# Patient Record
Sex: FEMALE | Race: WHITE | NOT HISPANIC OR LATINO | Employment: OTHER | URBAN - NONMETROPOLITAN AREA
[De-identification: names, ages, dates, MRNs, and addresses within clinical notes are randomized per-mention and may not be internally consistent; named-entity substitution may affect disease eponyms.]

---

## 2017-01-03 ENCOUNTER — TRANSCRIBE ORDERS (OUTPATIENT)
Dept: ADMINISTRATIVE | Facility: HOSPITAL | Age: 82
End: 2017-01-03

## 2017-01-03 ENCOUNTER — HOSPITAL ENCOUNTER (OUTPATIENT)
Dept: ULTRASOUND IMAGING | Facility: HOSPITAL | Age: 82
Discharge: HOME | End: 2017-01-03
Attending: OPHTHALMOLOGY | Admitting: OPHTHALMOLOGY

## 2017-01-03 DIAGNOSIS — I65.23 CAROTID ARTERY OBSTRUCTION, BILATERAL: ICD-10-CM

## 2017-01-03 DIAGNOSIS — I95.9 HYPOTENSION, UNSPECIFIED HYPOTENSION TYPE: Primary | ICD-10-CM

## 2017-01-03 DIAGNOSIS — I65.23 CAROTID ARTERY OBSTRUCTION, BILATERAL: Primary | ICD-10-CM

## 2017-01-03 PROCEDURE — 93880 EXTRACRANIAL BILAT STUDY: CPT

## 2017-01-05 ENCOUNTER — OFFICE VISIT (OUTPATIENT)
Dept: VASCULAR SURGERY | Facility: CLINIC | Age: 82
End: 2017-01-05

## 2017-01-05 ENCOUNTER — HOSPITAL ENCOUNTER (OUTPATIENT)
Dept: CARDIOLOGY | Facility: HOSPITAL | Age: 82
Discharge: HOME | End: 2017-01-05
Attending: OPHTHALMOLOGY

## 2017-01-05 ENCOUNTER — HOSPITAL ENCOUNTER (OUTPATIENT)
Dept: CARDIOLOGY | Facility: HOSPITAL | Age: 82
Discharge: HOME | End: 2017-01-05
Attending: OPHTHALMOLOGY | Admitting: OPHTHALMOLOGY

## 2017-01-05 VITALS
DIASTOLIC BLOOD PRESSURE: 68 MMHG | WEIGHT: 152 LBS | BODY MASS INDEX: 26.93 KG/M2 | HEIGHT: 63 IN | SYSTOLIC BLOOD PRESSURE: 140 MMHG | HEART RATE: 80 BPM

## 2017-01-05 VITALS
DIASTOLIC BLOOD PRESSURE: 75 MMHG | SYSTOLIC BLOOD PRESSURE: 140 MMHG | WEIGHT: 152 LBS | BODY MASS INDEX: 26.93 KG/M2 | HEIGHT: 63 IN

## 2017-01-05 DIAGNOSIS — E78.5 HYPERLIPIDEMIA, UNSPECIFIED HYPERLIPIDEMIA TYPE: ICD-10-CM

## 2017-01-05 DIAGNOSIS — I95.9 HYPOTENSION, UNSPECIFIED HYPOTENSION TYPE: ICD-10-CM

## 2017-01-05 DIAGNOSIS — I10 ESSENTIAL HYPERTENSION: ICD-10-CM

## 2017-01-05 DIAGNOSIS — I65.23 BILATERAL CAROTID ARTERY STENOSIS: Primary | ICD-10-CM

## 2017-01-05 DIAGNOSIS — G45.3 AMAUROSIS FUGAX OF RIGHT EYE: ICD-10-CM

## 2017-01-05 DIAGNOSIS — I48.91 ATRIAL FIBRILLATION, UNSPECIFIED TYPE (HCC): ICD-10-CM

## 2017-01-05 LAB
BH CV ECHO MEAS - AO MAX PG (FULL): 4.5 MMHG
BH CV ECHO MEAS - AO MAX PG: 7.8 MMHG
BH CV ECHO MEAS - AO MEAN PG (FULL): 3 MMHG
BH CV ECHO MEAS - AO MEAN PG: 5 MMHG
BH CV ECHO MEAS - AO ROOT AREA (BSA CORRECTED): 1.4
BH CV ECHO MEAS - AO ROOT AREA: 4.5 CM^2
BH CV ECHO MEAS - AO ROOT DIAM: 2.4 CM
BH CV ECHO MEAS - AO V2 MAX: 140 CM/SEC
BH CV ECHO MEAS - AO V2 MEAN: 101 CM/SEC
BH CV ECHO MEAS - AO V2 VTI: 36.4 CM
BH CV ECHO MEAS - AVA(I,A): 1.2 CM^2
BH CV ECHO MEAS - AVA(I,D): 1.2 CM^2
BH CV ECHO MEAS - AVA(V,A): 1.3 CM^2
BH CV ECHO MEAS - AVA(V,D): 1.3 CM^2
BH CV ECHO MEAS - BSA(HAYCOCK): 1.8 M^2
BH CV ECHO MEAS - BSA: 1.7 M^2
BH CV ECHO MEAS - BZI_BMI: 26.9 KILOGRAMS/M^2
BH CV ECHO MEAS - BZI_METRIC_HEIGHT: 160 CM
BH CV ECHO MEAS - BZI_METRIC_WEIGHT: 68.9 KG
BH CV ECHO MEAS - EDV(CUBED): 46.7 ML
BH CV ECHO MEAS - EDV(TEICH): 54.4 ML
BH CV ECHO MEAS - EF(CUBED): 92.8 %
BH CV ECHO MEAS - EF(TEICH): 88.9 %
BH CV ECHO MEAS - ESV(CUBED): 3.4 ML
BH CV ECHO MEAS - ESV(TEICH): 6.1 ML
BH CV ECHO MEAS - FS: 58.3 %
BH CV ECHO MEAS - IVS/LVPW: 1.3
BH CV ECHO MEAS - IVSD: 1.2 CM
BH CV ECHO MEAS - LA DIMENSION: 3.7 CM
BH CV ECHO MEAS - LA/AO: 1.5
BH CV ECHO MEAS - LAT PEAK E' VEL: 8.1 CM/SEC
BH CV ECHO MEAS - LV MASS(C)D: 115.9 GRAMS
BH CV ECHO MEAS - LV MASS(C)DI: 67.3 GRAMS/M^2
BH CV ECHO MEAS - LV MAX PG: 3.3 MMHG
BH CV ECHO MEAS - LV MEAN PG: 2 MMHG
BH CV ECHO MEAS - LV V1 MAX: 90.7 CM/SEC
BH CV ECHO MEAS - LV V1 MEAN: 59.4 CM/SEC
BH CV ECHO MEAS - LV V1 VTI: 22.2 CM
BH CV ECHO MEAS - LVIDD: 3.6 CM
BH CV ECHO MEAS - LVIDS: 1.5 CM
BH CV ECHO MEAS - LVOT AREA (M): 2 CM^2
BH CV ECHO MEAS - LVOT AREA: 2 CM^2
BH CV ECHO MEAS - LVOT DIAM: 1.6 CM
BH CV ECHO MEAS - LVPWD: 0.9 CM
BH CV ECHO MEAS - MED PEAK E' VEL: 6.42 CM/SEC
BH CV ECHO MEAS - MV DEC TIME: 0.17 SEC
BH CV ECHO MEAS - MV E MAX VEL: 132 CM/SEC
BH CV ECHO MEAS - RAP SYSTOLE: 10 MMHG
BH CV ECHO MEAS - RVSP: 74.6 MMHG
BH CV ECHO MEAS - SI(AO): 95.7 ML/M^2
BH CV ECHO MEAS - SI(CUBED): 25.1 ML/M^2
BH CV ECHO MEAS - SI(LVOT): 25.9 ML/M^2
BH CV ECHO MEAS - SI(TEICH): 28.1 ML/M^2
BH CV ECHO MEAS - SV(AO): 164.7 ML
BH CV ECHO MEAS - SV(CUBED): 43.3 ML
BH CV ECHO MEAS - SV(LVOT): 44.6 ML
BH CV ECHO MEAS - SV(TEICH): 48.4 ML
BH CV ECHO MEAS - TR MAX VEL: 402 CM/SEC
E/E' RATIO: 20.6
LEFT ATRIUM VOLUME INDEX: 37.2 ML/M2
LEFT ATRIUM VOLUME: 63.9 CM3
LV EF 2D ECHO EST: 65 %

## 2017-01-05 PROCEDURE — 93226 XTRNL ECG REC<48 HR SCAN A/R: CPT

## 2017-01-05 PROCEDURE — 99204 OFFICE O/P NEW MOD 45 MIN: CPT | Performed by: SURGERY

## 2017-01-05 PROCEDURE — 93306 TTE W/DOPPLER COMPLETE: CPT

## 2017-01-05 PROCEDURE — 93306 TTE W/DOPPLER COMPLETE: CPT | Performed by: INTERNAL MEDICINE

## 2017-01-05 PROCEDURE — 93227 XTRNL ECG REC<48 HR R&I: CPT | Performed by: INTERNAL MEDICINE

## 2017-01-05 PROCEDURE — 93225 XTRNL ECG REC<48 HRS REC: CPT

## 2017-01-05 RX ORDER — ALENDRONATE SODIUM 70 MG/1
70 TABLET ORAL
COMMUNITY
End: 2022-08-08

## 2017-01-05 RX ORDER — HYDROCHLOROTHIAZIDE 25 MG/1
25 TABLET ORAL DAILY
COMMUNITY

## 2017-01-05 RX ORDER — LEVOTHYROXINE SODIUM 137 UG/1
137 CAPSULE ORAL DAILY
COMMUNITY
End: 2021-08-10

## 2017-01-05 RX ORDER — LISINOPRIL 30 MG/1
10 TABLET ORAL 2 TIMES DAILY
COMMUNITY
End: 2018-01-19 | Stop reason: DRUGHIGH

## 2017-01-05 RX ORDER — WARFARIN SODIUM 5 MG/1
5 TABLET ORAL
COMMUNITY
End: 2017-01-16 | Stop reason: SDUPTHER

## 2017-01-05 RX ORDER — IBUPROFEN 200 MG
200 TABLET ORAL DAILY PRN
COMMUNITY
End: 2022-08-08

## 2017-01-05 RX ORDER — ALPRAZOLAM 0.25 MG/1
0.25 TABLET ORAL 2 TIMES DAILY PRN
COMMUNITY

## 2017-01-05 RX ORDER — TRAMADOL HYDROCHLORIDE 50 MG/1
50 TABLET ORAL EVERY 6 HOURS PRN
COMMUNITY

## 2017-01-05 RX ORDER — ACETAMINOPHEN 325 MG/1
650 TABLET ORAL DAILY PRN
COMMUNITY

## 2017-01-05 RX ORDER — CHOLECALCIFEROL (VITAMIN D3) 125 MCG
TABLET ORAL DAILY
COMMUNITY
End: 2019-01-25

## 2017-01-05 RX ORDER — METOPROLOL SUCCINATE 100 MG/1
100 TABLET, EXTENDED RELEASE ORAL NIGHTLY
COMMUNITY
End: 2022-08-08 | Stop reason: SDUPTHER

## 2017-01-05 NOTE — Clinical Note
January 5, 2017     Shahla Beltran DO  [address]  [address]  [place] [address]    Patient: Damaris Nash   YOB: 1935   Date of Visit: 1/5/2017       Dear Dr. Beltran DO:    Thank you for referring Damaris Nash to me for evaluation. Below are the relevant portions of my assessment and plan of care.                   If you have questions, please do not hesitate to call me. I look forward to following Damaris along with you.         Sincerely,        Natan Rogel DO        CC: No Recipients

## 2017-01-05 NOTE — LETTER
January 5, 2017     Shahla Beltran DO  [address]  [address]  [place] [address]    Patient: Damaris Nash   YOB: 1935   Date of Visit: 1/5/2017       Dear Dr. Beltran, DO:    Thank you for referring Damaris Nash to me for evaluation. Below are the relevant portions of my assessment and plan of care.    If you have questions, please do not hesitate to call me. I look forward to following Damaris along with you.         Sincerely,        Natan Rogel DO        CC: MD Natan Oseguera DO  1/5/2017  4:50 PM  Signed  01/05/2017      Shahla Beltran DO  [address]  [address]  [place] [address]    Damaris Nash  1935    Chief Complaint   Patient presents with   • Carotid Artery Disease     US daniele carotid completed yesterday. Can not see out off right eye since Monday night       Dear Shahla Beltran, *:      HPI  I had the pleasure of seeing your patient Damaris Nash in the office today.  Thank you kindly for this consultation.  As you recall, Damaris Nash is a 81 y.o. left-handed female who you are currently following for routine health maintenance.  She states she has loss of vision to her right eye three days ago.  She was sent to Dr. Rajput at that time.  She does have a history of stroke with symptoms of slurred speech and right-sided weakness.  Her most recent episodes only involved right eye vision loss.  She had a carotid duplex which was personally reviewed by me.    Past Medical History   Diagnosis Date   • A-fib    • Breast CA    • Carotid artery disease    • Hyperlipidemia    • Hypertension    • Hypothyroid    • Osteopenia    • Stroke    • Vitamin D deficiency        Past Surgical History   Procedure Laterality Date   • Breast lumpectomy     • Partial hip arthroplasty Right    • Cataract extraction Right        History reviewed. No pertinent family history.    Social History     Social History   • Marital status:      Spouse name:  N/A   • Number of children: N/A   • Years of education: N/A     Occupational History   • Not on file.     Social History Main Topics   • Smoking status: Former Smoker     Quit date: 1/5/1994   • Smokeless tobacco: Never Used   • Alcohol use No   • Drug use: No   • Sexual activity: Defer     Other Topics Concern   • Not on file     Social History Narrative       Allergies   Allergen Reactions   • Celebrex [Celecoxib]    • Epinephrine    • Ferrous Sulfate    • Other      Omnicef, some Thad, possible Rocephin allergy   • Simvastatin    • Sulfa Antibiotics        Prior to Admission medications    Medication Sig Start Date End Date Taking? Authorizing Provider   acetaminophen (TYLENOL) 325 MG tablet Take 650 mg by mouth Daily As Needed for mild pain (1-3).   Yes Historical Provider, MD   alendronate (FOSAMAX) 70 MG tablet Take 70 mg by mouth Every 7 (Seven) Days.   Yes Historical Provider, MD   ALPRAZolam (XANAX) 0.25 MG tablet Take 0.25 mg by mouth 2 (Two) Times a Day As Needed for anxiety.   Yes Historical Provider, MD   Ergocalciferol (VITAMIN D2) 2000 UNITS tablet Take  by mouth Daily.   Yes Historical Provider, MD   Glucosamine-Chondroitin (MOVE FREE PO) Take  by mouth Daily.   Yes Historical Provider, MD   hydrochlorothiazide (HYDRODIURIL) 25 MG tablet Take 25 mg by mouth Daily.   Yes Historical Provider, MD   ibuprofen (ADVIL,MOTRIN) 200 MG tablet Take 200 mg by mouth Daily As Needed for mild pain (1-3).   Yes Historical Provider, MD   levothyroxine (SYNTHROID, LEVOTHROID) 75 MCG tablet Take 75 mcg by mouth Daily.   Yes Historical Provider, MD   lisinopril (PRINIVIL,ZESTRIL) 30 MG tablet Take 30 mg by mouth Daily.   Yes Historical Provider, MD   metoprolol succinate XL (TOPROL-XL) 100 MG 24 hr tablet Take 100 mg by mouth Every Night.   Yes Historical Provider, MD   Multiple Vitamins-Minerals (PRESERVISION AREDS 2 PO) Take 2 capsules by mouth Daily.   Yes Historical Provider, MD   traMADol (ULTRAM) 50 MG tablet  "Take 50 mg by mouth Every 6 (Six) Hours As Needed for moderate pain (4-6).   Yes Historical Provider, MD   warfarin (COUMADIN) 5 MG tablet Take 5 mg by mouth Daily.   Yes Historical Provider, MD       Review of Systems   Constitutional: Negative.    HENT: Negative.    Eyes: Positive for visual disturbance.   Respiratory: Negative.    Cardiovascular: Negative.    Gastrointestinal: Negative.    Endocrine: Negative.    Genitourinary: Negative.    Musculoskeletal: Negative.    Skin: Negative.    Allergic/Immunologic: Negative.    Neurological: Negative.    Hematological: Negative.    Psychiatric/Behavioral: Negative.        Visit Vitals   • /68   • Pulse 80   • Ht 63\" (160 cm)   • Wt 152 lb (68.9 kg)   • BMI 26.93 kg/m2     Physical Exam   Constitutional: She is oriented to person, place, and time. She appears well-developed and well-nourished. No distress.   HENT:   Head: Normocephalic and atraumatic.   Mouth/Throat: Oropharynx is clear and moist.   Eyes: Pupils are equal, round, and reactive to light. No scleral icterus.   Neck: Normal range of motion. Neck supple. No JVD present. Carotid bruit is not present. No thyromegaly present.   Cardiovascular: Normal rate, regular rhythm, S2 normal, normal heart sounds, intact distal pulses and normal pulses.  Exam reveals no gallop and no friction rub.    No murmur heard.  Abdominal: Soft. Normal aorta and bowel sounds are normal. There is no hepatosplenomegaly.   Musculoskeletal: Normal range of motion.   Neurological: She is alert and oriented to person, place, and time. No cranial nerve deficit.   Skin: Skin is warm and dry. She is not diaphoretic.   Psychiatric: She has a normal mood and affect. Her behavior is normal. Judgment and thought content normal.   Nursing note and vitals reviewed.      Diagnostic Data:  EXAMINATION: US CAROTID BILATERAL- 1/3/2017 1:54 PM CST      HISTORY: Hypertension, right eye visual disturbance.      Color Doppler and duplex imaging of " the carotid arteries is obtained.  The right common carotid artery is visualized, color flow is present.  Peak systolic velocity right common carotid artery is 52 cm/s. The  proximal right internal carotid artery peak systolic velocities 109  cm/s. This may be associated with 50-69% stenosis.      Antegrade flow is demonstrated in the right vertebral.      Left common carotid artery is visualized. Atherosclerotic plaque is  present. Color flow is demonstrated in the left common carotid artery.  Peak systolic velocity is 67 cm/s. Atherosclerotic plaque is present at  its bifurcation. Peak systolic velocity left internal carotid artery 149  cm/s. This is associated with 50-69% stenosis.      Antegrade flow is demonstrated in the left vertebral artery.      IMPRESSION:  1. Turbulence is noted in the proximal right internal carotid artery  50-69% associated stenosis.  2. Turbulence is present in the proximal left internal carotid artery  50-69% stenosis.  3. Correlation with CT angiography may be suggested.  This report was finalized on 01/03/2017 15:45 by Dr. Nate Mendes MD.    Patient Active Problem List   Diagnosis   • Atrial fibrillation (aka ATRIAL FIBRILLATION)   • CVA (cerebral infarction)   • A-fib   • Carotid artery disease   • Hyperlipidemia   • Hypertension   • Amaurosis fugax of right eye   • Bilateral carotid artery stenosis         ICD-10-CM ICD-9-CM   1. Bilateral carotid artery stenosis I65.23 433.10     433.30   2. Atrial fibrillation, unspecified type I48.91 427.31   3. Essential hypertension I10 401.9   4. Hyperlipidemia, unspecified hyperlipidemia type E78.5 272.4   5. Amaurosis fugax of right eye G45.3 362.34       Lab Frequency Next Occurrence   Protime-INR     POC Protime / INR         Plan: After thoroughly evaluating Damaris Nash, I believe the best course of action is to proceed with a CTA of neck to further evaluate her carotid disease.  She does have loss of vision to right eye, without  return.  She should continue taking her current medication regimen as previously planned.  We will see her back on Tuesday with a CTA of the neck.  If she does have significant carotid occlusive disease she will need to have a carotid endarterectomy for stroke risk reduction.  This was all discussed in full with complete understanding.  Thank you for allowing me to participate in the care of your patient.  Please do not hesitate with any questions or concerns.  I will keep you aware of any further encounters with Damaris Nash.        Sincerely yours,         Natan Rogel, DO Shahla Beltran, DO

## 2017-01-05 NOTE — MR AVS SNAPSHOT
Damaris Nash   1/5/2017 10:45 AM   Office Visit    Dept Phone:  [phone]   Encounter #:  [id number]    Provider:  Natan Rogel DO   Department:  [facility] VASCULAR SERVICES                Your Full Care Plan              Your Updated Medication List          This list is accurate as of: 1/5/17 11:30 AM.  Always use your most recent med list.                acetaminophen 325 MG tablet   Commonly known as:  TYLENOL       alendronate 70 MG tablet   Commonly known as:  FOSAMAX       ALPRAZolam 0.25 MG tablet   Commonly known as:  XANAX       hydrochlorothiazide 25 MG tablet   Commonly known as:  HYDRODIURIL       ibuprofen 200 MG tablet   Commonly known as:  ADVIL,MOTRIN       levothyroxine 75 MCG tablet   Commonly known as:  SYNTHROID, LEVOTHROID       lisinopril 30 MG tablet   Commonly known as:  PRINIVIL,ZESTRIL       metoprolol succinate  MG 24 hr tablet   Commonly known as:  TOPROL-XL       MOVE FREE PO       PRESERVISION AREDS 2 PO       traMADol 50 MG tablet   Commonly known as:  ULTRAM       Vitamin D2 2000 UNITS tablet       warfarin 5 MG tablet   Commonly known as:  COUMADIN               You Were Diagnosed With        Codes Comments    Bilateral carotid artery stenosis    -  Primary ICD-10-CM: I65.23  ICD-9-CM: 433.10, 433.30       Instructions     None    Patient Instructions History      Upcoming Appointments     Visit Type Date Time Department     [facility] ECHO 2D COMPLETE VT 1/5/2017  8:00 AM  [facility]     PAD HOLTER MONITOR 24 HOUR VT 1/5/2017  9:15 AM  [facility]    NEW PATIENT 1/5/2017 10:45 AM [facility]    FOLLOW UP 1/16/2017 12:30 PM [facility]      MyChart Signup     Our records indicate that you have declined [facility] signup. If you would like to sign up for [facility], please email [email or web address] or call [phone] to obtain an activation code.             Other Info from Your  "Visit           Your Appointments     Jan 16, 2017 12:30 PM CST   Follow Up with [facility] NP   [facility] (--)    [address]  [facility] [place] [address] [phone]           Arrive 15 minutes prior to appointment.              Allergies     Celebrex [Celecoxib]      Epinephrine      Ferrous Sulfate      Other      Omnicef, some Thad, possible Rocephin allergy    Simvastatin      Sulfa Antibiotics        Reason for Visit     Carotid Artery Disease US daniele carotid completed yesterday. Can not see out off right eye since Monday night      Vital Signs     Blood Pressure Pulse Height Weight Body Mass Index Smoking Status    140/68 80 63\" (160 cm) 152 lb (68.9 kg) 26.93 kg/m2 Former Smoker      Problems and Diagnoses Noted     Atrial fibrillation (irregular heartbeat)    Carotid artery disease    High cholesterol or triglycerides    High blood pressure    Narrowing of artery in neck    -  Primary        "

## 2017-01-09 ENCOUNTER — TELEPHONE (OUTPATIENT)
Dept: CARDIOLOGY | Facility: CLINIC | Age: 82
End: 2017-01-09

## 2017-01-09 DIAGNOSIS — R93.89 ABNORMAL ULTRASOUND OF CAROTID ARTERY: Primary | ICD-10-CM

## 2017-01-09 NOTE — TELEPHONE ENCOUNTER
----- Message from Riley Mota MD sent at 1/4/2017  9:03 PM CST -----  Moderate bilat disease. Consult lizz   i called pt to go over the results of the carotid u/s and that Dr. Mota wanted to refer to Dr. Rogel.  I had to leave a  msg for pt to call me back.  Tod Nixon, CMA

## 2017-01-09 NOTE — TELEPHONE ENCOUNTER
Pt has already been referred to Dr. Rogel for the carotid.  So disregard the order I put in for the referral.

## 2017-01-10 ENCOUNTER — HOSPITAL ENCOUNTER (OUTPATIENT)
Dept: CT IMAGING | Facility: HOSPITAL | Age: 82
Discharge: HOME | End: 2017-01-10
Admitting: NURSE PRACTITIONER

## 2017-01-10 ENCOUNTER — OFFICE VISIT (OUTPATIENT)
Dept: VASCULAR SURGERY | Facility: CLINIC | Age: 82
End: 2017-01-10

## 2017-01-10 VITALS
WEIGHT: 150 LBS | SYSTOLIC BLOOD PRESSURE: 158 MMHG | DIASTOLIC BLOOD PRESSURE: 70 MMHG | BODY MASS INDEX: 26.58 KG/M2 | HEART RATE: 84 BPM | HEIGHT: 63 IN

## 2017-01-10 DIAGNOSIS — I10 ESSENTIAL HYPERTENSION: ICD-10-CM

## 2017-01-10 DIAGNOSIS — I65.23 BILATERAL CAROTID ARTERY STENOSIS: ICD-10-CM

## 2017-01-10 DIAGNOSIS — G45.3 AMAUROSIS FUGAX OF RIGHT EYE: ICD-10-CM

## 2017-01-10 DIAGNOSIS — I65.23 BILATERAL CAROTID ARTERY STENOSIS: Primary | ICD-10-CM

## 2017-01-10 LAB — CREAT BLDA-MCNC: 1 MG/DL (ref 0.6–1.3)

## 2017-01-10 PROCEDURE — 70498 CT ANGIOGRAPHY NECK: CPT

## 2017-01-10 PROCEDURE — 99214 OFFICE O/P EST MOD 30 MIN: CPT | Performed by: SURGERY

## 2017-01-10 PROCEDURE — 0 IOPAMIDOL PER 1 ML: Performed by: NURSE PRACTITIONER

## 2017-01-10 PROCEDURE — 82565 ASSAY OF CREATININE: CPT

## 2017-01-10 RX ORDER — BENZONATATE 100 MG/1
CAPSULE ORAL
COMMUNITY
Start: 2017-01-04 | End: 2017-09-01

## 2017-01-10 RX ORDER — WARFARIN SODIUM 4 MG/1
TABLET ORAL
Refills: 5 | COMMUNITY
Start: 2016-12-16 | End: 2017-01-16 | Stop reason: SDUPTHER

## 2017-01-10 RX ADMIN — IOPAMIDOL 150 ML: 755 INJECTION, SOLUTION INTRAVENOUS at 08:15

## 2017-01-10 NOTE — Clinical Note
January 10, 2017     Shahla Beltran DO  [address]  [address]  [place] [address]    Patient: Damaris Nash   YOB: 1935   Date of Visit: 1/10/2017       Dear Dr. Devin DO:    Damaris Nash was in my office today. Below are the relevant portions of my assessment and plan of care.           If you have questions, please do not hesitate to call me. I look forward to following Damaris along with you.         Sincerely,        Natan Rogel,         CC: Isidoro Rajput MD

## 2017-01-10 NOTE — LETTER
"January 10, 2017     Shahla Beltran DO  [address]  [address]  [place] [address]    Patient: Damaris Nash   YOB: 1935   Date of Visit: 1/10/2017       Dear Dr. Beltran, :    Thank you for referring Damaris Nash to me for evaluation. Below are the relevant portions of my assessment and plan of care.    If you have questions, please do not hesitate to call me. I look forward to following Damaris along with you.         Sincerely,        Natan Rogel DO        CC: MD Natan Oseguera DO  1/10/2017  4:06 PM  Signed  01/05/2017      No referring provider defined for this encounter.    Damaris Nash  1935    Chief Complaint   Patient presents with   • Follow-up     CTA results.Visual impairment right eye.       Dear  Shahla Beltran DO      HPI  I had the pleasure of seeing your patient Damaris Nash in the office today.    As you recall, Damaris Nash is a 81 y.o. left-handed female who you are currently following for routine health maintenance.  She states she has loss of vision to her right eye three days ago.  She was sent to Dr. Rajput at that time, and after a discussion with him, he states he thought the problem was related to hypoperfusion.  She does have a history of stroke with symptoms of slurred speech and right-sided weakness.  Her most recent episodes only involved right eye vision loss.  She did have a CTA of the neck, which I did review in office.    Review of Systems   Constitutional: Negative.    HENT: Negative.    Eyes: Positive for visual disturbance.   Respiratory: Negative.    Cardiovascular: Negative.    Gastrointestinal: Negative.    Endocrine: Negative.    Genitourinary: Negative.    Musculoskeletal: Negative.    Skin: Negative.    Allergic/Immunologic: Negative.    Neurological: Negative.    Hematological: Negative.    Psychiatric/Behavioral: Negative.        Visit Vitals   • /70   • Pulse 84   • Ht 63\" (160 cm)   • Wt 150 lb " (68 kg)   • BMI 26.57 kg/m2     Physical Exam   Constitutional: She is oriented to person, place, and time. She appears well-developed and well-nourished. No distress.   HENT:   Head: Normocephalic and atraumatic.   Mouth/Throat: Oropharynx is clear and moist.   Eyes: Pupils are equal, round, and reactive to light. No scleral icterus.   Neck: Normal range of motion. Neck supple. No JVD present. Carotid bruit is not present. No thyromegaly present.   Cardiovascular: Normal rate, regular rhythm, S2 normal, normal heart sounds, intact distal pulses and normal pulses.  Exam reveals no gallop and no friction rub.    No murmur heard.  Abdominal: Soft. Normal aorta and bowel sounds are normal. There is no hepatosplenomegaly.   Musculoskeletal: Normal range of motion.   Neurological: She is alert and oriented to person, place, and time. No cranial nerve deficit.   Skin: Skin is warm and dry. She is not diaphoretic.   Psychiatric: She has a normal mood and affect. Her behavior is normal. Judgment and thought content normal.   Nursing note and vitals reviewed.      Diagnostic Data:  CT ANGIOGRAM OF THE NECK WITHOUT AND WITH CONTRAST WITH REFORMATIONS AND  3-D IMAGING:  Underlying COPD changes are present within the partially visualized lung  apices. There is some apical granulomatous scarring mainly on the right.  There is a questionable 5 mm nodule within the posterior right upper  lobe with follow-up of this finding recommended to document stability.      Some streak artifact from dense contrast within the brachiocephalic vein  and upper SVC. The right common carotid artery demonstrates only minimal  atherosclerotic calcification at its origin and throughout its course.  In the region of the bifurcation there are moderate calcified plaques  present felt to be approximately 50% stenotic. The internal carotid  artery appears to be widely patent throughout its course in the neck  into the skull base.      The left common  carotid artery origin shows no obvious significant  stenosis with streaking artifact present. There is scattered mild  atherosclerotic calcification in the distal left common carotid artery  felt to be less than 30% stenotic. There is some proximal left internal  carotid artery calcified plaque, irregular felt to be in the range of  80-90% stenotic. This involves a short segment of the proximal left  internal carotid artery which returns to normal caliber above this  stenosis into the skull base.      The right subclavian artery demonstrates a normal origin of the right  vertebral artery with a normal cervical course of this vessel throughout  the neck into the skull base and posterior fossa.      Some mild atherosclerotic calcifications within the left subclavian  artery does not produce significant vertebral stenosis on the left. The  left vertebral origin appears widely patent. The cervical course shows  no obvious component of significant stenosis. There is minimal  calcification involving the distal left vertebral artery just lateral to  the left lateral mass of C1. This is felt to be less than 50% stenotic.      IMPRESSIONS:  1. Multiple calcified plaques within the proximal left internal carotid  artery distribution just over a short segment felt to be in the range of  80-90% stenotic.  2. Lesser calcified plaque in the right carotid bifurcation felt to be  in the range of 50% stenotic.  3. Some minimal calcification of the distal left vertebral artery  lateral to the C1 arch level, felt to be less than 50% stenotic.  This report was finalized on by Dr. Mikey Rendon MD.    Patient Active Problem List   Diagnosis   • Atrial fibrillation (aka ATRIAL FIBRILLATION)   • CVA (cerebral infarction)   • A-fib   • Carotid artery disease   • Hyperlipidemia   • Hypertension   • Amaurosis fugax of right eye   • Bilateral carotid artery stenosis         ICD-10-CM ICD-9-CM   1. Bilateral carotid artery stenosis I65.23  433.10     433.30   2. Essential hypertension I10 401.9   3. Amaurosis fugax of right eye G45.3 362.34         Plan: After thoroughly evaluating Damaris Nash, I believe the best course of action is to remain conservative from a vascular standpoint.  The CTA of her neck did not show significant narrowing.  There is about 50% stenosis on both sides.  The reading from the radiologist is not accurate.  She can continue on her current medication regimen as previously planned.  I will see her back in 1 year's time with a repeat carotid duplex for continued surveillance.  This was all discussed in full with complete understanding.  Thank you for allowing me to participate in the care of your patient.  Please do not hesitate with any questions or concerns.  I will keep you aware of any further encounters with Damaris Nash.        Sincerely yours,         Natan Rogel, DO    Shahla Beltran, DO

## 2017-01-10 NOTE — PROGRESS NOTES
"01/05/2017      No referring provider defined for this encounter.    Damaris Nash  1935    Chief Complaint   Patient presents with   • Follow-up     CTA results.Visual impairment right eye.       Dear  Shahla Beltran, DO      HPI  I had the pleasure of seeing your patient Damaris Nash in the office today.    As you recall, Damaris Nash is a 81 y.o. left-handed female who you are currently following for routine health maintenance.  She states she has loss of vision to her right eye three days ago.  She was sent to Dr. Rajput at that time, and after a discussion with him, he states he thought the problem was related to hypoperfusion.  She does have a history of stroke with symptoms of slurred speech and right-sided weakness.  Her most recent episodes only involved right eye vision loss.  She did have a CTA of the neck, which I did review in office.    Review of Systems   Constitutional: Negative.    HENT: Negative.    Eyes: Positive for visual disturbance.   Respiratory: Negative.    Cardiovascular: Negative.    Gastrointestinal: Negative.    Endocrine: Negative.    Genitourinary: Negative.    Musculoskeletal: Negative.    Skin: Negative.    Allergic/Immunologic: Negative.    Neurological: Negative.    Hematological: Negative.    Psychiatric/Behavioral: Negative.        Visit Vitals   • /70   • Pulse 84   • Ht 63\" (160 cm)   • Wt 150 lb (68 kg)   • BMI 26.57 kg/m2     Physical Exam   Constitutional: She is oriented to person, place, and time. She appears well-developed and well-nourished. No distress.   HENT:   Head: Normocephalic and atraumatic.   Mouth/Throat: Oropharynx is clear and moist.   Eyes: Pupils are equal, round, and reactive to light. No scleral icterus.   Neck: Normal range of motion. Neck supple. No JVD present. Carotid bruit is not present. No thyromegaly present.   Cardiovascular: Normal rate, regular rhythm, S2 normal, normal heart sounds, intact distal pulses and normal " pulses.  Exam reveals no gallop and no friction rub.    No murmur heard.  Abdominal: Soft. Normal aorta and bowel sounds are normal. There is no hepatosplenomegaly.   Musculoskeletal: Normal range of motion.   Neurological: She is alert and oriented to person, place, and time. No cranial nerve deficit.   Skin: Skin is warm and dry. She is not diaphoretic.   Psychiatric: She has a normal mood and affect. Her behavior is normal. Judgment and thought content normal.   Nursing note and vitals reviewed.      Diagnostic Data:  CT ANGIOGRAM OF THE NECK WITHOUT AND WITH CONTRAST WITH REFORMATIONS AND  3-D IMAGING:  Underlying COPD changes are present within the partially visualized lung  apices. There is some apical granulomatous scarring mainly on the right.  There is a questionable 5 mm nodule within the posterior right upper  lobe with follow-up of this finding recommended to document stability.      Some streak artifact from dense contrast within the brachiocephalic vein  and upper SVC. The right common carotid artery demonstrates only minimal  atherosclerotic calcification at its origin and throughout its course.  In the region of the bifurcation there are moderate calcified plaques  present felt to be approximately 50% stenotic. The internal carotid  artery appears to be widely patent throughout its course in the neck  into the skull base.      The left common carotid artery origin shows no obvious significant  stenosis with streaking artifact present. There is scattered mild  atherosclerotic calcification in the distal left common carotid artery  felt to be less than 30% stenotic. There is some proximal left internal  carotid artery calcified plaque, irregular felt to be in the range of  80-90% stenotic. This involves a short segment of the proximal left  internal carotid artery which returns to normal caliber above this  stenosis into the skull base.      The right subclavian artery demonstrates a normal origin of  the right  vertebral artery with a normal cervical course of this vessel throughout  the neck into the skull base and posterior fossa.      Some mild atherosclerotic calcifications within the left subclavian  artery does not produce significant vertebral stenosis on the left. The  left vertebral origin appears widely patent. The cervical course shows  no obvious component of significant stenosis. There is minimal  calcification involving the distal left vertebral artery just lateral to  the left lateral mass of C1. This is felt to be less than 50% stenotic.      IMPRESSIONS:  1. Multiple calcified plaques within the proximal left internal carotid  artery distribution just over a short segment felt to be in the range of  80-90% stenotic.  2. Lesser calcified plaque in the right carotid bifurcation felt to be  in the range of 50% stenotic.  3. Some minimal calcification of the distal left vertebral artery  lateral to the C1 arch level, felt to be less than 50% stenotic.  This report was finalized on by Dr. Mikey Rendon MD.    Patient Active Problem List   Diagnosis   • Atrial fibrillation (aka ATRIAL FIBRILLATION)   • CVA (cerebral infarction)   • A-fib   • Carotid artery disease   • Hyperlipidemia   • Hypertension   • Amaurosis fugax of right eye   • Bilateral carotid artery stenosis         ICD-10-CM ICD-9-CM   1. Bilateral carotid artery stenosis I65.23 433.10     433.30   2. Essential hypertension I10 401.9   3. Amaurosis fugax of right eye G45.3 362.34         Plan: After thoroughly evaluating Damaris Nash, I believe the best course of action is to remain conservative from a vascular standpoint.  The CTA of her neck did not show significant narrowing.  There is about 50% stenosis on both sides.  The reading from the radiologist is not accurate.  She can continue on her current medication regimen as previously planned.  I will see her back in 1 year's time with a repeat carotid duplex for continued  surveillance.  This was all discussed in full with complete understanding.  Thank you for allowing me to participate in the care of your patient.  Please do not hesitate with any questions or concerns.  I will keep you aware of any further encounters with Damaris Nash.        Sincerely yours,         Natan Rogel, DO    Shahla Beltran, DO

## 2017-01-10 NOTE — MR AVS SNAPSHOT
Damaris Nash   1/10/2017 9:30 AM   Office Visit    Dept Phone:  [phone]   Encounter #:  [id number]    Provider:  Natan Rogel DO   Department:  [facility] VASCULAR SERVICES                Your Full Care Plan              Your Updated Medication List          This list is accurate as of: 1/10/17 10:20 AM.  Always use your most recent med list.                acetaminophen 325 MG tablet   Commonly known as:  TYLENOL       alendronate 70 MG tablet   Commonly known as:  FOSAMAX       ALPRAZolam 0.25 MG tablet   Commonly known as:  XANAX       benzonatate 100 MG capsule   Commonly known as:  TESSALON       hydrochlorothiazide 25 MG tablet   Commonly known as:  HYDRODIURIL       ibuprofen 200 MG tablet   Commonly known as:  ADVIL,MOTRIN       levothyroxine 75 MCG tablet   Commonly known as:  SYNTHROID, LEVOTHROID       lisinopril 30 MG tablet   Commonly known as:  PRINIVIL,ZESTRIL       metoprolol succinate  MG 24 hr tablet   Commonly known as:  TOPROL-XL       MOVE FREE PO       PRESERVISION AREDS 2 PO       traMADol 50 MG tablet   Commonly known as:  ULTRAM       Vitamin D2 2000 UNITS tablet       warfarin 5 MG tablet   Commonly known as:  COUMADIN               You Were Diagnosed With        Codes Comments    Bilateral carotid artery stenosis    -  Primary ICD-10-CM: I65.23  ICD-9-CM: 433.10, 433.30     Essential hypertension     ICD-10-CM: I10  ICD-9-CM: 401.9       Instructions     None    Patient Instructions History      Upcoming Appointments     Visit Type Date Time Department    FOLLOW UP 1/10/2017  9:30 AM [facility]ULAR SURG [facility]    CT PAD ANGIOGRAM NECK W WO 1/10/2017  8:30 AM  PAD CT    FOLLOW UP 1/16/2017 12:30 PM [facility] HEART GROUP PAD      MyChart Signup     Our records indicate that you have declined [facility] MyChart signup. If you would like to sign up for MyChart, please email [email or web address] or call [phone] to obtain an  "activation code.             Other Info from Your Visit           Your Appointments     Jan 16, 2017 12:30 PM CST   Follow Up with [facility] NP   [facility] GROUP (--)    [address] [place] [address] [address]  [facility] [address] [phone]           Arrive 15 minutes prior to appointment.              Allergies     Celebrex [Celecoxib]      Epinephrine      Ferrous Sulfate      Other      Omnicef, some Thad, possible Rocephin allergy    Simvastatin      Sulfa Antibiotics        Reason for Visit     Follow-up CTA results.Visual impairment right eye.      Vital Signs     Blood Pressure Pulse Height Weight Body Mass Index Smoking Status    158/70 84 63\" (160 cm) 150 lb (68 kg) 26.57 kg/m2 Former Smoker      Problems and Diagnoses Noted     Narrowing of artery in neck    High blood pressure        "

## 2017-01-16 ENCOUNTER — OFFICE VISIT (OUTPATIENT)
Dept: CARDIOLOGY | Facility: CLINIC | Age: 82
End: 2017-01-16

## 2017-01-16 VITALS
DIASTOLIC BLOOD PRESSURE: 70 MMHG | WEIGHT: 149 LBS | BODY MASS INDEX: 26.4 KG/M2 | SYSTOLIC BLOOD PRESSURE: 130 MMHG | RESPIRATION RATE: 18 BRPM | HEART RATE: 78 BPM | HEIGHT: 63 IN

## 2017-01-16 DIAGNOSIS — I77.9 BILATERAL CAROTID ARTERY DISEASE (HCC): ICD-10-CM

## 2017-01-16 DIAGNOSIS — I48.91 ATRIAL FIBRILLATION, UNSPECIFIED TYPE (HCC): ICD-10-CM

## 2017-01-16 DIAGNOSIS — Z86.73 HX OF TRANSIENT ISCHEMIC ATTACK (TIA): ICD-10-CM

## 2017-01-16 DIAGNOSIS — E78.2 MIXED HYPERLIPIDEMIA: Primary | ICD-10-CM

## 2017-01-16 DIAGNOSIS — I10 ESSENTIAL HYPERTENSION: ICD-10-CM

## 2017-01-16 PROCEDURE — 93000 ELECTROCARDIOGRAM COMPLETE: CPT | Performed by: PHYSICIAN ASSISTANT

## 2017-01-16 PROCEDURE — 99213 OFFICE O/P EST LOW 20 MIN: CPT | Performed by: PHYSICIAN ASSISTANT

## 2017-01-16 RX ORDER — WARFARIN SODIUM 4 MG/1
TABLET ORAL
Qty: 30 TABLET | Refills: 11 | Status: SHIPPED | OUTPATIENT
Start: 2017-01-16 | End: 2018-02-04 | Stop reason: SDUPTHER

## 2017-01-16 NOTE — MR AVS SNAPSHOT
Damaris Nash   1/16/2017 12:30 PM   Office Visit    Dept Phone:  [phone]   Encounter #:  [id number]    Provider:  [facility] NP   Department:  [facility] HEART GROUP                Your Full Care Plan              Today's Medication Changes          These changes are accurate as of: 1/16/17  1:36 PM.  If you have any questions, ask your nurse or doctor.               Medication(s)that have changed:     warfarin 4 MG tablet   Commonly known as:  COUMADIN   Take 1/2 pill daily on MWF. Take 1 daily on Tues,TH,Sat,Sun   What changed:  See the new instructions.            Where to Get Your Medications      These medications were sent to [facility] [address] [facility], [place] - [address] AT [facility] [address]  - [phone] [facility] [phone] FX  [address], [place] [address]    Hours:  24-hours Phone:  [phone]     warfarin 4 MG tablet                  Your Updated Medication List          This list is accurate as of: 1/16/17  1:36 PM.  Always use your most recent med list.                acetaminophen 325 MG tablet   Commonly known as:  TYLENOL       alendronate 70 MG tablet   Commonly known as:  FOSAMAX       ALPRAZolam 0.25 MG tablet   Commonly known as:  XANAX       benzonatate 100 MG capsule   Commonly known as:  TESSALON       hydrochlorothiazide 25 MG tablet   Commonly known as:  HYDRODIURIL       ibuprofen 200 MG tablet   Commonly known as:  ADVIL,MOTRIN       levothyroxine 75 MCG tablet   Commonly known as:  SYNTHROID, LEVOTHROID       lisinopril 30 MG tablet   Commonly known as:  PRINIVIL,ZESTRIL       metoprolol succinate  MG 24 hr tablet   Commonly known as:  TOPROL-XL       MOVE FREE PO       PRESERVISION AREDS 2 PO       traMADol 50 MG tablet   Commonly known as:  ULTRAM       Vitamin D2 2000 UNITS tablet       warfarin 4 MG tablet   Commonly known as:  COUMADIN   Take 1/2 pill daily on MWF. Take 1 daily on  "Tues,TH,Sat,Sun               You Were Diagnosed With        Codes Comments    Mixed hyperlipidemia    -  Primary ICD-10-CM: E78.2  ICD-9-CM: 272.2     Bilateral carotid artery disease     ICD-10-CM: I77.9  ICD-9-CM: 447.9     Atrial fibrillation, unspecified type     ICD-10-CM: I48.91  ICD-9-CM: 427.31 Ablation 2012    Essential hypertension     ICD-10-CM: I10  ICD-9-CM: 401.9     Hx of transient ischemic attack (TIA)     ICD-10-CM: Z86.73  ICD-9-CM: V12.54       Instructions     None    Patient Instructions History      Upcoming Appointments     Visit Type Date Time Department    FOLLOW UP 1/16/2017 12:30 PM [facility]    FOLLOW UP 1/12/2018 11:30 AM [facility]ASCULAR SURG [facility]      MyChart Signup     Our records indicate that you have declined [facility] signup. If you would like to sign up for [employer], please email [facility] [email or web address] or call [phone] to obtain an activation code.             Other Info from Your Visit           Your Appointments     Jan 12, 2018 11:30 AM CST   Follow Up with POLY Rosen   [facility] VASCULAR SERVICES (--)    [address] [place] [address] [address] [place] [address]   [phone]           Arrive 15 minutes prior to appointment.              Allergies     Celebrex [Celecoxib]      Epinephrine      Ferrous Sulfate      Other      Omnicef, some Thad, possible Rocephin allergy    Simvastatin      Sulfa Antibiotics        Reason for Visit     Hypotension had carotid ultrasound, holter moniter, 2D echo was done      Vital Signs     Blood Pressure Pulse Respirations Height Weight Breastfeeding?    130/70 (BP Location: Left arm, Patient Position: Sitting, Cuff Size: Adult) 78 18 63\" (160 cm) 149 lb (67.6 kg) No    Body Mass Index Smoking Status                26.39 kg/m2 Former Smoker          Problems and Diagnoses Noted     Atrial fibrillation (irregular heartbeat)    Carotid artery disease    Hx of transient ischemic attack (TIA)    High " cholesterol or triglycerides    High blood pressure

## 2017-01-16 NOTE — PROGRESS NOTES
Subjective:     Encounter Date:01/16/2017      Patient ID: Damaris Nash is a 81 y.o. female.    Chief Complaint:  Atrial Fibrillation   Presents for follow-up visit. Symptoms include hypertension. Symptoms are negative for chest pain, dizziness, palpitations, shortness of breath, syncope, tachycardia and weakness. The symptoms have been improving. Past medical history includes atrial fibrillation.   Hypertension   This is a chronic problem. The current episode started more than 1 year ago. The problem is unchanged. Associated symptoms include blurred vision. Pertinent negatives include no chest pain, headaches, malaise/fatigue, orthopnea, palpitations, PND or shortness of breath. Risk factors for coronary artery disease include dyslipidemia.     The pt states she has had trouble with her vision. She woke up a few weeks ago with transient vision loss in her R eye. Since this time she continues with blurred/double vision intermittently. She says she's seen Dr. Isidoro Rajput about this issue and he recommended a carotid study. It showed 50-69% stenosis on both sides. The CTA neck showed similar results, per Dr. Rogel. He recommends a conservative treatment approach. The pt also had an echo and Holter monitor performed. The echo showed LVEF 65%, grade I diastolic dysfn and mild mitral regurg. The holter was unremarkable. The pt states that Dr. Rajput suggested the vision loss may have been caused from nighttime hypotension and recommended that she take her Toprol earlier in the evening. The pt states her BP has been running 120s-130s and occasionally 150s systolic. There are no documented episodes of hypotension. Other than the transient vision loss, the pt has no new complaints. She denies any CP/pressure, dyspnea, syncope, dizziness, palpitations, edema, or increased fatigue.     The following portions of the patient's history were reviewed and updated as appropriate: allergies, current medications, past family  history, past medical history, past social history, past surgical history and problem list.    Review of Systems   Constitution: Negative for weakness, malaise/fatigue and weight gain.   HENT: Negative for headaches.    Eyes: Positive for blurred vision, double vision and vision loss in right eye (resolved now).   Cardiovascular: Negative for chest pain, claudication, dyspnea on exertion, irregular heartbeat, leg swelling, near-syncope, orthopnea, palpitations, paroxysmal nocturnal dyspnea and syncope.   Respiratory: Negative for hemoptysis and shortness of breath.    Hematologic/Lymphatic: Negative for bleeding problem. Does not bruise/bleed easily.   Skin: Negative for rash.   Musculoskeletal: Negative for myalgias.   Gastrointestinal: Negative for abdominal pain, melena, nausea and vomiting.   Genitourinary: Negative for hematuria.   Neurological: Negative for dizziness, focal weakness and light-headedness.   Psychiatric/Behavioral: Negative for depression and memory loss. The patient is not nervous/anxious.    All other systems reviewed and are negative.      ECG 12 Lead  Date/Time: 1/16/2017 1:56 PM  Performed by: CELESTINE BAINS  Authorized by: CELESTINE BAINS   Comparison: compared with previous ECG from 11/24/2015  Comparison to previous ECG: No longer sinus tach  Rhythm: sinus rhythm  Rate: normal  Conduction: non-specific intraventricular conduction delay  QRS axis: normal                 Objective:     Physical Exam   Constitutional: She is oriented to person, place, and time. She appears well-developed and well-nourished.   HENT:   Head: Normocephalic and atraumatic.   Eyes: Conjunctivae and EOM are normal. Pupils are equal, round, and reactive to light.   Neck: Normal range of motion. Neck supple. No JVD present.   Cardiovascular: Normal rate, regular rhythm, S1 normal, S2 normal, normal heart sounds, intact distal pulses and normal pulses.    No murmur heard.  Pulmonary/Chest: Effort normal and breath  "sounds normal. No respiratory distress.   Abdominal: Soft. Bowel sounds are normal. She exhibits no distension.   Musculoskeletal: She exhibits no edema.   Neurological: She is alert and oriented to person, place, and time.   Skin: Skin is warm and dry.   Psychiatric: She has a normal mood and affect. Judgment normal.   Vitals reviewed.    Visit Vitals   • /70 (BP Location: Left arm, Patient Position: Sitting, Cuff Size: Adult)   • Pulse 78   • Resp 18   • Ht 63\" (160 cm)   • Wt 149 lb (67.6 kg)   • Breastfeeding No   • BMI 26.39 kg/m2         Lab Review: INR 12/27/16: 3.1      Assessment:          Diagnosis Plan   1. Mixed hyperlipidemia      Followed by Dr. Beltran, pt states she will be placed back on statin    2. Bilateral carotid artery disease      Followed by Dr. Rogel   3. Atrial fibrillation, unspecified type      Ablation 2012. in NSR today. Chronically anticoagulated on warfarin   4. Essential hypertension      Well controlled today. Pt reports 130s-150s. Follow-up with PCP.    5. Hx of transient ischemic attack (TIA)            Plan:       1. Will not treat for hypotension at this time, as no documentation   2. Continue Coumadin 4 mg TTHSS and 2 mg MWF.  3. Recheck INR as scheduled.  4. Continue all other medication as prescribed  5. Follow-up with PCP in 2 weeks, as scheduled, regarding HTN and HLD  6. Pt not currently on statin drug, but states her PCP has said she would be started on one soon  7. Follow-up in 1 year, unless needed sooner  8. Verbalized understanding of instructions.        "

## 2017-01-27 ENCOUNTER — LAB (OUTPATIENT)
Dept: LAB | Facility: HOSPITAL | Age: 82
End: 2017-01-27
Attending: INTERNAL MEDICINE

## 2017-01-27 ENCOUNTER — ANTICOAGULATION VISIT (OUTPATIENT)
Dept: CARDIOLOGY | Facility: CLINIC | Age: 82
End: 2017-01-27

## 2017-01-27 DIAGNOSIS — I48.91 ATRIAL FIBRILLATION, UNSPECIFIED TYPE (HCC): ICD-10-CM

## 2017-01-27 LAB
INR PPP: 2.7
INR PPP: 2.7 (ref 0.91–1.09)
PROTHROMBIN TIME: 32 SECONDS (ref 10–13.8)

## 2017-01-27 PROCEDURE — 85610 PROTHROMBIN TIME: CPT | Performed by: FAMILY MEDICINE

## 2017-02-27 ENCOUNTER — LAB (OUTPATIENT)
Dept: LAB | Facility: HOSPITAL | Age: 82
End: 2017-02-27
Attending: INTERNAL MEDICINE

## 2017-02-27 ENCOUNTER — ANTICOAGULATION VISIT (OUTPATIENT)
Dept: CARDIOLOGY | Facility: CLINIC | Age: 82
End: 2017-02-27

## 2017-02-27 DIAGNOSIS — I48.91 ATRIAL FIBRILLATION, UNSPECIFIED TYPE (HCC): ICD-10-CM

## 2017-02-27 LAB
INR PPP: 2.4
INR PPP: 2.4 (ref 0.91–1.09)
PROTHROMBIN TIME: 28.5 SECONDS (ref 10–13.8)

## 2017-02-27 PROCEDURE — 85610 PROTHROMBIN TIME: CPT | Performed by: FAMILY MEDICINE

## 2017-03-28 ENCOUNTER — LAB (OUTPATIENT)
Dept: LAB | Facility: HOSPITAL | Age: 82
End: 2017-03-28
Attending: INTERNAL MEDICINE

## 2017-03-28 ENCOUNTER — ANTICOAGULATION VISIT (OUTPATIENT)
Dept: CARDIOLOGY | Facility: CLINIC | Age: 82
End: 2017-03-28

## 2017-03-28 DIAGNOSIS — I48.91 ATRIAL FIBRILLATION, UNSPECIFIED TYPE (HCC): ICD-10-CM

## 2017-03-28 LAB
INR PPP: 2.4 (ref 0.91–1.09)
PROTHROMBIN TIME: 29.2 SECONDS (ref 10–13.8)

## 2017-03-28 PROCEDURE — 85610 PROTHROMBIN TIME: CPT | Performed by: FAMILY MEDICINE

## 2017-04-26 ENCOUNTER — LAB (OUTPATIENT)
Dept: LAB | Facility: HOSPITAL | Age: 82
End: 2017-04-26
Attending: INTERNAL MEDICINE

## 2017-04-26 ENCOUNTER — ANTICOAGULATION VISIT (OUTPATIENT)
Dept: CARDIOLOGY | Facility: CLINIC | Age: 82
End: 2017-04-26

## 2017-04-26 DIAGNOSIS — I48.91 ATRIAL FIBRILLATION, UNSPECIFIED TYPE (HCC): ICD-10-CM

## 2017-04-26 LAB
INR PPP: 2.1 (ref 0.91–1.09)
PROTHROMBIN TIME: 24.6 SECONDS (ref 10–13.8)

## 2017-04-26 PROCEDURE — 85610 PROTHROMBIN TIME: CPT | Performed by: FAMILY MEDICINE

## 2017-05-30 ENCOUNTER — LAB (OUTPATIENT)
Dept: LAB | Facility: HOSPITAL | Age: 82
End: 2017-05-30
Attending: INTERNAL MEDICINE

## 2017-05-30 ENCOUNTER — ANTICOAGULATION VISIT (OUTPATIENT)
Dept: CARDIOLOGY | Facility: CLINIC | Age: 82
End: 2017-05-30

## 2017-05-30 DIAGNOSIS — I48.91 ATRIAL FIBRILLATION, UNSPECIFIED TYPE (HCC): ICD-10-CM

## 2017-05-30 LAB
INR PPP: 1.7 (ref 0.91–1.09)
PROTHROMBIN TIME: 20.8 SECONDS (ref 10–13.8)

## 2017-05-30 PROCEDURE — 85610 PROTHROMBIN TIME: CPT | Performed by: FAMILY MEDICINE

## 2017-06-07 ENCOUNTER — ANTICOAGULATION VISIT (OUTPATIENT)
Dept: CARDIOLOGY | Facility: CLINIC | Age: 82
End: 2017-06-07

## 2017-06-07 ENCOUNTER — LAB (OUTPATIENT)
Dept: LAB | Facility: HOSPITAL | Age: 82
End: 2017-06-07
Attending: INTERNAL MEDICINE

## 2017-06-07 DIAGNOSIS — I48.91 ATRIAL FIBRILLATION, UNSPECIFIED TYPE (HCC): ICD-10-CM

## 2017-06-07 LAB
INR PPP: 2.6 (ref 0.91–1.09)
PROTHROMBIN TIME: 30.8 SECONDS (ref 10–13.8)

## 2017-06-07 PROCEDURE — 85610 PROTHROMBIN TIME: CPT | Performed by: FAMILY MEDICINE

## 2017-06-22 ENCOUNTER — ANTICOAGULATION VISIT (OUTPATIENT)
Dept: CARDIOLOGY | Facility: CLINIC | Age: 82
End: 2017-06-22

## 2017-06-22 ENCOUNTER — LAB (OUTPATIENT)
Dept: LAB | Facility: HOSPITAL | Age: 82
End: 2017-06-22
Attending: INTERNAL MEDICINE

## 2017-06-22 DIAGNOSIS — I48.91 ATRIAL FIBRILLATION, UNSPECIFIED TYPE (HCC): ICD-10-CM

## 2017-06-22 LAB
INR PPP: 2.4 (ref 0.91–1.09)
PROTHROMBIN TIME: 29.3 SECONDS (ref 10–13.8)

## 2017-06-22 PROCEDURE — 85610 PROTHROMBIN TIME: CPT | Performed by: INTERNAL MEDICINE

## 2017-07-21 ENCOUNTER — LAB (OUTPATIENT)
Dept: LAB | Facility: HOSPITAL | Age: 82
End: 2017-07-21
Attending: INTERNAL MEDICINE

## 2017-07-21 ENCOUNTER — ANTICOAGULATION VISIT (OUTPATIENT)
Dept: CARDIOLOGY | Facility: CLINIC | Age: 82
End: 2017-07-21

## 2017-07-21 DIAGNOSIS — I48.91 ATRIAL FIBRILLATION, UNSPECIFIED TYPE (HCC): ICD-10-CM

## 2017-07-21 LAB
INR PPP: 2.1 (ref 0.91–1.09)
PROTHROMBIN TIME: 25.3 SECONDS (ref 10–13.8)

## 2017-07-21 PROCEDURE — 85610 PROTHROMBIN TIME: CPT | Performed by: INTERNAL MEDICINE

## 2017-08-17 ENCOUNTER — TRANSCRIBE ORDERS (OUTPATIENT)
Dept: ADMINISTRATIVE | Facility: HOSPITAL | Age: 82
End: 2017-08-17

## 2017-08-17 DIAGNOSIS — M85.9 DISORDER OF BONE DENSITY AND STRUCTURE, UNSPECIFIED: Primary | ICD-10-CM

## 2017-08-18 ENCOUNTER — LAB (OUTPATIENT)
Dept: LAB | Facility: HOSPITAL | Age: 82
End: 2017-08-18
Attending: INTERNAL MEDICINE

## 2017-08-18 ENCOUNTER — ANTICOAGULATION VISIT (OUTPATIENT)
Dept: CARDIOLOGY | Facility: CLINIC | Age: 82
End: 2017-08-18

## 2017-08-18 ENCOUNTER — HOSPITAL ENCOUNTER (OUTPATIENT)
Dept: BONE DENSITY | Facility: HOSPITAL | Age: 82
Discharge: HOME | End: 2017-08-18
Attending: FAMILY MEDICINE | Admitting: FAMILY MEDICINE

## 2017-08-18 DIAGNOSIS — M85.9 DISORDER OF BONE DENSITY AND STRUCTURE, UNSPECIFIED: ICD-10-CM

## 2017-08-18 DIAGNOSIS — I48.91 ATRIAL FIBRILLATION, UNSPECIFIED TYPE (HCC): ICD-10-CM

## 2017-08-18 LAB
INR PPP: 2.4
INR PPP: 2.4 (ref 0.91–1.09)
PROTHROMBIN TIME: 28.7 SECONDS (ref 10–13.8)

## 2017-08-18 PROCEDURE — 77080 DXA BONE DENSITY AXIAL: CPT

## 2017-08-18 PROCEDURE — 85610 PROTHROMBIN TIME: CPT | Performed by: INTERNAL MEDICINE

## 2017-09-01 PROCEDURE — 80048 BASIC METABOLIC PNL TOTAL CA: CPT | Performed by: FAMILY MEDICINE

## 2017-09-01 PROCEDURE — 87086 URINE CULTURE/COLONY COUNT: CPT | Performed by: FAMILY MEDICINE

## 2017-09-01 PROCEDURE — 84443 ASSAY THYROID STIM HORMONE: CPT | Performed by: FAMILY MEDICINE

## 2017-09-01 PROCEDURE — 84439 ASSAY OF FREE THYROXINE: CPT | Performed by: FAMILY MEDICINE

## 2017-09-06 ENCOUNTER — OFFICE VISIT (OUTPATIENT)
Dept: CARDIAC REHAB | Facility: HOSPITAL | Age: 82
End: 2017-09-06

## 2017-09-06 DIAGNOSIS — I48.91 ATRIAL FIBRILLATION, UNSPECIFIED TYPE (HCC): Primary | ICD-10-CM

## 2017-09-08 ENCOUNTER — TRANSCRIBE ORDERS (OUTPATIENT)
Dept: LAB | Facility: HOSPITAL | Age: 82
End: 2017-09-08

## 2017-09-08 DIAGNOSIS — I10 ESSENTIAL (PRIMARY) HYPERTENSION: Primary | ICD-10-CM

## 2017-09-09 ENCOUNTER — HOSPITAL ENCOUNTER (OUTPATIENT)
Dept: ULTRASOUND IMAGING | Facility: HOSPITAL | Age: 82
Discharge: HOME | End: 2017-09-09
Admitting: NURSE PRACTITIONER

## 2017-09-09 DIAGNOSIS — I10 ESSENTIAL (PRIMARY) HYPERTENSION: ICD-10-CM

## 2017-09-09 PROCEDURE — 76775 US EXAM ABDO BACK WALL LIM: CPT

## 2017-09-15 ENCOUNTER — ANTICOAGULATION VISIT (OUTPATIENT)
Dept: CARDIOLOGY | Facility: CLINIC | Age: 82
End: 2017-09-15

## 2017-09-15 ENCOUNTER — LAB (OUTPATIENT)
Dept: LAB | Facility: HOSPITAL | Age: 82
End: 2017-09-15
Attending: INTERNAL MEDICINE

## 2017-09-15 DIAGNOSIS — I48.91 ATRIAL FIBRILLATION, UNSPECIFIED TYPE (HCC): ICD-10-CM

## 2017-09-15 LAB
INR PPP: 1.7
INR PPP: 1.7 (ref 0.91–1.09)
PROTHROMBIN TIME: 20 SECONDS (ref 10–13.8)

## 2017-09-15 PROCEDURE — 85610 PROTHROMBIN TIME: CPT | Performed by: INTERNAL MEDICINE

## 2017-09-25 ENCOUNTER — ANTICOAGULATION VISIT (OUTPATIENT)
Dept: CARDIOLOGY | Facility: CLINIC | Age: 82
End: 2017-09-25

## 2017-09-25 ENCOUNTER — LAB (OUTPATIENT)
Dept: LAB | Facility: HOSPITAL | Age: 82
End: 2017-09-25
Attending: INTERNAL MEDICINE

## 2017-09-25 ENCOUNTER — OFFICE VISIT (OUTPATIENT)
Dept: UROLOGY | Facility: CLINIC | Age: 82
End: 2017-09-25

## 2017-09-25 VITALS
SYSTOLIC BLOOD PRESSURE: 130 MMHG | DIASTOLIC BLOOD PRESSURE: 82 MMHG | WEIGHT: 147.2 LBS | BODY MASS INDEX: 26.08 KG/M2 | TEMPERATURE: 98.6 F | HEIGHT: 63 IN

## 2017-09-25 DIAGNOSIS — R31.29 MICROSCOPIC HEMATURIA: Primary | ICD-10-CM

## 2017-09-25 DIAGNOSIS — I48.91 ATRIAL FIBRILLATION, UNSPECIFIED TYPE (HCC): ICD-10-CM

## 2017-09-25 DIAGNOSIS — R35.0 FREQUENCY OF MICTURITION: ICD-10-CM

## 2017-09-25 LAB
BILIRUB BLD-MCNC: NEGATIVE MG/DL
CLARITY, POC: CLEAR
COLOR UR: YELLOW
GLUCOSE UR STRIP-MCNC: NEGATIVE MG/DL
INR PPP: 2.4 (ref 0.91–1.09)
KETONES UR QL: NEGATIVE
LEUKOCYTE EST, POC: NEGATIVE
NITRITE UR-MCNC: NEGATIVE MG/ML
PH UR: 6 [PH] (ref 5–8)
PROT UR STRIP-MCNC: NEGATIVE MG/DL
PROTHROMBIN TIME: 28.6 SECONDS (ref 10–13.8)
RBC # UR STRIP: ABNORMAL /UL
SP GR UR: 1.01 (ref 1–1.03)
UROBILINOGEN UR QL: NORMAL

## 2017-09-25 PROCEDURE — 99204 OFFICE O/P NEW MOD 45 MIN: CPT | Performed by: UROLOGY

## 2017-09-25 PROCEDURE — 85610 PROTHROMBIN TIME: CPT | Performed by: INTERNAL MEDICINE

## 2017-09-25 PROCEDURE — 88112 CYTOPATH CELL ENHANCE TECH: CPT | Performed by: UROLOGY

## 2017-09-25 PROCEDURE — 81003 URINALYSIS AUTO W/O SCOPE: CPT | Performed by: UROLOGY

## 2017-09-25 NOTE — PROGRESS NOTES
Subjective    Ms. Nash is 81 y.o. female    Chief Complaint: Microscopic Hematuria    History of Present Illness     Hematuria  Patient complains of microscopic hematuria. Onset of hematuria was several weeks ago and was gradual in onset. There is not a history of nephrolithiasis. There is not a history of urologic trauma. Other urologic symptoms include nocturia, urgency. Patient admits to history of smoking. Patient denies history of previous infection. Prior workup has been UA. Prior workup has revealed no etiology.      The following portions of the patient's history were reviewed and updated as appropriate: allergies, current medications, past family history, past medical history, past social history, past surgical history and problem list.    Review of Systems   Constitutional: Negative for appetite change, chills, fatigue, fever and unexpected weight change.   HENT: Negative for congestion, dental problem, ear pain, hearing loss, nosebleeds, sinus pressure and trouble swallowing.    Eyes: Negative for pain, discharge, redness and itching.   Respiratory: Negative for apnea, cough, choking and shortness of breath.    Cardiovascular: Negative for chest pain and palpitations.   Gastrointestinal: Negative for abdominal distention, abdominal pain, blood in stool, constipation, diarrhea, nausea and vomiting.   Endocrine: Negative for cold intolerance and heat intolerance.   Genitourinary: Negative for difficulty urinating, dysuria, flank pain, frequency, hematuria and urgency.   Musculoskeletal: Negative for arthralgias, back pain and gait problem.   Skin: Negative for pallor, rash and wound.   Allergic/Immunologic: Negative for immunocompromised state.   Neurological: Negative for dizziness, tremors, seizures, weakness, numbness and headaches.   Hematological: Negative for adenopathy. Does not bruise/bleed easily.   Psychiatric/Behavioral: Negative for agitation, behavioral problems, hallucinations, self-injury  and suicidal ideas.         Current Outpatient Prescriptions:   •  acetaminophen (TYLENOL) 325 MG tablet, Take 650 mg by mouth Daily As Needed for mild pain (1-3)., Disp: , Rfl:   •  alendronate (FOSAMAX) 70 MG tablet, Take 70 mg by mouth Every 7 (Seven) Days., Disp: , Rfl:   •  ALPRAZolam (XANAX) 0.25 MG tablet, Take 0.25 mg by mouth 2 (Two) Times a Day As Needed for anxiety., Disp: , Rfl:   •  Ergocalciferol (VITAMIN D2) 2000 UNITS tablet, Take  by mouth Daily., Disp: , Rfl:   •  Glucosamine-Chondroitin (MOVE FREE PO), Take  by mouth Daily., Disp: , Rfl:   •  hydrochlorothiazide (HYDRODIURIL) 25 MG tablet, Take 25 mg by mouth Daily., Disp: , Rfl:   •  ibuprofen (ADVIL,MOTRIN) 200 MG tablet, Take 200 mg by mouth Daily As Needed for mild pain (1-3)., Disp: , Rfl:   •  levothyroxine (SYNTHROID, LEVOTHROID) 75 MCG tablet, Take 75 mcg by mouth Daily., Disp: , Rfl:   •  lisinopril (PRINIVIL,ZESTRIL) 30 MG tablet, Take 10 mg by mouth 2 (Two) Times a Day., Disp: , Rfl:   •  metoprolol succinate XL (TOPROL-XL) 100 MG 24 hr tablet, Take 100 mg by mouth Every Night., Disp: , Rfl:   •  Multiple Vitamins-Minerals (PRESERVISION AREDS 2 PO), Take 2 capsules by mouth Daily., Disp: , Rfl:   •  traMADol (ULTRAM) 50 MG tablet, Take 50 mg by mouth Every 6 (Six) Hours As Needed for moderate pain (4-6)., Disp: , Rfl:   •  warfarin (COUMADIN) 4 MG tablet, Take 1/2 pill daily on MWF. Take 1 daily on Tues,TH,Sat,Sun, Disp: 30 tablet, Rfl: 11    Past Medical History:   Diagnosis Date   • A-fib    • Benign essential HTN    • Breast CA    • Carotid artery disease    • CVA (cerebral vascular accident)    • GERD (gastroesophageal reflux disease)    • Hyperlipidemia    • Hypertension    • Hypothyroid    • Hypothyroidism    • Osteopenia    • Primary pulmonary HTN    • Shortness of breath    • Stroke    • Vitamin D deficiency        Past Surgical History:   Procedure Laterality Date   • BREAST LUMPECTOMY     • CARDIAC ABLATION      cardiac  "ablation procedure for af   • CATARACT EXTRACTION Right    • PARTIAL HIP ARTHROPLASTY Right        Social History     Social History   • Marital status:      Spouse name: N/A   • Number of children: N/A   • Years of education: N/A     Social History Main Topics   • Smoking status: Former Smoker     Years: 30.00     Types: Cigarettes     Quit date: 1/5/1992   • Smokeless tobacco: Never Used   • Alcohol use No   • Drug use: No   • Sexual activity: Defer     Other Topics Concern   • None     Social History Narrative       Family History   Problem Relation Age of Onset   • Stroke Mother    • Cancer Father    • COPD Sister        Objective    /82  Temp 98.6 °F (37 °C)  Ht 63\" (160 cm)  Wt 147 lb 3.2 oz (66.8 kg)  BMI 26.08 kg/m2    Physical Exam   Constitutional: She is oriented to person, place, and time. She appears well-developed and well-nourished. No distress.   HENT:   Head: Normocephalic and atraumatic.   Right Ear: External ear and ear canal normal.   Left Ear: External ear and ear canal normal.   Nose: No nasal deformity. No epistaxis.   Mouth/Throat: Oropharynx is clear and moist. Mucous membranes are not pale, not dry and not cyanotic. Normal dentition. No oropharyngeal exudate.   Neck: Trachea normal. No tracheal tenderness present. No tracheal deviation present. No thyroid mass and no thyromegaly present.   Pulmonary/Chest: Effort normal. No accessory muscle usage. No respiratory distress. Chest wall is not dull to percussion (No flatness or hyperresonance). She exhibits no mass and no tenderness.   On palpation, no tactile fremitus. All movements are symmetric. No intercostal retraction noted.    Abdominal: Soft. Normal appearance. She exhibits no distension and no mass. There is no hepatosplenomegaly. There is no tenderness. No hernia.   Rectal examination or stool specimen is not indicated.    Musculoskeletal:   Normal gait and station. The spine, ribs, and pelvis are examined. No " obvious misalignment or asymmetry. ROM is reasonable for age. No instability. No obvious atrophy, flaccidity or spasticity.    Lymphadenopathy:     She has no cervical adenopathy.        Right: No inguinal adenopathy present.        Left: No inguinal adenopathy present.   Neurological: She is alert and oriented to person, place, and time.   Skin: Skin is warm, dry and intact. No lesion and no rash noted. She is not diaphoretic. No cyanosis. No pallor. Nails show no clubbing.   On palpation, there were no induration, subcutaneous nodules, or tightening   Psychiatric: Her speech is normal and behavior is normal. Judgment and thought content normal. Her mood appears not anxious. Her affect is not labile. She does not exhibit a depressed mood.   Vitals reviewed.    Renal ultrasound independent review    The renal ultrasound is available for me to review.  Treatment recommendations require an independent review.  This film has been reviewed by the radiologist to determine any non urologic abnormalities that are presents.  However, I very closely inspected the kidneys for size, symmetry, contour, parenchymal thickness, perinephric reaction, presence of calcifications, and intrarenal dilation of the collecting system.       The right kidney appears normal on this ultrasound.  The renal parenchymal is norml in thickness.  There are no solid masses or cysts.  There is no hydronephrosis.  There are no stones.      The left kidney appears normal on this ultrasound.  The renal parenchymal is norml in thickness.  There are no solid masses or cysts.  There is no hydronephrosis.  There are no stones.      The bladder appears normal on thisultrsaound.  The bladder appears normal in thickness.  There no masses or stones seen on this exam.             Results for orders placed or performed in visit on 09/25/17   POC Urinalysis Dipstick, Automated   Result Value Ref Range    Color Yellow Yellow, Straw, Dark Yellow, Miryam    Clarity,  UA Clear Clear    Glucose, UA Negative Negative, 1000 mg/dL (3+) mg/dL    Bilirubin Negative Negative    Ketones, UA Negative Negative    Specific Gravity  1.010 1.005 - 1.030    Blood, UA Small (A) Negative    pH, Urine 6.0 5.0 - 8.0    Protein, POC Negative Negative mg/dL    Urobilinogen, UA Normal Normal    Leukocytes Negative Negative    Nitrite, UA Negative Negative     Assessment and Plan    Damaris was seen today for blood in urine.    Diagnoses and all orders for this visit:    Microscopic hematuria  -     POC Urinalysis Dipstick, Automated  -     CT Abdomen Pelvis With & Without Contrast; Future  -     Cystoscopy; Future  -     Non-gynecologic Cytology    Frequency of micturition              Full hematuria workup including cytology, CT urogram and cystoscopy.  I revealed 51 pages of medical records summarizes follows she had a urine culture drawn which showed gram-positive Argelia a repeat urinalysis still showed continued blood in urine and she was sent over for evaluation.

## 2017-09-27 LAB
CYTO UR: NORMAL
LAB AP CASE REPORT: NORMAL
Lab: NORMAL
PATH REPORT.FINAL DX SPEC: NORMAL
PATH REPORT.GROSS SPEC: NORMAL

## 2017-10-05 ENCOUNTER — HOSPITAL ENCOUNTER (OUTPATIENT)
Dept: CT IMAGING | Facility: HOSPITAL | Age: 82
Discharge: HOME | End: 2017-10-05
Attending: UROLOGY | Admitting: UROLOGY

## 2017-10-05 ENCOUNTER — PROCEDURE VISIT (OUTPATIENT)
Dept: UROLOGY | Facility: CLINIC | Age: 82
End: 2017-10-05

## 2017-10-05 ENCOUNTER — APPOINTMENT (OUTPATIENT)
Dept: CARDIAC REHAB | Facility: HOSPITAL | Age: 82
End: 2017-10-05

## 2017-10-05 DIAGNOSIS — R31.29 MICROSCOPIC HEMATURIA: Primary | ICD-10-CM

## 2017-10-05 DIAGNOSIS — R31.29 MICROSCOPIC HEMATURIA: ICD-10-CM

## 2017-10-05 PROCEDURE — 0 IOPAMIDOL PER 1 ML: Performed by: UROLOGY

## 2017-10-05 PROCEDURE — 82565 ASSAY OF CREATININE: CPT

## 2017-10-05 PROCEDURE — 52000 CYSTOURETHROSCOPY: CPT | Performed by: UROLOGY

## 2017-10-05 PROCEDURE — 74178 CT ABD&PLV WO CNTR FLWD CNTR: CPT

## 2017-10-05 RX ADMIN — IOPAMIDOL 150 ML: 755 INJECTION, SOLUTION INTRAVENOUS at 09:45

## 2017-10-05 NOTE — PROGRESS NOTES
Pre- operative diagnosis:  Hematuria    Post operative diagnosis:  Same    Procedure:  The patient was prepped and draped in a normal sterile fashion.  The urethra was anesthetized with 2% lidocaine jelly.  A rigid cystoscope was introduced per urethra.      Urethra:  Normal    Bladder:  There is no evidence of a stone, foreign body or mass within the bladder.  The bladder is minimally trabeculated.  The bladder neck is without contracture.    Ureteral orifices:  Normal position bilaterally and Clear efflux bilaterally    Patient tolerated the procedure well    Complications: none    Blood loss: minimal    Follow up:    No additional follow up needed    Cytology negative.  Cystoscopy negative.  CT reviewed I see no evidence of solid renal masses or hydronephrosis.  There is no obstructing or nonobstructing stones.  Plan to see on a when necessary basis.

## 2017-10-06 LAB — CREAT BLDA-MCNC: 1 MG/DL (ref 0.6–1.3)

## 2017-10-10 ENCOUNTER — APPOINTMENT (OUTPATIENT)
Dept: CARDIAC REHAB | Facility: HOSPITAL | Age: 82
End: 2017-10-10

## 2017-10-12 ENCOUNTER — APPOINTMENT (OUTPATIENT)
Dept: CARDIAC REHAB | Facility: HOSPITAL | Age: 82
End: 2017-10-12

## 2017-10-17 ENCOUNTER — APPOINTMENT (OUTPATIENT)
Dept: CARDIAC REHAB | Facility: HOSPITAL | Age: 82
End: 2017-10-17

## 2017-10-19 ENCOUNTER — APPOINTMENT (OUTPATIENT)
Dept: CARDIAC REHAB | Facility: HOSPITAL | Age: 82
End: 2017-10-19

## 2017-10-23 ENCOUNTER — ANTICOAGULATION VISIT (OUTPATIENT)
Dept: CARDIOLOGY | Facility: CLINIC | Age: 82
End: 2017-10-23

## 2017-10-23 ENCOUNTER — LAB (OUTPATIENT)
Dept: LAB | Facility: HOSPITAL | Age: 82
End: 2017-10-23
Attending: INTERNAL MEDICINE

## 2017-10-23 DIAGNOSIS — I48.91 ATRIAL FIBRILLATION, UNSPECIFIED TYPE (HCC): Primary | ICD-10-CM

## 2017-10-23 DIAGNOSIS — I48.91 ATRIAL FIBRILLATION, UNSPECIFIED TYPE (HCC): ICD-10-CM

## 2017-10-23 LAB
INR PPP: 2.6 (ref 0.91–1.09)
PROTHROMBIN TIME: 31.7 SECONDS (ref 10–13.8)

## 2017-10-23 PROCEDURE — 85610 PROTHROMBIN TIME: CPT | Performed by: INTERNAL MEDICINE

## 2017-10-24 ENCOUNTER — APPOINTMENT (OUTPATIENT)
Dept: CARDIAC REHAB | Facility: HOSPITAL | Age: 82
End: 2017-10-24

## 2017-10-26 ENCOUNTER — APPOINTMENT (OUTPATIENT)
Dept: CARDIAC REHAB | Facility: HOSPITAL | Age: 82
End: 2017-10-26

## 2017-10-31 ENCOUNTER — APPOINTMENT (OUTPATIENT)
Dept: CARDIAC REHAB | Facility: HOSPITAL | Age: 82
End: 2017-10-31

## 2017-11-02 ENCOUNTER — APPOINTMENT (OUTPATIENT)
Dept: CARDIAC REHAB | Facility: HOSPITAL | Age: 82
End: 2017-11-02

## 2017-11-07 ENCOUNTER — APPOINTMENT (OUTPATIENT)
Dept: CARDIAC REHAB | Facility: HOSPITAL | Age: 82
End: 2017-11-07

## 2017-11-08 ENCOUNTER — APPOINTMENT (OUTPATIENT)
Dept: CARDIAC REHAB | Facility: HOSPITAL | Age: 82
End: 2017-11-08

## 2017-11-09 ENCOUNTER — APPOINTMENT (OUTPATIENT)
Dept: CARDIAC REHAB | Facility: HOSPITAL | Age: 82
End: 2017-11-09

## 2017-11-14 ENCOUNTER — APPOINTMENT (OUTPATIENT)
Dept: CARDIAC REHAB | Facility: HOSPITAL | Age: 82
End: 2017-11-14

## 2017-11-15 ENCOUNTER — APPOINTMENT (OUTPATIENT)
Dept: CARDIAC REHAB | Facility: HOSPITAL | Age: 82
End: 2017-11-15

## 2017-11-16 ENCOUNTER — APPOINTMENT (OUTPATIENT)
Dept: CARDIAC REHAB | Facility: HOSPITAL | Age: 82
End: 2017-11-16

## 2017-11-21 ENCOUNTER — ANTICOAGULATION VISIT (OUTPATIENT)
Dept: CARDIOLOGY | Facility: CLINIC | Age: 82
End: 2017-11-21

## 2017-11-21 ENCOUNTER — LAB (OUTPATIENT)
Dept: LAB | Facility: HOSPITAL | Age: 82
End: 2017-11-21
Attending: INTERNAL MEDICINE

## 2017-11-21 ENCOUNTER — APPOINTMENT (OUTPATIENT)
Dept: CARDIAC REHAB | Facility: HOSPITAL | Age: 82
End: 2017-11-21

## 2017-11-21 DIAGNOSIS — I48.91 ATRIAL FIBRILLATION, UNSPECIFIED TYPE (HCC): ICD-10-CM

## 2017-11-21 LAB
INR PPP: 2.4 (ref 0.91–1.09)
PROTHROMBIN TIME: 29.1 SECONDS (ref 10–13.8)

## 2017-11-21 PROCEDURE — 85610 PROTHROMBIN TIME: CPT | Performed by: INTERNAL MEDICINE

## 2017-11-22 ENCOUNTER — APPOINTMENT (OUTPATIENT)
Dept: CARDIAC REHAB | Facility: HOSPITAL | Age: 82
End: 2017-11-22

## 2017-11-28 ENCOUNTER — APPOINTMENT (OUTPATIENT)
Dept: CARDIAC REHAB | Facility: HOSPITAL | Age: 82
End: 2017-11-28

## 2017-11-29 ENCOUNTER — APPOINTMENT (OUTPATIENT)
Dept: CARDIAC REHAB | Facility: HOSPITAL | Age: 82
End: 2017-11-29

## 2017-11-30 ENCOUNTER — APPOINTMENT (OUTPATIENT)
Dept: CARDIAC REHAB | Facility: HOSPITAL | Age: 82
End: 2017-11-30

## 2017-12-05 ENCOUNTER — APPOINTMENT (OUTPATIENT)
Dept: CARDIAC REHAB | Facility: HOSPITAL | Age: 82
End: 2017-12-05

## 2017-12-06 ENCOUNTER — APPOINTMENT (OUTPATIENT)
Dept: CARDIAC REHAB | Facility: HOSPITAL | Age: 82
End: 2017-12-06

## 2017-12-07 ENCOUNTER — APPOINTMENT (OUTPATIENT)
Dept: CARDIAC REHAB | Facility: HOSPITAL | Age: 82
End: 2017-12-07

## 2017-12-12 ENCOUNTER — APPOINTMENT (OUTPATIENT)
Dept: CARDIAC REHAB | Facility: HOSPITAL | Age: 82
End: 2017-12-12

## 2017-12-13 ENCOUNTER — APPOINTMENT (OUTPATIENT)
Dept: CARDIAC REHAB | Facility: HOSPITAL | Age: 82
End: 2017-12-13

## 2017-12-14 ENCOUNTER — APPOINTMENT (OUTPATIENT)
Dept: CARDIAC REHAB | Facility: HOSPITAL | Age: 82
End: 2017-12-14

## 2017-12-19 ENCOUNTER — APPOINTMENT (OUTPATIENT)
Dept: CARDIAC REHAB | Facility: HOSPITAL | Age: 82
End: 2017-12-19

## 2017-12-20 ENCOUNTER — APPOINTMENT (OUTPATIENT)
Dept: CARDIAC REHAB | Facility: HOSPITAL | Age: 82
End: 2017-12-20

## 2017-12-20 ENCOUNTER — ANTICOAGULATION VISIT (OUTPATIENT)
Dept: CARDIOLOGY | Facility: CLINIC | Age: 82
End: 2017-12-20

## 2017-12-20 ENCOUNTER — LAB (OUTPATIENT)
Dept: LAB | Facility: HOSPITAL | Age: 82
End: 2017-12-20
Attending: INTERNAL MEDICINE

## 2017-12-20 DIAGNOSIS — I48.91 ATRIAL FIBRILLATION, UNSPECIFIED TYPE (HCC): ICD-10-CM

## 2017-12-20 LAB
INR PPP: 2.4 (ref 0.91–1.09)
PROTHROMBIN TIME: 28.8 SECONDS (ref 10–13.8)

## 2017-12-20 PROCEDURE — 85610 PROTHROMBIN TIME: CPT | Performed by: INTERNAL MEDICINE

## 2017-12-21 ENCOUNTER — APPOINTMENT (OUTPATIENT)
Dept: CARDIAC REHAB | Facility: HOSPITAL | Age: 82
End: 2017-12-21

## 2017-12-26 ENCOUNTER — APPOINTMENT (OUTPATIENT)
Dept: CARDIAC REHAB | Facility: HOSPITAL | Age: 82
End: 2017-12-26

## 2017-12-27 ENCOUNTER — APPOINTMENT (OUTPATIENT)
Dept: CARDIAC REHAB | Facility: HOSPITAL | Age: 82
End: 2017-12-27

## 2017-12-28 ENCOUNTER — APPOINTMENT (OUTPATIENT)
Dept: CARDIAC REHAB | Facility: HOSPITAL | Age: 82
End: 2017-12-28

## 2018-01-10 ENCOUNTER — HOSPITAL ENCOUNTER (OUTPATIENT)
Dept: ULTRASOUND IMAGING | Facility: HOSPITAL | Age: 83
Discharge: HOME | End: 2018-01-10
Admitting: NURSE PRACTITIONER

## 2018-01-10 DIAGNOSIS — I65.23 BILATERAL CAROTID ARTERY STENOSIS: ICD-10-CM

## 2018-01-10 PROCEDURE — 93880 EXTRACRANIAL BILAT STUDY: CPT | Performed by: SURGERY

## 2018-01-10 PROCEDURE — 93880 EXTRACRANIAL BILAT STUDY: CPT

## 2018-01-18 ENCOUNTER — TELEPHONE (OUTPATIENT)
Dept: VASCULAR SURGERY | Facility: CLINIC | Age: 83
End: 2018-01-18

## 2018-01-18 NOTE — TELEPHONE ENCOUNTER
Spoke with Ms Nash and reminded her of her appointment tomorrow at 930 am with Josefina and she stated she would be here.     Test completed [id number]

## 2018-01-19 ENCOUNTER — LAB (OUTPATIENT)
Dept: LAB | Facility: HOSPITAL | Age: 83
End: 2018-01-19
Attending: INTERNAL MEDICINE

## 2018-01-19 ENCOUNTER — ANTICOAGULATION VISIT (OUTPATIENT)
Dept: CARDIOLOGY | Facility: CLINIC | Age: 83
End: 2018-01-19

## 2018-01-19 ENCOUNTER — OFFICE VISIT (OUTPATIENT)
Dept: CARDIOLOGY | Facility: CLINIC | Age: 83
End: 2018-01-19

## 2018-01-19 ENCOUNTER — OFFICE VISIT (OUTPATIENT)
Dept: VASCULAR SURGERY | Facility: CLINIC | Age: 83
End: 2018-01-19

## 2018-01-19 VITALS
DIASTOLIC BLOOD PRESSURE: 64 MMHG | SYSTOLIC BLOOD PRESSURE: 134 MMHG | WEIGHT: 144 LBS | HEIGHT: 63 IN | BODY MASS INDEX: 25.52 KG/M2 | HEART RATE: 64 BPM | OXYGEN SATURATION: 98 %

## 2018-01-19 VITALS
DIASTOLIC BLOOD PRESSURE: 62 MMHG | WEIGHT: 145 LBS | OXYGEN SATURATION: 95 % | HEART RATE: 68 BPM | BODY MASS INDEX: 25.69 KG/M2 | HEIGHT: 63 IN | SYSTOLIC BLOOD PRESSURE: 110 MMHG

## 2018-01-19 DIAGNOSIS — I48.91 ATRIAL FIBRILLATION, UNSPECIFIED TYPE (HCC): ICD-10-CM

## 2018-01-19 DIAGNOSIS — I65.23 BILATERAL CAROTID ARTERY STENOSIS: Primary | ICD-10-CM

## 2018-01-19 DIAGNOSIS — E78.2 MIXED HYPERLIPIDEMIA: ICD-10-CM

## 2018-01-19 DIAGNOSIS — I10 ESSENTIAL HYPERTENSION: ICD-10-CM

## 2018-01-19 DIAGNOSIS — I65.23 BILATERAL CAROTID ARTERY STENOSIS: ICD-10-CM

## 2018-01-19 DIAGNOSIS — I48.0 PAROXYSMAL ATRIAL FIBRILLATION (HCC): Primary | ICD-10-CM

## 2018-01-19 LAB
INR PPP: 2.5 (ref 0.91–1.09)
PROTHROMBIN TIME: 30.2 SECONDS (ref 10–13.8)

## 2018-01-19 PROCEDURE — 93000 ELECTROCARDIOGRAM COMPLETE: CPT | Performed by: INTERNAL MEDICINE

## 2018-01-19 PROCEDURE — 99214 OFFICE O/P EST MOD 30 MIN: CPT | Performed by: INTERNAL MEDICINE

## 2018-01-19 PROCEDURE — 85610 PROTHROMBIN TIME: CPT | Performed by: INTERNAL MEDICINE

## 2018-01-19 PROCEDURE — 99213 OFFICE O/P EST LOW 20 MIN: CPT | Performed by: NURSE PRACTITIONER

## 2018-01-19 RX ORDER — LISINOPRIL 20 MG/1
TABLET ORAL
Refills: 5 | COMMUNITY
Start: 2017-12-24 | End: 2019-01-04

## 2018-01-19 RX ORDER — ATORVASTATIN CALCIUM 20 MG/1
20 TABLET, FILM COATED ORAL DAILY
COMMUNITY
End: 2022-08-08 | Stop reason: SINTOL

## 2018-01-19 NOTE — PROGRESS NOTES
"01/19/2018       Shahla Beltran DO  [address]  [facility] [address]    Damaris Nash  1935    Chief Complaint   Patient presents with   • Follow-up     I year Follow up on Carotid artery stenosis.  Had U/S on 01/10/2017.  She denies any stroke like symptoms.  Her vision in her right eye has slightly improved from stroke since last year.       Dear  Shahla Beltran DO      HPI  I had the pleasure of seeing your patient Damaris Nash in the office today.    As you recall, Damaris Nash is a 82 y.o. left-handed female who you are currently following for routine health maintenance.  She states she has loss of vision to her right eye three days ago.  She was sent to Dr. Rajput at that time, and after a discussion with him, he states he thought the problem was related to hypoperfusion.  She does have a history of stroke with symptoms of slurred speech and right-sided weakness.  Her most recent episodes only involved right eye vision loss.  She did have noninvasive testing performed today, which I did review in office.    Review of Systems   Constitutional: Negative.    HENT: Negative.    Eyes: Positive for visual disturbance.   Respiratory: Negative.    Cardiovascular: Negative.    Gastrointestinal: Negative.    Endocrine: Negative.    Genitourinary: Negative.    Musculoskeletal: Negative.    Skin: Negative.    Allergic/Immunologic: Negative.    Neurological: Negative.    Hematological: Negative.    Psychiatric/Behavioral: Negative.        /64  Pulse 64  Ht 160 cm (63\")  Wt 65.3 kg (144 lb)  SpO2 98%  BMI 25.51 kg/m2  Physical Exam   Constitutional: She is oriented to person, place, and time. She appears well-developed and well-nourished. No distress.   HENT:   Head: Normocephalic and atraumatic.   Mouth/Throat: Oropharynx is clear and moist.   Eyes: Pupils are equal, round, and reactive to light. No scleral icterus.   Neck: Normal range of motion. Neck supple. No JVD present. " Carotid bruit is not present. No thyromegaly present.   Cardiovascular: Normal rate, regular rhythm, S2 normal, normal heart sounds, intact distal pulses and normal pulses.  Exam reveals no gallop and no friction rub.    No murmur heard.  Abdominal: Soft. Normal aorta and bowel sounds are normal. There is no hepatosplenomegaly.   Musculoskeletal: Normal range of motion.   Neurological: She is alert and oriented to person, place, and time. No cranial nerve deficit.   Skin: Skin is warm and dry. She is not diaphoretic.   Psychiatric: She has a normal mood and affect. Her behavior is normal. Judgment and thought content normal.   Nursing note and vitals reviewed.      Diagnostic Data:  History: Carotid occlusive disease      IMPRESSION:  Impression:  1. There is 50-69% stenosis of the right internal carotid artery.  2. There is less than 50% stenosis of the left internal carotid artery.  3. Antegrade flow is demonstrated in bilateral vertebral arteries.      Comments: Bilateral carotid vertebral arterial duplex scan was  performed.      Grayscale imaging shows intimal thickening and calcified elements at the  carotid bifurcation. The right internal carotid artery peak systolic  velocity is 445 cm/sec. The end-diastolic velocity is 94.3 cm/sec. The  right ICA/CCA ratio is approximately 13.6 . These findings correlate  with 50-69% stenosis of the right internal carotid artery.      Grayscale imaging shows intimal thickening and calcified elements at the  carotid bifurcation. The left internal carotid artery peak systolic  velocity is 102 cm/sec. The end-diastolic velocity is 27 cm/sec. The  left ICA/CCA ratio is approximately 1.5 . These findings correlate with  less than 50% stenosis of the left internal carotid artery.      Antegrade flow is demonstrated in bilateral vertebral arteries.  There is greater than 50% stenosis in bilateral external carotid  arteries.  This report was finalized on 01/10/2018 12:28 by   Natan Rogel MD.      CT ANGIOGRAM OF THE NECK WITHOUT AND WITH CONTRAST WITH REFORMATIONS AND  3-D IMAGING:  Underlying COPD changes are present within the partially visualized lung  apices. There is some apical granulomatous scarring mainly on the right.  There is a questionable 5 mm nodule within the posterior right upper  lobe with follow-up of this finding recommended to document stability.      Some streak artifact from dense contrast within the brachiocephalic vein  and upper SVC. The right common carotid artery demonstrates only minimal  atherosclerotic calcification at its origin and throughout its course.  In the region of the bifurcation there are moderate calcified plaques  present felt to be approximately 50% stenotic. The internal carotid  artery appears to be widely patent throughout its course in the neck  into the skull base.      The left common carotid artery origin shows no obvious significant  stenosis with streaking artifact present. There is scattered mild  atherosclerotic calcification in the distal left common carotid artery  felt to be less than 30% stenotic. There is some proximal left internal  carotid artery calcified plaque, irregular felt to be in the range of  80-90% stenotic. This involves a short segment of the proximal left  internal carotid artery which returns to normal caliber above this  stenosis into the skull base.      The right subclavian artery demonstrates a normal origin of the right  vertebral artery with a normal cervical course of this vessel throughout  the neck into the skull base and posterior fossa.      Some mild atherosclerotic calcifications within the left subclavian  artery does not produce significant vertebral stenosis on the left. The  left vertebral origin appears widely patent. The cervical course shows  no obvious component of significant stenosis. There is minimal  calcification involving the distal left vertebral artery just lateral to  the left  lateral mass of C1. This is felt to be less than 50% stenotic.      IMPRESSIONS:  1. Multiple calcified plaques within the proximal left internal carotid  artery distribution just over a short segment felt to be in the range of  80-90% stenotic.  2. Lesser calcified plaque in the right carotid bifurcation felt to be  in the range of 50% stenotic.  3. Some minimal calcification of the distal left vertebral artery  lateral to the C1 arch level, felt to be less than 50% stenotic.  This report was finalized on by Dr. Mikey Rendon MD.    Patient Active Problem List   Diagnosis   • A-fib   • Carotid artery disease   • Hyperlipidemia   • Hypertension   • Amaurosis fugax of right eye   • Bilateral carotid artery stenosis   • Hx of transient ischemic attack (TIA)         ICD-10-CM ICD-9-CM   1. Bilateral carotid artery stenosis I65.23 433.10     433.30   2. Essential hypertension I10 401.9   3. Mixed hyperlipidemia E78.2 272.2         Plan: After thoroughly evaluating Damaris Nash, I believe the best course of action is to remain conservative from a vascular standpoint.  The CTA of her neck did not show significant narrowing.  There is about 50% stenosis on both sides.  The reading from the radiologist is not accurate.  She can continue on her current medication regimen as previously planned.  I did have Dr. Rogel review her testing and reports unchanged.  I will see her back in 1 year's time with a repeat carotid duplex for continued surveillance.  This was all discussed in full with complete understanding.  Thank you for allowing me to participate in the care of your patient.  Please do not hesitate with any questions or concerns.  I will keep you aware of any further encounters with Damaris Nash.        Sincerely yours,         POLY Rosen, DO

## 2018-01-19 NOTE — PROGRESS NOTES
Referring Provider: Shahla Beltran DO    Reason for Follow-up Visit: afib    Subjective .   Chief Complaint:   Chief Complaint   Patient presents with   • Follow-up     yearly for afib, CAD, HTN and HDL   • Coronary Artery Disease     doing well with no compliants.  has had a good year.   • Atrial Fibrillation     no complaints of palpitations   • Hyperlipidemia     followed by pcp. pt had a carotid study last week and was referred to Dr. Rogel.  she will see him today.   • Hypertension     had a spell with BP a few months ago where it went up.  pt went to pcp and found she had a bladder infections.       History of present illness:  Damaris Nash is a 82 y.o. yo female with history of PAF, maintaining SR on coumadin. Denies CP or SOB.        History  Past Medical History:   Diagnosis Date   • A-fib    • Benign essential HTN    • Breast CA    • Carotid artery disease    • CVA (cerebral vascular accident)    • GERD (gastroesophageal reflux disease)    • Hyperlipidemia    • Hypertension    • Hypothyroid    • Hypothyroidism    • Osteopenia    • Primary pulmonary HTN    • Shortness of breath    • Stroke    • Vitamin D deficiency    ,   Past Surgical History:   Procedure Laterality Date   • BREAST LUMPECTOMY     • CARDIAC ABLATION      cardiac ablation procedure for af   • CATARACT EXTRACTION Right    • PARTIAL HIP ARTHROPLASTY Right    ,   Family History   Problem Relation Age of Onset   • Stroke Mother    • Cancer Father    • COPD Sister    ,   Social History   Substance Use Topics   • Smoking status: Former Smoker     Years: 30.00     Types: Cigarettes     Start date: 1955     Quit date: 1/5/1992   • Smokeless tobacco: Never Used   • Alcohol use No   ,     Medications  Current Outpatient Prescriptions   Medication Sig Dispense Refill   • acetaminophen (TYLENOL) 325 MG tablet Take 650 mg by mouth Daily As Needed for mild pain (1-3).     • alendronate (FOSAMAX) 70 MG tablet Take 70 mg by mouth Every 7  (Seven) Days.     • ALPRAZolam (XANAX) 0.25 MG tablet Take 0.25 mg by mouth 2 (Two) Times a Day As Needed for anxiety.     • atorvastatin (LIPITOR) 10 MG tablet Take 10 mg by mouth Daily.     • Ergocalciferol (VITAMIN D2) 2000 UNITS tablet Take  by mouth Daily.     • hydrochlorothiazide (HYDRODIURIL) 25 MG tablet Take 25 mg by mouth Daily.     • ibuprofen (ADVIL,MOTRIN) 200 MG tablet Take 200 mg by mouth Daily As Needed for mild pain (1-3).     • levothyroxine (SYNTHROID, LEVOTHROID) 75 MCG tablet Take 75 mcg by mouth Daily.     • lisinopril (PRINIVIL,ZESTRIL) 30 MG tablet Take 10 mg by mouth 2 (Two) Times a Day.     • metoprolol succinate XL (TOPROL-XL) 100 MG 24 hr tablet Take 100 mg by mouth Every Night.     • Multiple Vitamins-Minerals (PRESERVISION AREDS 2 PO) Take 2 capsules by mouth Daily.     • traMADol (ULTRAM) 50 MG tablet Take 50 mg by mouth Every 6 (Six) Hours As Needed for moderate pain (4-6).     • warfarin (COUMADIN) 4 MG tablet Take 1/2 pill daily on MWF. Take 1 daily on Tues,TH,Sat,Sun (Patient taking differently: Take 4 mg by mouth. Take 1/2 pill daily on MWF. Take 1 daily on Tues,TH,Sat,Sun) 30 tablet 11   • Glucosamine-Chondroitin (MOVE FREE PO) Take  by mouth Daily.       No current facility-administered medications for this visit.        Allergies:  Celebrex [celecoxib]; Epinephrine; Ferrous sulfate; Other; Simvastatin; and Sulfa antibiotics    Review of Systems  Review of Systems   HENT: Negative for nosebleeds.    Cardiovascular: Negative for chest pain, claudication, dyspnea on exertion, irregular heartbeat, leg swelling, near-syncope, orthopnea, palpitations, paroxysmal nocturnal dyspnea and syncope.   Respiratory: Negative for cough, hemoptysis and shortness of breath.    Gastrointestinal: Negative for dysphagia, hematemesis and melena.   Genitourinary: Negative for hematuria.   All other systems reviewed and are negative.      Objective     Physical Exam:  /62 (BP Location: Left  "arm, Patient Position: Sitting, Cuff Size: Adult)  Pulse 68  Ht 160 cm (63\")  Wt 65.8 kg (145 lb)  SpO2 95%  BMI 25.69 kg/m2  Physical Exam   Constitutional: She is oriented to person, place, and time. She appears well-developed and well-nourished. No distress.   HENT:   Head: Normocephalic and atraumatic.   Eyes: No scleral icterus.   Neck: Normal range of motion.   Cardiovascular: Normal rate, regular rhythm and normal heart sounds.  Exam reveals no gallop and no friction rub.    No murmur heard.  Pulmonary/Chest: Effort normal and breath sounds normal. No respiratory distress. She has no wheezes. She has no rales.   Abdominal: Soft. Bowel sounds are normal. She exhibits no distension. There is no tenderness.   Musculoskeletal: She exhibits no edema.   Neurological: She is alert and oriented to person, place, and time. No cranial nerve deficit.   Skin: Skin is warm and dry. She is not diaphoretic. No erythema.   Psychiatric: She has a normal mood and affect. Her behavior is normal.       Results Review:    ECG 12 Lead  Date/Time: 1/19/2018 9:24 AM  Performed by: SHARMAINE RAE  Authorized by: SHARMAINE RAE   Comparison: compared with previous ECG   Similar to previous ECG  Rhythm: sinus rhythm  Rate: normal  Conduction: conduction normal  ST Segments: ST segments normal  T Waves: T waves normal  QRS axis: normal  Clinical impression: normal ECG            Lab on 12/20/2017   Component Date Value Ref Range Status   • Protime 12/20/2017 28.8* 10.0 - 13.8 seconds Final    : [id number] ID: [id number]   • INR 12/20/2017 2.4* 0.91 - 1.09 Final       Assessment/Plan   Damaris was seen today for follow-up, coronary artery disease, atrial fibrillation, hyperlipidemia and hypertension.    Diagnoses and all orders for this visit:    Paroxysmal atrial fibrillation, maintaining SR    Mixed hyperlipidemia, Patient's statin therapy is followed by PCP.    Essential hypertension, good control    Bilateral " carotid artery stenosis, moderate, followed by Dr. Rogel

## 2018-02-05 RX ORDER — WARFARIN SODIUM 4 MG/1
TABLET ORAL
Qty: 30 TABLET | Refills: 11 | Status: SHIPPED | OUTPATIENT
Start: 2018-02-05 | End: 2019-01-25 | Stop reason: SDUPTHER

## 2018-02-14 ENCOUNTER — LAB (OUTPATIENT)
Dept: LAB | Facility: HOSPITAL | Age: 83
End: 2018-02-14
Attending: INTERNAL MEDICINE

## 2018-02-14 DIAGNOSIS — I48.91 ATRIAL FIBRILLATION, UNSPECIFIED TYPE (HCC): ICD-10-CM

## 2018-02-14 LAB
INR PPP: 1.9 (ref 0.91–1.09)
PROTHROMBIN TIME: 22.3 SECONDS (ref 10–13.8)

## 2018-02-14 PROCEDURE — 85610 PROTHROMBIN TIME: CPT | Performed by: INTERNAL MEDICINE

## 2018-02-15 ENCOUNTER — ANTICOAGULATION VISIT (OUTPATIENT)
Dept: CARDIOLOGY | Facility: CLINIC | Age: 83
End: 2018-02-15

## 2018-02-28 ENCOUNTER — ANTICOAGULATION VISIT (OUTPATIENT)
Dept: CARDIOLOGY | Facility: CLINIC | Age: 83
End: 2018-02-28

## 2018-02-28 ENCOUNTER — LAB (OUTPATIENT)
Dept: LAB | Facility: HOSPITAL | Age: 83
End: 2018-02-28
Attending: INTERNAL MEDICINE

## 2018-02-28 DIAGNOSIS — I48.91 ATRIAL FIBRILLATION, UNSPECIFIED TYPE (HCC): ICD-10-CM

## 2018-02-28 LAB
INR PPP: 2.2 (ref 0.91–1.09)
PROTHROMBIN TIME: 26.6 SECONDS (ref 10–13.8)

## 2018-02-28 PROCEDURE — 85610 PROTHROMBIN TIME: CPT | Performed by: INTERNAL MEDICINE

## 2018-03-14 ENCOUNTER — LAB (OUTPATIENT)
Dept: LAB | Facility: HOSPITAL | Age: 83
End: 2018-03-14
Attending: INTERNAL MEDICINE

## 2018-03-14 ENCOUNTER — ANTICOAGULATION VISIT (OUTPATIENT)
Dept: CARDIOLOGY | Facility: CLINIC | Age: 83
End: 2018-03-14

## 2018-03-14 DIAGNOSIS — I48.91 ATRIAL FIBRILLATION, UNSPECIFIED TYPE (HCC): ICD-10-CM

## 2018-03-14 LAB
INR PPP: 2.4 (ref 0.91–1.09)
PROTHROMBIN TIME: 28.3 SECONDS (ref 10–13.8)

## 2018-03-14 PROCEDURE — 85610 PROTHROMBIN TIME: CPT | Performed by: INTERNAL MEDICINE

## 2018-04-12 ENCOUNTER — LAB (OUTPATIENT)
Dept: LAB | Facility: HOSPITAL | Age: 83
End: 2018-04-12
Attending: INTERNAL MEDICINE

## 2018-04-12 ENCOUNTER — ANTICOAGULATION VISIT (OUTPATIENT)
Dept: CARDIOLOGY | Facility: CLINIC | Age: 83
End: 2018-04-12

## 2018-04-12 DIAGNOSIS — I48.91 ATRIAL FIBRILLATION, UNSPECIFIED TYPE (HCC): ICD-10-CM

## 2018-04-12 LAB
INR PPP: 2.7 (ref 0.91–1.09)
PROTHROMBIN TIME: 32.1 SECONDS (ref 10–13.8)

## 2018-04-12 PROCEDURE — 85610 PROTHROMBIN TIME: CPT | Performed by: INTERNAL MEDICINE

## 2018-05-11 ENCOUNTER — ANTICOAGULATION VISIT (OUTPATIENT)
Dept: CARDIOLOGY | Facility: CLINIC | Age: 83
End: 2018-05-11

## 2018-05-11 ENCOUNTER — LAB (OUTPATIENT)
Dept: LAB | Facility: HOSPITAL | Age: 83
End: 2018-05-11
Attending: INTERNAL MEDICINE

## 2018-05-11 DIAGNOSIS — I48.91 ATRIAL FIBRILLATION, UNSPECIFIED TYPE (HCC): ICD-10-CM

## 2018-05-11 LAB
INR PPP: 2.4 (ref 0.91–1.09)
PROTHROMBIN TIME: 28.9 SECONDS (ref 10–13.8)

## 2018-05-11 PROCEDURE — 85610 PROTHROMBIN TIME: CPT | Performed by: INTERNAL MEDICINE

## 2018-06-11 ENCOUNTER — LAB (OUTPATIENT)
Dept: LAB | Facility: HOSPITAL | Age: 83
End: 2018-06-11
Attending: INTERNAL MEDICINE

## 2018-06-11 DIAGNOSIS — I48.91 ATRIAL FIBRILLATION, UNSPECIFIED TYPE (HCC): ICD-10-CM

## 2018-06-11 LAB
INR PPP: 2.5 (ref 0.91–1.09)
PROTHROMBIN TIME: 30.4 SECONDS (ref 10–13.8)

## 2018-06-11 PROCEDURE — 85610 PROTHROMBIN TIME: CPT | Performed by: INTERNAL MEDICINE

## 2018-06-13 ENCOUNTER — ANTICOAGULATION VISIT (OUTPATIENT)
Dept: CARDIOLOGY | Facility: CLINIC | Age: 83
End: 2018-06-13

## 2018-07-09 ENCOUNTER — LAB (OUTPATIENT)
Dept: LAB | Facility: HOSPITAL | Age: 83
End: 2018-07-09
Attending: INTERNAL MEDICINE

## 2018-07-09 ENCOUNTER — ANTICOAGULATION VISIT (OUTPATIENT)
Dept: CARDIOLOGY | Facility: CLINIC | Age: 83
End: 2018-07-09

## 2018-07-09 DIAGNOSIS — I48.91 ATRIAL FIBRILLATION, UNSPECIFIED TYPE (HCC): ICD-10-CM

## 2018-07-09 LAB
INR PPP: 2.7 (ref 0.91–1.09)
PROTHROMBIN TIME: 32.9 SECONDS (ref 10–13.8)

## 2018-07-09 PROCEDURE — 85610 PROTHROMBIN TIME: CPT | Performed by: INTERNAL MEDICINE

## 2018-07-10 RX ORDER — FLUTICASONE PROPIONATE 50 MCG
2 SPRAY, SUSPENSION (ML) NASAL DAILY
COMMUNITY
End: 2019-01-04

## 2018-07-10 RX ORDER — METOPROLOL TARTRATE 100 MG/1
100 TABLET ORAL 2 TIMES DAILY
COMMUNITY
End: 2019-01-25 | Stop reason: ALTCHOICE

## 2018-07-10 RX ORDER — CLONIDINE HYDROCHLORIDE 0.1 MG/1
0.1 TABLET ORAL AS NEEDED
COMMUNITY
End: 2022-08-08

## 2018-07-10 RX ORDER — ACETAMINOPHEN 160 MG
2000 TABLET,DISINTEGRATING ORAL DAILY
COMMUNITY

## 2018-08-06 ENCOUNTER — LAB (OUTPATIENT)
Dept: LAB | Facility: HOSPITAL | Age: 83
End: 2018-08-06
Attending: INTERNAL MEDICINE

## 2018-08-06 ENCOUNTER — OFFICE VISIT (OUTPATIENT)
Dept: PODIATRY | Facility: CLINIC | Age: 83
End: 2018-08-06

## 2018-08-06 ENCOUNTER — ANTICOAGULATION VISIT (OUTPATIENT)
Dept: CARDIOLOGY | Facility: CLINIC | Age: 83
End: 2018-08-06

## 2018-08-06 VITALS
DIASTOLIC BLOOD PRESSURE: 74 MMHG | WEIGHT: 144 LBS | HEIGHT: 63 IN | OXYGEN SATURATION: 95 % | HEART RATE: 72 BPM | BODY MASS INDEX: 25.52 KG/M2 | SYSTOLIC BLOOD PRESSURE: 140 MMHG

## 2018-08-06 DIAGNOSIS — I48.91 ATRIAL FIBRILLATION, UNSPECIFIED TYPE (HCC): ICD-10-CM

## 2018-08-06 DIAGNOSIS — M79.672 LEFT FOOT PAIN: Primary | ICD-10-CM

## 2018-08-06 DIAGNOSIS — I87.2 VENOUS INSUFFICIENCY OF BOTH LOWER EXTREMITIES: ICD-10-CM

## 2018-08-06 LAB
INR PPP: 2.5 (ref 0.91–1.09)
PROTHROMBIN TIME: 29.5 SECONDS (ref 10–13.8)

## 2018-08-06 PROCEDURE — 99203 OFFICE O/P NEW LOW 30 MIN: CPT | Performed by: PODIATRIST

## 2018-08-06 PROCEDURE — 85610 PROTHROMBIN TIME: CPT | Performed by: INTERNAL MEDICINE

## 2018-08-06 RX ORDER — NITROFURANTOIN MACROCRYSTALS 100 MG/1
100 CAPSULE ORAL 4 TIMES DAILY
COMMUNITY
End: 2019-01-25

## 2018-08-06 NOTE — PATIENT INSTRUCTIONS
Chronic Venous Insufficiency  Chronic venous insufficiency, also called venous stasis, is a condition that prevents blood from being pumped effectively through the veins in your legs. Blood may no longer be pumped effectively from the legs back to the heart. This condition can range from mild to severe. With proper treatment, you should be able to continue with an active life.  What are the causes?  Chronic venous insufficiency occurs when the vein walls become stretched, weakened, or damaged, or when valves within the vein are damaged. Some common causes of this include:  · High blood pressure inside the veins (venous hypertension).  · Increased blood pressure in the leg veins from long periods of sitting or standing.  · A blood clot that blocks blood flow in a vein (deep vein thrombosis, DVT).  · Inflammation of a vein (phlebitis) that causes a blood clot to form.  · Tumors in the pelvis that cause blood to back up.    What increases the risk?  The following factors may make you more likely to develop this condition:  · Having a family history of this condition.  · Obesity.  · Pregnancy.  · Living without enough physical activity or exercise (sedentary lifestyle).  · Smoking.  · Having a job that requires long periods of standing or sitting in one place.  · Being a certain age. Women in their 40s and 50s and men in their 70s are more likely to develop this condition.    What are the signs or symptoms?  Symptoms of this condition include:  · Veins that are enlarged, bulging, or twisted (varicose veins).  · Skin breakdown or ulcers.  · Reddened or discolored skin on the front of the leg.  · Brown, smooth, tight, and painful skin just above the ankle, usually on the inside of the leg (lipodermatosclerosis).  · Swelling.    How is this diagnosed?  This condition may be diagnosed based on:  · Your medical history.  · A physical exam.  · Tests, such as:  ? A procedure that creates an image of a blood vessel and nearby  organs and provides information about blood flow through the blood vessel (duplex ultrasound).  ? A procedure that tests blood flow (plethysmography).  ? A procedure to look at the veins using X-ray and dye (venogram).    How is this treated?  The goals of treatment are to help you return to an active life and to minimize pain or disability. Treatment depends on the severity of your condition, and it may include:  · Wearing compression stockings. These can help relieve symptoms and help prevent your condition from getting worse. However, they do not cure the condition.  · Sclerotherapy. This is a procedure involving an injection of a material that “dissolves” damaged veins.  · Surgery. This may involve:  ? Removing a diseased vein (vein stripping).  ? Cutting off blood flow through the vein (laser ablation surgery).  ? Repairing a valve.    Follow these instructions at home:  · Wear compression stockings as told by your health care provider. These stockings help to prevent blood clots and reduce swelling in your legs.  · Take over-the-counter and prescription medicines only as told by your health care provider.  · Stay active by exercising, walking, or doing different activities. Ask your health care provider what activities are safe for you and how much exercise you need.  · Drink enough fluid to keep your urine clear or pale yellow.  · Do not use any products that contain nicotine or tobacco, such as cigarettes and e-cigarettes. If you need help quitting, ask your health care provider.  · Keep all follow-up visits as told by your health care provider. This is important.  Contact a health care provider if:  · You have redness, swelling, or more pain in the affected area.  · You see a red streak or line that extends up or down from the affected area.  · You have skin breakdown or a loss of skin in the affected area, even if the breakdown is small.  · You get an injury in the affected area.  Get help right away  if:  · You get an injury and an open wound in the affected area.  · You have severe pain that does not get better with medicine.  · You have sudden numbness or weakness in the foot or ankle below the affected area, or you have trouble moving your foot or ankle.  · You have a fever and you have worse or persistent symptoms.  · You have chest pain.  · You have shortness of breath.  Summary  · Chronic venous insufficiency, also called venous stasis, is a condition that prevents blood from being pumped effectively through the veins in your legs.  · Chronic venous insufficiency occurs when the vein walls become stretched, weakened, or damaged, or when valves within the vein are damaged.  · Treatment for this condition depends on how severe your condition is, and it may involve wearing compression stockings or having a procedure.  · Make sure you stay active by exercising, walking, or doing different activities. Ask your health care provider what activities are safe for you and how much exercise you need.  This information is not intended to replace advice given to you by your health care provider. Make sure you discuss any questions you have with your health care provider.  Document Released: 04/22/2008 Document Revised: 11/06/2017 Document Reviewed: 11/06/2017  [employer] Patient Education © 2017 [employer]

## 2018-08-06 NOTE — PROGRESS NOTES
[facility] - PODIATRY    Today's Date: 08/06/18    Patient Name: Damaris Nash  MRN: [id number]  CSN: [id number]  PCP: Shahla Beltran DO  Referring Provider: No ref. provider found    SUBJECTIVE     Chief Complaint   Patient presents with   • Left Foot - Establish Care     PT is here to establish care. PT c/o cramping in lower left leg and left foot. Pain scale: 5/10. PCP: Dr. Shahla Beltran last visit 05/24/2018     HPI: Damaris Nash, a 82 y.o.female, comes to clinic as a(n) new patient complaining of foot pain. Patient has h/o AFib on Warfarin, Stroke, HTN, Breast Cancer, CAD, GERD, HLD, HTN, Hypothyroid, Osteopenia, Vit D Def. Patient is non-diabetic. States that her left foot becomes painful when she is on her feet for long periods. Denies injury or trauma. Denies open wounds or sores. Denies pain to today but relates it is mostly in her arch. Also notes redness at the ends of her legs and occasional swelling. Admits pain at 5/10 level and described as aching and cramps. Denies previous treatment. Denies any constitutional symptoms. No other pedal complaints at this time.    Past Medical History:   Diagnosis Date   • A-fib (CMS/HCC)    • Acute ischemic right ICA stroke (CMS/HCC)    • Atrial fibrillation (CMS/HCC)    • Benign essential HTN    • Breast CA (CMS/HCC)    • Breast cancer (CMS/HCC)    • Carotid artery disease (CMS/HCC)    • CVA (cerebral vascular accident) (CMS/HCC)    • CVA (cerebral vascular accident) (CMS/HCC)    • Gallstones    • GERD (gastroesophageal reflux disease)    • Hyperlipidemia    • Hypertension    • Hypothyroid    • Hypothyroidism    • Osteopenia    • Osteopenia    • Primary pulmonary HTN (CMS/HCC)    • Shortness of breath    • Stroke (CMS/HCC)    • Vitamin D deficiency      Past Surgical History:   Procedure Laterality Date   • BREAST LUMPECTOMY     • CARDIAC ABLATION      cardiac ablation procedure for af   • CATARACT EXTRACTION Right    • PARTIAL HIP  ARTHROPLASTY Right      Family History   Problem Relation Age of Onset   • Stroke Mother    • Cancer Father    • COPD Sister      Social History     Social History   • Marital status:      Spouse name: N/A   • Number of children: N/A   • Years of education: N/A     Occupational History   • Not on file.     Social History Main Topics   • Smoking status: Former Smoker     Years: 30.00     Types: Cigarettes     Start date: 1955     Quit date: 1/5/1992   • Smokeless tobacco: Never Used   • Alcohol use No   • Drug use: No   • Sexual activity: Defer     Other Topics Concern   • Not on file     Social History Narrative   • No narrative on file     Allergies   Allergen Reactions   • Epinephrine Shortness Of Breath   • Celebrex [Celecoxib]    • Ferrous Sulfate    • Other      Omnicef, some Thad, possible Rocephin allergy   • Simvastatin    • Sulfa Antibiotics      Current Outpatient Prescriptions   Medication Sig Dispense Refill   • acetaminophen (TYLENOL) 325 MG tablet Take 650 mg by mouth Daily As Needed for mild pain (1-3).     • alendronate (FOSAMAX) 70 MG tablet Take 70 mg by mouth Every 7 (Seven) Days.     • ALPRAZolam (XANAX) 0.25 MG tablet Take 0.25 mg by mouth 2 (Two) Times a Day As Needed for anxiety.     • atorvastatin (LIPITOR) 10 MG tablet Take 10 mg by mouth Daily.     • Cholecalciferol (VITAMIN D3) 2000 units capsule Take 2,000 Units by mouth Daily.     • Ergocalciferol (VITAMIN D2) 2000 UNITS tablet Take  by mouth Daily.     • hydrochlorothiazide (HYDRODIURIL) 25 MG tablet Take 25 mg by mouth Daily.     • ibuprofen (ADVIL,MOTRIN) 200 MG tablet Take 200 mg by mouth Daily As Needed for mild pain (1-3).     • levothyroxine (SYNTHROID, LEVOTHROID) 75 MCG tablet Take 75 mcg by mouth Daily.     • lisinopril (PRINIVIL,ZESTRIL) 20 MG tablet TK 1 T PO BID  5   • metoprolol tartrate (LOPRESSOR) 100 MG tablet Take 100 mg by mouth 2 (Two) Times a Day.     • Multiple Vitamins-Minerals (PRESERVISION AREDS 2 PO)  Take 2 capsules by mouth Daily.     • nitrofurantoin (MACRODANTIN) 100 MG capsule Take 100 mg by mouth 4 (Four) Times a Day.     • traMADol (ULTRAM) 50 MG tablet Take 50 mg by mouth Every 6 (Six) Hours As Needed for moderate pain (4-6).     • warfarin (COUMADIN) 4 MG tablet TAKE 0.5 TABLET BY MOUTH ON MONDAY, WEDNESDAY, AND FRIDAY. TAKE 1 TABLET BY MOUTH ON TUESDAY, THURSDAY, SATURDAY, AND SUNDAY 30 tablet 11   • CloNIDine (CATAPRES) 0.1 MG tablet Take 0.1 mg by mouth 2 (Two) Times a Day.     • DOXYCYCLINE MONOHYDRATE PO Take 100 mg by mouth.     • fluticasone (FLONASE) 50 MCG/ACT nasal spray 2 sprays into each nostril Daily.     • metoprolol succinate XL (TOPROL-XL) 100 MG 24 hr tablet Take 100 mg by mouth Every Night.       No current facility-administered medications for this visit.      Review of Systems   Constitutional: Negative for chills and fever.   HENT: Negative for congestion.    Respiratory: Negative for shortness of breath.    Cardiovascular: Positive for leg swelling. Negative for chest pain.   Gastrointestinal: Negative for constipation, diarrhea, nausea and vomiting.   Musculoskeletal:        Foot pain   Skin: Negative for wound.   Neurological: Negative for numbness.       OBJECTIVE     Vitals:    08/06/18 1328   BP: 140/74   Pulse: 72   SpO2: 95%       PHYSICAL EXAM  GEN:   Accompanied by granddaughter.     General Exam  Appearance: appears stated age and healthy   Orientation: alert and oriented to person, place, and time   Affect: appropriate   Assistance: cane use   Shoe Gear: casual shoes    Foot/Ankle Exam  Inspection and Palpation  Ecchymosis - Right: none Left: none  Tenderness - Right: none   Left: none   Arch - Right: normal Left: normal (Mild pronation on weight bearing)  Hallux Valgus - Right: no Left: no  Hallux Limitus - Right: no Left: no  Edema - Right: +1 Left: +1  Skin - Right: skin intact  Left: skin intact   Nails -  Right: normal Left: normal     Vascular  Dorsalis Pedis -  Right: 2+ Left: 2+  Posterior Tibial - Right: 2+ Left: 2+  Skin Temperature -  Right: warm  Left: warm    CFT - Right: 3 Left: 3  Pedal Hair Growth - Right: absent Left: absent  Varicosities -  Right: moderate varicosities  Left: moderate varicosities      Neurologic  Protective Sensation using [place] Monofilament - Right: 10 Left: 10    Muscle Strength  Dorsiflexion - Right: 5 Left: 5  Plantar Flexion - Right: 5 Left: 5  Eversion - Right: 5 Left: 5  Inversion - Right: 5 Left: 5  Great Toe Extension - Right: 5 Left: 5  Great Toe Flexion - Right: 5 Left: 5    Range of Motion  Normal right ankle ROM  Normal left ankle ROM            RADIOLOGY/NUCLEAR:  No results found.    LABORATORY/CULTURE RESULTS:      PATHOLOGY RESULTS:       ASSESSMENT/PLAN     Damaris was seen today for establish care.    Diagnoses and all orders for this visit:    Left foot pain    Venous insufficiency of both lower extremities      Comprehensive lower extremity examination and evaluation was performed.  Discussed findings and treatment plan including risks, benefits, and treatment options with patient in detail. Patient agreed with treatment plan.  Advised to wear supportive shoes with added cushion inserts when ambulating for longer periods of time.    Aching and cramping in legs consistent with venous insufficiency.  Rx: Compression Stockings 20-30mmHg  An After Visit Summary was printed and given to the patient at discharge, including (if requested) any available informative/educational handouts regarding diagnosis, treatment, or medications. All questions were answered to patient/family satisfaction. Should symptoms fail to improve or worsen they agree to call or return to clinic or to go to the Emergency Department. Discussed the importance of following up with any needed screening tests/labs/specialist appointments and any requested follow-up recommended by me today. Importance of maintaining follow-up discussed and patient  accepts that missed appointments can delay diagnosis and potentially lead to worsening of conditions.  Return if symptoms worsen or fail to improve., or sooner if acute issues arise.    Lab Frequency Next Occurrence   Cystoscopy Once 10/09/2017   US Carotid Bilateral Once 01/19/2019   POC Protime / INR     Protime-INR         This document has been electronically signed by Laurent Gonzalez DPM on August 6, 2018 2:07 PM

## 2018-09-04 ENCOUNTER — TRANSCRIBE ORDERS (OUTPATIENT)
Dept: PHYSICAL THERAPY | Facility: HOSPITAL | Age: 83
End: 2018-09-04

## 2018-09-04 ENCOUNTER — ANTICOAGULATION VISIT (OUTPATIENT)
Dept: CARDIOLOGY | Facility: CLINIC | Age: 83
End: 2018-09-04

## 2018-09-04 ENCOUNTER — LAB (OUTPATIENT)
Dept: LAB | Facility: HOSPITAL | Age: 83
End: 2018-09-04
Attending: INTERNAL MEDICINE

## 2018-09-04 DIAGNOSIS — I48.91 ATRIAL FIBRILLATION, UNSPECIFIED TYPE (HCC): ICD-10-CM

## 2018-09-04 DIAGNOSIS — R27.0 ATAXIA: Primary | ICD-10-CM

## 2018-09-04 DIAGNOSIS — R26.9 GAIT ABNORMALITY: ICD-10-CM

## 2018-09-04 LAB
INR PPP: 2.1 (ref 0.91–1.09)
PROTHROMBIN TIME: 24.6 SECONDS (ref 10–13.8)

## 2018-09-04 PROCEDURE — 85610 PROTHROMBIN TIME: CPT | Performed by: INTERNAL MEDICINE

## 2018-09-11 ENCOUNTER — HOSPITAL ENCOUNTER (OUTPATIENT)
Dept: PHYSICAL THERAPY | Facility: HOSPITAL | Age: 83
Setting detail: THERAPIES SERIES
Discharge: HOME | End: 2018-09-11
Attending: FAMILY MEDICINE

## 2018-09-11 DIAGNOSIS — R26.9 ABNORMALITY OF GAIT AND MOBILITY: Primary | ICD-10-CM

## 2018-09-11 DIAGNOSIS — R53.1 GENERAL WEAKNESS: ICD-10-CM

## 2018-09-11 PROCEDURE — G8978 MOBILITY CURRENT STATUS: HCPCS | Performed by: PHYSICAL THERAPIST

## 2018-09-11 PROCEDURE — 97161 PT EVAL LOW COMPLEX 20 MIN: CPT | Performed by: PHYSICAL THERAPIST

## 2018-09-11 PROCEDURE — G8979 MOBILITY GOAL STATUS: HCPCS | Performed by: PHYSICAL THERAPIST

## 2018-09-11 NOTE — THERAPY EVALUATION
Outpatient Physical Therapy Ortho Initial Evaluation  [facility]     Patient Name: Damaris Nash  : 1935  MRN: [id number]  Today's Date: 2018      Visit Date: 2018    Patient Active Problem List   Diagnosis   • A-fib (CMS/HCC)   • Carotid artery disease (CMS/HCC)   • Hyperlipidemia   • Hypertension   • Amaurosis fugax of right eye   • Bilateral carotid artery stenosis   • Hx of transient ischemic attack (TIA)        Past Medical History:   Diagnosis Date   • A-fib (CMS/HCC)    • Acute ischemic right ICA stroke (CMS/HCC)    • Atrial fibrillation (CMS/HCC)    • Benign essential HTN    • Breast CA (CMS/HCC)    • Breast cancer (CMS/HCC)    • Carotid artery disease (CMS/HCC)    • CVA (cerebral vascular accident) (CMS/HCC)    • CVA (cerebral vascular accident) (CMS/HCC)    • Gallstones    • GERD (gastroesophageal reflux disease)    • Hyperlipidemia    • Hypertension    • Hypothyroid    • Hypothyroidism    • Osteopenia    • Osteopenia    • Primary pulmonary HTN (CMS/HCC)    • S/P right hip fracture 2012   • Shortness of breath    • Stroke (CMS/HCC)    • Vitamin D deficiency         Past Surgical History:   Procedure Laterality Date   • BREAST LUMPECTOMY     • CARDIAC ABLATION      cardiac ablation procedure for af   • CATARACT EXTRACTION Right    • PARTIAL HIP ARTHROPLASTY Right        Visit Dx:     ICD-10-CM ICD-9-CM   1. Abnormality of gait and mobility R26.9 781.2   2. General weakness R53.1 780.79             Patient History     Row Name 18 1445             History    Chief Complaint Difficulty Walking;Difficulty with daily activities;Falls/history of falls;Balance Problems  -TB      Date Current Problem(s) Began 16  -TB      Brief Description of Current Complaint She has a remote h/o a mini-stroke (20 years ago). In , she fell and broke her right hip and eventually had a hemiarthroplasty. Over the last couple of years, she says her walking has become more difficult. She  doesn't want to regress to the point of needing a walker or w/c.   -TB      Patient/Caregiver Goals Relieve pain;Improve strength;Know what to do to help the symptoms  -TB      Patient's Rating of General Health Fair  -TB      Hand Dominance left-handed  -TB      Occupation/sports/leisure activities retired  -TB      How has patient tried to help current problem? cardiac rehab  -TB         Pain     Pain Location Hip;Knee  -TB      Pain at Present 6  -TB      Pain Frequency Intermittent  -TB      What Performance Factors Make the Current Problem(s) WORSE? standing after sitting awhile  -TB      What Performance Factors Make the Current Problem(s) BETTER? not walking  -TB      Pain Comments pain in right hip and daniele knees  -TB         Fall Risk Assessment    Any falls in the past year: No  -TB         Services    Prior Rehab/Home Health Experiences Yes  -TB      When was the prior experience with Rehab/Home Health 2012  -TB      Are you currently receiving Home Health services No  -TB         Daily Activities    Primary Language English  -TB      Are you able to read Yes  -TB      Are you able to write Yes  -TB      How does patient learn best? Listening;Demonstration  -TB      Teaching needs identified Home Exercise Program;Management of Condition;Falls Prevention  -TB      Patient is concerned about/has problems with Performing home management (household chores, shopping, care of dependents);Walking;Difficulty with self care (i.e. bathing, dressing, toileting:  -TB      Does patient have problems with the following? Anxiety  -TB      Barriers to learning Visual   blind right eye  -TB      Pt Participated in POC and Goals Yes  -TB         Safety    Are you being hurt, hit, or frightened by anyone at home or in your life? No  -TB      Are you being neglected by a caregiver No  -TB        User Key  (r) = Recorded By, (t) = Taken By, (c) = Cosigned By    Initials Name Provider Type    TB Corbin Gates, PT Physical  Therapist                PT Ortho     Row Name 09/11/18 1600       Posture/Observations    Posture/Observations Comments hyperkyphotic thoracic spine  -TB       Special Tests/Palpation    Special Tests/Palpation Hip  -TB       Hip Special Tests    Trendelenberg sign (gluteus medius weakness) Positive;Bilateral:  -TB       General ROM    GENERAL ROM COMMENTS daniele hip PROM WFL  -TB       MMT (Manual Muscle Testing)    Rt Lower Ext Rt Hip ABduction;Rt Knee Extension;Rt Knee Flexion;Rt Ankle Plantarflexion;Rt Ankle Dorsiflexion  -TB    Lt Lower Ext Lt Hip ABduction;Lt Knee WNL;Lt Ankle WNL  -TB       MMT Right Lower Ext    Rt Hip ABduction MMT, Gross Movement (3-/5) fair minus  -TB    Rt Knee Extension MMT, Gross Movement (4/5) good  -TB    Rt Knee Flexion MMT, Gross Movement (4/5) good  -TB    Rt Ankle Plantarflexion MMT, Gross Movement (4/5) good  -TB    Rt Ankle Dorsiflexion MMT, Gross Movement (4/5) good  -TB       MMT Left Lower Ext    Lt Hip ABduction MMT, Gross Movement (3-/5) fair minus  -TB       Sensation    Additional Comments diminished sensation 50% daniele feet  -TB       Pathomechanics    Lower Extremity Pathomechanics Trendelenburg with midstance  -TB       Gait/Stairs Assessment/Training    Comment (Gait/Stairs) walks with forward stooped posture with SC looking at floor with wide ABENA and daniele trendelenberg  -TB      User Key  (r) = Recorded By, (t) = Taken By, (c) = Cosigned By    Initials Name Provider Type    TB Corbin Gates, PT Physical Therapist                      Therapy Education  Education Details: seated W's and hip abd against yellow tband; posture with walking  Given: HEP, Mobility training, Fall prevention and home safety  Program: New  How Provided: Verbal, Demonstration, Written  Provided to: Patient, Caregiver  Level of Understanding: Teach back education performed, Verbalized, Demonstrated           PT OP Goals     Row Name 09/11/18 1600          Long Term Goals    LTG Date to Achieve  10/23/18  -TB     LTG 1 daniele hip abd 4/5  -TB     LTG 1 Progress New  -TB     LTG 2 Walk with more erect posture without looking at the floor most of the time  -TB     LTG 2 Progress New  -TB     LTG 3 Walk with decreased trendelenberg daniele with SC  -TB     LTG 3 Progress New  -TB     LTG 4 SLS x 5 seconds daniele  -TB     LTG 4 Progress New  -TB     LTG 5 Ind with HEP for hip stability and posture  -TB     LTG 5 Progress New  -TB        Time Calculation    PT Goal Re-Cert Due Date 10/11/18  -TB       User Key  (r) = Recorded By, (t) = Taken By, (c) = Cosigned By    Initials Name Provider Type    TB Corbin Gates, PT Physical Therapist                PT Assessment/Plan     Row Name 09/11/18 1600          PT Assessment    Functional Limitations Impaired gait;Impaired locomotion;Limitation in home management;Limitations in functional capacity and performance;Performance in leisure activities  -TB     Impairments Gait;Impaired muscle endurance;Balance;Posture;Muscle strength  -TB     Assessment Comments Her primary problem with walking appears to stem from the severe weakness in her daniele hips and her dependency on her vision for balance causing her to stoop forward and look down with walking. Her right leg grossly is weaker than her left but it's her hips that limit her the most. I believe she can progress well with her confidence and gait if she is consistent with her HEP.  -TB     Please refer to paper survey for additional self-reported information Yes  -TB     Rehab Potential Good  -TB     Patient/caregiver participated in establishment of treatment plan and goals Yes  -TB     Patient would benefit from skilled therapy intervention Yes  -TB        PT Plan    PT Frequency 1x/week;2x/week  -TB     Predicted Duration of Therapy Intervention (Therapy Eval) 6 weeks  -TB     Planned CPT's? PT EVAL LOW COMPLEXITY: 70350;PT THER PROC EA 15 MIN: 22122;PT THER ACT EA 15 MIN: 35616;PT MANUAL THERAPY EA 15 MIN: 91905;PT GAIT  TRAINING EA 15 MIN: 20225  -TB     PT Plan Comments We will focus early on her posture, loosening her thoracic kyphosis and also focus on hip stability. We will incorporate this into more balance and gait training.   -TB       User Key  (r) = Recorded By, (t) = Taken By, (c) = Cosigned By    Initials Name Provider Type    TB Corbin Gates, PT Physical Therapist                                        Time Calculation:     Therapy Suggested Charges     Code   Minutes Charges    None             Start Time: 1445  Stop Time: 1545  Time Calculation (min): 60 min     Therapy Charges for Today     Code Description Service Date Service Provider Modifiers Qty    85003681180 HC PT MOBILITY CURRENT 9/11/2018 Corbin Gates, PT GP, CK 1    37583702434 HC PT MOBILITY PROJECTED 9/11/2018 Corbin Gates, PT GP, CJ 1    31693837241 HC PT EVAL LOW COMPLEXITY 4 9/11/2018 Corbin Gates, PT GP 1          PT G-Codes  PT Professional Judgement Used?: Yes  Functional Limitation: Mobility: Walking and moving around  Mobility: Walking and Moving Around Current Status (): At least 40 percent but less than 60 percent impaired, limited or restricted  Mobility: Walking and Moving Around Goal Status (): At least 20 percent but less than 40 percent impaired, limited or restricted         Corbin Gates, PT  9/11/2018

## 2018-09-13 ENCOUNTER — HOSPITAL ENCOUNTER (OUTPATIENT)
Dept: PHYSICAL THERAPY | Facility: HOSPITAL | Age: 83
Setting detail: THERAPIES SERIES
Discharge: HOME | End: 2018-09-13

## 2018-09-13 DIAGNOSIS — R53.1 GENERAL WEAKNESS: ICD-10-CM

## 2018-09-13 DIAGNOSIS — R26.9 ABNORMALITY OF GAIT AND MOBILITY: Primary | ICD-10-CM

## 2018-09-13 PROCEDURE — 97110 THERAPEUTIC EXERCISES: CPT

## 2018-09-13 NOTE — THERAPY TREATMENT NOTE
Outpatient Physical Therapy Ortho Treatment Note  [facility]     Patient Name: Damaris Nash  : 1935  MRN: [id number]  Today's Date: 2018      Visit Date: 2018    Visit Dx:    ICD-10-CM ICD-9-CM   1. Abnormality of gait and mobility R26.9 781.2   2. General weakness R53.1 780.79       Patient Active Problem List   Diagnosis   • A-fib (CMS/HCC)   • Carotid artery disease (CMS/HCC)   • Hyperlipidemia   • Hypertension   • Amaurosis fugax of right eye   • Bilateral carotid artery stenosis   • Hx of transient ischemic attack (TIA)        Past Medical History:   Diagnosis Date   • A-fib (CMS/HCC)    • Acute ischemic right ICA stroke (CMS/HCC)    • Atrial fibrillation (CMS/HCC)    • Benign essential HTN    • Breast CA (CMS/HCC)    • Breast cancer (CMS/HCC)    • Carotid artery disease (CMS/HCC)    • CVA (cerebral vascular accident) (CMS/HCC)    • CVA (cerebral vascular accident) (CMS/HCC)    • Gallstones    • GERD (gastroesophageal reflux disease)    • Hyperlipidemia    • Hypertension    • Hypothyroid    • Hypothyroidism    • Osteopenia    • Osteopenia    • Primary pulmonary HTN (CMS/HCC)    • S/P right hip fracture 2012   • Shortness of breath    • Stroke (CMS/HCC)    • Vitamin D deficiency         Past Surgical History:   Procedure Laterality Date   • BREAST LUMPECTOMY     • CARDIAC ABLATION      cardiac ablation procedure for af   • CATARACT EXTRACTION Right    • PARTIAL HIP ARTHROPLASTY Right              PT Ortho     Row Name 18 1600       Posture/Observations    Posture/Observations Comments hyperkyphotic thoracic spine  -TB       Special Tests/Palpation    Special Tests/Palpation Hip  -TB       Hip Special Tests    Trendelenberg sign (gluteus medius weakness) Positive;Bilateral:  -TB       General ROM    GENERAL ROM COMMENTS daniele hip PROM WFL  -TB       MMT (Manual Muscle Testing)    Rt Lower Ext Rt Hip ABduction;Rt Knee Extension;Rt Knee Flexion;Rt Ankle Plantarflexion;Rt Ankle  Dorsiflexion  -TB    Lt Lower Ext Lt Hip ABduction;Lt Knee WNL;Lt Ankle WNL  -TB       MMT Right Lower Ext    Rt Hip ABduction MMT, Gross Movement (3-/5) fair minus  -TB    Rt Knee Extension MMT, Gross Movement (4/5) good  -TB    Rt Knee Flexion MMT, Gross Movement (4/5) good  -TB    Rt Ankle Plantarflexion MMT, Gross Movement (4/5) good  -TB    Rt Ankle Dorsiflexion MMT, Gross Movement (4/5) good  -TB       MMT Left Lower Ext    Lt Hip ABduction MMT, Gross Movement (3-/5) fair minus  -TB       Sensation    Additional Comments diminished sensation 50% daniele feet  -TB       Pathomechanics    Lower Extremity Pathomechanics Trendelenburg with midstance  -TB       Gait/Stairs Assessment/Training    Comment (Gait/Stairs) walks with forward stooped posture with SC looking at floor with wide ABENA and daniele trendelenberg  -TB      User Key  (r) = Recorded By, (t) = Taken By, (c) = Cosigned By    Initials Name Provider Type    TB Corbin Gates, PT Physical Therapist                            PT Assessment/Plan     Row Name 09/13/18 1520          PT Assessment    Assessment Comments Patient continues to have increased thoracic kyphosis, which causes her to look down at the ground when she is walking.  She also continues to have decreased endurance and needed multiple rest breaks.   Due to decreased right heel strike and decreased proprioception and forward flexed posture, she is at a higher risk of falls.  -GILBERTO        PT Plan    PT Plan Comments We will continue to work to improve posture, proprioception, hip strength, and progress with balance.  -GILBERTO       User Key  (r) = Recorded By, (t) = Taken By, (c) = Cosigned By    Initials Name Provider Type    Nikita Bautista, PTA Physical Therapy Assistant                    Exercises     Row Name 09/13/18 1520             Subjective Comments    Subjective Comments Patient reports she has trouble walking.  She has to have help climbing up/down stairs with rail or cane. She reports  "she has steps at home,  but she has a rail.  She denies any recent falls.  -GILBERTO         Subjective Pain    Able to rate subjective pain? yes  -GILBERTO      Pre-Treatment Pain Level 0  -GILBERTO      Post-Treatment Pain Level 0  -GILBERTO         Total Minutes    59231 - PT Therapeutic Exercise Minutes 58  -GILBERTO         Exercise 1    Exercise Name 1 reclined: thoracic towel roll stretch  -GILBERTO      Cueing 1 Verbal;Tactile  -GILBERTO      Time 1 5 minutes  -GILBERTO         Exercise 2    Exercise Name 2 reclined: hooklying clams  -GILBERTO      Cueing 2 Verbal;Tactile  -GILBERTO      Sets 2 3  -GILBERTO      Reps 2 10  -GILBERTO      Additional Comments yellow band  -GILBERTO         Exercise 3    Exercise Name 3 seated marching with yellow band around knees  -GILBERTO      Cueing 3 Verbal;Tactile;Demo  -GILBERTO      Sets 3 3  -GILBERTO      Reps 3 10  -GILBERTO         Exercise 4    Exercise Name 4 sit to stands  -GILBERTO      Cueing 4 Tactile;Verbal  -GILBERTO      Sets 4 1  -GILBERTO      Reps 4 5  -GILBERTO         Exercise 5    Exercise Name 5 Ambulated with cane 40'x4 working to increase heel strike and look up  -GILBERTO      Cueing 5 Verbal;Tactile;Demo  -GILBERTO      Sets 5 2  -GILBERTO      Time 5 40'  -GILBERTO         Exercise 6    Exercise Name 6 seated alternating foot taps on 2\" step  -GILBERTO      Cueing 6 Verbal;Tactile  -GILBERTO      Sets 6 3  -GILBERTO      Time 6 30 seconds each  -GILBERTO      Additional Comments cues to look up and not at feet  -GILBERTO         Exercise 7    Exercise Name 7 reviewed new HEP components  -GILBERTO        User Key  (r) = Recorded By, (t) = Taken By, (c) = Cosigned By    Initials Name Provider Type    GILBERTO Nikita Verma, PTA Physical Therapy Assistant                               PT OP Goals     Row Name 09/13/18 1520          Long Term Goals    LTG Date to Achieve 10/23/18  -GILBERTO     LTG 1 daniele hip abd 4/5  -GILBERTO     LTG 1 Progress Ongoing  -GILBERTO     LTG 2 Walk with more erect posture without looking at the floor most of the time  -GILBERTO     LTG 2 Progress Ongoing  -GILBERTO     LTG 2 Progress Comments she continues to look down at the " ground  -GILBERTO     LTG 3 Walk with decreased trendelenberg daniele with SC  -GILBERTO     LTG 3 Progress Ongoing  -GILBERTO     LTG 4 SLS x 5 seconds daniele  -GILBERTO     LTG 4 Progress Ongoing  -GILBERTO     LTG 5 Ind with HEP for hip stability and posture  -GILBERTO     LTG 5 Progress Ongoing  -GILBERTO        Time Calculation    PT Goal Re-Cert Due Date 10/11/18  -GILBERTO       User Key  (r) = Recorded By, (t) = Taken By, (c) = Cosigned By    Initials Name Provider Type    Nikita Bautista PTA Physical Therapy Assistant          Therapy Education  Education Details: seated hip abduction with yellow band, seated thoracic towel roll stretch  Given: HEP  Program: New  How Provided: Verbal, Demonstration, Written  Provided to: Patient  Level of Understanding: Verbalized              Time Calculation:   Start Time: 1520  Stop Time: 1618  Time Calculation (min): 58 min  Total Timed Code Minutes- PT: 58 minute(s)  Therapy Suggested Charges     Code   Minutes Charges    66169 (CPT®) Hc Pt Neuromusc Re Education Ea 15 Min      51559 (CPT®) Hc Pt Ther Proc Ea 15 Min 58 4    89954 (CPT®) Hc Gait Training Ea 15 Min      07852 (CPT®) Hc Pt Therapeutic Act Ea 15 Min      98814 (CPT®) Hc Pt Manual Therapy Ea 15 Min      39238 (CPT®) Hc Pt Ther Massage- Per 15 Min      36489 (CPT®) Hc Pt Iontophoresis Ea 15 Min      99365 (CPT®) Hc Pt Elec Stim Ea-Per 15 Min      61907 (CPT®) Hc Pt Ultrasound Ea 15 Min      02408 (CPT®) Hc Pt Self Care/Mgmt/Train Ea 15 Min      43900 (CPT®) Hc Pt Prosthetic (S) Train Initial Encounter, Each 15 Min      11862 (CPT®) Hc Orthotic(S) Mgmt/Train Initial Encounter, Each 15min      82275 (CPT®) Hc Pt Aquatic Therapy Ea 15 Min      30226 (CPT®) Hc Pt Orthotic(S)/Prosthetic(S) Encounter, Each 15 Min      Total  58 4        Therapy Charges for Today     Code Description Service Date Service Provider Modifiers Qty    81468784402 HC PT THER PROC EA 15 MIN 9/13/2018 Nikita Verma PTA GP 4                    Nikita Verma PTA  9/13/2018

## 2018-09-20 ENCOUNTER — HOSPITAL ENCOUNTER (OUTPATIENT)
Dept: PHYSICAL THERAPY | Facility: HOSPITAL | Age: 83
Setting detail: THERAPIES SERIES
Discharge: HOME | End: 2018-09-20

## 2018-09-20 ENCOUNTER — APPOINTMENT (OUTPATIENT)
Dept: PHYSICAL THERAPY | Facility: HOSPITAL | Age: 83
End: 2018-09-20

## 2018-09-20 DIAGNOSIS — R53.1 GENERAL WEAKNESS: ICD-10-CM

## 2018-09-20 DIAGNOSIS — R26.9 ABNORMALITY OF GAIT AND MOBILITY: Primary | ICD-10-CM

## 2018-09-20 PROCEDURE — 97110 THERAPEUTIC EXERCISES: CPT | Performed by: PHYSICAL THERAPIST

## 2018-09-20 PROCEDURE — 97116 GAIT TRAINING THERAPY: CPT | Performed by: PHYSICAL THERAPIST

## 2018-09-20 NOTE — THERAPY TREATMENT NOTE
Outpatient Physical Therapy Ortho Treatment Note  [facility]     Patient Name: Damaris Nash  : 1935  MRN: [id number]  Today's Date: 2018      Visit Date: 2018    Visit Dx:    ICD-10-CM ICD-9-CM   1. Abnormality of gait and mobility R26.9 781.2   2. General weakness R53.1 780.79       Patient Active Problem List   Diagnosis   • A-fib (CMS/HCC)   • Carotid artery disease (CMS/HCC)   • Hyperlipidemia   • Hypertension   • Amaurosis fugax of right eye   • Bilateral carotid artery stenosis   • Hx of transient ischemic attack (TIA)        Past Medical History:   Diagnosis Date   • A-fib (CMS/HCC)    • Acute ischemic right ICA stroke (CMS/HCC)    • Atrial fibrillation (CMS/HCC)    • Benign essential HTN    • Breast CA (CMS/HCC)    • Breast cancer (CMS/HCC)    • Carotid artery disease (CMS/HCC)    • CVA (cerebral vascular accident) (CMS/HCC)    • CVA (cerebral vascular accident) (CMS/HCC)    • Gallstones    • GERD (gastroesophageal reflux disease)    • Hyperlipidemia    • Hypertension    • Hypothyroid    • Hypothyroidism    • Osteopenia    • Osteopenia    • Primary pulmonary HTN (CMS/HCC)    • S/P right hip fracture 2012   • Shortness of breath    • Stroke (CMS/HCC)    • Vitamin D deficiency         Past Surgical History:   Procedure Laterality Date   • BREAST LUMPECTOMY     • CARDIAC ABLATION      cardiac ablation procedure for af   • CATARACT EXTRACTION Right    • PARTIAL HIP ARTHROPLASTY Right                              PT Assessment/Plan     Row Name 18 1000          PT Assessment    Assessment Comments Her hips are certainly weak but it's her lack of anterior weight shifting that is affecting her gait more than anything. She struggles to understand the concept of the anterior weight shifting and will tend to get distracted with other cues like heel strike and forget about posture and weight shifting. She showed that with ant weight shifting, she could walk actually pretty good.  She needs more confidence and practice in this area.   -TB        PT Plan    PT Plan Comments Cont to work on anterior weight shifting and incorporate into her gait  -TB       User Key  (r) = Recorded By, (t) = Taken By, (c) = Cosigned By    Initials Name Provider Type    Corbin Muhammad, PT Physical Therapist                    Exercises     Row Name 09/20/18 1005 09/20/18 1000          Subjective Comments    Subjective Comments She is trying to not look at her toes as much. She denies any falls.   -TB  --        Subjective Pain    Pre-Treatment Pain Level 0  -TB  --        Total Minutes    96937 - Gait Training Minutes   -- 30  -TB     21010 - PT Therapeutic Exercise Minutes  -- 10  -TB        Exercise 1    Exercise Name 1 gait training with cues to stand more erect and shift weight more anterior.  -TB  --     Time 1 30  -TB  --     Additional Comments she tends to push her weight posterior; I nudged her in the back to push her more anterior and she did much better with a faster stride and straight walk; it took many cues over and over again for her to begin to understand  -TB  --        Exercise 2    Exercise Name 2 standing SLS with hip abd in // bars  -TB  --     Sets 2 2  -TB  --     Reps 2 10 daniele  -TB  --     Time 2 sustained 5 sec each  -TB  --        Exercise 3    Exercise Name 3 standing heel raises in // bars  -TB  --     Sets 3 1  -TB  --     Reps 3 25  -TB  --     Additional Comments cued to not pull with arms to balance but try to not hold onto // bars  -TB  --       User Key  (r) = Recorded By, (t) = Taken By, (c) = Cosigned By    Initials Name Provider Type    Corbin Muhammad, PT Physical Therapist                               PT OP Goals     Row Name 09/20/18 1000          Long Term Goals    LTG Date to Achieve 10/23/18  -TB     LTG 1 daniele hip abd 4/5  -TB     LTG 1 Progress Ongoing  -TB     LTG 2 Walk with more erect posture without looking at the floor most of the time  -TB     LTG 2  Progress Ongoing  -TB     LTG 2 Progress Comments tends to stoop  -TB     LTG 3 Walk with decreased trendelenberg daniele with SC  -TB     LTG 3 Progress Ongoing  -TB     LTG 4 SLS x 5 seconds daniele  -TB     LTG 4 Progress Ongoing  -TB     LTG 5 Ind with HEP for hip stability and posture  -TB     LTG 5 Progress Ongoing  -TB        Time Calculation    PT Goal Re-Cert Due Date 10/11/18  -TB       User Key  (r) = Recorded By, (t) = Taken By, (c) = Cosigned By    Initials Name Provider Type    TB Corbin Gates, PT Physical Therapist          Therapy Education  Education Details: focus on anterior weight shift wtih gait  Given: HEP  Program: New, Progressed  How Provided: Verbal, Demonstration, Written  Provided to: Patient  Level of Understanding: Verbalized              Time Calculation:   Start Time: 1005  Stop Time: 1045  Time Calculation (min): 40 min  Total Timed Code Minutes- PT: 40 minute(s)  Therapy Suggested Charges     Code   Minutes Charges    94958 (CPT®) Hc Pt Neuromusc Re Education Ea 15 Min      14775 (CPT®) Hc Pt Ther Proc Ea 15 Min 10 1    69659 (CPT®) Hc Gait Training Ea 15 Min 30 2    19838 (CPT®) Hc Pt Therapeutic Act Ea 15 Min      25255 (CPT®) Hc Pt Manual Therapy Ea 15 Min      22228 (CPT®) Hc Pt Ther Massage- Per 15 Min      14649 (CPT®) Hc Pt Iontophoresis Ea 15 Min      47411 (CPT®) Hc Pt Elec Stim Ea-Per 15 Min      54795 (CPT®) Hc Pt Ultrasound Ea 15 Min      97611 (CPT®) Hc Pt Self Care/Mgmt/Train Ea 15 Min      42590 (CPT®) Hc Pt Prosthetic (S) Train Initial Encounter, Each 15 Min      68277 (CPT®) Hc Orthotic(S) Mgmt/Train Initial Encounter, Each 15min      33060 (CPT®) Hc Pt Aquatic Therapy Ea 15 Min      90402 (CPT®) Hc Pt Orthotic(S)/Prosthetic(S) Encounter, Each 15 Min      Total  40 3        Therapy Charges for Today     Code Description Service Date Service Provider Modifiers Qty    22856788924 HC PT THER PROC EA 15 MIN 9/20/2018 Corbin Gates, PT GP 1    97870112501 HC GAIT TRAINING  EA 15 MIN 9/20/2018 Corbin Gates, PT GP 2                    Corbin Gates, PT  9/20/2018

## 2018-09-25 ENCOUNTER — HOSPITAL ENCOUNTER (OUTPATIENT)
Dept: PHYSICAL THERAPY | Facility: HOSPITAL | Age: 83
Setting detail: THERAPIES SERIES
Discharge: HOME | End: 2018-09-25

## 2018-09-25 DIAGNOSIS — R26.9 ABNORMALITY OF GAIT AND MOBILITY: Primary | ICD-10-CM

## 2018-09-25 DIAGNOSIS — R53.1 GENERAL WEAKNESS: ICD-10-CM

## 2018-09-25 PROCEDURE — 97116 GAIT TRAINING THERAPY: CPT

## 2018-09-25 PROCEDURE — 97110 THERAPEUTIC EXERCISES: CPT

## 2018-09-25 NOTE — THERAPY TREATMENT NOTE
Outpatient Physical Therapy Ortho Treatment Note  [facility]     Patient Name: Damaris Nash  : 1935  MRN: [id number]  Today's Date: 2018      Visit Date: 2018    Visit Dx:    ICD-10-CM ICD-9-CM   1. Abnormality of gait and mobility R26.9 781.2   2. General weakness R53.1 780.79       Patient Active Problem List   Diagnosis   • A-fib (CMS/HCC)   • Carotid artery disease (CMS/HCC)   • Hyperlipidemia   • Hypertension   • Amaurosis fugax of right eye   • Bilateral carotid artery stenosis   • Hx of transient ischemic attack (TIA)        Past Medical History:   Diagnosis Date   • A-fib (CMS/HCC)    • Acute ischemic right ICA stroke (CMS/HCC)    • Atrial fibrillation (CMS/HCC)    • Benign essential HTN    • Breast CA (CMS/HCC)    • Breast cancer (CMS/HCC)    • Carotid artery disease (CMS/HCC)    • CVA (cerebral vascular accident) (CMS/HCC)    • CVA (cerebral vascular accident) (CMS/HCC)    • Gallstones    • GERD (gastroesophageal reflux disease)    • Hyperlipidemia    • Hypertension    • Hypothyroid    • Hypothyroidism    • Osteopenia    • Osteopenia    • Primary pulmonary HTN (CMS/HCC)    • S/P right hip fracture 2012   • Shortness of breath    • Stroke (CMS/HCC)    • Vitamin D deficiency         Past Surgical History:   Procedure Laterality Date   • BREAST LUMPECTOMY     • CARDIAC ABLATION      cardiac ablation procedure for af   • CATARACT EXTRACTION Right    • PARTIAL HIP ARTHROPLASTY Right              PT Ortho     Row Name 18 0940       Gait/Stairs Assessment/Training    59816 - Gait Training Minutes  10  -GILBERTO    [place] Level (Gait) contact guard  -GILBERTO    Assistive Device (Gait) cane, straight  -GILBERTO    Distance in Feet (Gait) 330  -GILBERTO    Bilateral Gait Deviations heel strike decreased;weight shift ability decreased  -GILBERTO    Comment (Gait/Stairs) Worked on improving anterior weightshifting with gait She did have decreased heel strike L>R.  PTA provided gentle nudge on her back  in order to have tactile cueing for anterior weightshifting.  This did improve with practice, but she continues to  a sway back posture.  -GILBERTO      User Key  (r) = Recorded By, (t) = Taken By, (c) = Cosigned By    Initials Name Provider Type    Nikita Bautista PTA Physical Therapy Assistant                            PT Assessment/Plan     Row Name 09/25/18 0940          PT Assessment    Assessment Comments Today's focus was on improving anterior weightshifting on the Neurocom balance master.  She performed well on the balance master with visual cueing of her weightshifting.  However, with gait she continues to have increased posteriorly displaced COM.  However, with cueing this did improve.    -GILBERTO        PT Plan    PT Plan Comments We will continue to challenge her anterior weightshifting and carry this over into gait.  -GILBERTO       User Key  (r) = Recorded By, (t) = Taken By, (c) = Cosigned By    Initials Name Provider Type    Nikita Bautista PTA Physical Therapy Assistant                    Exercises     Row Name 09/25/18 0909             Subjective Comments    Subjective Comments Patient denies any falls.  She reports she hasn't been doing real good due to the weather.  -GILBERTO         Subjective Pain    Able to rate subjective pain? yes  -GILBERTO      Pre-Treatment Pain Level 0  -GILBERTO      Post-Treatment Pain Level 0  -GILBERTO         Total Minutes    28452 - Gait Training Minutes  10  -GILBERTO      82697 - PT Therapeutic Exercise Minutes 44  -GILBERTO         Exercise 1    Exercise Name 1 Neurocom: limits of stability  -GILBERTO      Time 1 --  -GILBERTO         Exercise 2    Exercise Name 2 Neurocom: weightshifting training: midline orientation, forward/backward, foward L/R, forward L/R/straight  -GILBERTO      Time 2 --  -GILBERTO      Additional Comments 44 minutes total spent on Neurocom with seated rest breaks  -GILBERTO        User Key  (r) = Recorded By, (t) = Taken By, (c) = Cosigned By    Initials Name Provider Type    Nikita Bautista,  PTA Physical Therapy Assistant                               PT OP Goals     Row Name 09/25/18 0940          Long Term Goals    LTG Date to Achieve 10/23/18  -GILBERTO     LTG 1 daniele hip abd 4/5  -GILBERTO     LTG 1 Progress Ongoing  -GILBERTO     LTG 2 Walk with more erect posture without looking at the floor most of the time  -GILBERTO     LTG 2 Progress Ongoing  -GILBERTO     LTG 2 Progress Comments She tends to look down at the ground with sway back posture.  -GILBERTO     LTG 3 Walk with decreased trendelenberg daniele with SC  -GILBERTO     LTG 3 Progress Ongoing  -GILBERTO     LTG 4 SLS x 5 seconds daniele  -GILBERTO     LTG 4 Progress Ongoing  -GILBERTO     LTG 5 Ind with HEP for hip stability and posture  -GILBERTO     LTG 5 Progress Ongoing  -GILBERTO        Time Calculation    PT Goal Re-Cert Due Date 10/11/18  -GILBERTO       User Key  (r) = Recorded By, (t) = Taken By, (c) = Cosigned By    Initials Name Provider Type    Nikita Bautista, PTA Physical Therapy Assistant          Therapy Education  Education Details: Gave her printed walking tip sheet  Given: HEP  Program: New  How Provided: Verbal, Written  Provided to: Patient  Level of Understanding: Verbalized              Time Calculation:   Start Time: 0940  Stop Time: 1037  Time Calculation (min): 57 min  PT Non-Billable Time (min): 3 min  Total Timed Code Minutes- PT: 54 minute(s)  Therapy Suggested Charges     Code   Minutes Charges    22759 (CPT®) Hc Pt Neuromusc Re Education Ea 15 Min      27075 (CPT®) Hc Pt Ther Proc Ea 15 Min 44 3    70875 (CPT®) Hc Gait Training Ea 15 Min 10 1    28447 (CPT®) Hc Pt Therapeutic Act Ea 15 Min      39337 (CPT®) Hc Pt Manual Therapy Ea 15 Min      09292 (CPT®) Hc Pt Ther Massage- Per 15 Min      02043 (CPT®) Hc Pt Iontophoresis Ea 15 Min      17866 (CPT®) Hc Pt Elec Stim Ea-Per 15 Min      06688 (CPT®) Hc Pt Ultrasound Ea 15 Min      12941 (CPT®) Hc Pt Self Care/Mgmt/Train Ea 15 Min      51869 (CPT®) Hc Pt Prosthetic (S) Train Initial Encounter, Each 15 Min      04276 (CPT®) Hc Orthotic(S)  Mgmt/Train Initial Encounter, Each 15min      56956 (CPT®) Hc Pt Aquatic Therapy Ea 15 Min      87086 (CPT®) Hc Pt Orthotic(S)/Prosthetic(S) Encounter, Each 15 Min      Total  54 4        Therapy Charges for Today     Code Description Service Date Service Provider Modifiers Qty    57749131821 HC PT THER PROC EA 15 MIN 9/25/2018 Nikita Verma, PTA GP 3    78984099879 HC GAIT TRAINING EA 15 MIN 9/25/2018 Nikita Verma, PTA GP 1                    Nikita Verma, PTA  9/25/2018

## 2018-09-27 ENCOUNTER — HOSPITAL ENCOUNTER (OUTPATIENT)
Dept: PHYSICAL THERAPY | Facility: HOSPITAL | Age: 83
Setting detail: THERAPIES SERIES
Discharge: HOME | End: 2018-09-27

## 2018-09-27 DIAGNOSIS — R26.9 ABNORMALITY OF GAIT AND MOBILITY: Primary | ICD-10-CM

## 2018-09-27 DIAGNOSIS — R53.1 GENERAL WEAKNESS: ICD-10-CM

## 2018-09-27 PROCEDURE — 97110 THERAPEUTIC EXERCISES: CPT

## 2018-09-27 NOTE — THERAPY TREATMENT NOTE
Outpatient Physical Therapy Ortho Treatment Note  [facility]     Patient Name: Damaris Nash  : 1935  MRN: [id number]  Today's Date: 2018      Visit Date: 2018    Visit Dx:    ICD-10-CM ICD-9-CM   1. Abnormality of gait and mobility R26.9 781.2   2. General weakness R53.1 780.79       Patient Active Problem List   Diagnosis   • A-fib (CMS/HCC)   • Carotid artery disease (CMS/HCC)   • Hyperlipidemia   • Hypertension   • Amaurosis fugax of right eye   • Bilateral carotid artery stenosis   • Hx of transient ischemic attack (TIA)        Past Medical History:   Diagnosis Date   • A-fib (CMS/HCC)    • Acute ischemic right ICA stroke (CMS/HCC)    • Atrial fibrillation (CMS/HCC)    • Benign essential HTN    • Breast CA (CMS/HCC)    • Breast cancer (CMS/HCC)    • Carotid artery disease (CMS/HCC)    • CVA (cerebral vascular accident) (CMS/HCC)    • CVA (cerebral vascular accident) (CMS/HCC)    • Gallstones    • GERD (gastroesophageal reflux disease)    • Hyperlipidemia    • Hypertension    • Hypothyroid    • Hypothyroidism    • Osteopenia    • Osteopenia    • Primary pulmonary HTN (CMS/HCC)    • S/P right hip fracture 2012   • Shortness of breath    • Stroke (CMS/HCC)    • Vitamin D deficiency         Past Surgical History:   Procedure Laterality Date   • BREAST LUMPECTOMY     • CARDIAC ABLATION      cardiac ablation procedure for af   • CATARACT EXTRACTION Right    • PARTIAL HIP ARTHROPLASTY Right              PT Ortho     Row Name 18 0940       Gait/Stairs Assessment/Training    17201 - Gait Training Minutes  10  -GILBERTO    [place]augh Level (Gait) contact guard  -GILBERTO    Assistive Device (Gait) cane, straight  -GILBERTO    Distance in Feet (Gait) 330  -GILBERTO    Bilateral Gait Deviations heel strike decreased;weight shift ability decreased  -GILBERTO    Comment (Gait/Stairs) Worked on improving anterior weightshifting with gait She did have decreased heel strike L>R.  PTA provided gentle nudge on her back  in order to have tactile cueing for anterior weightshifting.  This did improve with practice, but she continues to  a sway back posture.  -GILBERTO      User Key  (r) = Recorded By, (t) = Taken By, (c) = Cosigned By    Initials Name Provider Type    Nikita Bautista PTA Physical Therapy Assistant                            PT Assessment/Plan     Row Name 09/27/18 1121          PT Assessment    Assessment Comments Patient continues to have increased weight placed more posteriorly, but she was able to correct this today about 50% of the time on her own.  Her static balance is actually good for her age.  She does demonstrate decreased step length and heel strike during gait, which places her at an increased risk of falls.  -GILBERTO        PT Plan    PT Plan Comments We will continue to work on anterior weightshifting, improving gait mechanics, and challenging her balance.  -GILBERTO       User Key  (r) = Recorded By, (t) = Taken By, (c) = Cosigned By    Initials Name Provider Type    Nikita Bautista PTA Physical Therapy Assistant                    Exercises     Row Name 09/27/18 1121             Subjective Comments    Subjective Comments Patient denies falls or near falls  -GILBERTO         Subjective Pain    Able to rate subjective pain? yes  -GILBERTO      Pre-Treatment Pain Level 0  -GILBERTO      Post-Treatment Pain Level 0  -GILBERTO         Total Minutes    56789 - PT Therapeutic Exercise Minutes 38  -GILBERTO         Exercise 1    Exercise Name 1 standing heel raises at cybex  -GILBERTO      Cueing 1 Verbal;Tactile  -GILBERTO      Sets 1 2  -GILBERTO      Reps 1 10  -GILBERTO      Additional Comments UE support  -GILBERTO         Exercise 2    Exercise Name 2 NBOS and normal base of support on bynum foam   -GILBERTO      Cueing 2 Tactile;Verbal  -GILBERTO      Sets 2 3 each  -GILBERTO      Time 2 30 seconds each  -GILBERTO      Additional Comments no UE support  -GILBERTO         Exercise 3    Exercise Name 3 SLS with step up/over foam // bars  -GILBERTO      Cueing 3 Verbal;Tactile;Demo  -GILBERTO      Sets 3  "1  -GILBERTO      Reps 3 10  -GILBERTO      Additional Comments each leg, blue therafoam, UE support at bars  -GILBERTO         Exercise 4    Exercise Name 4 alternating toe taps on 2\" step  -GILBERTO      Cueing 4 Tactile;Verbal;Demo  -GILBERTO      Sets 4 2  -GILBERTO      Time 4 1 minute each  -GILBERTO      Additional Comments she progressed to only one UE support  -GILBERTO         Exercise 5    Exercise Name 5 standing SLS with hip abd in // bars  -GILBERTO      Cueing 5 Verbal;Tactile  -GILBERTO      Sets 5 2  -GILBERTO      Reps 5 10  -GILBERTO         Exercise 6    Exercise Name 6 standing marching at // bars  -GILBERTO      Cueing 6 Verbal;Tactile  -GILBERTO      Sets 6 1  -GILBERTO      Reps 6 20  -GILBERTO      Additional Comments added to HEP  -GILBERTO        User Key  (r) = Recorded By, (t) = Taken By, (c) = Cosigned By    Initials Name Provider Type    Nikita Bautista, PTA Physical Therapy Assistant                               PT OP Goals     Row Name 09/27/18 1121          Long Term Goals    LTG Date to Achieve 10/23/18  -GILBERTO     LTG 1 daniele hip abd 4/5  -GILBERTO     LTG 1 Progress Ongoing  -GILBERTO     LTG 2 Walk with more erect posture without looking at the floor most of the time  -GILBERTO     LTG 2 Progress Ongoing  -GILBERTO     LTG 2 Progress Comments she is improving with being able to correct herself from looking at the ground  -GILBERTO     LTG 3 Walk with decreased trendelenberg daniele with SC  -GILBERTO     LTG 3 Progress Ongoing  -GILBERTO     LTG 4 SLS x 5 seconds daniele  -GILBERTO     LTG 4 Progress Ongoing  -GILBERTO     LTG 5 Ind with HEP for hip stability and posture  -GILBERTO     LTG 5 Progress Ongoing  -GILBERTO        Time Calculation    PT Goal Re-Cert Due Date 10/11/18  -GILBERTO       User Key  (r) = Recorded By, (t) = Taken By, (c) = Cosigned By    Initials Name Provider Type    Nikita Bautista, PTA Physical Therapy Assistant          Therapy Education  Education Details: standing marching with UE support  Given: HEP  Program: New  How Provided: Verbal, Demonstration, Written  Provided to: Patient  Level of Understanding: Verbalized, " Demonstrated              Time Calculation:   Start Time: 1121  Stop Time: 1208  Time Calculation (min): 47 min  PT Non-Billable Time (min): 9 min  Total Timed Code Minutes- PT: 38 minute(s)  Therapy Suggested Charges     Code   Minutes Charges    82904 (CPT®) Hc Pt Neuromusc Re Education Ea 15 Min      03982 (CPT®) Hc Pt Ther Proc Ea 15 Min 38 3    05402 (CPT®) Hc Gait Training Ea 15 Min      95373 (CPT®) Hc Pt Therapeutic Act Ea 15 Min      56349 (CPT®) Hc Pt Manual Therapy Ea 15 Min      59335 (CPT®) Hc Pt Ther Massage- Per 15 Min      06860 (CPT®) Hc Pt Iontophoresis Ea 15 Min      23727 (CPT®) Hc Pt Elec Stim Ea-Per 15 Min      15929 (CPT®) Hc Pt Ultrasound Ea 15 Min      96431 (CPT®) Hc Pt Self Care/Mgmt/Train Ea 15 Min      21036 (CPT®) Hc Pt Prosthetic (S) Train Initial Encounter, Each 15 Min      73334 (CPT®) Hc Orthotic(S) Mgmt/Train Initial Encounter, Each 15min      51859 (CPT®) Hc Pt Aquatic Therapy Ea 15 Min      18633 (CPT®) Hc Pt Orthotic(S)/Prosthetic(S) Encounter, Each 15 Min      Total  38 3        Therapy Charges for Today     Code Description Service Date Service Provider Modifiers Qty    08978656790 HC PT THER PROC EA 15 MIN 9/27/2018 Nikita Verma, PTA GP 3                    Nikita Verma, PTA  9/27/2018

## 2018-10-02 ENCOUNTER — HOSPITAL ENCOUNTER (OUTPATIENT)
Dept: PHYSICAL THERAPY | Facility: HOSPITAL | Age: 83
Setting detail: THERAPIES SERIES
Discharge: HOME | End: 2018-10-02

## 2018-10-02 DIAGNOSIS — R53.1 GENERAL WEAKNESS: ICD-10-CM

## 2018-10-02 DIAGNOSIS — R26.9 ABNORMALITY OF GAIT AND MOBILITY: Primary | ICD-10-CM

## 2018-10-02 PROCEDURE — 97110 THERAPEUTIC EXERCISES: CPT | Performed by: PHYSICAL THERAPIST

## 2018-10-02 PROCEDURE — G8979 MOBILITY GOAL STATUS: HCPCS | Performed by: PHYSICAL THERAPIST

## 2018-10-02 PROCEDURE — G8978 MOBILITY CURRENT STATUS: HCPCS | Performed by: PHYSICAL THERAPIST

## 2018-10-02 PROCEDURE — 97116 GAIT TRAINING THERAPY: CPT | Performed by: PHYSICAL THERAPIST

## 2018-10-02 NOTE — THERAPY TREATMENT NOTE
Outpatient Physical Therapy Ortho Treatment Note  [facility]     Patient Name: Damaris Nash  : 1935  MRN: [id number]  Today's Date: 10/2/2018      Visit Date: 10/02/2018    Visit Dx:    ICD-10-CM ICD-9-CM   1. Abnormality of gait and mobility R26.9 781.2   2. General weakness R53.1 780.79       Patient Active Problem List   Diagnosis   • A-fib (CMS/HCC)   • Carotid artery disease (CMS/HCC)   • Hyperlipidemia   • Hypertension   • Amaurosis fugax of right eye   • Bilateral carotid artery stenosis   • Hx of transient ischemic attack (TIA)        Past Medical History:   Diagnosis Date   • A-fib (CMS/HCC)    • Acute ischemic right ICA stroke (CMS/HCC)    • Atrial fibrillation (CMS/HCC)    • Benign essential HTN    • Breast CA (CMS/HCC)    • Breast cancer (CMS/HCC)    • Carotid artery disease (CMS/HCC)    • CVA (cerebral vascular accident) (CMS/HCC)    • CVA (cerebral vascular accident) (CMS/HCC)    • Gallstones    • GERD (gastroesophageal reflux disease)    • Hyperlipidemia    • Hypertension    • Hypothyroid    • Hypothyroidism    • Osteopenia    • Osteopenia    • Primary pulmonary HTN (CMS/HCC)    • S/P right hip fracture 2012   • Shortness of breath    • Stroke (CMS/HCC)    • Vitamin D deficiency         Past Surgical History:   Procedure Laterality Date   • BREAST LUMPECTOMY     • CARDIAC ABLATION      cardiac ablation procedure for af   • CATARACT EXTRACTION Right    • PARTIAL HIP ARTHROPLASTY Right                              PT Assessment/Plan     Row Name 10/02/18 1200          PT Assessment    Assessment Comments She is physically capable of walk faster and with better control but her habit of leaning back. This improves her gait and control. I emphasized how her kyphosis and gait could worsen over time if she doesn't change her habits now. I also showed her Xrays of compression fractures of a thoracic kyphosis.   -TB        PT Plan    PT Plan Comments Cont to emphasize hip stability and  posture and anterior weight shifting with gait.  -TB       User Key  (r) = Recorded By, (t) = Taken By, (c) = Cosigned By    Initials Name Provider Type    Corbin Muhammad, PT Physical Therapist                    Exercises     Row Name 10/02/18 1200 10/02/18 1120          Subjective Comments    Subjective Comments  -- She feels like she has good days and bad days. She reports no falling. She feels like she is 10% better and would like to walk better.  -TB        Subjective Pain    Pre-Treatment Pain Level  -- 0  -TB        Total Minutes    76576 - Gait Training Minutes  25  -TB  --     42076 - PT Therapeutic Exercise Minutes 15  -TB  --        Exercise 1    Exercise Name 1  -- SLS with UE asst in // bars with hip abd/airplane  -TB     Sets 1  -- 3  -TB     Time 1  -- 1-2 min each leg each direction  -TB        Exercise 2    Exercise Name 2  -- Gait training with emphasis on posture being more erect and more of an anterior weight shift. I nudged her in the bad to get her to shift her weight more anterior. When I did this, she took longer steps and walked faster. She would tend to resist and push  back and when I would let go, she would slow down.   -TB     Time 2  -- 25  -TB       User Key  (r) = Recorded By, (t) = Taken By, (c) = Cosigned By    Initials Name Provider Type    Corbin Muhammad, PT Physical Therapist                               PT OP Goals     Row Name 10/02/18 1200          Long Term Goals    LTG Date to Achieve 10/23/18  -TB     LTG 1 daniele hip abd 4/5  -TB     LTG 1 Progress Ongoing  -TB     LTG 1 Progress Comments 4-/5  -TB     LTG 2 Walk with more erect posture without looking at the floor most of the time  -TB     LTG 2 Progress Ongoing  -TB     LTG 2 Progress Comments capable but needs cuing  -TB     LTG 3 Walk with decreased trendelenberg daniele with SC  -TB     LTG 3 Progress Ongoing  -TB     LTG 4 SLS x 5 seconds daniele  -TB     LTG 4 Progress Ongoing  -TB     LTG 4 Progress Comments not  without UE asst  -TB     LTG 5 Ind with HEP for hip stability and posture  -TB     LTG 5 Progress Ongoing  -TB        Time Calculation    PT Goal Re-Cert Due Date 10/11/18  -TB       User Key  (r) = Recorded By, (t) = Taken By, (c) = Cosigned By    Initials Name Provider Type    TB Corbin Gates, PT Physical Therapist          Therapy Education  Education Details: SLS with hip abd/airplane; ant weight shift with gait  Given: HEP  Program: Reinforced  How Provided: Verbal, Demonstration, Written  Provided to: Patient  Level of Understanding: Verbalized, Demonstrated              Time Calculation:   Start Time: 1120  Stop Time: 1200  Time Calculation (min): 40 min  Total Timed Code Minutes- PT: 40 minute(s)  Therapy Suggested Charges     Code   Minutes Charges    90293 (CPT®) Hc Pt Neuromusc Re Education Ea 15 Min      69696 (CPT®) Hc Pt Ther Proc Ea 15 Min 15 1    81388 (CPT®) Hc Gait Training Ea 15 Min 25 2    51536 (CPT®) Hc Pt Therapeutic Act Ea 15 Min      41765 (CPT®) Hc Pt Manual Therapy Ea 15 Min      45530 (CPT®) Hc Pt Ther Massage- Per 15 Min      68616 (CPT®) Hc Pt Iontophoresis Ea 15 Min      53081 (CPT®) Hc Pt Elec Stim Ea-Per 15 Min      39674 (CPT®) Hc Pt Ultrasound Ea 15 Min      33787 (CPT®) Hc Pt Self Care/Mgmt/Train Ea 15 Min      15313 (CPT®) Hc Pt Prosthetic (S) Train Initial Encounter, Each 15 Min      18879 (CPT®) Hc Orthotic(S) Mgmt/Train Initial Encounter, Each 15min      24540 (CPT®) Hc Pt Aquatic Therapy Ea 15 Min      67711 (CPT®) Hc Pt Orthotic(S)/Prosthetic(S) Encounter, Each 15 Min       (CPT®) Hc Pt Electrical Stim Unattended      Total  40 3        Therapy Charges for Today     Code Description Service Date Service Provider Modifiers Qty    55879013833 HC PT MOBILITY CURRENT 10/2/2018 Corbin Gates, PT GP, CK 1    72176077168 HC PT MOBILITY PROJECTED 10/2/2018 Corbin Gates, PT GP, CJ 1    29819667981 HC PT THER PROC EA 15 MIN 10/2/2018 Corbin Gates, PT GP 1     [id number]  GAIT TRAINING EA 15 MIN 10/2/2018 Corbin Gates, PT GP 2          PT G-Codes  PT Professional Judgement Used?: Yes  Functional Limitation: Mobility: Walking and moving around  Mobility: Walking and Moving Around Current Status (): At least 40 percent but less than 60 percent impaired, limited or restricted  Mobility: Walking and Moving Around Goal Status (): At least 20 percent but less than 40 percent impaired, limited or restricted         Corbin Gates, PT  10/2/2018

## 2018-10-04 ENCOUNTER — ANTICOAGULATION VISIT (OUTPATIENT)
Dept: CARDIOLOGY | Facility: CLINIC | Age: 83
End: 2018-10-04

## 2018-10-04 ENCOUNTER — HOSPITAL ENCOUNTER (OUTPATIENT)
Dept: PHYSICAL THERAPY | Facility: HOSPITAL | Age: 83
Setting detail: THERAPIES SERIES
Discharge: HOME | End: 2018-10-04

## 2018-10-04 ENCOUNTER — LAB (OUTPATIENT)
Dept: LAB | Facility: HOSPITAL | Age: 83
End: 2018-10-04
Attending: INTERNAL MEDICINE

## 2018-10-04 DIAGNOSIS — R53.1 GENERAL WEAKNESS: ICD-10-CM

## 2018-10-04 DIAGNOSIS — R26.9 ABNORMALITY OF GAIT AND MOBILITY: Primary | ICD-10-CM

## 2018-10-04 DIAGNOSIS — I48.91 ATRIAL FIBRILLATION, UNSPECIFIED TYPE (HCC): ICD-10-CM

## 2018-10-04 LAB
INR PPP: 2.6 (ref 0.91–1.09)
PROTHROMBIN TIME: 31.4 SECONDS (ref 10–13.8)

## 2018-10-04 PROCEDURE — 97110 THERAPEUTIC EXERCISES: CPT

## 2018-10-04 PROCEDURE — 85610 PROTHROMBIN TIME: CPT | Performed by: INTERNAL MEDICINE

## 2018-10-04 NOTE — THERAPY TREATMENT NOTE
Outpatient Physical Therapy Ortho Treatment Note  [facility]     Patient Name: Damaris Nash  : 1935  MRN: [id number]  Today's Date: 10/4/2018      Visit Date: 10/04/2018    Visit Dx:    ICD-10-CM ICD-9-CM   1. Abnormality of gait and mobility R26.9 781.2   2. General weakness R53.1 780.79       Patient Active Problem List   Diagnosis   • A-fib (CMS/HCC)   • Carotid artery disease (CMS/HCC)   • Hyperlipidemia   • Hypertension   • Amaurosis fugax of right eye   • Bilateral carotid artery stenosis   • Hx of transient ischemic attack (TIA)        Past Medical History:   Diagnosis Date   • A-fib (CMS/HCC)    • Acute ischemic right ICA stroke (CMS/HCC)    • Atrial fibrillation (CMS/HCC)    • Benign essential HTN    • Breast CA (CMS/HCC)    • Breast cancer (CMS/HCC)    • Carotid artery disease (CMS/HCC)    • CVA (cerebral vascular accident) (CMS/HCC)    • CVA (cerebral vascular accident) (CMS/HCC)    • Gallstones    • GERD (gastroesophageal reflux disease)    • Hyperlipidemia    • Hypertension    • Hypothyroid    • Hypothyroidism    • Osteopenia    • Osteopenia    • Primary pulmonary HTN (CMS/HCC)    • S/P right hip fracture 2012   • Shortness of breath    • Stroke (CMS/HCC)    • Vitamin D deficiency         Past Surgical History:   Procedure Laterality Date   • BREAST LUMPECTOMY     • CARDIAC ABLATION      cardiac ablation procedure for af   • CATARACT EXTRACTION Right    • PARTIAL HIP ARTHROPLASTY Right                              PT Assessment/Plan     Row Name 10/04/18 1122          PT Assessment    Assessment Comments Patient continues to rest in a sway back posture, which puts more weight posteriorly.  Today, she was progressed with hip and lower extremity strengthening in order to improve standing stabilization.  She does have stairs at home, and today she required moderate assistance with arms to complete safe step up.  -GILBERTO        PT Plan    PT Plan Comments We will continue to progress LE  "strengthening and stair practice.  -GILBERTO       User Key  (r) = Recorded By, (t) = Taken By, (c) = Cosigned By    Initials Name Provider Type    Nikita Bautista, PTA Physical Therapy Assistant                    Exercises     Row Name 10/04/18 1122             Subjective Comments    Subjective Comments Patient reports she is tired today. She denies falls since the last visit.    -GILBERTO         Subjective Pain    Able to rate subjective pain? yes  -GILBERTO      Pre-Treatment Pain Level 0  -GILBERTO      Post-Treatment Pain Level 0  -GILBERTO         Total Minutes    47167 - PT Therapeutic Exercise Minutes 43  -GILBERTO         Exercise 1    Exercise Name 1 reclined sidelying clams  -GILBERTO      Cueing 1 Verbal;Tactile  -GILBERTO      Sets 1 3  -GILBERTO      Reps 1 10  -GILBERTO      Additional Comments first set without resistance; new orange band for resistance   -GILBERTO         Exercise 2    Exercise Name 2 seated LAQ  -GILBERTO      Cueing 2 Verbal;Tactile  -GILBERTO      Sets 2 1  -GILBERTO      Reps 2 10  -GILBERTO      Additional Comments red theraband  -GILBERTO         Exercise 3    Exercise Name 3 seated hamstring curl  -GILBERTO      Cueing 3 Verbal;Tactile  -GILBERTO      Sets 3 1  -GILBERTO      Reps 3 10  -GILBERTO      Additional Comments red theraband  -GILBERTO         Exercise 4    Exercise Name 4 alternating toe taps on 4\" steps  -GILBERTO      Cueing 4 Verbal;Tactile;Demo  -GILBERTO      Time 4 3 minutes   -GILBERTO      Additional Comments LUE support at cybex  -GILBERTO         Exercise 5    Exercise Name 5 Gait training with emphasis on posture being more erect and more of an anterior weight shift. I provided a gentle nudge on her back to get her to shift her weight more anterior. When I did this, she took longer steps and walked faster. She would tend to resist and push  back and when I would let go, she would slow down.   -GILBERTO      Cueing 5 Tactile;Verbal  -GILBERTO      Time 5 230' with SC  -GILBERTO         Exercise 6    Exercise Name 6 step up/down on 4\" step  -GILBERTO      Cueing 6 Verbal;Tactile  -GILBERTO      Sets 6 1  -GILBERTO      Reps 6 10 " each leg  -GILBERTO      Additional Comments daniele UE support at cybex; she used daniele UE to pull herself up  -GILBERTO        User Key  (r) = Recorded By, (t) = Taken By, (c) = Cosigned By    Initials Name Provider Type    Nikita Bautista PTA Physical Therapy Assistant                               PT OP Goals     Row Name 10/04/18 1122          Long Term Goals    LTG Date to Achieve 10/23/18  -GILBERTO     LTG 1 daniele hip abd 4/5  -GILBERTO     LTG 1 Progress Ongoing  -GILBERTO     LTG 2 Walk with more erect posture without looking at the floor most of the time  -GILBERTO     LTG 2 Progress Ongoing  -GILBERTO     LTG 3 Walk with decreased trendelenberg daniele with SC  -GILBERTO     LTG 3 Progress Ongoing  -GILBERTO     LTG 4 SLS x 5 seconds daniele  -GILBERTO     LTG 4 Progress Ongoing  -GILBERTO     LTG 5 Ind with HEP for hip stability and posture  -GILBERTO     LTG 5 Progress Ongoing  -GILBERTO     LTG 5 Progress Comments added clams with yellow band  -GILBERTO        Time Calculation    PT Goal Re-Cert Due Date 10/11/18  -GILBERTO       User Key  (r) = Recorded By, (t) = Taken By, (c) = Cosigned By    Initials Name Provider Type    Nikita Bautista PTA Physical Therapy Assistant          Therapy Education  Education Details: sidelying clams with yellow band  Given: HEP  Program: New  How Provided: Verbal, Demonstration, Written  Provided to: Patient  Level of Understanding: Verbalized, Demonstrated              Time Calculation:   Start Time: 1122  Stop Time: 1205  Time Calculation (min): 43 min  Total Timed Code Minutes- PT: 43 minute(s)  Therapy Suggested Charges     Code   Minutes Charges    20362 (CPT®) Hc Pt Neuromusc Re Education Ea 15 Min      85613 (CPT®) Hc Pt Ther Proc Ea 15 Min 43 3    73718 (CPT®) Hc Gait Training Ea 15 Min      62105 (CPT®) Hc Pt Therapeutic Act Ea 15 Min      13923 (CPT®) Hc Pt Manual Therapy Ea 15 Min      79909 (CPT®) Hc Pt Ther Massage- Per 15 Min      60819 (CPT®) Hc Pt Iontophoresis Ea 15 Min      50691 (CPT®) Hc Pt Elec Stim Ea-Per 15 Min      63870 (CPT®) Hc Pt  Ultrasound Ea 15 Min      57345 (CPT®) Hc Pt Self Care/Mgmt/Train Ea 15 Min      66118 (CPT®) Hc Pt Prosthetic (S) Train Initial Encounter, Each 15 Min      72669 (CPT®) Hc Orthotic(S) Mgmt/Train Initial Encounter, Each 15min      27342 (CPT®) Hc Pt Aquatic Therapy Ea 15 Min      13984 (CPT®) Hc Pt Orthotic(S)/Prosthetic(S) Encounter, Each 15 Min       (CPT®) Hc Pt Electrical Stim Unattended      Total  43 3        Therapy Charges for Today     Code Description Service Date Service Provider Modifiers Qty    94531976129 HC PT THER PROC EA 15 MIN 10/4/2018 Nikita Verma, PTA GP 3                    Nikita Verma, LEXIE  10/4/2018

## 2018-10-09 ENCOUNTER — HOSPITAL ENCOUNTER (OUTPATIENT)
Dept: PHYSICAL THERAPY | Facility: HOSPITAL | Age: 83
Setting detail: THERAPIES SERIES
Discharge: HOME | End: 2018-10-09

## 2018-10-09 DIAGNOSIS — R53.1 GENERAL WEAKNESS: ICD-10-CM

## 2018-10-09 DIAGNOSIS — R26.9 ABNORMALITY OF GAIT AND MOBILITY: Primary | ICD-10-CM

## 2018-10-09 PROCEDURE — 97110 THERAPEUTIC EXERCISES: CPT

## 2018-10-09 NOTE — THERAPY PROGRESS REPORT/RE-CERT
Outpatient Physical Therapy Ortho Progress Note  [facility]     Patient Name: Damaris Nash  : 1935  MRN: [id number]  Today's Date: 10/9/2018      Visit Date: 10/09/2018    Visit Dx:    ICD-10-CM ICD-9-CM   1. Abnormality of gait and mobility R26.9 781.2   2. General weakness R53.1 780.79       Patient Active Problem List   Diagnosis   • A-fib (CMS/HCC)   • Carotid artery disease (CMS/HCC)   • Hyperlipidemia   • Hypertension   • Amaurosis fugax of right eye   • Bilateral carotid artery stenosis   • Hx of transient ischemic attack (TIA)        Past Medical History:   Diagnosis Date   • A-fib (CMS/HCC)    • Acute ischemic right ICA stroke (CMS/HCC)    • Atrial fibrillation (CMS/HCC)    • Benign essential HTN    • Breast CA (CMS/HCC)    • Breast cancer (CMS/HCC)    • Carotid artery disease (CMS/HCC)    • CVA (cerebral vascular accident) (CMS/HCC)    • CVA (cerebral vascular accident) (CMS/HCC)    • Gallstones    • GERD (gastroesophageal reflux disease)    • Hyperlipidemia    • Hypertension    • Hypothyroid    • Hypothyroidism    • Osteopenia    • Osteopenia    • Primary pulmonary HTN (CMS/HCC)    • S/P right hip fracture 2012   • Shortness of breath    • Stroke (CMS/HCC)    • Vitamin D deficiency         Past Surgical History:   Procedure Laterality Date   • BREAST LUMPECTOMY     • CARDIAC ABLATION      cardiac ablation procedure for af   • CATARACT EXTRACTION Right    • PARTIAL HIP ARTHROPLASTY Right                              PT Assessment/Plan     Row Name 10/09/18 1115          PT Assessment    Functional Limitations Impaired gait;Impaired locomotion;Limitation in home management;Limitations in functional capacity and performance;Performance in leisure activities  -TB     Impairments Gait;Impaired muscle endurance;Balance;Posture;Muscle strength  -TB     Assessment Comments Patient is slowly making improvements towards her goals.  She is showing improvements with her posture, but she does  continue to require cueing in order to not look at the ground while she is walking.  Her hip strength is one of the biggest limitations for her.  She has decreased trust of right leg, even though with MMT both hips are symmetrical in strength.  She would continue to benefit from skilled therapy in order to improve functional strength and carry this over into gait, balance, and functional  training.  -GILBERTO     Rehab Potential Good  -TB     Patient/caregiver participated in establishment of treatment plan and goals Yes  -TB     Patient would benefit from skilled therapy intervention Yes  -TB        PT Plan    PT Frequency 1x/week;2x/week  -TB     Predicted Duration of Therapy Intervention (Therapy Eval) 2-4 weeks  -TB     Planned CPT's? PT THER PROC EA 15 MIN: 31010;PT THER ACT EA 15 MIN: 46590;PT MANUAL THERAPY EA 15 MIN: 40142;PT GAIT TRAINING EA 15 MIN: 15163  -TB     PT Plan Comments We will continue to work to improve hip strength and focus on stepping up activities for stair safety.  -GILBERTO       User Key  (r) = Recorded By, (t) = Taken By, (c) = Cosigned By    Initials Name Provider Type    TB Corbin Gates, PT Physical Therapist    Nikita Bautista, PTA Physical Therapy Assistant                    Exercises     Row Name 10/09/18 1110             Subjective Comments    Subjective Comments Patient reports she feels tired today.  She feels she can see improvements with being able to be more aware of her posture.  However, she still has difficulty with walking especially at night.    -GILBERTO         Subjective Pain    Able to rate subjective pain? yes  -GILBERTO      Pre-Treatment Pain Level 0  -GILBERTO      Post-Treatment Pain Level 0  -GILBERTO         Total Minutes    74594 - PT Therapeutic Exercise Minutes 49  -GILBERTO         Exercise 1    Exercise Name 1 MMT of bilateral hips   -GILBERTO      Additional Comments see goals section  -GILBERTO         Exercise 2    Exercise Name 2 sidelying clams  -GILBERTO      Cueing 2 Verbal;Tactile  -GILBERTO      Sets  "2 2  -GILBERTO      Reps 2 10  -GILBERTO      Additional Comments red band, reviewed for HEP  -GILBERTO         Exercise 3    Exercise Name 3 rolling on mat table SBA  -GILBERTO      Additional Comments she needs increased time for bed mobility  -GILBERTO         Exercise 4    Exercise Name 4 SLS at cybex  -GILBERTO      Cueing 4 Verbal;Tactile;Demo  -GILBERTO      Sets 4 3 each leg  -GILBERTO      Time 4 Best (L) 13 seconds (R) 6 seconds   -GILBERTO      Additional Comments she progressed to a two finger hold for each leg, unable without UE support at all  -IGLBERTO         Exercise 5    Exercise Name 5 step ups onto 4\" step  -GILBERTO      Cueing 5 Verbal;Tactile  -GILBERTO      Sets 5 1  -GILBERTO      Reps 5 10 each  -GILBERTO      Additional Comments she needed one UE support  -GILBERTO         Exercise 6    Exercise Name 6 stairs training  -GILBERTO      Cueing 6 Verbal;Tactile  -GILBERTO      Sets 6 2  -GILBERTO      Reps 6 4 steps  -GILBERTO      Additional Comments She required one HR on right and cane in left during ascending.  She was educated to go up with the left foot and down with the right for safety.  She is unable to go step-over-step at this time.  -GILBERTO         Exercise 7    Exercise Name 7 Marching in place  -GILBERTO      Cueing 7 Verbal;Tactile  -GILBERTO      Sets 7 1  -GILBERTO      Reps 7 20  -GILBERTO      Additional Comments reviewed for HEP  -GILBERTO        User Key  (r) = Recorded By, (t) = Taken By, (c) = Cosigned By    Initials Name Provider Type    Nikita Bautista, PTA Physical Therapy Assistant                               PT OP Goals     Row Name 10/09/18 1115          Long Term Goals    LTG Date to Achieve 11/06/18  -GILBERTO     LTG 1 daniele hip abd 4/5  -GILBERTO     LTG 1 Progress Ongoing  -GILBERTO     LTG 1 Progress Comments (R) 3+/5 (L) 3+/5  -GILBERTO     LTG 2 Walk with more erect posture without looking at the floor most of the time  -GILBERTO     LTG 2 Progress Ongoing  -GILBERTO     LTG 2 Progress Comments She does continue to have forward shoulder posture, but she is becoming more aware to self correct  -GILBERTO     LTG 3 Walk with decreased " trendelenberg daniele with SC  -GILBERTO     LTG 3 Progress Ongoing  -GILBERTO     LTG 3 Progress Comments During gait she only had minor trendelenberg during right midstance.  However, during SLS she had positive trendelenburg bilaterally  -GILBERTO     LTG 4 SLS x 5 seconds daniele  -GILBERTO     LTG 4 Progress Ongoing  -GILBERTO     LTG 4 Progress Comments She required a two finger hand hold.  (L) 13 seconds and (R) 6 second best  -GILBERTO     LTG 5 Ind with HEP for hip stability and posture  -GILBERTO     LTG 5 Progress Ongoing  -GILBERTO     LTG 5 Progress Comments reviewed today  -GILBERTO        Time Calculation    PT Goal Re-Cert Due Date 11/08/18  -GILBERTO       User Key  (r) = Recorded By, (t) = Taken By, (c) = Cosigned By    Initials Name Provider Type    Nikita Bautista, PTA Physical Therapy Assistant          Therapy Education  Education Details: reviewed HEP today  Given: HEP  Program: Reinforced  How Provided: Verbal, Demonstration  Provided to: Patient  Level of Understanding: Verbalized, Demonstrated              Time Calculation:   Start Time: 1115  Stop Time: 1209  Time Calculation (min): 54 min  PT Non-Billable Time (min): 5 min  Total Timed Code Minutes- PT: 49 minute(s)  Therapy Suggested Charges     Code   Minutes Charges    85811 (CPT®) Hc Pt Neuromusc Re Education Ea 15 Min      24825 (CPT®) Hc Pt Ther Proc Ea 15 Min 49 3    84579 (CPT®) Hc Gait Training Ea 15 Min      79133 (CPT®) Hc Pt Therapeutic Act Ea 15 Min      64708 (CPT®) Hc Pt Manual Therapy Ea 15 Min      35654 (CPT®) Hc Pt Ther Massage- Per 15 Min      35577 (CPT®) Hc Pt Iontophoresis Ea 15 Min      33432 (CPT®) Hc Pt Elec Stim Ea-Per 15 Min      08731 (CPT®) Hc Pt Ultrasound Ea 15 Min      93400 (CPT®) Hc Pt Self Care/Mgmt/Train Ea 15 Min      85803 (CPT®) Hc Pt Prosthetic (S) Train Initial Encounter, Each 15 Min      09673 (CPT®) Hc Orthotic(S) Mgmt/Train Initial Encounter, Each 15min      73077 (CPT®) Hc Pt Aquatic Therapy Ea 15 Min      47794 (CPT®) Hc Pt Orthotic(S)/Prosthetic(S)  Encounter, Each 15 Min       (CPT®) Hc Pt Electrical Stim Unattended      Total  49 3                  The plan of care and all goals were reviewed and updated as needed by Corbin Gates, PT 10/9/2018 12:59 PM      Corbin Gates, PT  10/9/2018

## 2018-10-12 ENCOUNTER — HOSPITAL ENCOUNTER (OUTPATIENT)
Dept: PHYSICAL THERAPY | Facility: HOSPITAL | Age: 83
Setting detail: THERAPIES SERIES
Discharge: HOME | End: 2018-10-12

## 2018-10-12 DIAGNOSIS — R53.1 GENERAL WEAKNESS: ICD-10-CM

## 2018-10-12 DIAGNOSIS — R26.9 ABNORMALITY OF GAIT AND MOBILITY: Primary | ICD-10-CM

## 2018-10-12 PROCEDURE — 97110 THERAPEUTIC EXERCISES: CPT

## 2018-10-12 NOTE — THERAPY TREATMENT NOTE
Outpatient Physical Therapy Ortho Treatment Note  [facility]     Patient Name: Damaris Nash  : 1935  MRN: [id number]  Today's Date: 10/12/2018      Visit Date: 10/12/2018    Visit Dx:    ICD-10-CM ICD-9-CM   1. Abnormality of gait and mobility R26.9 781.2   2. General weakness R53.1 780.79       Patient Active Problem List   Diagnosis   • A-fib (CMS/HCC)   • Carotid artery disease (CMS/HCC)   • Hyperlipidemia   • Hypertension   • Amaurosis fugax of right eye   • Bilateral carotid artery stenosis   • Hx of transient ischemic attack (TIA)        Past Medical History:   Diagnosis Date   • A-fib (CMS/HCC)    • Acute ischemic right ICA stroke (CMS/HCC)    • Atrial fibrillation (CMS/HCC)    • Benign essential HTN    • Breast CA (CMS/HCC)    • Breast cancer (CMS/HCC)    • Carotid artery disease (CMS/HCC)    • CVA (cerebral vascular accident) (CMS/HCC)    • CVA (cerebral vascular accident) (CMS/HCC)    • Gallstones    • GERD (gastroesophageal reflux disease)    • Hyperlipidemia    • Hypertension    • Hypothyroid    • Hypothyroidism    • Osteopenia    • Osteopenia    • Primary pulmonary HTN (CMS/HCC)    • S/P right hip fracture 2012   • Shortness of breath    • Stroke (CMS/HCC)    • Vitamin D deficiency         Past Surgical History:   Procedure Laterality Date   • BREAST LUMPECTOMY     • CARDIAC ABLATION      cardiac ablation procedure for af   • CATARACT EXTRACTION Right    • PARTIAL HIP ARTHROPLASTY Right                              PT Assessment/Plan     Row Name 10/12/18 1117          PT Assessment    Assessment Comments Patient continues to have decreased R foot clearance during gait, and she tends to drag the right foot.  This does improve with cueing, but she needs moderate level of cueing.  We worked to improve her ability to stand from a seated position today with cues to shift anteriorly and to look up and not at the ground.  -GILBERTO        PT Plan    PT Plan Comments We will continue to work to  improve right foot clearance during gait, mechanics to stand, and balance.  -GILBERTO       User Key  (r) = Recorded By, (t) = Taken By, (c) = Cosigned By    Initials Name Provider Type    Nikita Bautista PTA Physical Therapy Assistant                    Exercises     Row Name 10/12/18 1117             Subjective Comments    Subjective Comments Patient reports she has been having pain in the right groin down into the upper thigh.  She reports this isn't a new pain, but it has been more aggravated the last couple of days.  -GILBERTO         Subjective Pain    Able to rate subjective pain? yes  -GILBERTO      Pre-Treatment Pain Level 4  -GILBERTO      Post-Treatment Pain Level 2  -GILBERTO         Total Minutes    94392 - PT Therapeutic Exercise Minutes 43  -GILBERTO         Exercise 1    Exercise Name 1 walked outside: sidewalks,  up/down incline, grass, up/down curb  -GILBERTO      Cueing 1 Tactile;Verbal  -GILBERTO      Time 1 15 minutes   -GILBERTO      Additional Comments CGA to supervision  -GILBERTO         Exercise 2    Exercise Name 2 stepping up/over cones  -GILBERTO      Cueing 2 Verbal;Tactile  -GILBERTO      Additional Comments cane and HHA  -GILBERTO         Exercise 3    Exercise Name 3 stepping around cones  -GILBERTO      Cueing 3 Verbal;Tactile  -GILBERTO      Additional Comments without SC, one HHA  -GILBERTO         Exercise 4    Exercise Name 4 sit to stands   -GILBERTO      Cueing 4 Verbal;Tactile  -GILBERTO      Sets 4 1  -GILBERTO      Reps 4 5  -GILBERTO      Additional Comments cues to look up and shift forward  -GILBERTO         Exercise 5    Exercise Name 5 side steps  -GILBERTO      Cueing 5 Verbal;Tactile  -GILBERTO      Sets 5 2  -GILBERTO      Time 5 10' each  -GILBERTO         Exercise 6    Exercise Name 6 backwards walking  -GILBERTO      Cueing 6 Verbal;Tactile  -GILBERTO      Sets 6 2  -GILBERTO      Time 6 10' each  -GILBERTO        User Key  (r) = Recorded By, (t) = Taken By, (c) = Cosigned By    Initials Name Provider Type    Nikita Bautista PTA Physical Therapy Assistant                               PT OP Goals     Row Name 10/12/18  1117          Long Term Goals    LTG Date to Achieve 11/06/18  -GILBERTO     LTG 1 daniele hip abd 4/5  -GILBERTO     LTG 1 Progress Ongoing  -GILBERTO     LTG 2 Walk with more erect posture without looking at the floor most of the time  -GILBERTO     LTG 2 Progress Ongoing  -GILBERTO     LTG 2 Progress Comments continues to need cues to look up   -GILBERTO     LTG 3 Walk with decreased trendelenberg daniele with SC  -GILBERTO     LTG 3 Progress Ongoing  -GILBERTO     LTG 4 SLS x 5 seconds daniele  -GILBERTO     LTG 4 Progress Ongoing  -GILBERTO     LTG 5 Ind with HEP for hip stability and posture  -GILBERTO     LTG 5 Progress Ongoing  -GILBERTO        Time Calculation    PT Goal Re-Cert Due Date 11/08/18  -GILBERTO       User Key  (r) = Recorded By, (t) = Taken By, (c) = Cosigned By    Initials Name Provider Type    Nikita Bautista, PTA Physical Therapy Assistant          Therapy Education  Education Details: cueing to stand with better mechanics  Given: HEP, Posture/body mechanics  Program: New  How Provided: Verbal, Demonstration  Provided to: Patient  Level of Understanding: Verbalized, Demonstrated              Time Calculation:   Start Time: 1117  Stop Time: 1200  Time Calculation (min): 43 min  Total Timed Code Minutes- PT: 43 minute(s)  Therapy Suggested Charges     Code   Minutes Charges    81946 (CPT®) Hc Pt Neuromusc Re Education Ea 15 Min      63061 (CPT®) Hc Pt Ther Proc Ea 15 Min 43 3    35585 (CPT®) Hc Gait Training Ea 15 Min      49483 (CPT®) Hc Pt Therapeutic Act Ea 15 Min      53947 (CPT®) Hc Pt Manual Therapy Ea 15 Min      39043 (CPT®) Hc Pt Ther Massage- Per 15 Min      36629 (CPT®) Hc Pt Iontophoresis Ea 15 Min      91037 (CPT®) Hc Pt Elec Stim Ea-Per 15 Min      09195 (CPT®) Hc Pt Ultrasound Ea 15 Min      67033 (CPT®) Hc Pt Self Care/Mgmt/Train Ea 15 Min      88990 (CPT®) Hc Pt Prosthetic (S) Train Initial Encounter, Each 15 Min      04366 (CPT®) Hc Orthotic(S) Mgmt/Train Initial Encounter, Each 15min      85874 (CPT®) Hc Pt Aquatic Therapy Ea 15 Min      36192 (CPT®) Hc Pt  Orthotic(S)/Prosthetic(S) Encounter, Each 15 Min       (CPT®) Hc Pt Electrical Stim Unattended      Total  43 3        Therapy Charges for Today     Code Description Service Date Service Provider Modifiers Qty    62849160738 HC PT THER PROC EA 15 MIN 10/12/2018 Nikita Verma P, PTA GP 3                    Nikita Verma, PTA  10/12/2018

## 2018-10-16 ENCOUNTER — HOSPITAL ENCOUNTER (OUTPATIENT)
Dept: PHYSICAL THERAPY | Facility: HOSPITAL | Age: 83
Setting detail: THERAPIES SERIES
Discharge: HOME | End: 2018-10-16

## 2018-10-16 DIAGNOSIS — R26.9 ABNORMALITY OF GAIT AND MOBILITY: Primary | ICD-10-CM

## 2018-10-16 DIAGNOSIS — R53.1 GENERAL WEAKNESS: ICD-10-CM

## 2018-10-16 PROCEDURE — 97110 THERAPEUTIC EXERCISES: CPT

## 2018-10-16 NOTE — THERAPY TREATMENT NOTE
Outpatient Physical Therapy Ortho Treatment Note  [facility]     Patient Name: Damaris Nash  : 1935  MRN: [id number]  Today's Date: 10/16/2018      Visit Date: 10/16/2018    Visit Dx:    ICD-10-CM ICD-9-CM   1. Abnormality of gait and mobility R26.9 781.2   2. General weakness R53.1 780.79       Patient Active Problem List   Diagnosis   • A-fib (CMS/HCC)   • Carotid artery disease (CMS/HCC)   • Hyperlipidemia   • Hypertension   • Amaurosis fugax of right eye   • Bilateral carotid artery stenosis   • Hx of transient ischemic attack (TIA)        Past Medical History:   Diagnosis Date   • A-fib (CMS/HCC)    • Acute ischemic right ICA stroke (CMS/HCC)    • Atrial fibrillation (CMS/HCC)    • Benign essential HTN    • Breast CA (CMS/HCC)    • Breast cancer (CMS/HCC)    • Carotid artery disease (CMS/HCC)    • CVA (cerebral vascular accident) (CMS/HCC)    • CVA (cerebral vascular accident) (CMS/HCC)    • Gallstones    • GERD (gastroesophageal reflux disease)    • Hyperlipidemia    • Hypertension    • Hypothyroid    • Hypothyroidism    • Osteopenia    • Osteopenia    • Primary pulmonary HTN (CMS/HCC)    • S/P right hip fracture 2012   • Shortness of breath    • Stroke (CMS/HCC)    • Vitamin D deficiency         Past Surgical History:   Procedure Laterality Date   • BREAST LUMPECTOMY     • CARDIAC ABLATION      cardiac ablation procedure for af   • CATARACT EXTRACTION Right    • PARTIAL HIP ARTHROPLASTY Right                              PT Assessment/Plan     Row Name 10/16/18 1116          PT Assessment    Assessment Comments The main focus of today was to continue to challenge patient with the ability to step up/over and around objects.  She continues to have decreased right foot clearance during gait, therefore this was stressed today.  She continues to have decreased trust on the right leg especially during stepping activities. She did show improvements with her ability to perform a stand with  improved speed and performance without hands today.  -GILBERTO        PT Plan    PT Plan Comments We will continue to work to improve foot clearance during gait.  We will also continue to work to challenge her ability to navigate around objects.  -GILBERTO       User Key  (r) = Recorded By, (t) = Taken By, (c) = Cosigned By    Initials Name Provider Type    Nikita Bautista, PTA Physical Therapy Assistant                    Exercises     Row Name 10/16/18 1116             Subjective Comments    Subjective Comments Patient reports she didn't do much over the weekend.  She reports her right groin pain is better today.  -GILBERTO         Subjective Pain    Able to rate subjective pain? yes  -GILBERTO      Pre-Treatment Pain Level 2  -GILBERTO      Post-Treatment Pain Level 2  -GILBERTO      Subjective Pain Comment right knee  -GILBERTO         Total Minutes    58545 - PT Therapeutic Exercise Minutes 49  -GILBERTO         Exercise 1    Exercise Name 1 sit<>stands  -GILBERTO      Cueing 1 Verbal;Tactile  -GILBERTO      Sets 1 2  -GILBERTO      Reps 1 5  -GILBERTO      Additional Comments progressed to not needing hands  -GILBERTO         Exercise 2    Exercise Name 2 stepping in/around cones  -GILBERTO      Cueing 2 Verbal;Tactile;Demo  -GILBERTO      Sets 2 1  -GILBERTO      Time 2 10'  -GILBERTO         Exercise 3    Exercise Name 3 stepping up/over cones  -GILBERTO      Cueing 3 Verbal;Tactile;Demo  -GILBERTO      Sets 3 2  -GILBERTO      Time 3 10' each  -GILBERTO      Additional Comments cues to make big steps, cane and HHA  -GILBERTO         Exercise 4    Exercise Name 4 stepping around cones with sidestepping/forward/ and backward stepping  -GILBERTO      Cueing 4 Verbal;Tactile;Demo  -GILBERTO      Sets 4 1  -GILBERTO      Time 4 10'  -GILBERTO      Additional Comments with cane  -GILBERTO         Exercise 5    Exercise Name 5 lateral steps up/over cones  -GILBERTO      Cueing 5 Verbal;Tactile;Demo  -GILBERTO      Sets 5 2  -GILBERTO      Time 5 10' each   -GILBERTO      Additional Comments HHA x2  -GILBERTO         Exercise 6    Exercise Name 6 walking marching  -GILBERTO      Cueing 6 Verbal;Tactile   "-GILBERTO      Sets 6 1  -GILBERTO      Time 6 20'  -GILBERTO         Exercise 7    Exercise Name 7 walking without cane working to improve step length, heel strike, josué, and posture  -GILBERTO      Time 7 150'  -GILBERTO         Exercise 8    Exercise Name 8 step up/down on 4\" step  -GILBERTO      Cueing 8 Verbal;Tactile  -GILBERTO      Sets 8 1  -GILBERTO      Reps 8 10 each leg  -GILBERTO      Additional Comments UE support at cybex  -GILBERTO        User Key  (r) = Recorded By, (t) = Taken By, (c) = Cosigned By    Initials Name Provider Type    Nikita Bautista, PTA Physical Therapy Assistant                               PT OP Goals     Row Name 10/16/18 1116          Long Term Goals    LTG Date to Achieve 11/06/18  -GILBERTO     LTG 1 daniele hip abd 4/5  -GILBERTO     LTG 1 Progress Ongoing  -GILBERTO     LTG 2 Walk with more erect posture without looking at the floor most of the time  -GILBERTO     LTG 2 Progress Ongoing  -GILBERTO     LTG 2 Progress Comments continued to work on foot clearance during gait today  -GILBERTO     LTG 3 Walk with decreased trendelenberg daniele with SC  -GILBERTO     LTG 3 Progress Ongoing  -GILBERTO     LTG 4 SLS x 5 seconds daniele  -GILBERTO     LTG 4 Progress Ongoing  -GILBERTO     LTG 5 Ind with HEP for hip stability and posture  -GILBERTO     LTG 5 Progress Ongoing  -GILBERTO        Time Calculation    PT Goal Re-Cert Due Date 11/08/18  -GILBERTO       User Key  (r) = Recorded By, (t) = Taken By, (c) = Cosigned By    Initials Name Provider Type    Nikita Bautista, LEXIE Physical Therapy Assistant          Therapy Education  Given: HEP  Program: Reinforced  How Provided: Verbal  Provided to: Patient  Level of Understanding: Verbalized              Time Calculation:   Start Time: 1116  Stop Time: 1205  Time Calculation (min): 49 min  Total Timed Code Minutes- PT: 49 minute(s)  Therapy Suggested Charges     Code   Minutes Charges    07866 (CPT®) Hc Pt Neuromusc Re Education Ea 15 Min      51106 (CPT®) Hc Pt Ther Proc Ea 15 Min 49 3    32825 (CPT®) Hc Gait Training Ea 15 Min      62745 (CPT®) Hc Pt Therapeutic " Act Ea 15 Min      76287 (CPT®) Hc Pt Manual Therapy Ea 15 Min      15386 (CPT®) Hc Pt Ther Massage- Per 15 Min      18633 (CPT®) Hc Pt Iontophoresis Ea 15 Min      23580 (CPT®) Hc Pt Elec Stim Ea-Per 15 Min      52238 (CPT®) Hc Pt Ultrasound Ea 15 Min      97467 (CPT®) Hc Pt Self Care/Mgmt/Train Ea 15 Min      82451 (CPT®) Hc Pt Prosthetic (S) Train Initial Encounter, Each 15 Min      99858 (CPT®) Hc Orthotic(S) Mgmt/Train Initial Encounter, Each 15min      01499 (CPT®) Hc Pt Aquatic Therapy Ea 15 Min      01757 (CPT®) Hc Pt Orthotic(S)/Prosthetic(S) Encounter, Each 15 Min       (CPT®) Hc Pt Electrical Stim Unattended      Total  49 3        Therapy Charges for Today     Code Description Service Date Service Provider Modifiers Qty    62520472916 HC PT THER PROC EA 15 MIN 10/16/2018 Nikita Verma, PTA GP 3                    Nikita Verma, LEXIE  10/16/2018

## 2018-10-18 ENCOUNTER — HOSPITAL ENCOUNTER (OUTPATIENT)
Dept: PHYSICAL THERAPY | Facility: HOSPITAL | Age: 83
Setting detail: THERAPIES SERIES
Discharge: HOME | End: 2018-10-18

## 2018-10-18 DIAGNOSIS — R53.1 GENERAL WEAKNESS: ICD-10-CM

## 2018-10-18 DIAGNOSIS — R26.9 ABNORMALITY OF GAIT AND MOBILITY: Primary | ICD-10-CM

## 2018-10-18 PROCEDURE — 97110 THERAPEUTIC EXERCISES: CPT | Performed by: PHYSICAL THERAPIST

## 2018-10-18 NOTE — THERAPY TREATMENT NOTE
Outpatient Physical Therapy Ortho Treatment Note  [facility]     Patient Name: Damaris Nash  : 1935  MRN: [id number]  Today's Date: 10/18/2018      Visit Date: 10/18/2018    Visit Dx:    ICD-10-CM ICD-9-CM   1. Abnormality of gait and mobility R26.9 781.2   2. General weakness R53.1 780.79       Patient Active Problem List   Diagnosis   • A-fib (CMS/HCC)   • Carotid artery disease (CMS/HCC)   • Hyperlipidemia   • Hypertension   • Amaurosis fugax of right eye   • Bilateral carotid artery stenosis   • Hx of transient ischemic attack (TIA)        Past Medical History:   Diagnosis Date   • A-fib (CMS/HCC)    • Acute ischemic right ICA stroke (CMS/HCC)    • Atrial fibrillation (CMS/HCC)    • Benign essential HTN    • Breast CA (CMS/HCC)    • Breast cancer (CMS/HCC)    • Carotid artery disease (CMS/HCC)    • CVA (cerebral vascular accident) (CMS/HCC)    • CVA (cerebral vascular accident) (CMS/HCC)    • Gallstones    • GERD (gastroesophageal reflux disease)    • Hyperlipidemia    • Hypertension    • Hypothyroid    • Hypothyroidism    • Osteopenia    • Osteopenia    • Primary pulmonary HTN (CMS/HCC)    • S/P right hip fracture 2012   • Shortness of breath    • Stroke (CMS/HCC)    • Vitamin D deficiency         Past Surgical History:   Procedure Laterality Date   • BREAST LUMPECTOMY     • CARDIAC ABLATION      cardiac ablation procedure for af   • CATARACT EXTRACTION Right    • PARTIAL HIP ARTHROPLASTY Right                              PT Assessment/Plan     Row Name 10/18/18 1120          PT Assessment    Assessment Comments Patient shows decreased balance, strength and proprioception.  During gait patient demonstrated decreased stride lenth and cadance with an increased ABENA.  Patient will continue to benefit from skilled outpatient physical therapy to adress theses issues inorder to increase function and decrease fall risk  -TB        PT Plan    PT Plan Comments To contiue to improve strength, balance  and proprioception to facilitate a normal gait pattern while decreasing fall risk.   -TB (r) LM (t) TB (c)       User Key  (r) = Recorded By, (t) = Taken By, (c) = Cosigned By    Initials Name Provider Type    TB Corbin Gates, PT Physical Therapist    LM Francois Howell, PT Student PT Student                    Exercises     Row Name 10/18/18 1200 10/18/18 1100          Subjective Comments    Subjective Comments  -- Patient reports stiffness in daniele LE following sitting. Patient reports minimal pain in R hip today.  -TB (r) LM (t) TB (c)        Subjective Pain    Able to rate subjective pain?  -- yes  -TB (r) LM (t) TB (c)     Pre-Treatment Pain Level  -- 2  -TB (r) LM (t) TB (c)        Total Minutes    04543 - PT Therapeutic Exercise Minutes 45  -TB (r) LM (t) TB (c)  --        Exercise 1    Exercise Name 1  -- seated hip flexion  -TB (r) LM (t) TB (c)     Cueing 1  -- Verbal  -TB (r) LM (t) TB (c)     Sets 1  -- 1  -TB (r) LM (t) TB (c)     Reps 1  -- 20  -TB (r) LM (t) TB (c)     Time 1  -- 2:30  -TB (r) LM (t) TB (c)        Exercise 2    Exercise Name 2  -- long arc quads   -TB (r) LM (t) TB (c)     Cueing 2  -- Verbal  -TB (r) LM (t) TB (c)     Sets 2  -- 1  -TB (r) LM (t) TB (c)     Reps 2  -- 20  -TB (r) LM (t) TB (c)     Time 2  -- 2:30  -TB (r) LM (t) TB (c)        Exercise 3    Exercise Name 3  -- sit to stand  -TB (r) LM (t) TB (c)     Cueing 3  -- Verbal;Tactile  -TB (r) LM (t) TB (c)     Sets 3  -- 1  -TB (r) LM (t) TB (c)     Reps 3  -- 5  -TB (r) LM (t) TB (c)     Time 3  -- 5 min  -TB (r) LM (t) TB (c)        Exercise 4    Exercise Name 4  -- standing marching  -TB (r) LM (t) TB (c)     Cueing 4  -- Verbal;Tactile  -TB (r) LM (t) TB (c)     Sets 4  -- 1  -TB (r) LM (t) TB (c)     Reps 4  -- 20 reps   -TB (r) LM (t) TB (c)     Time 4  -- 5 min  -TB (r) LM (t) TB (c)     Additional Comments  -- 20 reps each LE  -TB (r) LM (t) TB (c)        Exercise 5    Exercise Name 5  -- Hip ext, hip abd  -TB (r)  LM (t) TB (c)     Cueing 5  -- Verbal;Tactile  -TB (r) LM (t) TB (c)     Sets 5  -- 1  -TB (r) LM (t) TB (c)     Reps 5  -- 20  -TB (r) LM (t) TB (c)     Time 5  -- 5 min  -TB (r) LM (t) TB (c)        Exercise 6    Exercise Name 6  -- toe taps onto foam pad  -TB (r) LM (t) TB (c)     Cueing 6  -- Verbal;Tactile  -TB (r) LM (t) TB (c)     Sets 6  -- 1  -TB (r) LM (t) TB (c)     Reps 6  -- 5  -TB (r) LM (t) TB (c)     Time 6  -- 5 min  -TB (r) LM (t) TB (c)     Additional Comments  -- 5 reps Petros LE  -TB (r) LM (t) TB (c)        Exercise 7    Exercise Name 7  -- side stepping in parallel bars  -TB (r) LM (t) TB (c)     Cueing 7  -- Verbal  -TB (r) LM (t) TB (c)     Sets 7  -- 1  -TB (r) LM (t) TB (c)     Reps 7  -- 5 down and back  -TB (r) LM (t) TB (c)     Time 7  -- 5 min  -TB (r) LM (t) TB (c)        Exercise 8    Exercise Name 8  -- balance FA, FT Eyes open and closed  -TB (r) LM (t) TB (c)     Cueing 8  -- Verbal;Tactile  -TB (r) LM (t) TB (c)     Reps 8  -- 2  -TB (r) LM (t) TB (c)     Time 8  -- 2:30  -TB (r) LM (t) TB (c)     Additional Comments  -- 30 sec  -TB (r) LM (t) TB (c)        Exercise 9    Exercise Name 9  -- tandem stance  -TB (r) LM (t) TB (c)     Sets 9  -- 2  -TB (r) LM (t) TB (c)     Time 9  -- 2:30  -TB (r) LM (t) TB (c)     Additional Comments  -- 30 sec PETROS LE  -TB (r) LM (t) TB (c)        Exercise 10    Exercise Name 10  -- mini lunge   -TB (r) LM (t) TB (c)     Cueing 10  -- Verbal;Tactile  -TB (r) LM (t) TB (c)     Sets 10  -- 1  -TB (r) LM (t) TB (c)     Reps 10  -- 5  -TB (r) LM (t) TB (c)     Time 10  -- 5 min  -TB (r) LM (t) TB (c)     Additional Comments  -- reqired moderate assistance to maintain balance  -TB (r) LM (t) TB (c)        Exercise 11    Exercise Name 11  -- stairs   -TB (r) LM (t) TB (c)     Cueing 11  -- Verbal;Tactile  -TB (r) LM (t) TB (c)     Sets 11  -- 1  -TB (r) LM (t) TB (c)     Reps 11  -- 3 stairs  -TB (r) LM (t) TB (c)     Time 11  -- 5 min  -TB (r) LM (t) TB  (c)     Additional Comments  -- min/mod A with decending  -TB (r) LM (t) TB (c)       User Key  (r) = Recorded By, (t) = Taken By, (c) = Cosigned By    Initials Name Provider Type    TB Corbin Gates, PT Physical Therapist    Francois Houston, PT Student PT Student                               PT OP Goals     Row Name 10/18/18 1200 10/18/18 1120       Long Term Goals    LTG Date to Achieve  -- 11/06/18  -TB (r) LM (t) TB (c)    LTG 1  -- daniele hip abd 4/5  -TB (r) LM (t) TB (c)    LTG 1 Progress  -- Ongoing  -TB (r) LM (t) TB (c)    LTG 2  -- Walk with more erect posture without looking at the floor most of the time  -TB (r) LM (t) TB (c)    LTG 2 Progress  -- Ongoing  -TB (r) LM (t) TB (c)    LTG 2 Progress Comments  -- become more self aware of tendency to look down  -TB    LTG 3  -- Walk with decreased trendelenberg daniele with SC  -TB (r) LM (t) TB (c)    LTG 3 Progress  -- Ongoing  -TB (r) LM (t) TB (c)    LTG 4  -- SLS x 5 seconds daniele  -TB (r) LM (t) TB (c)    LTG 4 Progress  -- Ongoing  -TB (r) LM (t) TB (c)    LTG 4 Progress Comments  -- able to maintain tandem stance x 30 sec; working toward SLS  -TB    LTG 5  -- Ind with HEP for hip stability and posture  -TB (r) LM (t) TB (c)    LTG 5 Progress  -- Ongoing  -TB (r) LM (t) TB (c)       Time Calculation    PT Goal Re-Cert Due Date 11/08/18  -TB (r) LM (t) TB (c)  --      User Key  (r) = Recorded By, (t) = Taken By, (c) = Cosigned By    Initials Name Provider Type    TB Corbin Gates, PT Physical Therapist    Francois Houston, PT Student PT Student          Therapy Education  Given: HEP  Program: Reinforced  How Provided: Verbal  Provided to: Patient  Level of Understanding: Verbalized              Time Calculation:   Start Time: 1120  Stop Time: 1205  Time Calculation (min): 45 min  Total Timed Code Minutes- PT: 45 minute(s)  Therapy Suggested Charges     Code   Minutes Charges    22497 (CPT®) Hc Pt Neuromusc Re Education Ea 15 Min      91499 (CPT®) Hc  Pt Ther Proc Ea 15 Min 45 3    66786 (CPT®) Hc Gait Training Ea 15 Min      25932 (CPT®) Hc Pt Therapeutic Act Ea 15 Min      76346 (CPT®) Hc Pt Manual Therapy Ea 15 Min      19130 (CPT®) Hc Pt Ther Massage- Per 15 Min      05162 (CPT®) Hc Pt Iontophoresis Ea 15 Min      41352 (CPT®) Hc Pt Elec Stim Ea-Per 15 Min      03751 (CPT®) Hc Pt Ultrasound Ea 15 Min      98164 (CPT®) Hc Pt Self Care/Mgmt/Train Ea 15 Min      61863 (CPT®) Hc Pt Prosthetic (S) Train Initial Encounter, Each 15 Min      38189 (CPT®) Hc Orthotic(S) Mgmt/Train Initial Encounter, Each 15min      37939 (CPT®) Hc Pt Aquatic Therapy Ea 15 Min      53665 (CPT®) Hc Pt Orthotic(S)/Prosthetic(S) Encounter, Each 15 Min       (CPT®) Hc Pt Electrical Stim Unattended      Total  45 3        Therapy Charges for Today     Code Description Service Date Service Provider Modifiers Qty    44923369717 HC PT THER PROC EA 15 MIN 10/18/2018 Corbin Gates, PT GP 3                    Corbin Gates, PT  10/18/2018

## 2018-10-23 ENCOUNTER — HOSPITAL ENCOUNTER (OUTPATIENT)
Dept: PHYSICAL THERAPY | Facility: HOSPITAL | Age: 83
Setting detail: THERAPIES SERIES
Discharge: HOME | End: 2018-10-23

## 2018-10-23 DIAGNOSIS — R53.1 GENERAL WEAKNESS: ICD-10-CM

## 2018-10-23 DIAGNOSIS — R26.9 ABNORMALITY OF GAIT AND MOBILITY: Primary | ICD-10-CM

## 2018-10-23 PROCEDURE — 97110 THERAPEUTIC EXERCISES: CPT | Performed by: PHYSICAL THERAPIST

## 2018-10-23 PROCEDURE — 97116 GAIT TRAINING THERAPY: CPT | Performed by: PHYSICAL THERAPIST

## 2018-10-25 ENCOUNTER — HOSPITAL ENCOUNTER (OUTPATIENT)
Dept: PHYSICAL THERAPY | Facility: HOSPITAL | Age: 83
Setting detail: THERAPIES SERIES
End: 2018-10-25

## 2018-10-29 ENCOUNTER — HOSPITAL ENCOUNTER (OUTPATIENT)
Dept: PHYSICAL THERAPY | Facility: HOSPITAL | Age: 83
Setting detail: THERAPIES SERIES
Discharge: HOME | End: 2018-10-29

## 2018-10-29 DIAGNOSIS — R26.9 ABNORMALITY OF GAIT AND MOBILITY: Primary | ICD-10-CM

## 2018-10-29 DIAGNOSIS — R53.1 GENERAL WEAKNESS: ICD-10-CM

## 2018-10-29 PROCEDURE — 97110 THERAPEUTIC EXERCISES: CPT

## 2018-10-29 NOTE — THERAPY PROGRESS REPORT/RE-CERT
Outpatient Physical Therapy Ortho Progress Note  [facility]     Patient Name: Damaris Nash  : 1935  MRN: [id number]  Today's Date: 10/29/2018      Visit Date: 10/29/2018    Visit Dx:    ICD-10-CM ICD-9-CM   1. Abnormality of gait and mobility R26.9 781.2   2. General weakness R53.1 780.79       Patient Active Problem List   Diagnosis   • A-fib (CMS/HCC)   • Carotid artery disease (CMS/HCC)   • Hyperlipidemia   • Hypertension   • Amaurosis fugax of right eye   • Bilateral carotid artery stenosis   • Hx of transient ischemic attack (TIA)        Past Medical History:   Diagnosis Date   • A-fib (CMS/HCC)    • Acute ischemic right ICA stroke (CMS/HCC)    • Atrial fibrillation (CMS/HCC)    • Benign essential HTN    • Breast CA (CMS/HCC)    • Breast cancer (CMS/HCC)    • Carotid artery disease (CMS/HCC)    • CVA (cerebral vascular accident) (CMS/HCC)    • CVA (cerebral vascular accident) (CMS/HCC)    • Gallstones    • GERD (gastroesophageal reflux disease)    • Hyperlipidemia    • Hypertension    • Hypothyroid    • Hypothyroidism    • Osteopenia    • Osteopenia    • Primary pulmonary HTN (CMS/HCC)    • S/P right hip fracture 2012   • Shortness of breath    • Stroke (CMS/HCC)    • Vitamin D deficiency         Past Surgical History:   Procedure Laterality Date   • BREAST LUMPECTOMY     • CARDIAC ABLATION      cardiac ablation procedure for af   • CATARACT EXTRACTION Right    • PARTIAL HIP ARTHROPLASTY Right                              PT Assessment/Plan     Row Name 10/29/18 1303          PT Assessment    Functional Limitations Impaired gait;Impaired locomotion;Limitation in home management;Limitations in functional capacity and performance;Performance in leisure activities  -KR     Impairments Gait;Impaired muscle endurance;Balance;Posture;Muscle strength  -KR     Assessment Comments Patient reports she feels she is 40% improved.  She has not had any falls, which is a positive.  She does continue to  have bilateral hip weakness with the right being weaker than the left.  She has had a set back since the last visit per patient due to her knees hurting her.  She continues to ambulate with short steps and forward flexed posture.  She is able to improve with better step length and posture with cueing, but continues to need cueing.  She also continues to have a slowed josué.  At this point, patient reported coming twice a week is too much for her.  We are decreasing frequency to once a week because she would continue to benefit from skilled therapy in order to improve josué, step length, balance, and hip strength in order to decrease fall risks.  -GILBERTO     Please refer to paper survey for additional self-reported information Yes  -KR     Rehab Potential Good  -KR     Patient/caregiver participated in establishment of treatment plan and goals Yes  -KR     Patient would benefit from skilled therapy intervention Yes  -KR        PT Plan    PT Frequency 1x/week  -KR     Predicted Duration of Therapy Intervention (Therapy Eval) 4 weeks  -KR     Planned CPT's? PT THER PROC EA 15 MIN: 22588;PT THER ACT EA 15 MIN: 35838;PT MANUAL THERAPY EA 15 MIN: 44978;PT GAIT TRAINING EA 15 MIN: 40023  -KR     Physical Therapy Interventions (Optional Details) balance training;gait training;home exercise program;joint mobilization;lumbar stabilization;manual therapy techniques;neuromuscular re-education;patient/family education;postural re-education;ROM (Range of Motion);stair training;strengthening;stretching;swiss ball techniques;taping;transfer training  -KR     PT Plan Comments We will continue to work to improve hip strength.  This will then be carried over into gait and balance training.  She was educated on the importance of compliance with HEP due to decreased frequency of once a week.  -GILBERTO       User Key  (r) = Recorded By, (t) = Taken By, (c) = Cosigned By    Initials Name Provider Type    Ester Calle, PT DPT Physical  Therapist    Nikita Bautista P, PTA Physical Therapy Assistant                    Exercises     Row Name 10/29/18 8009             Subjective Comments    Subjective Comments Patient reports she couldn't walk for two days after her last visit due to her knees hurting her.  She reports two days a week is too much for her.  She reports she thinks once a week is better.  She reports she felt she was having improvements up until last week due to knees hurting.  She reports she feels she is standing up better without looking down. She denies any falls or near falls.  She reports she is probably 40% improved.  -GILBERTO         Subjective Pain    Able to rate subjective pain? yes  -GILBERTO      Pre-Treatment Pain Level 6  -GILBERTO      Post-Treatment Pain Level 4  -GILBERTO      Subjective Pain Comment R knee  -GILBERTO         Total Minutes    81881 - PT Therapeutic Exercise Minutes 42  -GILBERTO         Exercise 1    Exercise Name 1 Assessed goals for progress note  -GILBERTO         Exercise 2    Exercise Name 2 sidelying hip abduction with bent knee (support given at knee)  -GILBERTO      Cueing 2 Tactile;Verbal  -GILBERTO      Sets 2 2 each leg  -GILBERTO      Reps 2 8  -GILBERTO      Additional Comments table reclined  -GILBERTO         Exercise 3    Exercise Name 3 reclined: resisted bilateral hip abduction with legs propped on white foam roller  -GILBERTO      Cueing 3 Verbal;Tactile  -GILBERTO      Sets 3 1  -GILBERTO      Reps 3 10  -GILBERTO      Additional Comments yellow band at ankles and then moved to above knees  -GILBERTO         Exercise 4    Exercise Name 4 SLS  -GILBERTO      Cueing 4 Verbal;Tactile  -GILBERTO      Sets 4 4 each leg  -GILBERTO      Time 4 Best (R) 20 seconds with one UE support, (L) 10 seconds with one UE support  -GILBERTO      Additional Comments < 3 seconds bilaterally without UE support  -GILBERTO         Exercise 5    Exercise Name 5 walked 10' x 3 working on improving step length and speed while walking to the door  -GILBERTO      Additional Comments with cane  -GILBERTO        User Key  (r) = Recorded By, (t) =  Taken By, (c) = Cosigned By    Initials Name Provider Type    Nikita Bautista PTA Physical Therapy Assistant                               PT OP Goals     Row Name 10/29/18 1303          Long Term Goals    LTG Date to Achieve 11/26/18  -GILBERTO     LTG 1 daniele hip abd 4/5  -GILBERTO     LTG 1 Progress Ongoing  -GILBERTO     LTG 1 Progress Comments (R) 3+/5,  (L) 4/5  -GILBERTO     LTG 2 Walk with more erect posture without looking at the floor most of the time  -GILBERTO     LTG 2 Progress Ongoing  -GILBERTO     LTG 2 Progress Comments This has improved, but she continues to need cues to not look at the ground  -GILBERTO     LTG 3 Walk with decreased trendelenberg daniele with SC  -GILBERTO     LTG 3 Progress Ongoing  -GILBERTO     LTG 3 Progress Comments She continued to have trendelenburg during SLS  -GILBERTO     LTG 4 SLS x 5 seconds daniele  -GILBERTO     LTG 4 Progress Partially Met  -GILBERTO     LTG 4 Progress Comments < 3 seconds without UE support.  (R) 20 seconds  (L) 10 seconds with one UE support  -GILBERTO     LTG 5 Ind with HEP for hip stability and posture  -GILBERTO     LTG 5 Progress Ongoing  -GILBERTO     LTG 5 Progress Comments Reviewed today, and she reported she has all of her print outs at home. It was emphasized that she will have to improve compliance at home with dropping frequency to once a week.  -GILBERTO        Time Calculation    PT Goal Re-Cert Due Date 11/28/18  -GILBERTO       User Key  (r) = Recorded By, (t) = Taken By, (c) = Cosigned By    Initials Name Provider Type    Nikita Bautista PTA Physical Therapy Assistant          Therapy Education  Education Details: importance of compliance with HEP  Given: HEP  Program: Reinforced  How Provided: Verbal  Provided to: Patient  Level of Understanding: Verbalized              Time Calculation:   Start Time: 1303  Stop Time: 1345  Time Calculation (min): 42 min  Total Timed Code Minutes- PT: 42 minute(s)  Therapy Suggested Charges     Code   Minutes Charges    01869 (CPT®) Hc Pt Neuromusc Re Education Ea 15 Min      46168 (CPT®) Hc Pt  Ther Proc Ea 15 Min 42 3    18094 (CPT®) Hc Gait Training Ea 15 Min      76931 (CPT®) Hc Pt Therapeutic Act Ea 15 Min      45101 (CPT®) Hc Pt Manual Therapy Ea 15 Min      94003 (CPT®) Hc Pt Ther Massage- Per 15 Min      60366 (CPT®) Hc Pt Iontophoresis Ea 15 Min      87610 (CPT®) Hc Pt Elec Stim Ea-Per 15 Min      12517 (CPT®) Hc Pt Ultrasound Ea 15 Min      03101 (CPT®) Hc Pt Self Care/Mgmt/Train Ea 15 Min      36484 (CPT®) Hc Pt Prosthetic (S) Train Initial Encounter, Each 15 Min      64644 (CPT®) Hc Orthotic(S) Mgmt/Train Initial Encounter, Each 15min      59197 (CPT®) Hc Pt Aquatic Therapy Ea 15 Min      60870 (CPT®) Hc Pt Orthotic(S)/Prosthetic(S) Encounter, Each 15 Min       (CPT®) Hc Pt Electrical Stim Unattended      Total  42 3                      Ester Mccray, PT DPT  10/29/2018

## 2018-11-05 ENCOUNTER — ANTICOAGULATION VISIT (OUTPATIENT)
Dept: CARDIOLOGY | Facility: CLINIC | Age: 83
End: 2018-11-05

## 2018-11-05 ENCOUNTER — LAB (OUTPATIENT)
Dept: LAB | Facility: HOSPITAL | Age: 83
End: 2018-11-05
Attending: INTERNAL MEDICINE

## 2018-11-05 DIAGNOSIS — I48.91 ATRIAL FIBRILLATION, UNSPECIFIED TYPE (HCC): Primary | ICD-10-CM

## 2018-11-05 DIAGNOSIS — I48.91 ATRIAL FIBRILLATION, UNSPECIFIED TYPE (HCC): ICD-10-CM

## 2018-11-05 LAB
INR PPP: 2 (ref 0.91–1.09)
PROTHROMBIN TIME: 23.4 SECONDS (ref 11.9–14.6)

## 2018-11-05 PROCEDURE — 85610 PROTHROMBIN TIME: CPT | Performed by: INTERNAL MEDICINE

## 2018-11-05 PROCEDURE — 36415 COLL VENOUS BLD VENIPUNCTURE: CPT

## 2018-11-09 ENCOUNTER — APPOINTMENT (OUTPATIENT)
Dept: PHYSICAL THERAPY | Facility: HOSPITAL | Age: 83
End: 2018-11-09

## 2018-11-20 ENCOUNTER — TELEPHONE (OUTPATIENT)
Dept: PHYSICAL THERAPY | Facility: HOSPITAL | Age: 83
End: 2018-11-20

## 2018-11-20 ENCOUNTER — HOSPITAL ENCOUNTER (OUTPATIENT)
Dept: PHYSICAL THERAPY | Facility: HOSPITAL | Age: 83
Setting detail: THERAPIES SERIES
Discharge: HOME | End: 2018-11-20

## 2018-11-20 NOTE — TELEPHONE ENCOUNTER
Ms. Nash presented into the clinic after a gap of treatment of nearly a month due to having to cancel.  She reported that she has not been doing her HEP very well due to being sick.  She reports she feels like she is back to where she started because her legs do not want to move.  She denies any falls, but she reports she might have had near falls due to her feet dragging.  She then reported that she was dizzy yesterday and did not feel well.  Her BP was 181/103 in the left arm with a machine, and manually it was 164/110.  Her HR was reading 156 on machine and was hard to record manually.  Ludwin Jauregui PTA came over and measured HR manually at 98 bpm.  It was at 130 bpm about 5 minutes later.  Her referring physician Dr. Shahla Beltran’s office was called and a voicemail was left.  Her nurse, Lissette, called back within 5 minutes and reported that they could not get her in today, but could get her tomorrow morning at 9:50.  Her nurse reported that at this time she did not recommend Ms. Nash going to the Urgent Care or ER, but if symptoms worsened to have someone take her.  At the present time Ms. Nash was asymptomatic.  She agreed to continue to check vital signs at home, and to have her daughter take her to urgent care/ER if symptoms worse.    She did complete 1 set of LAQ on each leg with red band at the beginning of treatment.  She left our clinic without completing a treatment due to vital signs not being in therapeutic ranges.  We will see her back in a week.  She did report leg pain at 4/10.

## 2018-11-27 ENCOUNTER — TELEPHONE (OUTPATIENT)
Dept: PHYSICAL THERAPY | Facility: HOSPITAL | Age: 83
End: 2018-11-27

## 2018-11-27 ENCOUNTER — APPOINTMENT (OUTPATIENT)
Dept: PHYSICAL THERAPY | Facility: HOSPITAL | Age: 83
End: 2018-11-27

## 2018-11-27 NOTE — TELEPHONE ENCOUNTER
Patient had called and asked for me to call her back.  She reports she does not feel well.  She did go see her primary physician since the last visit, and they have started her on new medicine.  Today was her last scheduled visit, and she wishes to be placed on hold right now in order to try to get to feeling better.  This will also give her new medicines a chance to normalize her BP.  She is aware that if we do not hear from her in 30 days we will discharge.

## 2018-12-03 ENCOUNTER — ANTICOAGULATION VISIT (OUTPATIENT)
Dept: CARDIOLOGY | Facility: CLINIC | Age: 83
End: 2018-12-03

## 2018-12-03 ENCOUNTER — LAB (OUTPATIENT)
Dept: LAB | Facility: HOSPITAL | Age: 83
End: 2018-12-03
Attending: INTERNAL MEDICINE

## 2018-12-03 DIAGNOSIS — I48.91 ATRIAL FIBRILLATION, UNSPECIFIED TYPE (HCC): ICD-10-CM

## 2018-12-03 LAB
INR PPP: 2.5 (ref 0.91–1.09)
PROTHROMBIN TIME: 30 SECONDS (ref 10–13.8)

## 2018-12-03 PROCEDURE — 85610 PROTHROMBIN TIME: CPT | Performed by: INTERNAL MEDICINE

## 2018-12-26 ENCOUNTER — DOCUMENTATION (OUTPATIENT)
Dept: PHYSICAL THERAPY | Facility: HOSPITAL | Age: 83
End: 2018-12-26

## 2018-12-26 DIAGNOSIS — R26.9 ABNORMALITY OF GAIT AND MOBILITY: Primary | ICD-10-CM

## 2018-12-26 DIAGNOSIS — R53.1 GENERAL WEAKNESS: ICD-10-CM

## 2018-12-26 NOTE — THERAPY DISCHARGE NOTE
Outpatient Physical Therapy Discharge Summary         Patient Name: Damaris Nash  : 1935  MRN: [id number]    Today's Date: 2018    Visit Dx:    ICD-10-CM ICD-9-CM   1. Abnormality of gait and mobility R26.9 781.2   2. General weakness R53.1 780.79       PT OP Goals     Row Name 18 0926          Long Term Goals    LTG Date to Achieve  18  -GILBERTO     LTG 1  daniele hip abd 4/5  -GILBERTO     LTG 1 Progress  Not Met  -GILBERTO     LTG 1 Progress Comments  (R) 3+/5,  (L) 4/5  -GILBERTO     LTG 2  Walk with more erect posture without looking at the floor most of the time  -GILBERTO     LTG 2 Progress  Not Met  -GILBERTO     LTG 2 Progress Comments  This had improved, but she continued to need cues to not look at the ground  -GILBERTO     LTG 3  Walk with decreased trendelenberg daniele with SC  -GILBERTO     LTG 3 Progress  Not Met  -GILBERTO     LTG 3 Progress Comments  She continued to have trendelenburg during SLS  -GILBERTO     LTG 4  SLS x 5 seconds daniele  -GILBERTO     LTG 4 Progress  Partially Met  -GILBERTO     LTG 4 Progress Comments  < 3 seconds without UE support.  (R) 20 seconds  (L) 10 seconds with one UE support  -GILBERTO     LTG 5  Ind with HEP for hip stability and posture  -GILBERTO     LTG 5 Progress  Not Met  -GILBERTO     LTG 5 Progress Comments  She has HEP at home, but not sure how compliant she is.  -GILBERTO       User Key  (r) = Recorded By, (t) = Taken By, (c) = Cosigned By    Initials Name Provider Type    Nikita Bautista, PTA Physical Therapy Assistant          OP PT Discharge Summary  Date of Discharge: 18  Reason for Discharge: other (comment)(She was having BP and HR issues, and she wished to be placed on hold due to not feeling well.)  Outcomes Achieved: Refer to plan of care for updates on goals achieved  Discharge Destination: Home with home program  Discharge Instructions/Additional Comments: Patient called and asked to be placed on hold on 18 due to her having problems with her BP and HR.  She had gone to the doctor and they were  adjusting medications.  However, she did not feel well enough to complete physical therapy.  It has now been a month since she was placed on hold, and we are discharging at this time.      Time Calculation:        Therapy Suggested Charges     Code   Minutes Charges    None                       Nikita Verma, PTA  12/26/2018

## 2019-01-03 ENCOUNTER — TELEPHONE (OUTPATIENT)
Dept: VASCULAR SURGERY | Facility: CLINIC | Age: 84
End: 2019-01-03

## 2019-01-03 NOTE — TELEPHONE ENCOUNTER
Left message reminding Ms Nash of her appointments for Friday, January 4th, 2019. Reminded Ms Nash to arrive at [facility] 830 am for testing and follow up afterwards at 10 am with Josefina PANG. Also advised if she had any questions or needed to reschedule to please call the office at [phone].

## 2019-01-04 ENCOUNTER — OFFICE VISIT (OUTPATIENT)
Dept: VASCULAR SURGERY | Facility: CLINIC | Age: 84
End: 2019-01-04

## 2019-01-04 ENCOUNTER — LAB (OUTPATIENT)
Dept: LAB | Facility: HOSPITAL | Age: 84
End: 2019-01-04
Attending: INTERNAL MEDICINE

## 2019-01-04 ENCOUNTER — ANTICOAGULATION VISIT (OUTPATIENT)
Dept: CARDIOLOGY | Facility: CLINIC | Age: 84
End: 2019-01-04

## 2019-01-04 ENCOUNTER — HOSPITAL ENCOUNTER (OUTPATIENT)
Dept: ULTRASOUND IMAGING | Facility: HOSPITAL | Age: 84
Discharge: HOME | End: 2019-01-04
Admitting: NURSE PRACTITIONER

## 2019-01-04 VITALS
HEART RATE: 71 BPM | HEIGHT: 63 IN | WEIGHT: 140 LBS | DIASTOLIC BLOOD PRESSURE: 82 MMHG | OXYGEN SATURATION: 93 % | SYSTOLIC BLOOD PRESSURE: 142 MMHG | BODY MASS INDEX: 24.8 KG/M2

## 2019-01-04 DIAGNOSIS — I10 ESSENTIAL HYPERTENSION: ICD-10-CM

## 2019-01-04 DIAGNOSIS — I48.91 ATRIAL FIBRILLATION, UNSPECIFIED TYPE (HCC): ICD-10-CM

## 2019-01-04 DIAGNOSIS — E78.2 MIXED HYPERLIPIDEMIA: ICD-10-CM

## 2019-01-04 DIAGNOSIS — I65.23 BILATERAL CAROTID ARTERY STENOSIS: ICD-10-CM

## 2019-01-04 DIAGNOSIS — I65.23 BILATERAL CAROTID ARTERY STENOSIS: Primary | ICD-10-CM

## 2019-01-04 LAB
INR PPP: 1.9 (ref 0.91–1.09)
PROTHROMBIN TIME: 22.6 SECONDS (ref 10–13.8)

## 2019-01-04 PROCEDURE — 93880 EXTRACRANIAL BILAT STUDY: CPT

## 2019-01-04 PROCEDURE — 93880 EXTRACRANIAL BILAT STUDY: CPT | Performed by: SURGERY

## 2019-01-04 PROCEDURE — 99213 OFFICE O/P EST LOW 20 MIN: CPT | Performed by: NURSE PRACTITIONER

## 2019-01-04 PROCEDURE — 85610 PROTHROMBIN TIME: CPT | Performed by: INTERNAL MEDICINE

## 2019-01-04 NOTE — PROGRESS NOTES
"1/4/2019       Shahla Beltran,   [address]  [place] [address]    Damaris Nash  1935    Chief Complaint   Patient presents with   • Follow-up     1 Year Follow Up For Bilateral Carotid Artery Stenosis. Test 01/04/19  pad carotid bilateral. Patient denies any stroke like symptoms.        Dear  Shahla Beltran,       HPI  I had the pleasure of seeing your patient Damaris Nash in the office today.    As you recall, Damaris Nash is a 83 y.o. left-handed female who you are currently following for routine health maintenance.  She states she has loss of vision to her right eye three days ago.  She was sent to Dr. Rajput at that time, and after a discussion with him, he states he thought the problem was related to hypoperfusion.  She does have a history of stroke with symptoms of slurred speech and right-sided weakness.  Her most recent episodes only involved right eye vision loss, which has only slightly improved.  She is maintained on Coumadin and Lipitor.  She did have noninvasive testing performed today, which I did review in office.    Review of Systems   Constitutional: Negative.    HENT: Negative.    Eyes: Positive for visual disturbance.   Respiratory: Negative.    Cardiovascular: Negative.    Gastrointestinal: Negative.    Endocrine: Negative.    Genitourinary: Negative.    Musculoskeletal: Negative.    Skin: Negative.    Allergic/Immunologic: Negative.    Neurological: Negative.    Hematological: Negative.    Psychiatric/Behavioral: Negative.        /82 (BP Location: Left arm, Patient Position: Sitting, Cuff Size: Adult)   Pulse 71   Ht 160 cm (63\")   Wt 63.5 kg (140 lb)   SpO2 93%   BMI 24.80 kg/m²   Physical Exam   Constitutional: She is oriented to person, place, and time. She appears well-developed and well-nourished. No distress.   HENT:   Head: Normocephalic and atraumatic.   Mouth/Throat: Oropharynx is clear and moist.   Eyes: Pupils are equal, round, " and reactive to light. No scleral icterus.   Neck: Normal range of motion. Neck supple. No JVD present. Carotid bruit is not present. No thyromegaly present.   Cardiovascular: Normal rate, regular rhythm, S2 normal, normal heart sounds, intact distal pulses and normal pulses. Exam reveals no gallop and no friction rub.   No murmur heard.  Abdominal: Soft. Normal aorta and bowel sounds are normal. There is no hepatosplenomegaly.   Musculoskeletal: Normal range of motion.   Neurological: She is alert and oriented to person, place, and time. No cranial nerve deficit.   Skin: Skin is warm and dry. She is not diaphoretic.   Psychiatric: She has a normal mood and affect. Her behavior is normal. Judgment and thought content normal.   Nursing note and vitals reviewed.    Diagnostic Data:  Us Carotid Bilateral    Result Date: 1/4/2019  Narrative: History: Carotid occlusive disease      Impression: Impression: 1. There is 50-69% stenosis of the right internal carotid artery. 2. There is 50-69% stenosis of the left internal carotid artery. 3. Antegrade flow is demonstrated in bilateral vertebral arteries.  Comments: Bilateral carotid vertebral arterial duplex scan was performed.  Grayscale imaging shows intimal thickening and calcified elements at the carotid bifurcation. The right internal carotid artery peak systolic velocity is 191 cm/sec. The end-diastolic velocity is 29.5 cm/sec. The right ICA/CCA ratio is approximately 6.56 . These findings correlate with 50-69% stenosis of the right internal carotid artery.  Grayscale imaging shows intimal thickening and calcified elements at the carotid bifurcation. The left internal carotid artery peak systolic velocity is 175 cm/sec. The end-diastolic velocity is 31.4 cm/sec. The left ICA/CCA ratio is approximately 3.30 . These findings correlate with 50-69% stenosis of the left internal carotid artery.  Antegrade flow is demonstrated in bilateral vertebral arteries.    This report  was finalized on 01/04/2019 17:00 by Dr. Gama Pizarro MD.       Patient Active Problem List   Diagnosis   • A-fib (CMS/[facility])   • Carotid artery disease (CMS/[facility])   • Hyperlipidemia   • Hypertension   • Amaurosis fugax of right eye   • Bilateral carotid artery stenosis   • Hx of transient ischemic attack (TIA)         ICD-10-CM ICD-9-CM   1. Bilateral carotid artery stenosis I65.23 433.10     433.30   2. Mixed hyperlipidemia E78.2 272.2   3. Essential hypertension I10 401.9       Plan: After thoroughly evaluating Damaris Nash, I believe the best course of action is to remain conservative from a vascular standpoint.  Her testing is essentially unchanged.  Previous CTA of her neck did not show significant narrowing.  There is about 50% stenosis on both sides.  The reading from the radiologist is not accurate.  She can continue on her current medication regimen as previously planned.  I will see her back in 1 year's time with a repeat carotid duplex for continued surveillance.  I did discuss vascular risk factors as they pertain to the progression of vascular disease including controlling her hypertension and hyperlipidemia.  This was all discussed in full with complete understanding.  Thank you for allowing me to participate in the care of your patient.  Please do not hesitate with any questions or concerns.  I will keep you aware of any further encounters with Damaris Nash.        Sincerely yours,         Josefina Milligan, POLY Beltran, Shahla Bautista, DO

## 2019-01-18 ENCOUNTER — ANTICOAGULATION VISIT (OUTPATIENT)
Dept: CARDIOLOGY | Facility: CLINIC | Age: 84
End: 2019-01-18

## 2019-01-18 ENCOUNTER — LAB (OUTPATIENT)
Dept: LAB | Facility: HOSPITAL | Age: 84
End: 2019-01-18
Attending: INTERNAL MEDICINE

## 2019-01-18 DIAGNOSIS — I48.91 ATRIAL FIBRILLATION, UNSPECIFIED TYPE (HCC): ICD-10-CM

## 2019-01-18 LAB
INR PPP: 1.9 (ref 0.91–1.09)
PROTHROMBIN TIME: 23.1 SECONDS (ref 10–13.8)

## 2019-01-18 PROCEDURE — 85610 PROTHROMBIN TIME: CPT | Performed by: INTERNAL MEDICINE

## 2019-01-25 ENCOUNTER — TELEPHONE (OUTPATIENT)
Dept: CARDIOLOGY | Facility: CLINIC | Age: 84
End: 2019-01-25

## 2019-01-25 ENCOUNTER — OFFICE VISIT (OUTPATIENT)
Dept: CARDIOLOGY | Facility: CLINIC | Age: 84
End: 2019-01-25

## 2019-01-25 VITALS
HEIGHT: 63 IN | BODY MASS INDEX: 24.45 KG/M2 | DIASTOLIC BLOOD PRESSURE: 78 MMHG | RESPIRATION RATE: 18 BRPM | WEIGHT: 138 LBS | OXYGEN SATURATION: 98 % | SYSTOLIC BLOOD PRESSURE: 140 MMHG | HEART RATE: 71 BPM

## 2019-01-25 DIAGNOSIS — I10 ESSENTIAL HYPERTENSION: ICD-10-CM

## 2019-01-25 DIAGNOSIS — I65.23 BILATERAL CAROTID ARTERY STENOSIS: ICD-10-CM

## 2019-01-25 DIAGNOSIS — E78.2 MIXED HYPERLIPIDEMIA: ICD-10-CM

## 2019-01-25 DIAGNOSIS — Z86.73 HX OF TRANSIENT ISCHEMIC ATTACK (TIA): ICD-10-CM

## 2019-01-25 DIAGNOSIS — Z79.01 CURRENT USE OF LONG TERM ANTICOAGULATION: ICD-10-CM

## 2019-01-25 DIAGNOSIS — I48.0 PAROXYSMAL ATRIAL FIBRILLATION (HCC): Primary | ICD-10-CM

## 2019-01-25 DIAGNOSIS — Z98.890 H/O CARDIAC RADIOFREQUENCY ABLATION: ICD-10-CM

## 2019-01-25 PROCEDURE — 99214 OFFICE O/P EST MOD 30 MIN: CPT | Performed by: NURSE PRACTITIONER

## 2019-01-25 PROCEDURE — 93000 ELECTROCARDIOGRAM COMPLETE: CPT | Performed by: NURSE PRACTITIONER

## 2019-01-25 RX ORDER — AMLODIPINE BESYLATE 2.5 MG/1
2.5 TABLET ORAL DAILY
Status: ON HOLD | COMMUNITY
End: 2023-02-23 | Stop reason: DRUGHIGH

## 2019-01-25 RX ORDER — WARFARIN SODIUM 4 MG/1
4 TABLET ORAL NIGHTLY
Qty: 30 TABLET | Refills: 11 | Status: SHIPPED | OUTPATIENT
Start: 2019-01-25 | End: 2019-06-19 | Stop reason: SDUPTHER

## 2019-01-25 NOTE — TELEPHONE ENCOUNTER
HERMINIA HAS BEEN NOTIFIED    ----- Message from POLY Sawant sent at 1/25/2019 11:06 AM CST -----  Please let MsImani Charity know that I had Dr. Mota look at her EKG and she was not in afib today in office. It was normal sinus rhythm with a PAC and artifact. No change in treatment plan. Keep scheduled follow up.

## 2019-01-25 NOTE — PROGRESS NOTES
Subjective:     Encounter Date:01/25/2019      Patient ID: Damaris Nash is a 83 y.o. female. She presents today for routine follow up. She has a history of paroxysmal atrial fibrillation on chronic anticoagulation, hypertension, hyperlipidemia, bilateral carotid artery disease and transient ischemic attack. She complains of bilateral lower extremity weakness that interferes with her activities of daily living. She reports dyspnea with moderate exertion, intermittent mild bilateral lower extremity edema. She denies chest pain, palpitations, dizziness, syncope, orthopnea, PND or decreased stamina. She denies any signs of bleeding. She states that overall she has been well since her last visit.     Chief Complaint: routine follow up  Atrial Fibrillation   Presents for follow-up visit. Symptoms are negative for an AICD problem, bradycardia, chest pain, dizziness, hemodynamic instability, hypertension, hypotension, pacemaker problem, palpitations, shortness of breath, syncope, tachycardia and weakness. The symptoms have been stable. Past medical history includes atrial fibrillation and hyperlipidemia.   Hypertension   This is a chronic problem. The current episode started more than 1 year ago. The problem is controlled. Pertinent negatives include no anxiety, blurred vision, chest pain, headaches, malaise/fatigue, neck pain, orthopnea, palpitations, peripheral edema, PND, shortness of breath or sweats. Risk factors for coronary artery disease include post-menopausal state and dyslipidemia. Current antihypertension treatment includes central alpha agonists, beta blockers and diuretics. The current treatment provides significant improvement.   Hyperlipidemia   This is a chronic problem. The current episode started more than 1 year ago. Pertinent negatives include no chest pain or shortness of breath. Current antihyperlipidemic treatment includes statins. Risk factors for coronary artery disease include hypertension,  dyslipidemia and post-menopausal.       The following portions of the patient's history were reviewed and updated as appropriate: allergies, current medications, past family history, past medical history, past social history, past surgical history and problem list.     Allergies   Allergen Reactions   • Epinephrine Shortness Of Breath   • Celebrex [Celecoxib]    • Ferrous Sulfate    • Other      Omnicef, some Thad, possible Rocephin allergy   • Simvastatin    • Sulfa Antibiotics        Current Outpatient Medications:   •  acetaminophen (TYLENOL) 325 MG tablet, Take 650 mg by mouth Daily As Needed for mild pain (1-3)., Disp: , Rfl:   •  alendronate (FOSAMAX) 70 MG tablet, Take 70 mg by mouth Every 7 (Seven) Days., Disp: , Rfl:   •  ALPRAZolam (XANAX) 0.25 MG tablet, Take 0.25 mg by mouth 2 (Two) Times a Day As Needed for anxiety., Disp: , Rfl:   •  amLODIPine (NORVASC) 2.5 MG tablet, Take 2.5 mg by mouth Daily., Disp: , Rfl:   •  atorvastatin (LIPITOR) 10 MG tablet, Take 10 mg by mouth Daily., Disp: , Rfl:   •  Cholecalciferol (VITAMIN D3) 2000 units capsule, Take 2,000 Units by mouth Daily., Disp: , Rfl:   •  CloNIDine (CATAPRES) 0.1 MG tablet, Take 0.1 mg by mouth As Needed., Disp: , Rfl:   •  hydrochlorothiazide (HYDRODIURIL) 25 MG tablet, Take 25 mg by mouth Daily., Disp: , Rfl:   •  ibuprofen (ADVIL,MOTRIN) 200 MG tablet, Take 200 mg by mouth Daily As Needed for mild pain (1-3)., Disp: , Rfl:   •  levothyroxine sodium (TIROSINT) 100 MCG capsule, Take 100 mcg by mouth Daily., Disp: , Rfl:   •  metoprolol succinate XL (TOPROL-XL) 100 MG 24 hr tablet, Take 100 mg by mouth Every Night., Disp: , Rfl:   •  Multiple Vitamins-Minerals (PRESERVISION AREDS 2 PO), Take 2 capsules by mouth Daily., Disp: , Rfl:   •  traMADol (ULTRAM) 50 MG tablet, Take 50 mg by mouth Every 6 (Six) Hours As Needed for moderate pain (4-6)., Disp: , Rfl:   •  warfarin (COUMADIN) 4 MG tablet, Take 1 tablet by mouth Every Night., Disp: 30  tablet, Rfl: 11  Past Medical History:   Diagnosis Date   • A-fib (CMS/HCC)    • Acute ischemic right ICA stroke (CMS/HCC)    • Atrial fibrillation (CMS/HCC)    • Benign essential HTN    • Breast CA (CMS/HCC)    • Breast cancer (CMS/HCC)    • Carotid artery disease (CMS/HCC)    • CVA (cerebral vascular accident) (CMS/HCC)    • CVA (cerebral vascular accident) (CMS/HCC)    • Gallstones    • GERD (gastroesophageal reflux disease)    • Hyperlipidemia    • Hypertension    • Hypothyroid    • Hypothyroidism    • Osteopenia    • Osteopenia    • Primary pulmonary HTN (CMS/HCC)    • S/P right hip fracture 2012   • Shortness of breath    • Stroke (CMS/HCC)    • Vitamin D deficiency      Past Surgical History:   Procedure Laterality Date   • BREAST LUMPECTOMY     • CARDIAC ABLATION      cardiac ablation procedure for af   • CATARACT EXTRACTION Right    • PARTIAL HIP ARTHROPLASTY Right      Family History   Problem Relation Age of Onset   • Stroke Mother    • Cancer Father    • COPD Sister      Social History     Socioeconomic History   • Marital status:      Spouse name: Not on file   • Number of children: Not on file   • Years of education: Not on file   • Highest education level: Not on file   Social Needs   • Financial resource strain: Not on file   • Food insecurity - worry: Not on file   • Food insecurity - inability: Not on file   • Transportation needs - medical: Not on file   • Transportation needs - non-medical: Not on file   Occupational History   • Not on file   Tobacco Use   • Smoking status: Former Smoker     Years: 30.00     Types: Cigarettes     Start date:      Last attempt to quit: 1992     Years since quittin.0   • Smokeless tobacco: Never Used   Substance and Sexual Activity   • Alcohol use: No   • Drug use: No   • Sexual activity: Defer   Other Topics Concern   • Not on file   Social History Narrative   • Not on file         Review of Systems   Constitution: Negative for chills,  "decreased appetite, fever, weakness, malaise/fatigue, night sweats, weight gain and weight loss.   HENT: Negative for nosebleeds.    Eyes: Negative for blurred vision and visual disturbance.   Cardiovascular: Positive for dyspnea on exertion and leg swelling. Negative for chest pain, near-syncope, orthopnea, palpitations, paroxysmal nocturnal dyspnea and syncope.   Respiratory: Negative for cough, hemoptysis, shortness of breath, snoring and wheezing.    Endocrine: Negative for cold intolerance and heat intolerance.   Hematologic/Lymphatic: Does not bruise/bleed easily.   Skin: Negative for rash.   Musculoskeletal: Positive for muscle weakness. Negative for back pain, falls and neck pain.   Gastrointestinal: Negative for abdominal pain, change in bowel habit, constipation, diarrhea, dysphagia, heartburn, nausea and vomiting.   Genitourinary: Negative for hematuria.   Neurological: Negative for dizziness, headaches and light-headedness.   Psychiatric/Behavioral: Negative for altered mental status.   Allergic/Immunologic: Negative for persistent infections.         ECG 12 Lead  Date/Time: 1/25/2019 10:18 AM  Performed by: Sarah Galvez APRN  Authorized by: Sarah Galvez APRN   Comparison: compared with previous ECG from 1/16/2017  Similar to previous ECG  Rhythm: sinus rhythm  Ectopy: atrial premature contractions  Rate: normal  BPM: 71  Clinical impression: abnormal ECG          /78 (BP Location: Right arm, Patient Position: Sitting, Cuff Size: Adult)   Pulse 71   Resp 18   Ht 160 cm (63\")   Wt 62.6 kg (138 lb)   SpO2 98%   BMI 24.45 kg/m²        Objective:     Physical Exam   Constitutional: She is oriented to person, place, and time. Vital signs are normal. She appears well-developed and well-nourished. No distress.   HENT:   Head: Normocephalic and atraumatic.   Right Ear: External ear normal.   Left Ear: External ear normal.   Nose: Nose normal.   Eyes: Conjunctivae are normal. Pupils are equal, " round, and reactive to light. Right eye exhibits no discharge. Left eye exhibits no discharge.   Neck: Normal range of motion. Neck supple. No JVD present. Carotid bruit is not present. No tracheal deviation present. No thyromegaly present.   Cardiovascular: Normal rate, regular rhythm, normal heart sounds, intact distal pulses and normal pulses. PMI is not displaced. Exam reveals no gallop and no friction rub.   No murmur heard.  Pulses:       Radial pulses are 2+ on the right side, and 2+ on the left side.        Dorsalis pedis pulses are 2+ on the right side, and 2+ on the left side.        Posterior tibial pulses are 2+ on the right side, and 2+ on the left side.   Pulmonary/Chest: Effort normal and breath sounds normal. No respiratory distress. She has no decreased breath sounds. She has no wheezes. She has no rhonchi. She has no rales. She exhibits no tenderness.   Abdominal: Soft. She exhibits no distension. There is no tenderness.   Musculoskeletal: Normal range of motion. She exhibits edema (Mild bilateral lower extremity edema). She exhibits no tenderness or deformity.   Neurological: She is alert and oriented to person, place, and time.   Skin: Skin is warm and dry. No rash noted. She is not diaphoretic. No erythema. No pallor.   Psychiatric: She has a normal mood and affect. Her behavior is normal. Judgment and thought content normal.   Vitals reviewed.        Assessment:          Diagnosis Plan   1. Paroxysmal atrial fibrillation (CMS/HCC)  Maintaining sinus rhythm. Anticoagulated.    2. H/O cardiac radiofrequency ablation  Follows with Dr. Rajput.    3. Current use of long term anticoagulation  On coumadin. Denies bleeding. Follows with coumadin clinic.    4. Bilateral carotid artery stenosis  Follows with vascular surgery.    5. Essential hypertension  Well controlled.    6. Mixed hyperlipidemia  Managed by PCP. On statin.    7. Hx of transient ischemic attack (TIA)            Plan:       1. Continue  medications as previously prescribed.  2. Report any worsening symptoms.  3. Report any signs of bleeding.  4. Continue heart healthy diet and regular exercise as tolerated.   5. Follow up with PCP for blood pressure and cholesterol management and routine lab work.  6. Follow up with Dr. Mota in one year, or sooner if needed.   7. Follow up with coumadin clinic as scheduled.   8. Follow up with Dr. Rogel's office as scheduled.   9. Follow up with Dr. Rajput as scheduled.

## 2019-02-04 ENCOUNTER — ANTICOAGULATION VISIT (OUTPATIENT)
Dept: CARDIOLOGY | Facility: CLINIC | Age: 84
End: 2019-02-04

## 2019-02-04 ENCOUNTER — LAB (OUTPATIENT)
Dept: LAB | Facility: HOSPITAL | Age: 84
End: 2019-02-04
Attending: INTERNAL MEDICINE

## 2019-02-04 DIAGNOSIS — I48.91 ATRIAL FIBRILLATION, UNSPECIFIED TYPE (HCC): ICD-10-CM

## 2019-02-04 LAB
INR PPP: 2.6 (ref 0.91–1.09)
PROTHROMBIN TIME: 31.3 SECONDS (ref 10–13.8)

## 2019-02-04 PROCEDURE — 85610 PROTHROMBIN TIME: CPT | Performed by: INTERNAL MEDICINE

## 2019-03-04 ENCOUNTER — LAB (OUTPATIENT)
Dept: LAB | Facility: HOSPITAL | Age: 84
End: 2019-03-04

## 2019-03-04 DIAGNOSIS — I48.91 ATRIAL FIBRILLATION, UNSPECIFIED TYPE (HCC): ICD-10-CM

## 2019-03-04 LAB
INR PPP: 2.3 (ref 0.91–1.09)
PROTHROMBIN TIME: 27.6 SECONDS (ref 10–13.8)

## 2019-03-04 PROCEDURE — 85610 PROTHROMBIN TIME: CPT | Performed by: INTERNAL MEDICINE

## 2019-03-06 ENCOUNTER — ANTICOAGULATION VISIT (OUTPATIENT)
Dept: CARDIOLOGY | Facility: CLINIC | Age: 84
End: 2019-03-06

## 2019-04-01 ENCOUNTER — LAB (OUTPATIENT)
Dept: LAB | Facility: HOSPITAL | Age: 84
End: 2019-04-01

## 2019-04-01 ENCOUNTER — ANTICOAGULATION VISIT (OUTPATIENT)
Dept: CARDIOLOGY | Facility: CLINIC | Age: 84
End: 2019-04-01

## 2019-04-01 DIAGNOSIS — I48.91 ATRIAL FIBRILLATION, UNSPECIFIED TYPE (HCC): ICD-10-CM

## 2019-04-01 LAB
INR PPP: 2.8 (ref 0.91–1.09)
PROTHROMBIN TIME: 34 SECONDS (ref 10–13.8)

## 2019-04-01 PROCEDURE — 85610 PROTHROMBIN TIME: CPT | Performed by: INTERNAL MEDICINE

## 2019-04-29 ENCOUNTER — LAB (OUTPATIENT)
Dept: LAB | Facility: HOSPITAL | Age: 84
End: 2019-04-29

## 2019-04-29 DIAGNOSIS — I48.91 ATRIAL FIBRILLATION, UNSPECIFIED TYPE (HCC): ICD-10-CM

## 2019-04-29 LAB
INR PPP: 2.6 (ref 0.91–1.09)
PROTHROMBIN TIME: 31.6 SECONDS (ref 10–13.8)

## 2019-04-29 PROCEDURE — 85610 PROTHROMBIN TIME: CPT | Performed by: INTERNAL MEDICINE

## 2019-04-30 ENCOUNTER — ANTICOAGULATION VISIT (OUTPATIENT)
Dept: CARDIOLOGY | Facility: CLINIC | Age: 84
End: 2019-04-30

## 2019-05-29 ENCOUNTER — LAB (OUTPATIENT)
Dept: LAB | Facility: HOSPITAL | Age: 84
End: 2019-05-29

## 2019-05-29 DIAGNOSIS — I48.91 ATRIAL FIBRILLATION, UNSPECIFIED TYPE (HCC): ICD-10-CM

## 2019-05-29 PROCEDURE — 85610 PROTHROMBIN TIME: CPT | Performed by: INTERNAL MEDICINE

## 2019-05-30 ENCOUNTER — ANTICOAGULATION VISIT (OUTPATIENT)
Dept: CARDIOLOGY | Facility: CLINIC | Age: 84
End: 2019-05-30

## 2019-05-30 LAB
INR PPP: 2.4 (ref 0.91–1.09)
PROTHROMBIN TIME: 28.9 SECONDS (ref 10–13.8)

## 2019-06-20 RX ORDER — WARFARIN SODIUM 4 MG/1
4 TABLET ORAL NIGHTLY
Qty: 90 TABLET | Refills: 1 | Status: SHIPPED | OUTPATIENT
Start: 2019-06-20 | End: 2019-12-16 | Stop reason: SDUPTHER

## 2019-06-26 ENCOUNTER — LAB (OUTPATIENT)
Dept: LAB | Facility: HOSPITAL | Age: 84
End: 2019-06-26

## 2019-06-26 DIAGNOSIS — I48.91 ATRIAL FIBRILLATION, UNSPECIFIED TYPE (HCC): ICD-10-CM

## 2019-06-26 LAB
INR PPP: 2.9 (ref 0.91–1.09)
PROTHROMBIN TIME: 34.7 SECONDS (ref 10–13.8)

## 2019-06-26 PROCEDURE — 85610 PROTHROMBIN TIME: CPT | Performed by: INTERNAL MEDICINE

## 2019-06-27 ENCOUNTER — ANTICOAGULATION VISIT (OUTPATIENT)
Dept: CARDIOLOGY | Facility: CLINIC | Age: 84
End: 2019-06-27

## 2019-07-24 ENCOUNTER — LAB (OUTPATIENT)
Dept: LAB | Facility: HOSPITAL | Age: 84
End: 2019-07-24

## 2019-07-24 ENCOUNTER — ANTICOAGULATION VISIT (OUTPATIENT)
Dept: CARDIOLOGY | Facility: CLINIC | Age: 84
End: 2019-07-24

## 2019-07-24 DIAGNOSIS — I48.91 ATRIAL FIBRILLATION, UNSPECIFIED TYPE (HCC): ICD-10-CM

## 2019-07-24 LAB
INR PPP: 3 (ref 0.91–1.09)
PROTHROMBIN TIME: 36.2 SECONDS (ref 10–13.8)

## 2019-07-24 PROCEDURE — 85610 PROTHROMBIN TIME: CPT | Performed by: INTERNAL MEDICINE

## 2019-08-09 ENCOUNTER — OFFICE VISIT (OUTPATIENT)
Dept: CARDIOLOGY | Facility: CLINIC | Age: 84
End: 2019-08-09

## 2019-08-09 VITALS
HEIGHT: 63 IN | BODY MASS INDEX: 23.74 KG/M2 | SYSTOLIC BLOOD PRESSURE: 158 MMHG | DIASTOLIC BLOOD PRESSURE: 70 MMHG | HEART RATE: 78 BPM | WEIGHT: 134 LBS

## 2019-08-09 DIAGNOSIS — I65.23 BILATERAL CAROTID ARTERY STENOSIS: ICD-10-CM

## 2019-08-09 DIAGNOSIS — I48.0 PAROXYSMAL ATRIAL FIBRILLATION (HCC): ICD-10-CM

## 2019-08-09 DIAGNOSIS — E78.2 MIXED HYPERLIPIDEMIA: Primary | ICD-10-CM

## 2019-08-09 DIAGNOSIS — I10 ESSENTIAL HYPERTENSION: ICD-10-CM

## 2019-08-09 DIAGNOSIS — Z86.73 HX OF TRANSIENT ISCHEMIC ATTACK (TIA): ICD-10-CM

## 2019-08-09 PROCEDURE — 99214 OFFICE O/P EST MOD 30 MIN: CPT | Performed by: NURSE PRACTITIONER

## 2019-08-09 PROCEDURE — 93000 ELECTROCARDIOGRAM COMPLETE: CPT | Performed by: NURSE PRACTITIONER

## 2019-08-09 NOTE — PROGRESS NOTES
"    Subjective:     Encounter Date:08/09/2019      Patient ID: Damaris Nash is a 83 y.o. female.    Chief Complaint: fatigue, follow up afib   The patient reports she is feeling well overall. She presents for follow up regarding her afib. She reports chronic fatigue. She denies chest pain, shortness of breath, edema, palpitations, orthopnea, PND, syncope or pre syncope. She reports compliance with her medications and good blood pressure control.         The following portions of the patient's history were reviewed and updated as appropriate: allergies, current medications, past family history, past medical history, past social history, past surgical history and problem list.  /70   Pulse 78   Ht 160 cm (63\")   Wt 60.8 kg (134 lb)   BMI 23.74 kg/m²   Allergies   Allergen Reactions   • Epinephrine Shortness Of Breath   • Celebrex [Celecoxib]    • Ferrous Sulfate    • Other      Omnicef, some Thad, possible Rocephin allergy   • Simvastatin    • Sulfa Antibiotics        Current Outpatient Medications:   •  acetaminophen (TYLENOL) 325 MG tablet, Take 650 mg by mouth Daily As Needed for mild pain (1-3)., Disp: , Rfl:   •  alendronate (FOSAMAX) 70 MG tablet, Take 70 mg by mouth Every 7 (Seven) Days., Disp: , Rfl:   •  ALPRAZolam (XANAX) 0.25 MG tablet, Take 0.25 mg by mouth 2 (Two) Times a Day As Needed for anxiety., Disp: , Rfl:   •  amLODIPine (NORVASC) 2.5 MG tablet, Take 2.5 mg by mouth Daily., Disp: , Rfl:   •  atorvastatin (LIPITOR) 10 MG tablet, Take 10 mg by mouth Daily., Disp: , Rfl:   •  Cholecalciferol (VITAMIN D3) 2000 units capsule, Take 2,000 Units by mouth Daily., Disp: , Rfl:   •  CloNIDine (CATAPRES) 0.1 MG tablet, Take 0.1 mg by mouth As Needed., Disp: , Rfl:   •  hydrochlorothiazide (HYDRODIURIL) 25 MG tablet, Take 25 mg by mouth Daily., Disp: , Rfl:   •  ibuprofen (ADVIL,MOTRIN) 200 MG tablet, Take 200 mg by mouth Daily As Needed for mild pain (1-3)., Disp: , Rfl:   •  levothyroxine sodium " (TIROSINT) 100 MCG capsule, Take 100 mcg by mouth Daily., Disp: , Rfl:   •  metoprolol succinate XL (TOPROL-XL) 100 MG 24 hr tablet, Take 100 mg by mouth Every Night., Disp: , Rfl:   •  Multiple Vitamins-Minerals (PRESERVISION AREDS 2 PO), Take 2 capsules by mouth Daily., Disp: , Rfl:   •  traMADol (ULTRAM) 50 MG tablet, Take 50 mg by mouth Every 6 (Six) Hours As Needed for moderate pain (4-6)., Disp: , Rfl:   •  warfarin (COUMADIN) 4 MG tablet, TAKE 1 TABLET BY MOUTH EVERY NIGHT, Disp: 90 tablet, Rfl: 1  Past Medical History:   Diagnosis Date   • A-fib (CMS/HCC)    • Acute ischemic right ICA stroke (CMS/HCC)    • Atrial fibrillation (CMS/HCC)    • Benign essential HTN    • Breast CA (CMS/HCC)    • Breast cancer (CMS/HCC)    • Carotid artery disease (CMS/HCC)    • CVA (cerebral vascular accident) (CMS/HCC)    • CVA (cerebral vascular accident) (CMS/HCC)    • Gallstones    • GERD (gastroesophageal reflux disease)    • Hyperlipidemia    • Hypertension    • Hypothyroid    • Hypothyroidism    • Osteopenia    • Osteopenia    • Primary pulmonary HTN (CMS/HCC)    • S/P right hip fracture 2012   • Shortness of breath    • Stroke (CMS/HCC)    • Vitamin D deficiency      Past Surgical History:   Procedure Laterality Date   • BREAST LUMPECTOMY     • CARDIAC ABLATION      cardiac ablation procedure for af   • CATARACT EXTRACTION Right    • PARTIAL HIP ARTHROPLASTY Right      Social History     Socioeconomic History   • Marital status:      Spouse name: Not on file   • Number of children: Not on file   • Years of education: Not on file   • Highest education level: Not on file   Tobacco Use   • Smoking status: Former Smoker     Years: 30.00     Types: Cigarettes     Start date:      Last attempt to quit: 1992     Years since quittin.6   • Smokeless tobacco: Never Used   Substance and Sexual Activity   • Alcohol use: No   • Drug use: No   • Sexual activity: Defer     Family History   Problem Relation Age  of Onset   • Stroke Mother    • Cancer Father    • COPD Sister        Review of Systems   Constitution: Positive for malaise/fatigue. Negative for chills, diaphoresis, fever and weakness.   HENT: Negative for nosebleeds.    Eyes: Negative for visual disturbance.   Cardiovascular: Negative for chest pain, claudication, cyanosis, dyspnea on exertion, irregular heartbeat, leg swelling, near-syncope, orthopnea, palpitations, paroxysmal nocturnal dyspnea and syncope.   Respiratory: Negative for cough, hemoptysis, shortness of breath, sputum production and wheezing.    Hematologic/Lymphatic: Negative for bleeding problem.   Skin: Negative for color change and flushing.   Musculoskeletal: Negative for falls.   Gastrointestinal: Negative for bloating, abdominal pain, hematemesis, hematochezia, melena, nausea and vomiting.   Genitourinary: Negative for hematuria.   Neurological: Negative for dizziness and light-headedness.   Psychiatric/Behavioral: Negative for altered mental status.         ECG 12 Lead  Date/Time: 8/9/2019 2:36 PM  Performed by: Debi Clark APRN  Authorized by: Debi Clark APRN   Comparison: compared with previous ECG from 1/25/2019  Similar to previous ECG  Rhythm: sinus rhythm  Conduction: 1st degree AV block               Objective:     Physical Exam   Constitutional: She is oriented to person, place, and time. She appears well-developed and well-nourished. No distress.   HENT:   Head: Normocephalic and atraumatic.   Eyes: Pupils are equal, round, and reactive to light.   Neck: Normal range of motion. Neck supple. No JVD present. No thyromegaly present.   Cardiovascular: Normal rate, regular rhythm, normal heart sounds and intact distal pulses. Exam reveals no gallop and no friction rub.   No murmur heard.  Pulmonary/Chest: Effort normal and breath sounds normal. No respiratory distress. She has no wheezes. She has no rales. She exhibits no tenderness.   Abdominal: Soft. Bowel sounds are normal. She  exhibits no distension. There is no tenderness.   Musculoskeletal: Normal range of motion. She exhibits no edema.   Neurological: She is alert and oriented to person, place, and time. No cranial nerve deficit.   Skin: Skin is warm and dry. She is not diaphoretic.   Psychiatric: She has a normal mood and affect. Her behavior is normal.     Records reviewed:  2015 stress echo low risk for ischemia     1/2017 echo-  LVEF 65%, mild MR, grade I DD, borderline RV dilation         Assessment:          Diagnosis Plan   1. Mixed hyperlipidemia    On statin, followed by pcp    2. Paroxysmal atrial fibrillation (CMS/HCC)    Maintaining NSR  Anticoagulated with warfarin - INRs and dosing followed by Agatha PLATA, RN   Hx of ablation per Dr. Rajput     3. Bilateral carotid artery stenosis    Followed by Dr. Juan F Lopez bilaterally per most recent US     4. Essential hypertension    Elevated today, pt reports good control per home readings and states she is following closely with Dr. Beltran for this. Continue to monitor and call here or pcp with persistent elevations     5. Hx of transient ischemic attack (TIA)            Plan:       As noted above  Continue current medical therapy including warfarin, norvasc, toprol xl and hctz  Follow up 1 year, sooner with symptoms or concerns

## 2019-08-21 ENCOUNTER — LAB (OUTPATIENT)
Dept: LAB | Facility: HOSPITAL | Age: 84
End: 2019-08-21

## 2019-08-21 ENCOUNTER — ANTICOAGULATION VISIT (OUTPATIENT)
Dept: CARDIOLOGY | Facility: CLINIC | Age: 84
End: 2019-08-21

## 2019-08-21 DIAGNOSIS — I48.91 ATRIAL FIBRILLATION, UNSPECIFIED TYPE (HCC): ICD-10-CM

## 2019-08-21 LAB
INR PPP: 2.5 (ref 0.91–1.09)
PROTHROMBIN TIME: 30.2 SECONDS (ref 10–13.8)

## 2019-08-21 PROCEDURE — 85610 PROTHROMBIN TIME: CPT | Performed by: INTERNAL MEDICINE

## 2019-09-19 ENCOUNTER — ANTICOAGULATION VISIT (OUTPATIENT)
Dept: CARDIOLOGY | Facility: CLINIC | Age: 84
End: 2019-09-19

## 2019-09-19 ENCOUNTER — LAB (OUTPATIENT)
Dept: LAB | Facility: HOSPITAL | Age: 84
End: 2019-09-19

## 2019-09-19 DIAGNOSIS — I48.91 ATRIAL FIBRILLATION, UNSPECIFIED TYPE (HCC): ICD-10-CM

## 2019-09-19 LAB
INR PPP: 2.7 (ref 0.91–1.09)
PROTHROMBIN TIME: 32.8 SECONDS (ref 10–13.8)

## 2019-09-19 PROCEDURE — 85610 PROTHROMBIN TIME: CPT | Performed by: INTERNAL MEDICINE

## 2019-10-17 ENCOUNTER — ANTICOAGULATION VISIT (OUTPATIENT)
Dept: CARDIOLOGY | Facility: CLINIC | Age: 84
End: 2019-10-17

## 2019-10-17 ENCOUNTER — LAB (OUTPATIENT)
Dept: LAB | Facility: HOSPITAL | Age: 84
End: 2019-10-17

## 2019-10-17 DIAGNOSIS — I48.91 ATRIAL FIBRILLATION, UNSPECIFIED TYPE (HCC): ICD-10-CM

## 2019-10-17 LAB
INR PPP: 3.8 (ref 0.91–1.09)
PROTHROMBIN TIME: 45.8 SECONDS (ref 10–13.8)

## 2019-10-17 PROCEDURE — 85610 PROTHROMBIN TIME: CPT | Performed by: INTERNAL MEDICINE

## 2019-10-24 ENCOUNTER — ANTICOAGULATION VISIT (OUTPATIENT)
Dept: CARDIOLOGY | Facility: CLINIC | Age: 84
End: 2019-10-24

## 2019-10-24 ENCOUNTER — LAB (OUTPATIENT)
Dept: LAB | Facility: HOSPITAL | Age: 84
End: 2019-10-24

## 2019-10-24 DIAGNOSIS — I48.91 ATRIAL FIBRILLATION, UNSPECIFIED TYPE (HCC): ICD-10-CM

## 2019-10-24 LAB
INR PPP: 2.7 (ref 0.91–1.09)
PROTHROMBIN TIME: 33 SECONDS (ref 10–13.8)

## 2019-10-24 PROCEDURE — 85610 PROTHROMBIN TIME: CPT | Performed by: INTERNAL MEDICINE

## 2019-11-08 ENCOUNTER — TRANSCRIBE ORDERS (OUTPATIENT)
Dept: ADMINISTRATIVE | Facility: HOSPITAL | Age: 84
End: 2019-11-08

## 2019-11-08 ENCOUNTER — ANTICOAGULATION VISIT (OUTPATIENT)
Dept: CARDIOLOGY | Facility: CLINIC | Age: 84
End: 2019-11-08

## 2019-11-08 ENCOUNTER — LAB (OUTPATIENT)
Dept: LAB | Facility: HOSPITAL | Age: 84
End: 2019-11-08

## 2019-11-08 DIAGNOSIS — I48.91 ATRIAL FIBRILLATION, UNSPECIFIED TYPE (HCC): Primary | ICD-10-CM

## 2019-11-08 LAB
INR PPP: 3.5 (ref 0.91–1.09)
PROTHROMBIN TIME: 42 SECONDS (ref 10–13.8)

## 2019-11-08 PROCEDURE — 85610 PROTHROMBIN TIME: CPT | Performed by: INTERNAL MEDICINE

## 2019-11-22 ENCOUNTER — LAB (OUTPATIENT)
Dept: LAB | Facility: HOSPITAL | Age: 84
End: 2019-11-22

## 2019-11-22 ENCOUNTER — ANTICOAGULATION VISIT (OUTPATIENT)
Dept: CARDIOLOGY | Facility: CLINIC | Age: 84
End: 2019-11-22

## 2019-11-22 DIAGNOSIS — I48.91 ATRIAL FIBRILLATION, UNSPECIFIED TYPE (HCC): ICD-10-CM

## 2019-11-22 DIAGNOSIS — I48.91 ATRIAL FIBRILLATION, UNSPECIFIED TYPE (HCC): Primary | ICD-10-CM

## 2019-11-22 LAB
INR PPP: 2.2 (ref 0.91–1.09)
PROTHROMBIN TIME: 26.9 SECONDS (ref 10–13.8)

## 2019-11-22 PROCEDURE — 85610 PROTHROMBIN TIME: CPT | Performed by: INTERNAL MEDICINE

## 2019-12-12 ENCOUNTER — TRANSCRIBE ORDERS (OUTPATIENT)
Dept: ADMINISTRATIVE | Facility: HOSPITAL | Age: 84
End: 2019-12-12

## 2019-12-12 DIAGNOSIS — R06.02 SHORTNESS OF BREATH: Primary | ICD-10-CM

## 2019-12-12 DIAGNOSIS — R91.8 OTHER NONSPECIFIC ABNORMAL FINDING OF LUNG FIELD: ICD-10-CM

## 2019-12-16 RX ORDER — WARFARIN SODIUM 4 MG/1
4 TABLET ORAL NIGHTLY
Qty: 90 TABLET | Refills: 3 | Status: SHIPPED | OUTPATIENT
Start: 2019-12-16 | End: 2021-06-28

## 2019-12-16 RX ORDER — WARFARIN SODIUM 4 MG/1
4 TABLET ORAL NIGHTLY
Qty: 30 TABLET | Refills: 0 | OUTPATIENT
Start: 2019-12-16

## 2019-12-17 ENCOUNTER — HOSPITAL ENCOUNTER (OUTPATIENT)
Dept: PULMONOLOGY | Facility: HOSPITAL | Age: 84
Discharge: HOME | End: 2019-12-17
Admitting: FAMILY MEDICINE

## 2019-12-17 DIAGNOSIS — R91.8 OTHER NONSPECIFIC ABNORMAL FINDING OF LUNG FIELD: ICD-10-CM

## 2019-12-17 DIAGNOSIS — R06.02 SHORTNESS OF BREATH: ICD-10-CM

## 2019-12-17 PROCEDURE — 94727 GAS DIL/WSHOT DETER LNG VOL: CPT | Performed by: INTERNAL MEDICINE

## 2019-12-17 PROCEDURE — 94729 DIFFUSING CAPACITY: CPT

## 2019-12-17 PROCEDURE — 94729 DIFFUSING CAPACITY: CPT | Performed by: INTERNAL MEDICINE

## 2019-12-17 PROCEDURE — 94727 GAS DIL/WSHOT DETER LNG VOL: CPT

## 2019-12-17 PROCEDURE — 94010 BREATHING CAPACITY TEST: CPT

## 2019-12-17 PROCEDURE — 94010 BREATHING CAPACITY TEST: CPT | Performed by: INTERNAL MEDICINE

## 2019-12-20 ENCOUNTER — ANTICOAGULATION VISIT (OUTPATIENT)
Dept: CARDIOLOGY | Facility: CLINIC | Age: 84
End: 2019-12-20

## 2019-12-20 ENCOUNTER — LAB (OUTPATIENT)
Dept: LAB | Facility: HOSPITAL | Age: 84
End: 2019-12-20

## 2019-12-20 DIAGNOSIS — I48.91 ATRIAL FIBRILLATION, UNSPECIFIED TYPE (HCC): ICD-10-CM

## 2019-12-20 LAB
INR PPP: 3 (ref 0.91–1.09)
PROTHROMBIN TIME: 36.1 SECONDS (ref 10–13.8)

## 2019-12-20 PROCEDURE — 85610 PROTHROMBIN TIME: CPT | Performed by: INTERNAL MEDICINE

## 2020-01-03 ENCOUNTER — HOSPITAL ENCOUNTER (OUTPATIENT)
Dept: ULTRASOUND IMAGING | Facility: HOSPITAL | Age: 85
Discharge: HOME | End: 2020-01-03
Admitting: NURSE PRACTITIONER

## 2020-01-03 DIAGNOSIS — I65.23 BILATERAL CAROTID ARTERY STENOSIS: ICD-10-CM

## 2020-01-03 PROCEDURE — 93880 EXTRACRANIAL BILAT STUDY: CPT | Performed by: SURGERY

## 2020-01-03 PROCEDURE — 93880 EXTRACRANIAL BILAT STUDY: CPT

## 2020-01-06 ENCOUNTER — OFFICE VISIT (OUTPATIENT)
Dept: VASCULAR SURGERY | Facility: CLINIC | Age: 85
End: 2020-01-06

## 2020-01-06 VITALS
HEART RATE: 71 BPM | HEIGHT: 63 IN | BODY MASS INDEX: 23.74 KG/M2 | OXYGEN SATURATION: 90 % | WEIGHT: 134 LBS | SYSTOLIC BLOOD PRESSURE: 140 MMHG | DIASTOLIC BLOOD PRESSURE: 86 MMHG

## 2020-01-06 DIAGNOSIS — I65.23 BILATERAL CAROTID ARTERY STENOSIS: Primary | ICD-10-CM

## 2020-01-06 DIAGNOSIS — I10 ESSENTIAL HYPERTENSION: ICD-10-CM

## 2020-01-06 DIAGNOSIS — E78.2 MIXED HYPERLIPIDEMIA: ICD-10-CM

## 2020-01-06 PROCEDURE — 99214 OFFICE O/P EST MOD 30 MIN: CPT | Performed by: NURSE PRACTITIONER

## 2020-01-06 NOTE — PROGRESS NOTES
"1/6/2020       Shahla Beltran,   [address]  [place] [address]    Damaris Nash  1935    Chief Complaint   Patient presents with   • Follow-up     6 Month Follow Up For Bilateral Carotid Artery Stenosis. Test 01/03/2020 US pad carotid bilateral. Patient denies any stroke like symptoms.        Dear  Shahla Beltran,       HPI  I had the pleasure of seeing your patient Damaris Nash in the office today.    As you recall, Damaris Nash is a 84 y.o. left-handed female who we are following for carotid occlusive disease.  Previously she did have a loss of vision to her right eye and was sent to Dr. Rajput at that time who thought the issue was related to hypoperfusion.   She does have a history of stroke with symptoms of slurred speech and right-sided weakness.  Her most recent episodes only involved right eye vision loss, which has only slightly improved.  She is maintained on Coumadin and Lipitor.  She did have noninvasive testing performed today, which I did review in office.    Review of Systems   Constitutional: Negative.    HENT: Negative.    Eyes: Positive for visual disturbance.   Respiratory: Negative.    Cardiovascular: Negative.    Gastrointestinal: Negative.    Endocrine: Negative.    Genitourinary: Negative.    Musculoskeletal: Negative.    Skin: Negative.    Allergic/Immunologic: Negative.    Neurological: Negative.    Hematological: Negative.    Psychiatric/Behavioral: Negative.        /86 (BP Location: Right arm, Patient Position: Sitting, Cuff Size: Adult)   Pulse 71   Ht 160 cm (63\")   Wt 60.8 kg (134 lb)   SpO2 90%   BMI 23.74 kg/m²   Physical Exam   Constitutional: She is oriented to person, place, and time. She appears well-developed and well-nourished. No distress.   HENT:   Head: Normocephalic and atraumatic.   Mouth/Throat: Oropharynx is clear and moist.   Eyes: Pupils are equal, round, and reactive to light. No scleral icterus.   Right eye vision " loss   Neck: Normal range of motion. Neck supple. No JVD present. Carotid bruit is not present. No thyromegaly present.   Cardiovascular: Normal rate, regular rhythm, S2 normal, normal heart sounds, intact distal pulses and normal pulses. Exam reveals no gallop and no friction rub.   No murmur heard.  Pulmonary/Chest: Effort normal and breath sounds normal.   Abdominal: Soft. Normal aorta and bowel sounds are normal. There is no hepatosplenomegaly.   Musculoskeletal: Normal range of motion.   Neurological: She is alert and oriented to person, place, and time. No cranial nerve deficit.   Skin: Skin is warm and dry. She is not diaphoretic.   Psychiatric: She has a normal mood and affect. Her behavior is normal. Judgment and thought content normal.   Nursing note and vitals reviewed.    Diagnostic Data:  Us Carotid Bilateral    Result Date: 1/3/2020  Narrative: History: Carotid occlusive disease      Impression: Impression: 1. There is 50-69% stenosis of the right internal carotid artery. 2. There is 50-69% stenosis of the left internal carotid artery. 3. Antegrade flow is demonstrated in bilateral vertebral arteries.  Comments: Bilateral carotid vertebral arterial duplex scan was performed.  Grayscale imaging shows intimal thickening and calcified elements at the carotid bifurcation. The right internal carotid artery peak systolic velocity is 249 cm/sec. The end-diastolic velocity is 23.6 cm/sec. The right ICA/CCA ratio is approximately 8.3 . These findings correlate with 50-69% stenosis of the right internal carotid artery.  Grayscale imaging shows intimal thickening and calcified elements at the carotid bifurcation. The left internal carotid artery peak systolic velocity is 130 cm/sec. The end-diastolic velocity is 23.6 cm/sec. The left ICA/CCA ratio is approximately 1.98 . These findings correlate with 50-69% stenosis of the left internal carotid artery.  Antegrade flow is demonstrated in bilateral vertebral  arteries. There is greater than 50% stenosis of the right external carotid artery. This report was finalized on 01/03/2020 16:22 by Dr. Natan Rogel MD.       Patient Active Problem List   Diagnosis   • A-fib (CMS/[facility])   • Carotid artery disease (CMS/[facility])   • Hyperlipidemia   • Hypertension   • Amaurosis fugax of right eye   • Bilateral carotid artery stenosis   • Hx of transient ischemic attack (TIA)   • Current use of long term anticoagulation   • H/O cardiac radiofrequency ablation         ICD-10-CM ICD-9-CM   1. Bilateral carotid artery stenosis I65.23 433.10     433.30   2. Mixed hyperlipidemia E78.2 272.2   3. Essential hypertension I10 401.9       Plan: After thoroughly evaluating Damaris Nash, I believe the best course of action is to remain conservative from vascular standpoint.  Currently, she is doing well and denies any strokelike symptoms.  I did review her testing which is stable and remains in the 50 to 69% range bilaterally.  Previous CTA of her neck 1/10/17, did not show significant narrowing.  There is about 50% stenosis on both sides.  The reading from the radiologist is not accurate per Dr. Rogel.  She can continue on her current medication regimen as previously planned.  We will see her back in 6 months with repeat noninvasive testing for continued surveillance, including a carotid duplex.  I did discuss vascular risk factors as they pertain to the progression of vascular disease including controlling her hypertension and hyperlipidemia, which appears stable on her current medication regimen.  This was all discussed in full with complete understanding.  Thank you for allowing me to participate in the care of your patient.  Please do not hesitate with any questions or concerns.  I will keep you aware of any further encounters with Damaris Nash.        Sincerely yours,         Josefina Milligan, POLY Beltran, Shahla Bautista, DO

## 2020-01-17 ENCOUNTER — ANTICOAGULATION VISIT (OUTPATIENT)
Dept: CARDIOLOGY | Facility: CLINIC | Age: 85
End: 2020-01-17

## 2020-01-17 ENCOUNTER — LAB (OUTPATIENT)
Dept: LAB | Facility: HOSPITAL | Age: 85
End: 2020-01-17

## 2020-01-17 DIAGNOSIS — I48.91 ATRIAL FIBRILLATION, UNSPECIFIED TYPE (HCC): ICD-10-CM

## 2020-01-17 LAB
INR PPP: 2.7 (ref 0.91–1.09)
PROTHROMBIN TIME: 32.4 SECONDS (ref 10–13.8)

## 2020-01-17 PROCEDURE — 85610 PROTHROMBIN TIME: CPT | Performed by: INTERNAL MEDICINE

## 2020-02-14 ENCOUNTER — ANTICOAGULATION VISIT (OUTPATIENT)
Dept: CARDIOLOGY | Facility: CLINIC | Age: 85
End: 2020-02-14

## 2020-02-14 ENCOUNTER — LAB (OUTPATIENT)
Dept: LAB | Facility: HOSPITAL | Age: 85
End: 2020-02-14

## 2020-02-14 DIAGNOSIS — I48.91 ATRIAL FIBRILLATION, UNSPECIFIED TYPE (HCC): ICD-10-CM

## 2020-02-14 LAB
INR PPP: 2.4 (ref 0.91–1.09)
PROTHROMBIN TIME: 28.7 SECONDS (ref 10–13.8)

## 2020-02-14 PROCEDURE — 85610 PROTHROMBIN TIME: CPT | Performed by: INTERNAL MEDICINE

## 2020-03-13 ENCOUNTER — LAB (OUTPATIENT)
Dept: LAB | Facility: HOSPITAL | Age: 85
End: 2020-03-13

## 2020-03-13 ENCOUNTER — ANTICOAGULATION VISIT (OUTPATIENT)
Dept: CARDIOLOGY | Facility: CLINIC | Age: 85
End: 2020-03-13

## 2020-03-13 DIAGNOSIS — I48.91 ATRIAL FIBRILLATION, UNSPECIFIED TYPE (HCC): ICD-10-CM

## 2020-03-13 LAB
INR PPP: 2.5 (ref 0.91–1.09)
PROTHROMBIN TIME: 29.5 SECONDS (ref 10–13.8)

## 2020-03-13 PROCEDURE — 85610 PROTHROMBIN TIME: CPT | Performed by: INTERNAL MEDICINE

## 2020-04-09 ENCOUNTER — ANTICOAGULATION VISIT (OUTPATIENT)
Dept: CARDIOLOGY | Facility: CLINIC | Age: 85
End: 2020-04-09

## 2020-04-27 ENCOUNTER — LAB (OUTPATIENT)
Dept: LAB | Facility: HOSPITAL | Age: 85
End: 2020-04-27

## 2020-04-27 ENCOUNTER — ANTICOAGULATION VISIT (OUTPATIENT)
Dept: CARDIOLOGY | Facility: CLINIC | Age: 85
End: 2020-04-27

## 2020-04-27 DIAGNOSIS — I48.91 ATRIAL FIBRILLATION, UNSPECIFIED TYPE (HCC): ICD-10-CM

## 2020-04-27 LAB
INR PPP: 2.2 (ref 0.91–1.09)
PROTHROMBIN TIME: 26.6 SECONDS (ref 10–13.8)

## 2020-04-27 PROCEDURE — 85610 PROTHROMBIN TIME: CPT | Performed by: INTERNAL MEDICINE

## 2020-06-08 ENCOUNTER — LAB (OUTPATIENT)
Dept: LAB | Facility: HOSPITAL | Age: 85
End: 2020-06-08

## 2020-06-08 DIAGNOSIS — I48.91 ATRIAL FIBRILLATION, UNSPECIFIED TYPE (HCC): ICD-10-CM

## 2020-06-08 LAB
INR PPP: 2.3 (ref 0.91–1.09)
PROTHROMBIN TIME: 27.6 SECONDS (ref 10–13.8)

## 2020-06-08 PROCEDURE — 85610 PROTHROMBIN TIME: CPT | Performed by: INTERNAL MEDICINE

## 2020-06-09 ENCOUNTER — ANTICOAGULATION VISIT (OUTPATIENT)
Dept: CARDIOLOGY | Facility: CLINIC | Age: 85
End: 2020-06-09

## 2020-07-06 ENCOUNTER — APPOINTMENT (OUTPATIENT)
Dept: ULTRASOUND IMAGING | Facility: HOSPITAL | Age: 85
End: 2020-07-06

## 2020-07-07 ENCOUNTER — LAB (OUTPATIENT)
Dept: LAB | Facility: HOSPITAL | Age: 85
End: 2020-07-07

## 2020-07-07 ENCOUNTER — ANTICOAGULATION VISIT (OUTPATIENT)
Dept: CARDIOLOGY | Facility: CLINIC | Age: 85
End: 2020-07-07

## 2020-07-07 DIAGNOSIS — I48.91 ATRIAL FIBRILLATION, UNSPECIFIED TYPE (HCC): ICD-10-CM

## 2020-07-07 LAB
INR PPP: 2.2 (ref 0.91–1.09)
PROTHROMBIN TIME: 26.7 SECONDS (ref 10–13.8)

## 2020-07-07 PROCEDURE — 85610 PROTHROMBIN TIME: CPT | Performed by: INTERNAL MEDICINE

## 2020-07-14 ENCOUNTER — TELEPHONE (OUTPATIENT)
Dept: VASCULAR SURGERY | Facility: CLINIC | Age: 85
End: 2020-07-14

## 2020-07-14 NOTE — TELEPHONE ENCOUNTER
Spoke with Mrs Nash reminding her of her appointments for Wednesday, July 15th, 2020. [facility] and follow up afterwards at 215 pm with Josefina PANG. Mrs Nash confirmed she would be here.

## 2020-07-15 ENCOUNTER — HOSPITAL ENCOUNTER (OUTPATIENT)
Dept: ULTRASOUND IMAGING | Facility: HOSPITAL | Age: 85
Discharge: HOME | End: 2020-07-15
Admitting: NURSE PRACTITIONER

## 2020-07-15 ENCOUNTER — OFFICE VISIT (OUTPATIENT)
Dept: VASCULAR SURGERY | Facility: CLINIC | Age: 85
End: 2020-07-15

## 2020-07-15 VITALS
HEART RATE: 76 BPM | OXYGEN SATURATION: 91 % | BODY MASS INDEX: 23.39 KG/M2 | HEIGHT: 63 IN | DIASTOLIC BLOOD PRESSURE: 84 MMHG | WEIGHT: 132 LBS | SYSTOLIC BLOOD PRESSURE: 140 MMHG

## 2020-07-15 DIAGNOSIS — I65.23 BILATERAL CAROTID ARTERY STENOSIS: Primary | ICD-10-CM

## 2020-07-15 DIAGNOSIS — E78.2 MIXED HYPERLIPIDEMIA: ICD-10-CM

## 2020-07-15 DIAGNOSIS — I10 ESSENTIAL HYPERTENSION: ICD-10-CM

## 2020-07-15 DIAGNOSIS — I65.23 BILATERAL CAROTID ARTERY STENOSIS: ICD-10-CM

## 2020-07-15 PROCEDURE — 99214 OFFICE O/P EST MOD 30 MIN: CPT | Performed by: NURSE PRACTITIONER

## 2020-07-15 PROCEDURE — 93880 EXTRACRANIAL BILAT STUDY: CPT

## 2020-07-15 PROCEDURE — 93880 EXTRACRANIAL BILAT STUDY: CPT | Performed by: SURGERY

## 2020-07-15 NOTE — PROGRESS NOTES
"07/15/2020       Shahla Beltran,   [address]  [place] [address]    Damaris Nash  1935    Chief Complaint   Patient presents with   • Follow-up     6 Month Follow Up For Bilateral Carotid Artery Stenosis. Test [id number] US pad carotid bilateral. Patient denies any stroke like symptoms.    • Med Management     Verbally verified medications with patient. Mrs Nash verbalized all medications were correct and up to date.        Dear  Shahla Beltran DO      HPI  I had the pleasure of seeing your patient Damaris Nash in the office today.    As you recall, Damaris Nash is a 84 y.o. left-handed female who we are following for carotid occlusive disease.  Previously she did have a loss of vision to her right eye and was sent to Dr. Rajput at that time who thought the issue was related to hypoperfusion.   She does have a history of stroke with symptoms of slurred speech and right-sided weakness.   She is maintained on Coumadin and Lipitor.  She did have noninvasive testing performed today, which I did review in office.    Review of Systems   Constitutional: Negative.    HENT: Negative.    Eyes: Positive for visual disturbance.   Respiratory: Negative.    Cardiovascular: Negative.    Gastrointestinal: Negative.    Endocrine: Negative.    Genitourinary: Negative.    Musculoskeletal: Negative.    Skin: Negative.    Allergic/Immunologic: Negative.    Neurological: Negative.    Hematological: Negative.    Psychiatric/Behavioral: Negative.        /84 (BP Location: Right arm, Patient Position: Sitting, Cuff Size: Adult)   Pulse 76   Ht 160 cm (63\")   Wt 59.9 kg (132 lb)   SpO2 91%   BMI 23.38 kg/m²   Physical Exam   Constitutional: She is oriented to person, place, and time. Vital signs are normal. She appears well-developed and well-nourished. She is cooperative. No distress.   HENT:   Head: Normocephalic and atraumatic.   Mouth/Throat: Oropharynx is clear and moist.   Eyes: Pupils " are equal, round, and reactive to light. No scleral icterus.   Right eye vision loss   Neck: Normal range of motion. Neck supple. No JVD present. Carotid bruit is not present. No thyromegaly present.   Cardiovascular: Normal rate, regular rhythm, S2 normal, normal heart sounds, intact distal pulses and normal pulses. Exam reveals no gallop and no friction rub.   No murmur heard.  Pulmonary/Chest: Effort normal and breath sounds normal.   Abdominal: Soft. Normal aorta and bowel sounds are normal. There is no hepatosplenomegaly.   Musculoskeletal: Normal range of motion.   Neurological: She is alert and oriented to person, place, and time. No cranial nerve deficit.   Skin: Skin is warm and dry. She is not diaphoretic.   Psychiatric: She has a normal mood and affect. Her behavior is normal. Judgment and thought content normal.   Nursing note and vitals reviewed.    Diagnostic Data:  Us Carotid Bilateral    Result Date: 7/17/2020  Narrative: History: Carotid occlusive disease      Impression: Impression: 1. There is 50-69% stenosis of the right internal carotid artery. 2. There is less than 50% stenosis of the left internal carotid artery. 3. Antegrade flow is demonstrated in bilateral vertebral arteries.  Comments: Bilateral carotid vertebral arterial duplex scan was performed.  Grayscale imaging shows intimal thickening and calcified elements at the carotid bifurcation. The right internal carotid artery peak systolic velocity is 240 cm/sec. The end-diastolic velocity is 40.9 cm/sec. The right ICA/CCA ratio is approximately 7.3 . These findings correlate with 50-69% stenosis of the right internal carotid artery.  Grayscale imaging shows intimal thickening and calcified elements at the carotid bifurcation. The left internal carotid artery peak systolic velocity is 66.4 cm/sec. The end-diastolic velocity is 15.7 cm/sec. The left ICA/CCA ratio is approximately 1.3 . These findings correlate with less than 50% stenosis of  the left internal carotid artery.  Antegrade flow is demonstrated in bilateral vertebral arteries. There is greater than 50% stenosis of the right external carotid artery. This report was finalized on 07/17/2020 12:08 by Dr. Natan Rogel MD.       Patient Active Problem List   Diagnosis   • A-fib (CMS/[facility])   • Carotid artery disease (CMS/[facility])   • Hyperlipidemia   • Hypertension   • Amaurosis fugax of right eye   • Bilateral carotid artery stenosis   • Hx of transient ischemic attack (TIA)   • Current use of long term anticoagulation   • H/O cardiac radiofrequency ablation         ICD-10-CM ICD-9-CM   1. Bilateral carotid artery stenosis I65.23 433.10     433.30   2. Mixed hyperlipidemia E78.2 272.2   3. Essential hypertension I10 401.9       Plan: After thoroughly evaluating Damaris Nash, I believe the best course of action is to remain conservative from vascular standpoint.  Currently, she is doing well and denies any strokelike symptoms.  I did review her testing which remains unchanged and shows 50 to 69% right carotid stenosis and less than 50% on the left.  We will see her back in 6 months with repeat noninvasive testing for continued surveillance, including a carotid duplex.   Previous CTA of her neck 1/10/17, did not show significant narrowing.  There is about 50% stenosis on both sides.  The reading from the radiologist is not accurate per Dr. Rogel.  She can continue on her current medication regimen as previously planned.  I did discuss vascular risk factors as they pertain to the progression of vascular disease including controlling her hypertension and hyperlipidemia.  Her blood pressure is stable on her current medication regimen.  She is maintained on Lipitor for her hyperlipidemia.  This was all discussed in full with complete understanding.  Thank you for allowing me to participate in the care of your patient.  Please do not hesitate with any questions or concerns.  I will keep you aware of  any further encounters with Damaris Nash.        Sincerely yours,         Josefina Milligan, POLY Beltran, Shahla Bautista, DO

## 2020-08-06 ENCOUNTER — LAB (OUTPATIENT)
Dept: LAB | Facility: HOSPITAL | Age: 85
End: 2020-08-06

## 2020-08-06 ENCOUNTER — ANTICOAGULATION VISIT (OUTPATIENT)
Dept: CARDIOLOGY | Facility: CLINIC | Age: 85
End: 2020-08-06

## 2020-08-06 DIAGNOSIS — I48.91 ATRIAL FIBRILLATION, UNSPECIFIED TYPE (HCC): ICD-10-CM

## 2020-08-06 LAB
INR PPP: 2.1 (ref 0.91–1.09)
PROTHROMBIN TIME: 25.4 SECONDS (ref 10–13.8)

## 2020-08-06 PROCEDURE — 85610 PROTHROMBIN TIME: CPT | Performed by: INTERNAL MEDICINE

## 2020-08-07 NOTE — PROGRESS NOTES
Subjective:     Encounter Date:08/10/2020      Patient ID: Damaris Nash is a 84 y.o. female with a history of paroxysmal atrial fibrillation s/p ablation on chronic anticoagulation, hypertension, hyperlipidemia, bilateral carotid artery disease and transient ischemic attack.    Chief Complaint: follow up  Atrial Fibrillation   Presents for follow-up visit. Symptoms include shortness of breath. Symptoms are negative for chest pain, dizziness, palpitations, syncope and weakness. The symptoms have been stable. Past medical history includes atrial fibrillation and hyperlipidemia.   Hypertension   This is a chronic problem. The current episode started more than 1 year ago. The problem has been rapidly improving since onset. The problem is controlled. Associated symptoms include peripheral edema and shortness of breath. Pertinent negatives include no chest pain, malaise/fatigue, orthopnea, palpitations or PND.   Hyperlipidemia   This is a chronic problem. The current episode started more than 1 year ago. The problem is controlled. Recent lipid tests were reviewed and are normal. Associated symptoms include shortness of breath. Pertinent negatives include no chest pain.     Patient presents today for a follow up. She states she has been well. She saw Dr. Rajput in June at which time he released her from his practice due to no change since her ablation. She has chronic shortness of breath that is unchanged from previous. She has intermittent BLE swelling that resolves overnight. She denies chest pain, palpitations, orthopnea and PND. She remains on Coumadin and denies bleeding.     The following portions of the patient's history were reviewed and updated as appropriate: allergies, current medications, past family history, past medical history, past social history, past surgical history and problem list.    Allergies   Allergen Reactions   • Epinephrine Shortness Of Breath   • Celebrex [Celecoxib]    • Ferrous Sulfate      • Other      Omnicef, some Thad, possible Rocephin allergy   • Simvastatin    • Sulfa Antibiotics        Current Outpatient Medications:   •  acetaminophen (TYLENOL) 325 MG tablet, Take 650 mg by mouth Daily As Needed for mild pain (1-3)., Disp: , Rfl:   •  alendronate (FOSAMAX) 70 MG tablet, Take 70 mg by mouth Every 7 (Seven) Days., Disp: , Rfl:   •  ALPRAZolam (XANAX) 0.25 MG tablet, Take 0.25 mg by mouth 2 (Two) Times a Day As Needed for anxiety., Disp: , Rfl:   •  amLODIPine (NORVASC) 2.5 MG tablet, Take 2.5 mg by mouth Daily., Disp: , Rfl:   •  atorvastatin (LIPITOR) 20 MG tablet, Take 20 mg by mouth Daily., Disp: , Rfl:   •  Cholecalciferol (VITAMIN D3) 2000 units capsule, Take 2,000 Units by mouth Daily., Disp: , Rfl:   •  hydrochlorothiazide (HYDRODIURIL) 25 MG tablet, Take 25 mg by mouth Daily., Disp: , Rfl:   •  ibuprofen (ADVIL,MOTRIN) 200 MG tablet, Take 200 mg by mouth Daily As Needed for mild pain (1-3)., Disp: , Rfl:   •  influenza vac split quad (FLULAVAL QUADRIVALENT) 0.5 ML suspension prefilled syringe injection, Inject  into the appropriate muscle as directed by prescriber., Disp: 0.5 mL, Rfl: 0  •  levothyroxine sodium (TIROSINT) 112 MCG capsule, Take 112 mcg by mouth Daily., Disp: , Rfl:   •  metoprolol succinate XL (TOPROL-XL) 100 MG 24 hr tablet, Take 100 mg by mouth Every Night., Disp: , Rfl:   •  Multiple Vitamins-Minerals (PRESERVISION AREDS 2 PO), Take 2 capsules by mouth Daily., Disp: , Rfl:   •  traMADol (ULTRAM) 50 MG tablet, Take 50 mg by mouth Every 6 (Six) Hours As Needed for moderate pain (4-6)., Disp: , Rfl:   •  warfarin (COUMADIN) 4 MG tablet, Take 1 tablet by mouth Every Night., Disp: 90 tablet, Rfl: 3  •  CloNIDine (CATAPRES) 0.1 MG tablet, Take 0.1 mg by mouth As Needed., Disp: , Rfl:   Past Medical History:   Diagnosis Date   • A-fib (CMS/HCC)    • Acute ischemic right ICA stroke (CMS/HCC)    • Atrial fibrillation (CMS/HCC)    • Benign essential HTN    • Breast CA  (CMS/HCC)    • Breast cancer (CMS/HCC)    • Carotid artery disease (CMS/HCC)    • CVA (cerebral vascular accident) (CMS/HCC)    • CVA (cerebral vascular accident) (CMS/HCC)    • Gallstones    • GERD (gastroesophageal reflux disease)    • Hyperlipidemia    • Hypertension    • Hypothyroid    • Hypothyroidism    • Osteopenia    • Osteopenia    • Primary pulmonary HTN (CMS/HCC)    • S/P right hip fracture 2012   • Shortness of breath    • Stroke (CMS/[facility])    • Vitamin D deficiency        Social History     Socioeconomic History   • Marital status:      Spouse name: Not on file   • Number of children: Not on file   • Years of education: Not on file   • Highest education level: Not on file   Tobacco Use   • Smoking status: Former Smoker     Years: 30.00     Types: Cigarettes     Start date:      Last attempt to quit: 1992     Years since quittin.6   • Smokeless tobacco: Never Used   Substance and Sexual Activity   • Alcohol use: No   • Drug use: No   • Sexual activity: Defer       Review of Systems   Constitution: Negative for malaise/fatigue, weight gain and weight loss.   Cardiovascular: Negative for chest pain, dyspnea on exertion, irregular heartbeat, leg swelling, near-syncope, orthopnea, palpitations, paroxysmal nocturnal dyspnea and syncope.   Respiratory: Positive for shortness of breath. Negative for cough, sleep disturbances due to breathing, sputum production and wheezing.    Skin: Negative for dry skin, flushing, itching and rash.   Gastrointestinal: Negative for hematemesis and hematochezia.   Neurological: Negative for dizziness, light-headedness, loss of balance and weakness.   All other systems reviewed and are negative.        Objective:     Physical Exam   Constitutional: She is oriented to person, place, and time. She appears well-developed and well-nourished. No distress.   HENT:   Head: Normocephalic.   Mouth/Throat: No oropharyngeal exudate.   Eyes: Pupils are equal, round,  "and reactive to light. Conjunctivae and EOM are normal. No scleral icterus.   Neck: Normal range of motion. Neck supple.   Cardiovascular: Normal rate, regular rhythm, normal heart sounds and intact distal pulses.   No murmur heard.  Pulmonary/Chest: Effort normal and breath sounds normal. No respiratory distress. She has no wheezes. She has no rales. She exhibits no tenderness.   Abdominal: Soft. Bowel sounds are normal. She exhibits no distension. There is no tenderness.   Musculoskeletal: Normal range of motion. She exhibits edema (mild).   Neurological: She is alert and oriented to person, place, and time. She has normal reflexes.   Skin: Skin is warm and dry. No rash noted. She is not diaphoretic. No erythema. No pallor.   Psychiatric: She has a normal mood and affect. Her behavior is normal.   Vitals reviewed.          ECG 12 Lead  Date/Time: 8/10/2020 3:11 PM  Performed by: Carolyn Smith APRN  Authorized by: Carolyn Smith APRN   Comparison: compared with previous ECG from 8/9/2019  Similar to previous ECG  Rhythm: sinus rhythm  Rate: normal  BPM: 67  Conduction: 1st degree AV block  QRS axis: normal    Clinical impression: abnormal EKG          /80   Pulse 67   Ht 160 cm (63\")   Wt 61.7 kg (136 lb)   SpO2 95%   BMI 24.09 kg/m²     Lab Review:   I have reviewed previous office notes and most recent INRs.     Lab Results   Component Value Date    INR 2.1 (H) 08/06/2020    INR 2.2 (H) 07/07/2020    INR 2.3 (H) 06/08/2020    PROTIME 25.4 (H) 08/06/2020    PROTIME 26.7 (H) 07/07/2020    PROTIME 27.6 (H) 06/08/2020             Assessment:          Diagnosis Plan   1. Paroxysmal atrial fibrillation (CMS/HCC)     2. H/O cardiac radiofrequency ablation     3. Current use of long term anticoagulation     4. Essential hypertension     5. Mixed hyperlipidemia     6. Bilateral carotid artery stenosis            Plan:       1. PAF- NSR today. no recurrence since ablation. On Coumadin  2. H/o radiofrequency " ablation- in 2011 per Dr. Rajput  3. Anticoagulation- on Coumadin. Last INR at 2.1. Follows with our coumadin clinic. Denies bleeding.  4. HTN- controlled. Followed by PCP   5. HLD-followed by PCP   6. Carotid stenosis- followed by vascular      Follow up in 1 year or sooner if symptoms worsen.     Advance Care Planning   ACP discussion was held with the patient during this visit. Patient does not have an advance directive, information provided.

## 2020-08-10 ENCOUNTER — OFFICE VISIT (OUTPATIENT)
Dept: CARDIOLOGY | Facility: CLINIC | Age: 85
End: 2020-08-10

## 2020-08-10 VITALS
SYSTOLIC BLOOD PRESSURE: 158 MMHG | WEIGHT: 136 LBS | DIASTOLIC BLOOD PRESSURE: 80 MMHG | OXYGEN SATURATION: 95 % | BODY MASS INDEX: 24.1 KG/M2 | HEART RATE: 67 BPM | HEIGHT: 63 IN

## 2020-08-10 DIAGNOSIS — Z98.890 H/O CARDIAC RADIOFREQUENCY ABLATION: ICD-10-CM

## 2020-08-10 DIAGNOSIS — Z79.01 CURRENT USE OF LONG TERM ANTICOAGULATION: ICD-10-CM

## 2020-08-10 DIAGNOSIS — I48.0 PAROXYSMAL ATRIAL FIBRILLATION (HCC): Primary | ICD-10-CM

## 2020-08-10 DIAGNOSIS — I65.23 BILATERAL CAROTID ARTERY STENOSIS: ICD-10-CM

## 2020-08-10 DIAGNOSIS — I10 ESSENTIAL HYPERTENSION: ICD-10-CM

## 2020-08-10 DIAGNOSIS — E78.2 MIXED HYPERLIPIDEMIA: ICD-10-CM

## 2020-08-10 PROCEDURE — 93000 ELECTROCARDIOGRAM COMPLETE: CPT | Performed by: NURSE PRACTITIONER

## 2020-08-10 PROCEDURE — 99213 OFFICE O/P EST LOW 20 MIN: CPT | Performed by: NURSE PRACTITIONER

## 2020-08-10 NOTE — PATIENT INSTRUCTIONS
Advance Care Planning and Advance Directives     You make decisions on a daily basis - decisions about where you want to live, your career, your home, your life. Perhaps one of the most important decisions you face is your choice for future medical care. Take time to talk with your family and your healthcare team and start planning today.  Advance Care Planning is a process that can help you:  · Understand possible future healthcare decisions in light of your own experiences  · Reflect on those decision in light of your goals and values  · Discuss your decisions with those closest to you and the healthcare professionals that care for you  · Make a plan by creating a document that reflects your wishes    Surrogate Decision Maker  In the event of a medical emergency, which has left you unable to communicate or to make your own decisions, you would need someone to make decisions for you.  It is important to discuss your preferences for medical treatment with this person while you are in good health.     Qualities of a surrogate decision maker:  • Willing to take on this role and responsibility  • Knows what you want for future medical care  • Willing to follow your wishes even if they don't agree with them  • Able to make difficult medical decisions under stressful circumstances    Advance Directives  These are legal documents you can create that will guide your healthcare team and decision maker(s) when needed. These documents can be stored in the electronic medical record.    · Living Will - a legal document to guide your care if you have a terminal condition or a serious illness and are unable to communicate. States vary by statute in document names/types, but most forms may include one or more of the following:        -  Directions regarding life-prolonging treatments        -  Directions regarding artificially provided nutrition/hydration        -  Choosing a healthcare decision maker        -  Direction  regarding organ/tissue donation    · Durable Power of  for Healthcare - this document names an -in-fact to make medical decisions for you, but it may also allow this person to make personal and financial decisions for you. Please seek the advice of an  if you need this type of document.    **Advance Directives are not required and no one may discriminate against you if you do not sign one.    Medical Orders  Many states allow specific forms/orders signed by your physician to record your wishes for medical treatment in your current state of health. This form, signed in personal communication with your physician, addresses resuscitation and other medical interventions that you may or may not want.      For more information or to schedule a time with a [facility] Advance Care Planning Facilitator contact: [email or web address] or call [phone] and someone will contact you directly.

## 2020-09-10 ENCOUNTER — LAB (OUTPATIENT)
Dept: LAB | Facility: HOSPITAL | Age: 85
End: 2020-09-10

## 2020-09-10 ENCOUNTER — ANTICOAGULATION VISIT (OUTPATIENT)
Dept: CARDIOLOGY | Facility: CLINIC | Age: 85
End: 2020-09-10

## 2020-09-10 DIAGNOSIS — I48.91 ATRIAL FIBRILLATION, UNSPECIFIED TYPE (HCC): ICD-10-CM

## 2020-09-10 LAB
INR PPP: 2.3 (ref 0.91–1.09)
PROTHROMBIN TIME: 27.4 SECONDS (ref 10–13.8)

## 2020-09-10 PROCEDURE — 85610 PROTHROMBIN TIME: CPT | Performed by: INTERNAL MEDICINE

## 2020-10-08 ENCOUNTER — ANTICOAGULATION VISIT (OUTPATIENT)
Dept: CARDIOLOGY | Facility: CLINIC | Age: 85
End: 2020-10-08

## 2020-10-08 ENCOUNTER — LAB (OUTPATIENT)
Dept: LAB | Facility: HOSPITAL | Age: 85
End: 2020-10-08

## 2020-10-08 DIAGNOSIS — I48.91 ATRIAL FIBRILLATION, UNSPECIFIED TYPE (HCC): ICD-10-CM

## 2020-10-08 LAB
INR PPP: 2.1 (ref 0.91–1.09)
PROTHROMBIN TIME: 25.7 SECONDS (ref 10–13.8)

## 2020-10-08 PROCEDURE — 85610 PROTHROMBIN TIME: CPT | Performed by: INTERNAL MEDICINE

## 2020-11-06 ENCOUNTER — LAB (OUTPATIENT)
Dept: LAB | Facility: HOSPITAL | Age: 85
End: 2020-11-06

## 2020-11-06 ENCOUNTER — ANTICOAGULATION VISIT (OUTPATIENT)
Dept: CARDIOLOGY | Facility: CLINIC | Age: 85
End: 2020-11-06

## 2020-11-06 DIAGNOSIS — I48.91 ATRIAL FIBRILLATION, UNSPECIFIED TYPE (HCC): ICD-10-CM

## 2020-11-06 LAB
INR PPP: 2.2 (ref 0.91–1.09)
PROTHROMBIN TIME: 26 SECONDS (ref 10–13.8)

## 2020-11-06 PROCEDURE — 85610 PROTHROMBIN TIME: CPT | Performed by: INTERNAL MEDICINE

## 2020-12-04 ENCOUNTER — LAB (OUTPATIENT)
Dept: LAB | Facility: HOSPITAL | Age: 85
End: 2020-12-04

## 2020-12-04 ENCOUNTER — ANTICOAGULATION VISIT (OUTPATIENT)
Dept: CARDIOLOGY | Facility: CLINIC | Age: 85
End: 2020-12-04

## 2020-12-04 DIAGNOSIS — I48.91 ATRIAL FIBRILLATION, UNSPECIFIED TYPE (HCC): ICD-10-CM

## 2020-12-04 DIAGNOSIS — I48.91 ATRIAL FIBRILLATION, UNSPECIFIED TYPE (HCC): Primary | ICD-10-CM

## 2020-12-04 LAB
INR PPP: 2.1 (ref 0.91–1.09)
PROTHROMBIN TIME: 25.8 SECONDS (ref 10–13.8)

## 2020-12-04 PROCEDURE — 85610 PROTHROMBIN TIME: CPT | Performed by: INTERNAL MEDICINE

## 2021-01-06 ENCOUNTER — LAB (OUTPATIENT)
Dept: LAB | Facility: HOSPITAL | Age: 86
End: 2021-01-06

## 2021-01-06 DIAGNOSIS — I48.91 ATRIAL FIBRILLATION, UNSPECIFIED TYPE (HCC): ICD-10-CM

## 2021-01-06 PROCEDURE — 85610 PROTHROMBIN TIME: CPT | Performed by: INTERNAL MEDICINE

## 2021-01-07 LAB
INR PPP: 2.3 (ref 0.91–1.09)
PROTHROMBIN TIME: 27.5 SECONDS (ref 10–13.8)

## 2021-01-08 ENCOUNTER — ANTICOAGULATION VISIT (OUTPATIENT)
Dept: CARDIOLOGY | Facility: CLINIC | Age: 86
End: 2021-01-08

## 2021-01-14 ENCOUNTER — TELEPHONE (OUTPATIENT)
Dept: VASCULAR SURGERY | Facility: CLINIC | Age: 86
End: 2021-01-14

## 2021-01-14 NOTE — TELEPHONE ENCOUNTER
Left message reminding Mrs Nash of her appointments for Friday, January 15th, 2021. Rem[facility]ed [facility] 1230 [facility] for 1 o'clock testing and follow up afterwards at 215 pm with Josefina PANG.

## 2021-01-15 ENCOUNTER — OFFICE VISIT (OUTPATIENT)
Dept: VASCULAR SURGERY | Facility: CLINIC | Age: 86
End: 2021-01-15

## 2021-01-15 ENCOUNTER — HOSPITAL ENCOUNTER (OUTPATIENT)
Dept: ULTRASOUND IMAGING | Facility: HOSPITAL | Age: 86
Discharge: HOME | End: 2021-01-15
Admitting: NURSE PRACTITIONER

## 2021-01-15 VITALS
OXYGEN SATURATION: 94 % | WEIGHT: 145 LBS | BODY MASS INDEX: 25.69 KG/M2 | SYSTOLIC BLOOD PRESSURE: 136 MMHG | HEIGHT: 63 IN | DIASTOLIC BLOOD PRESSURE: 86 MMHG | HEART RATE: 70 BPM

## 2021-01-15 DIAGNOSIS — I65.23 BILATERAL CAROTID ARTERY STENOSIS: Primary | ICD-10-CM

## 2021-01-15 DIAGNOSIS — I65.23 BILATERAL CAROTID ARTERY STENOSIS: ICD-10-CM

## 2021-01-15 DIAGNOSIS — E78.2 MIXED HYPERLIPIDEMIA: ICD-10-CM

## 2021-01-15 DIAGNOSIS — I10 ESSENTIAL HYPERTENSION: ICD-10-CM

## 2021-01-15 PROCEDURE — 93880 EXTRACRANIAL BILAT STUDY: CPT | Performed by: SURGERY

## 2021-01-15 PROCEDURE — 99214 OFFICE O/P EST MOD 30 MIN: CPT | Performed by: NURSE PRACTITIONER

## 2021-01-15 PROCEDURE — 93880 EXTRACRANIAL BILAT STUDY: CPT

## 2021-01-15 NOTE — PROGRESS NOTES
"1/15/2021       Shahla Beltran,   [address]  [place] [address]    Damaris Nash  1935    Chief Complaint   Patient presents with   • Follow-up     6 Month Follow Up For Bilateral Carotid Artery Stenosis. Test [id number] US pad carotid bilateral. Patient denies any stroke like symptoms.    • Former Smoker     Patient is a Former Smoker - Quit 1992   • Med Management     Verbally verified medications with patient        Dear  Shahla Beltran DO      HPI  I had the pleasure of seeing your patient Damaris Nash in the office today.    As you recall, Damaris Nash is a 85 y.o. left-handed female who we are following for carotid occlusive disease.  Previously she did have a loss of vision to her right eye and was sent to Dr. Rajput at that time who thought the issue was related to hypoperfusion.   She does have a history of stroke with symptoms of slurred speech and right-sided weakness.   She is maintained on Coumadin and Lipitor.  She denies any strokelike symptoms.  She did have noninvasive testing performed today, which I did review in office.     Review of Systems   Constitutional: Negative.    HENT: Negative.    Eyes: Positive for visual disturbance.   Respiratory: Negative.    Cardiovascular: Negative.    Gastrointestinal: Negative.    Endocrine: Negative.    Genitourinary: Negative.    Musculoskeletal: Negative.    Skin: Negative.    Allergic/Immunologic: Negative.    Neurological: Negative.    Hematological: Negative.    Psychiatric/Behavioral: Negative.        /86 (BP Location: Right arm, Patient Position: Sitting, Cuff Size: Adult)   Pulse 70   Ht 160 cm (63\")   Wt 65.8 kg (145 lb)   SpO2 94%   BMI 25.69 kg/m²   Physical Exam  Vitals signs and nursing note reviewed.   Constitutional:       General: She is not in acute distress.     Appearance: Normal appearance. She is well-developed and normal weight. She is not diaphoretic.   HENT:      Head: Normocephalic and " atraumatic.   Eyes:      General: No scleral icterus.     Pupils: Pupils are equal, round, and reactive to light.      Comments: Right eye vision loss   Neck:      Musculoskeletal: Normal range of motion and neck supple.      Thyroid: No thyromegaly.      Vascular: No carotid bruit or JVD.   Cardiovascular:      Rate and Rhythm: Normal rate and regular rhythm.      Pulses: Normal pulses.      Heart sounds: Normal heart sounds and S2 normal. No murmur. No friction rub. No gallop.    Pulmonary:      Effort: Pulmonary effort is normal.      Breath sounds: Normal breath sounds.   Abdominal:      General: Bowel sounds are normal.      Palpations: Abdomen is soft.   Musculoskeletal: Normal range of motion.   Skin:     General: Skin is warm and dry.   Neurological:      General: No focal deficit present.      Mental Status: She is alert and oriented to person, place, and time.      Cranial Nerves: No cranial nerve deficit.   Psychiatric:         Mood and Affect: Mood normal.         Behavior: Behavior normal. Behavior is cooperative.         Thought Content: Thought content normal.         Judgment: Judgment normal.       Diagnostic Data:  Us Carotid Bilateral    Result Date: 1/15/2021  Narrative: History: Carotid occlusive disease      Impression: Impression: 1. There is 50-69% stenosis of the right internal carotid artery. 2. There is less than 50% stenosis of the left internal carotid artery. 3. Antegrade flow is demonstrated in bilateral vertebral arteries.  Comments: Bilateral carotid vertebral arterial duplex scan was performed.  Grayscale imaging shows intimal thickening and calcified elements at the carotid bifurcation. The right internal carotid artery peak systolic velocity is 223.2 cm/sec. The end-diastolic velocity is 35.3 cm/sec. The right ICA/CCA ratio is approximately 7.3 . These findings correlate with 50-69% stenosis of the right internal carotid artery.  Grayscale imaging shows intimal thickening and  calcified elements at the carotid bifurcation. The left internal carotid artery peak systolic velocity is 87.5 cm/sec. The end-diastolic velocity is 21.6 cm/sec. The left ICA/CCA ratio is approximately 1.9 . These findings correlate with less than 50% stenosis of the left internal carotid artery.  Antegrade flow is demonstrated in bilateral vertebral arteries.  This report was finalized on 01/15/2021 15:53 by Dr. Natan Rogel MD.       Patient Active Problem List   Diagnosis   • A-fib (CMS/HCC)   • Carotid artery disease (CMS/HCC)   • Hyperlipidemia   • Hypertension   • Amaurosis fugax of right eye   • Bilateral carotid artery stenosis   • Hx of transient ischemic attack (TIA)   • Current use of long term anticoagulation   • H/O cardiac radiofrequency ablation         ICD-10-CM ICD-9-CM   1. Bilateral carotid artery stenosis  I65.23 433.10     433.30   2. Mixed hyperlipidemia  E78.2 272.2   3. Essential hypertension  I10 401.9       Plan: After thoroughly evaluating Damaris Nash, I believe the best course of action is to remain conservative from vascular standpoint.  She is doing well and denies any strokelike symptoms.  I did review her testing which shows 50-69% right carotid stenosis and less than 50% left carotid stenosis. We will see her back in 6 months with repeat noninvasive testing for continued surveillance, including a carotid duplex.   Previous CTA of her neck 1/10/17, did not show significant narrowing.  There is about 50% stenosis on both sides.  The reading from the radiologist is not accurate per Dr. Rogel.  She can continue on her current medication regimen as previously planned.  I did discuss vascular risk factors as they pertain to the progression of vascular disease including controlling her hypertension and hyperlipidemia.  Her blood pressure is stable on her current medication regimen.  She is maintained on Lipitor for her hyperlipidemia.  Patient's Body mass index is 25.69 kg/m². BMI  is within normal parameters. No follow-up required.. This was all discussed in full with complete understanding.  Thank you for allowing me to participate in the care of your patient.  Please do not hesitate with any questions or concerns.  I will keep you aware of any further encounters with Damaris Nash.        Sincerely yours,         Josefina Milligan, POLY Beltran, Shahla Bautista, DO

## 2021-02-05 ENCOUNTER — LAB (OUTPATIENT)
Dept: LAB | Facility: HOSPITAL | Age: 86
End: 2021-02-05

## 2021-02-05 ENCOUNTER — ANTICOAGULATION VISIT (OUTPATIENT)
Dept: CARDIOLOGY | Facility: CLINIC | Age: 86
End: 2021-02-05

## 2021-02-05 DIAGNOSIS — I48.91 ATRIAL FIBRILLATION, UNSPECIFIED TYPE (HCC): ICD-10-CM

## 2021-02-05 LAB
INR PPP: 2.3 (ref 0.91–1.09)
PROTHROMBIN TIME: 27.2 SECONDS (ref 10–13.8)

## 2021-02-05 PROCEDURE — 85610 PROTHROMBIN TIME: CPT | Performed by: INTERNAL MEDICINE

## 2021-03-05 ENCOUNTER — ANTICOAGULATION VISIT (OUTPATIENT)
Dept: CARDIOLOGY | Facility: CLINIC | Age: 86
End: 2021-03-05

## 2021-03-05 ENCOUNTER — LAB (OUTPATIENT)
Dept: LAB | Facility: HOSPITAL | Age: 86
End: 2021-03-05

## 2021-03-05 DIAGNOSIS — I48.91 ATRIAL FIBRILLATION, UNSPECIFIED TYPE (HCC): ICD-10-CM

## 2021-03-05 LAB
INR PPP: 2.6 (ref 0.91–1.09)
PROTHROMBIN TIME: 30.7 SECONDS (ref 10–13.8)

## 2021-03-05 PROCEDURE — 85610 PROTHROMBIN TIME: CPT | Performed by: INTERNAL MEDICINE

## 2021-04-02 ENCOUNTER — ANTICOAGULATION VISIT (OUTPATIENT)
Dept: CARDIOLOGY | Facility: CLINIC | Age: 86
End: 2021-04-02

## 2021-04-06 ENCOUNTER — ANTICOAGULATION VISIT (OUTPATIENT)
Dept: CARDIOLOGY | Facility: CLINIC | Age: 86
End: 2021-04-06

## 2021-04-06 ENCOUNTER — LAB (OUTPATIENT)
Dept: LAB | Facility: HOSPITAL | Age: 86
End: 2021-04-06

## 2021-04-06 DIAGNOSIS — I48.91 ATRIAL FIBRILLATION, UNSPECIFIED TYPE (HCC): ICD-10-CM

## 2021-04-06 LAB
INR PPP: 2.5 (ref 0.91–1.09)
PROTHROMBIN TIME: 29.7 SECONDS (ref 10–13.8)

## 2021-04-06 PROCEDURE — 85610 PROTHROMBIN TIME: CPT | Performed by: INTERNAL MEDICINE

## 2021-05-07 ENCOUNTER — LAB (OUTPATIENT)
Dept: LAB | Facility: HOSPITAL | Age: 86
End: 2021-05-07

## 2021-05-07 ENCOUNTER — ANTICOAGULATION VISIT (OUTPATIENT)
Dept: CARDIOLOGY | Facility: CLINIC | Age: 86
End: 2021-05-07

## 2021-05-07 DIAGNOSIS — I48.91 ATRIAL FIBRILLATION, UNSPECIFIED TYPE (HCC): ICD-10-CM

## 2021-05-07 LAB
INR PPP: 2.3 (ref 0.91–1.09)
PROTHROMBIN TIME: 27.3 SECONDS (ref 10–13.8)

## 2021-05-07 PROCEDURE — 85610 PROTHROMBIN TIME: CPT | Performed by: INTERNAL MEDICINE

## 2021-06-04 ENCOUNTER — LAB (OUTPATIENT)
Dept: LAB | Facility: HOSPITAL | Age: 86
End: 2021-06-04

## 2021-06-04 ENCOUNTER — ANTICOAGULATION VISIT (OUTPATIENT)
Dept: CARDIOLOGY | Facility: CLINIC | Age: 86
End: 2021-06-04

## 2021-06-04 DIAGNOSIS — I48.91 ATRIAL FIBRILLATION, UNSPECIFIED TYPE (HCC): ICD-10-CM

## 2021-06-04 LAB
INR PPP: 2.3 (ref 0.91–1.09)
PROTHROMBIN TIME: 27.3 SECONDS (ref 10–13.8)

## 2021-06-04 PROCEDURE — 85610 PROTHROMBIN TIME: CPT | Performed by: INTERNAL MEDICINE

## 2021-06-28 RX ORDER — WARFARIN SODIUM 4 MG/1
4 TABLET ORAL NIGHTLY
Qty: 90 TABLET | Refills: 3 | Status: SHIPPED | OUTPATIENT
Start: 2021-06-28 | End: 2022-09-26 | Stop reason: SDUPTHER

## 2021-07-06 ENCOUNTER — LAB (OUTPATIENT)
Dept: LAB | Facility: HOSPITAL | Age: 86
End: 2021-07-06

## 2021-07-06 DIAGNOSIS — I48.91 ATRIAL FIBRILLATION, UNSPECIFIED TYPE (HCC): ICD-10-CM

## 2021-07-06 PROCEDURE — 85610 PROTHROMBIN TIME: CPT | Performed by: INTERNAL MEDICINE

## 2021-07-07 ENCOUNTER — ANTICOAGULATION VISIT (OUTPATIENT)
Dept: CARDIOLOGY | Facility: CLINIC | Age: 86
End: 2021-07-07

## 2021-07-07 LAB
INR PPP: 1.9 (ref 0.91–1.09)
PROTHROMBIN TIME: 22.9 SECONDS (ref 10–13.8)

## 2021-07-16 ENCOUNTER — TELEPHONE (OUTPATIENT)
Dept: VASCULAR SURGERY | Facility: CLINIC | Age: 86
End: 2021-07-16

## 2021-07-16 NOTE — TELEPHONE ENCOUNTER
Left message reminding Mrs Nash of her appointments for Monday, July 19th, 2021. [facility] for 111 o'clock testing and follow up afterwards at 130 pm with Joseifna PANG. Also advised mrs Nash if she had any questions, concerns or needs to reschedule to please call the office at [phone].

## 2021-07-19 ENCOUNTER — OFFICE VISIT (OUTPATIENT)
Dept: VASCULAR SURGERY | Facility: CLINIC | Age: 86
End: 2021-07-19

## 2021-07-19 ENCOUNTER — HOSPITAL ENCOUNTER (OUTPATIENT)
Dept: ULTRASOUND IMAGING | Facility: HOSPITAL | Age: 86
Discharge: HOME | End: 2021-07-19
Admitting: NURSE PRACTITIONER

## 2021-07-19 VITALS
WEIGHT: 122 LBS | SYSTOLIC BLOOD PRESSURE: 122 MMHG | OXYGEN SATURATION: 95 % | HEART RATE: 76 BPM | DIASTOLIC BLOOD PRESSURE: 72 MMHG | HEIGHT: 63 IN | BODY MASS INDEX: 21.62 KG/M2

## 2021-07-19 DIAGNOSIS — I65.23 BILATERAL CAROTID ARTERY STENOSIS: ICD-10-CM

## 2021-07-19 DIAGNOSIS — I65.23 BILATERAL CAROTID ARTERY STENOSIS: Primary | ICD-10-CM

## 2021-07-19 DIAGNOSIS — E78.2 MIXED HYPERLIPIDEMIA: ICD-10-CM

## 2021-07-19 DIAGNOSIS — I10 ESSENTIAL HYPERTENSION: ICD-10-CM

## 2021-07-19 PROCEDURE — 93880 EXTRACRANIAL BILAT STUDY: CPT

## 2021-07-19 PROCEDURE — 99214 OFFICE O/P EST MOD 30 MIN: CPT | Performed by: NURSE PRACTITIONER

## 2021-07-19 PROCEDURE — 93880 EXTRACRANIAL BILAT STUDY: CPT | Performed by: SURGERY

## 2021-07-19 NOTE — PROGRESS NOTES
"7/19/2021       Shahla Beltran,   [address] [facility] [address]  [place] [address]    Damaris Nash  1935    Chief Complaint   Patient presents with   • Follow-up     6 month f/u with carotid testing.  Pt denies any stroke like symptoms.       Dear  Shahla Beltran,       HPI  I had the pleasure of seeing your patient Damaris Nash in the office today.    As you recall, Damaris Nash is a 85 y.o. left-handed female who we are following for carotid occlusive disease.  Initially we began following her after she had loss of vision to her right eye and was sent to Dr. Rajput.  He thought that might have been due to hypoperfusion.  She has a history of stroke with symptoms of slurred speech and right-sided weakness.  She is maintained on Coumadin and Lipitor.  Currently she is doing well and denies any strokelike symptoms.  She did have noninvasive testing performed today, which I did review in office.    Review of Systems   HENT: Negative.    Eyes: Positive for visual disturbance (right eye, unchanged).   Respiratory: Negative.    Cardiovascular: Negative.    Gastrointestinal: Negative.    Endocrine: Negative.    Genitourinary: Negative.    Musculoskeletal: Negative.    Skin: Negative.    Allergic/Immunologic: Negative.    Neurological: Positive for weakness (usus cane).   Hematological: Negative.    Psychiatric/Behavioral: Negative.         /72   Pulse 76   Ht 160 cm (63\")   Wt 55.3 kg (122 lb)   SpO2 95%   BMI 21.61 kg/m²   Physical Exam  Vitals and nursing note reviewed.   Constitutional:       General: She is not in acute distress.     Appearance: Normal appearance. She is well-developed and normal weight. She is not diaphoretic.   HENT:      Head: Normocephalic and atraumatic.   Eyes:      General: No scleral icterus.     Pupils: Pupils are equal, round, and reactive to light.   Neck:      Thyroid: No thyromegaly.      Vascular: No carotid bruit or JVD.   Cardiovascular:      Rate and " Rhythm: Normal rate and regular rhythm.      Pulses: Normal pulses.      Heart sounds: Normal heart sounds and S2 normal. No murmur heard.   No friction rub. No gallop.    Pulmonary:      Effort: Pulmonary effort is normal.      Breath sounds: Normal breath sounds.   Abdominal:      General: Bowel sounds are normal.      Palpations: Abdomen is soft.   Musculoskeletal:         General: Normal range of motion.      Cervical back: Normal range of motion and neck supple.      Comments: Uses a cane   Skin:     General: Skin is warm and dry.   Neurological:      Mental Status: She is alert and oriented to person, place, and time.      Cranial Nerves: No cranial nerve deficit.   Psychiatric:         Mood and Affect: Mood normal.         Behavior: Behavior normal. Behavior is cooperative.         Thought Content: Thought content normal.         Judgment: Judgment normal.          Diagnostic Data:  Noninvasive testing including a carotid duplex shows 50 to 69% right carotid stenosis and less than 50% left carotid stenosis with bilateral antegrade vertebral flow.    Patient Active Problem List   Diagnosis   • A-fib (CMS/HCC)   • Carotid artery disease (CMS/HCC)   • Hyperlipidemia   • Hypertension   • Amaurosis fugax of right eye   • Bilateral carotid artery stenosis   • Hx of transient ischemic attack (TIA)   • Current use of long term anticoagulation   • H/O cardiac radiofrequency ablation         ICD-10-CM ICD-9-CM   1. Bilateral carotid artery stenosis  I65.23 433.10     433.30   2. Mixed hyperlipidemia  E78.2 272.2   3. Essential hypertension  I10 401.9       Plan: After thoroughly evaluating Damaris Nash, I believe the best course of action is to remain conservative from vascular surgery standpoint.  Currently she is doing well denies any strokelike symptoms.  I did review her testing which is unchanged with 50 to 69% right carotid stenosis less than 50% left carotid stenosis.  We will see her back in 6 months with  repeat noninvasive testing for continued surveillance, including a carotid duplex.  Of note, her previous CTA of the neck in 2017 did not show significant narrowing and was an inaccurate reading per Dr. Rogel and actually was less than 50% on both sides.  She can continue on her current medication regimen as previously planned.  I did discuss vascular risk factors as they pertain to the progression of vascular disease including controlling her hypertension and hyperlipidemia.  Her blood pressure is stable on her current medication regimen.  She is maintained on Lipitor for her hyperlipidemia.  Patient's Body mass index is 21.61 kg/m². BMI is within normal parameters. No follow-up required.. This was all discussed in full with complete understanding.  Thank you for allowing me to participate in the care of your patient.  Please do not hesitate with any questions or concerns.  I will keep you aware of any further encounters with Damaris Nash.        Sincerely yours,         Josefina Milligan, POLY Beltran, Shahla Bautista, DO

## 2021-08-02 ENCOUNTER — LAB (OUTPATIENT)
Dept: LAB | Facility: HOSPITAL | Age: 86
End: 2021-08-02

## 2021-08-02 ENCOUNTER — ANTICOAGULATION VISIT (OUTPATIENT)
Dept: CARDIOLOGY | Facility: CLINIC | Age: 86
End: 2021-08-02

## 2021-08-02 DIAGNOSIS — I48.91 ATRIAL FIBRILLATION, UNSPECIFIED TYPE (HCC): ICD-10-CM

## 2021-08-02 LAB
INR PPP: 2 (ref 0.91–1.09)
PROTHROMBIN TIME: 24.3 SECONDS (ref 10–13.8)

## 2021-08-02 PROCEDURE — 85610 PROTHROMBIN TIME: CPT | Performed by: INTERNAL MEDICINE

## 2021-08-10 ENCOUNTER — OFFICE VISIT (OUTPATIENT)
Dept: CARDIOLOGY | Facility: CLINIC | Age: 86
End: 2021-08-10

## 2021-08-10 VITALS
BODY MASS INDEX: 22.15 KG/M2 | SYSTOLIC BLOOD PRESSURE: 182 MMHG | HEART RATE: 64 BPM | HEIGHT: 63 IN | DIASTOLIC BLOOD PRESSURE: 74 MMHG | OXYGEN SATURATION: 93 % | WEIGHT: 125 LBS

## 2021-08-10 DIAGNOSIS — I48.0 PAROXYSMAL ATRIAL FIBRILLATION (HCC): Primary | ICD-10-CM

## 2021-08-10 DIAGNOSIS — E78.2 MIXED HYPERLIPIDEMIA: ICD-10-CM

## 2021-08-10 DIAGNOSIS — I65.23 BILATERAL CAROTID ARTERY STENOSIS: ICD-10-CM

## 2021-08-10 DIAGNOSIS — I10 ESSENTIAL HYPERTENSION: ICD-10-CM

## 2021-08-10 DIAGNOSIS — Z79.01 CURRENT USE OF LONG TERM ANTICOAGULATION: ICD-10-CM

## 2021-08-10 DIAGNOSIS — Z98.890 H/O CARDIAC RADIOFREQUENCY ABLATION: ICD-10-CM

## 2021-08-10 PROCEDURE — 93000 ELECTROCARDIOGRAM COMPLETE: CPT | Performed by: NURSE PRACTITIONER

## 2021-08-10 PROCEDURE — 99214 OFFICE O/P EST MOD 30 MIN: CPT | Performed by: NURSE PRACTITIONER

## 2021-08-10 RX ORDER — LEVOTHYROXINE SODIUM 112 UG/1
112 TABLET ORAL DAILY
COMMUNITY
End: 2022-09-26

## 2021-08-10 NOTE — PROGRESS NOTES
Subjective:     Encounter Date:08/10/2021      Patient ID: Damaris Nash is a 85 y.o. female with a history of paroxysmal atrial fibrillation s/p ablation on chronic anticoagulation, hypertension, hyperlipidemia, bilateral carotid artery disease and transient ischemic attack..    Chief Complaint: follow up  Atrial Fibrillation  Presents for follow-up visit. Symptoms are negative for chest pain, dizziness, palpitations, shortness of breath, syncope and weakness. The symptoms have been stable. Past medical history includes atrial fibrillation.   Hypertension  This is a chronic problem. The current episode started more than 1 year ago. The problem is controlled. Associated symptoms include malaise/fatigue. Pertinent negatives include no chest pain, orthopnea, palpitations, PND or shortness of breath.     Patient presents today for a routine follow up. She has a history of PAF s/p ablation per Dr. Rajput in 2011 with no known recurrence. She remains anticoagulated on Coumadin with stable INRs and follows with our Coumadin clinic. She reports she has been well. Her only complaint today is fatigue and knee pain which makes walking difficult. She denies chest pain, palpitations, edema, dyspnea, orthopnea and PND.     The following portions of the patient's history were reviewed and updated as appropriate: allergies, current medications, past family history, past medical history, past social history, past surgical history and problem list.    Allergies   Allergen Reactions   • Epinephrine Shortness Of Breath   • Celebrex [Celecoxib]    • Ferrous Sulfate    • Other      Omnicef, some Thad, possible Rocephin allergy   • Simvastatin    • Sulfa Antibiotics        Current Outpatient Medications:   •  acetaminophen (TYLENOL) 325 MG tablet, Take 650 mg by mouth Daily As Needed for mild pain (1-3)., Disp: , Rfl:   •  alendronate (FOSAMAX) 70 MG tablet, Take 70 mg by mouth Every 7 (Seven) Days., Disp: , Rfl:   •  ALPRAZolam  (XANAX) 0.25 MG tablet, Take 0.25 mg by mouth 2 (Two) Times a Day As Needed for anxiety., Disp: , Rfl:   •  amLODIPine (NORVASC) 2.5 MG tablet, Take 2.5 mg by mouth Daily., Disp: , Rfl:   •  atorvastatin (LIPITOR) 20 MG tablet, Take 20 mg by mouth Daily., Disp: , Rfl:   •  Cholecalciferol (VITAMIN D3) 2000 units capsule, Take 2,000 Units by mouth Daily., Disp: , Rfl:   •  CloNIDine (CATAPRES) 0.1 MG tablet, Take 0.1 mg by mouth As Needed., Disp: , Rfl:   •  hydrochlorothiazide (HYDRODIURIL) 25 MG tablet, Take 25 mg by mouth Daily., Disp: , Rfl:   •  ibuprofen (ADVIL,MOTRIN) 200 MG tablet, Take 200 mg by mouth Daily As Needed for mild pain (1-3)., Disp: , Rfl:   •  levothyroxine (SYNTHROID, LEVOTHROID) 112 MCG tablet, Take 112 mcg by mouth Daily., Disp: , Rfl:   •  metoprolol succinate XL (TOPROL-XL) 100 MG 24 hr tablet, Take 100 mg by mouth Every Night., Disp: , Rfl:   •  Multiple Vitamins-Minerals (PRESERVISION AREDS 2 PO), Take 2 capsules by mouth Daily., Disp: , Rfl:   •  traMADol (ULTRAM) 50 MG tablet, Take 50 mg by mouth Every 6 (Six) Hours As Needed for moderate pain (4-6)., Disp: , Rfl:   •  warfarin (COUMADIN) 4 MG tablet, TAKE 1 TABLET BY MOUTH EVERY NIGHT, Disp: 90 tablet, Rfl: 3  Past Medical History:   Diagnosis Date   • A-fib (CMS/HCC)    • Acute ischemic right ICA stroke (CMS/HCC)    • Atrial fibrillation (CMS/HCC)    • Benign essential HTN    • Breast CA (CMS/HCC)    • Breast cancer (CMS/HCC)    • Carotid artery disease (CMS/HCC)    • CVA (cerebral vascular accident) (CMS/HCC)    • CVA (cerebral vascular accident) (CMS/HCC)    • Gallstones    • GERD (gastroesophageal reflux disease)    • Hyperlipidemia    • Hypertension    • Hypothyroid    • Hypothyroidism    • Osteopenia    • Osteopenia    • Primary pulmonary HTN (CMS/HCC)    • S/P right hip fracture 06/01/2012   • Shortness of breath    • Stroke (CMS/HCC)    • Vitamin D deficiency        Social History     Socioeconomic History   • Marital status:       Spouse name: Not on file   • Number of children: Not on file   • Years of education: Not on file   • Highest education level: Not on file   Tobacco Use   • Smoking status: Former Smoker     Years: 30.00     Types: Cigarettes     Start date:      Quit date: 1992     Years since quittin.6   • Smokeless tobacco: Never Used   Substance and Sexual Activity   • Alcohol use: No   • Drug use: No   • Sexual activity: Defer       Review of Systems   Constitutional: Positive for malaise/fatigue. Negative for weight gain and weight loss.   Cardiovascular: Negative for chest pain, dyspnea on exertion, irregular heartbeat, leg swelling, near-syncope, orthopnea, palpitations, paroxysmal nocturnal dyspnea and syncope.   Respiratory: Negative for cough, shortness of breath, sleep disturbances due to breathing, sputum production and wheezing.    Skin: Negative for dry skin, flushing, itching and rash.   Musculoskeletal: Positive for joint pain.   Gastrointestinal: Negative for hematemesis and hematochezia.   Neurological: Negative for dizziness, light-headedness, loss of balance and weakness.   All other systems reviewed and are negative.         Objective:     Vitals reviewed.   Constitutional:       General: Not in acute distress.     Appearance: Healthy appearance. Well-developed. Not diaphoretic.   Eyes:      General: No scleral icterus.     Conjunctiva/sclera: Conjunctivae normal.      Pupils: Pupils are equal, round, and reactive to light.   HENT:      Head: Normocephalic.    Mouth/Throat:      Pharynx: No oropharyngeal exudate.   Neck:      Vascular: No JVR.   Pulmonary:      Effort: Pulmonary effort is normal. No respiratory distress.      Breath sounds: Normal breath sounds. No wheezing. No rhonchi. No rales.   Chest:      Chest wall: Not tender to palpatation.   Cardiovascular:      Normal rate. Regular rhythm.   Pulses:     Intact distal pulses.   Edema:     Peripheral edema absent.   Abdominal:  "     General: Bowel sounds are normal. There is no distension.      Palpations: Abdomen is soft.      Tenderness: There is no abdominal tenderness.   Musculoskeletal: Normal range of motion.      Cervical back: Normal range of motion and neck supple. Skin:     General: Skin is warm and dry.      Coloration: Skin is not pale.      Findings: No erythema or rash.   Neurological:      Mental Status: Alert, oriented to person, place, and time and oriented to person, place and time.      Deep Tendon Reflexes: Reflexes are normal and symmetric.   Psychiatric:         Behavior: Behavior normal.             ECG 12 Lead    Date/Time: 8/10/2021 1:29 PM  Performed by: Carolyn Smith APRN  Authorized by: Carolyn Smith APRN   Comparison: compared with previous ECG from 8/10/2020  Similar to previous ECG  Rhythm: sinus rhythm  Rate: normal  BPM: 64  Conduction: 1st degree AV block  ST Segments: ST segments normal  T Waves: T waves normal  QRS axis: normal  Other: no other findings    Clinical impression: abnormal EKG          BP (!) 182/74   Pulse 64   Ht 160 cm (63\")   Wt 56.7 kg (125 lb)   SpO2 93%   BMI 22.14 kg/m²     Lab Review:   I have reviewed previous office notes, recent labs and recent cardiac testing.     Lab Results   Component Value Date    CHLPL 248 (H) 08/07/2015    TRIG 152 (H) 08/07/2015    HDL 56 08/07/2015     (H) 08/07/2015     Lab Results   Component Value Date    INR 2.0 (H) 08/02/2021    INR 1.9 (H) 07/06/2021    INR 2.3 (H) 06/04/2021    PROTIME 24.3 (H) 08/02/2021    PROTIME 22.9 (H) 07/06/2021    PROTIME 27.3 (H) 06/04/2021           Assessment:          Diagnosis Plan   1. Paroxysmal atrial fibrillation (CMS/HCC)     2. H/O cardiac radiofrequency ablation     3. Current use of long term anticoagulation     4. Essential hypertension     5. Mixed hyperlipidemia     6. Bilateral carotid artery stenosis            Plan:       1. PAF- stable. NSR today. no recurrence since ablation. On " Coumadin  2. H/o radiofrequency ablation- in 2011 per Dr. Rajput  3. Anticoagulation- on Coumadin. Last INR at 2.0. Follows with our coumadin clinic. Denies bleeding. Requests that we change her INR draws to be done at her PCP office due it being difficult to walk into our outpatient lab. I have reached out to SHEA Snider for the order to be faxed to her PCP lab.   4. HTN- controlled. Followed by PCP   5. HLD-followed by PCP   6. Carotid stenosis- followed by vascular      Follow up in 1 year or sooner if symptoms worsen.     I spent 30 minutes caring for Dmaaris on this date of service. This time includes time spent by me in the following activities:preparing for the visit, reviewing tests, obtaining and/or reviewing a separately obtained history, performing a medically appropriate examination and/or evaluation , counseling and educating the patient/family/caregiver, documenting information in the medical record, independently interpreting results and communicating that information with the patient/family/caregiver and care coordination

## 2021-09-02 ENCOUNTER — LAB (OUTPATIENT)
Dept: LAB | Facility: HOSPITAL | Age: 86
End: 2021-09-02

## 2021-09-02 ENCOUNTER — ANTICOAGULATION VISIT (OUTPATIENT)
Dept: CARDIOLOGY | Facility: CLINIC | Age: 86
End: 2021-09-02

## 2021-09-02 DIAGNOSIS — I48.91 ATRIAL FIBRILLATION, UNSPECIFIED TYPE (HCC): ICD-10-CM

## 2021-09-02 LAB
INR PPP: 2.3 (ref 0.91–1.09)
PROTHROMBIN TIME: 28 SECONDS (ref 10–13.8)

## 2021-09-02 PROCEDURE — 85610 PROTHROMBIN TIME: CPT | Performed by: FAMILY MEDICINE

## 2021-10-18 ENCOUNTER — ANTICOAGULATION VISIT (OUTPATIENT)
Dept: CARDIOLOGY | Facility: CLINIC | Age: 86
End: 2021-10-18

## 2021-10-19 ENCOUNTER — ANTICOAGULATION VISIT (OUTPATIENT)
Dept: CARDIOLOGY | Facility: CLINIC | Age: 86
End: 2021-10-19

## 2021-10-19 ENCOUNTER — LAB (OUTPATIENT)
Dept: LAB | Facility: HOSPITAL | Age: 86
End: 2021-10-19

## 2021-10-19 DIAGNOSIS — I48.91 ATRIAL FIBRILLATION, UNSPECIFIED TYPE (HCC): ICD-10-CM

## 2021-10-19 LAB
INR PPP: 1.9 (ref 0.91–1.09)
PROTHROMBIN TIME: 22.6 SECONDS (ref 10–13.8)

## 2021-10-19 PROCEDURE — 85610 PROTHROMBIN TIME: CPT | Performed by: FAMILY MEDICINE

## 2021-11-17 ENCOUNTER — LAB (OUTPATIENT)
Dept: LAB | Facility: HOSPITAL | Age: 86
End: 2021-11-17

## 2021-11-17 ENCOUNTER — ANTICOAGULATION VISIT (OUTPATIENT)
Dept: CARDIOLOGY | Facility: CLINIC | Age: 86
End: 2021-11-17

## 2021-11-17 DIAGNOSIS — I48.91 ATRIAL FIBRILLATION, UNSPECIFIED TYPE (HCC): ICD-10-CM

## 2021-11-17 LAB
INR PPP: 2.7 (ref 0.91–1.09)
PROTHROMBIN TIME: 32.2 SECONDS (ref 10–13.8)

## 2021-11-17 PROCEDURE — 85610 PROTHROMBIN TIME: CPT | Performed by: FAMILY MEDICINE

## 2021-12-15 ENCOUNTER — LAB (OUTPATIENT)
Dept: LAB | Facility: HOSPITAL | Age: 86
End: 2021-12-15

## 2021-12-15 ENCOUNTER — ANTICOAGULATION VISIT (OUTPATIENT)
Dept: CARDIOLOGY | Facility: CLINIC | Age: 86
End: 2021-12-15

## 2021-12-15 DIAGNOSIS — Z79.01 CURRENT USE OF LONG TERM ANTICOAGULATION: ICD-10-CM

## 2021-12-15 DIAGNOSIS — I48.91 ATRIAL FIBRILLATION, UNSPECIFIED TYPE (HCC): ICD-10-CM

## 2021-12-15 DIAGNOSIS — I48.91 ATRIAL FIBRILLATION, UNSPECIFIED TYPE (HCC): Primary | ICD-10-CM

## 2021-12-15 LAB
INR PPP: 2.1 (ref 0.91–1.09)
PROTHROMBIN TIME: 25.2 SECONDS (ref 10–13.8)

## 2021-12-15 PROCEDURE — 85610 PROTHROMBIN TIME: CPT | Performed by: FAMILY MEDICINE

## 2022-01-17 ENCOUNTER — LAB (OUTPATIENT)
Dept: LAB | Facility: HOSPITAL | Age: 87
End: 2022-01-17

## 2022-01-17 DIAGNOSIS — Z79.01 CURRENT USE OF LONG TERM ANTICOAGULATION: ICD-10-CM

## 2022-01-17 DIAGNOSIS — I48.91 ATRIAL FIBRILLATION, UNSPECIFIED TYPE: ICD-10-CM

## 2022-01-17 PROCEDURE — 85610 PROTHROMBIN TIME: CPT | Performed by: FAMILY MEDICINE

## 2022-01-18 LAB
INR PPP: 2.4 (ref 0.91–1.09)
PROTHROMBIN TIME: 28.3 SECONDS (ref 10–13.8)

## 2022-02-01 ENCOUNTER — ANTICOAGULATION VISIT (OUTPATIENT)
Dept: CARDIOLOGY | Facility: CLINIC | Age: 87
End: 2022-02-01

## 2022-02-16 ENCOUNTER — TELEPHONE (OUTPATIENT)
Dept: VASCULAR SURGERY | Facility: CLINIC | Age: 87
End: 2022-02-16

## 2022-02-16 NOTE — TELEPHONE ENCOUNTER
Outbound call to pt to reschedule appt for 01/20/22 no show appt.  US will also need to be rescheduled.  LM for return call.  Please reschedule if pt calls back.

## 2022-03-01 ENCOUNTER — LAB (OUTPATIENT)
Dept: LAB | Facility: HOSPITAL | Age: 87
End: 2022-03-01

## 2022-03-01 DIAGNOSIS — I48.91 ATRIAL FIBRILLATION, UNSPECIFIED TYPE: ICD-10-CM

## 2022-03-01 DIAGNOSIS — Z79.01 CURRENT USE OF LONG TERM ANTICOAGULATION: ICD-10-CM

## 2022-03-01 LAB
INR PPP: 1.76 (ref 0.91–1.09)
PROTHROMBIN TIME: 19.8 SECONDS (ref 11.9–14.6)

## 2022-03-01 PROCEDURE — 36415 COLL VENOUS BLD VENIPUNCTURE: CPT

## 2022-03-01 PROCEDURE — 85610 PROTHROMBIN TIME: CPT

## 2022-03-04 ENCOUNTER — ANTICOAGULATION VISIT (OUTPATIENT)
Dept: CARDIOLOGY | Facility: CLINIC | Age: 87
End: 2022-03-04

## 2022-03-15 ENCOUNTER — ANTICOAGULATION VISIT (OUTPATIENT)
Dept: CARDIOLOGY | Facility: CLINIC | Age: 87
End: 2022-03-15

## 2022-03-15 ENCOUNTER — LAB (OUTPATIENT)
Dept: LAB | Facility: HOSPITAL | Age: 87
End: 2022-03-15

## 2022-03-15 DIAGNOSIS — Z79.01 CURRENT USE OF LONG TERM ANTICOAGULATION: ICD-10-CM

## 2022-03-15 DIAGNOSIS — I48.91 ATRIAL FIBRILLATION, UNSPECIFIED TYPE: ICD-10-CM

## 2022-03-15 LAB
INR PPP: 1.6 (ref 0.91–1.09)
PROTHROMBIN TIME: 19.7 SECONDS (ref 10–13.8)

## 2022-03-15 PROCEDURE — 85610 PROTHROMBIN TIME: CPT | Performed by: FAMILY MEDICINE

## 2022-03-29 ENCOUNTER — LAB (OUTPATIENT)
Dept: LAB | Facility: HOSPITAL | Age: 87
End: 2022-03-29

## 2022-03-29 ENCOUNTER — ANTICOAGULATION VISIT (OUTPATIENT)
Dept: CARDIOLOGY | Facility: CLINIC | Age: 87
End: 2022-03-29

## 2022-03-29 DIAGNOSIS — I48.91 ATRIAL FIBRILLATION, UNSPECIFIED TYPE: ICD-10-CM

## 2022-03-29 DIAGNOSIS — Z79.01 CURRENT USE OF LONG TERM ANTICOAGULATION: ICD-10-CM

## 2022-03-29 LAB
INR PPP: 2.6
INR PPP: 2.6 (ref 0.91–1.09)
PROTHROMBIN TIME: 31.5 SECONDS (ref 10–13.8)

## 2022-03-29 PROCEDURE — 85610 PROTHROMBIN TIME: CPT | Performed by: FAMILY MEDICINE

## 2022-04-28 ENCOUNTER — LAB (OUTPATIENT)
Dept: LAB | Facility: HOSPITAL | Age: 87
End: 2022-04-28

## 2022-04-28 DIAGNOSIS — Z79.01 CURRENT USE OF LONG TERM ANTICOAGULATION: ICD-10-CM

## 2022-04-28 DIAGNOSIS — I48.91 ATRIAL FIBRILLATION, UNSPECIFIED TYPE: ICD-10-CM

## 2022-04-28 LAB
INR PPP: 2 (ref 0.91–1.09)
PROTHROMBIN TIME: 24.2 SECONDS (ref 10–13.8)

## 2022-04-28 PROCEDURE — 85610 PROTHROMBIN TIME: CPT | Performed by: FAMILY MEDICINE

## 2022-04-29 ENCOUNTER — ANTICOAGULATION VISIT (OUTPATIENT)
Dept: CARDIOLOGY | Facility: CLINIC | Age: 87
End: 2022-04-29

## 2022-05-23 ENCOUNTER — LAB (OUTPATIENT)
Dept: LAB | Facility: HOSPITAL | Age: 87
End: 2022-05-23

## 2022-05-23 DIAGNOSIS — I48.91 ATRIAL FIBRILLATION, UNSPECIFIED TYPE: ICD-10-CM

## 2022-05-23 DIAGNOSIS — Z79.01 CURRENT USE OF LONG TERM ANTICOAGULATION: ICD-10-CM

## 2022-05-23 LAB
INR PPP: 2.3 (ref 0.91–1.09)
PROTHROMBIN TIME: 27.2 SECONDS (ref 10–13.8)

## 2022-05-23 PROCEDURE — 85610 PROTHROMBIN TIME: CPT | Performed by: FAMILY MEDICINE

## 2022-05-24 ENCOUNTER — ANTICOAGULATION VISIT (OUTPATIENT)
Dept: CARDIOLOGY | Facility: CLINIC | Age: 87
End: 2022-05-24

## 2022-06-27 ENCOUNTER — LAB (OUTPATIENT)
Dept: LAB | Facility: HOSPITAL | Age: 87
End: 2022-06-27

## 2022-06-27 DIAGNOSIS — Z79.01 CURRENT USE OF LONG TERM ANTICOAGULATION: ICD-10-CM

## 2022-06-27 DIAGNOSIS — I48.91 ATRIAL FIBRILLATION, UNSPECIFIED TYPE: ICD-10-CM

## 2022-06-27 LAB
INR PPP: 1.8 (ref 0.91–1.09)
PROTHROMBIN TIME: 21.4 SECONDS (ref 10–13.8)

## 2022-06-27 PROCEDURE — 85610 PROTHROMBIN TIME: CPT | Performed by: FAMILY MEDICINE

## 2022-06-29 ENCOUNTER — ANTICOAGULATION VISIT (OUTPATIENT)
Dept: CARDIOLOGY | Facility: CLINIC | Age: 87
End: 2022-06-29

## 2022-07-13 ENCOUNTER — APPOINTMENT (OUTPATIENT)
Dept: GENERAL RADIOLOGY | Facility: HOSPITAL | Age: 87
End: 2022-07-13

## 2022-07-13 ENCOUNTER — HOSPITAL ENCOUNTER (EMERGENCY)
Facility: HOSPITAL | Age: 87
Discharge: HOME | End: 2022-07-13
Attending: EMERGENCY MEDICINE | Admitting: EMERGENCY MEDICINE

## 2022-07-13 VITALS
HEART RATE: 78 BPM | BODY MASS INDEX: 21.97 KG/M2 | WEIGHT: 124 LBS | HEIGHT: 63 IN | DIASTOLIC BLOOD PRESSURE: 75 MMHG | RESPIRATION RATE: 16 BRPM | TEMPERATURE: 98.3 F | SYSTOLIC BLOOD PRESSURE: 183 MMHG | OXYGEN SATURATION: 94 %

## 2022-07-13 DIAGNOSIS — S20.219A CONTUSION OF CHEST WALL, UNSPECIFIED LATERALITY, INITIAL ENCOUNTER: ICD-10-CM

## 2022-07-13 DIAGNOSIS — S20.222A CONTUSION OF LEFT SIDE OF BACK, INITIAL ENCOUNTER: Primary | ICD-10-CM

## 2022-07-13 LAB
INR PPP: 1.96 (ref 0.91–1.09)
PROTHROMBIN TIME: 21.6 SECONDS (ref 11.9–14.6)

## 2022-07-13 PROCEDURE — 99283 EMERGENCY DEPT VISIT LOW MDM: CPT

## 2022-07-13 PROCEDURE — 96374 THER/PROPH/DIAG INJ IV PUSH: CPT

## 2022-07-13 PROCEDURE — 96375 TX/PRO/DX INJ NEW DRUG ADDON: CPT

## 2022-07-13 PROCEDURE — 25010000002 ONDANSETRON PER 1 MG: Performed by: EMERGENCY MEDICINE

## 2022-07-13 PROCEDURE — 0 HYDROMORPHONE 1 MG/ML SOLUTION: Performed by: EMERGENCY MEDICINE

## 2022-07-13 PROCEDURE — 71101 X-RAY EXAM UNILAT RIBS/CHEST: CPT

## 2022-07-13 PROCEDURE — 85610 PROTHROMBIN TIME: CPT | Performed by: EMERGENCY MEDICINE

## 2022-07-13 RX ORDER — SODIUM CHLORIDE 0.9 % (FLUSH) 0.9 %
10 SYRINGE (ML) INJECTION AS NEEDED
Status: DISCONTINUED | OUTPATIENT
Start: 2022-07-13 | End: 2022-07-13 | Stop reason: HOSPADM

## 2022-07-13 RX ORDER — HYDROCODONE BITARTRATE AND ACETAMINOPHEN 5; 325 MG/1; MG/1
1 TABLET ORAL EVERY 6 HOURS PRN
Qty: 15 TABLET | Refills: 0 | Status: SHIPPED | OUTPATIENT
Start: 2022-07-13 | End: 2022-08-08

## 2022-07-13 RX ORDER — ONDANSETRON 2 MG/ML
4 INJECTION INTRAMUSCULAR; INTRAVENOUS ONCE
Status: COMPLETED | OUTPATIENT
Start: 2022-07-13 | End: 2022-07-13

## 2022-07-13 RX ADMIN — ONDANSETRON 4 MG: 2 INJECTION INTRAMUSCULAR; INTRAVENOUS at 17:00

## 2022-07-13 RX ADMIN — HYDROMORPHONE HYDROCHLORIDE 0.5 MG: 1 INJECTION, SOLUTION INTRAMUSCULAR; INTRAVENOUS; SUBCUTANEOUS at 17:00

## 2022-07-13 NOTE — ED PROVIDER NOTES
Subjective   86-year-old female presents to the emergency department with complaint of left-sided posterior rib pain status post mechanical fall from standing.  Patient states she was washing her hair in the kitchen sink, went to turn around and lost her balance and fell back hitting  her back on the corner of a countertop.  Did not hit her head or fall down, no loss of conscious.  Complains of left posterior rib pain that is worse with palpation and deep breathing.  No shortness of breath.  Symptoms moderate to severe with no alleviating factors.      History provided by:  Patient      Review of Systems   All other systems reviewed and are negative.      Past Medical History:   Diagnosis Date   • A-fib (HCC)    • Acute ischemic right ICA stroke (HCC)    • Atrial fibrillation (HCC)    • Benign essential HTN    • Breast CA (HCC)    • Breast cancer (HCC)    • Carotid artery disease (HCC)    • CVA (cerebral vascular accident) (HCC)    • CVA (cerebral vascular accident) (HCC)    • Gallstones    • GERD (gastroesophageal reflux disease)    • Hyperlipidemia    • Hypertension    • Hypothyroid    • Hypothyroidism    • Osteopenia    • Osteopenia    • Primary pulmonary HTN (HCC)    • S/P right hip fracture 06/01/2012   • Shortness of breath    • Stroke (HCC)    • Vitamin D deficiency        Allergies   Allergen Reactions   • Epinephrine Shortness Of Breath   • Celebrex [Celecoxib]    • Ferrous Sulfate    • Other      Omnicef, some Thad, possible Rocephin allergy   • Simvastatin    • Sulfa Antibiotics        Past Surgical History:   Procedure Laterality Date   • BREAST LUMPECTOMY     • CARDIAC ABLATION      cardiac ablation procedure for af   • CATARACT EXTRACTION Right    • PARTIAL HIP ARTHROPLASTY Right        Family History   Problem Relation Age of Onset   • Stroke Mother    • Cancer Father    • COPD Sister        Social History     Socioeconomic History   • Marital status:    Tobacco Use   • Smoking status:  Former Smoker     Years: 30.00     Types: Cigarettes     Start date:      Quit date: 1992     Years since quittin.5   • Smokeless tobacco: Never Used   Substance and Sexual Activity   • Alcohol use: No   • Drug use: No   • Sexual activity: Defer           Objective   Physical Exam  Vitals and nursing note reviewed.   Constitutional:       General: She is not in acute distress.     Appearance: Normal appearance. She is normal weight.   HENT:      Head: Normocephalic and atraumatic.      Nose: Nose normal.      Mouth/Throat:      Mouth: Mucous membranes are moist.      Pharynx: Oropharynx is clear. No oropharyngeal exudate or posterior oropharyngeal erythema.   Eyes:      Extraocular Movements: Extraocular movements intact.      Conjunctiva/sclera: Conjunctivae normal.      Pupils: Pupils are equal, round, and reactive to light.   Cardiovascular:      Rate and Rhythm: Normal rate and regular rhythm.      Pulses: Normal pulses.      Heart sounds: Normal heart sounds.   Pulmonary:      Effort: Pulmonary effort is normal.      Breath sounds: Normal breath sounds. No wheezing, rhonchi or rales.   Abdominal:      General: Abdomen is flat. Bowel sounds are normal. There is no distension.      Palpations: Abdomen is soft.      Tenderness: There is no abdominal tenderness.   Musculoskeletal:         General: Tenderness present. Normal range of motion.      Cervical back: Normal range of motion and neck supple. No rigidity or tenderness. No muscular tenderness.      Right lower leg: No edema.      Left lower leg: No edema.      Comments: Left side posterior rib tenderness to palpation about 6 and seventh ribs   Skin:     General: Skin is warm and dry.      Capillary Refill: Capillary refill takes less than 2 seconds.      Findings: No rash.   Neurological:      General: No focal deficit present.      Mental Status: She is alert and oriented to person, place, and time. Mental status is at baseline.      Cranial  Nerves: No cranial nerve deficit.      Sensory: No sensory deficit.      Motor: No weakness.   Psychiatric:         Mood and Affect: Mood normal.         Behavior: Behavior normal.         Thought Content: Thought content normal.         Procedures         XR Ribs Left With PA Chest    Result Date: 7/13/2022  EXAMINATION: XR RIBS LEFT W PA CHEST-  7/13/2022 7:09 PM CDT  HISTORY: fall, left side posterior rib pain  Chest x-ray and left rib films, 3 views.  Magnified heart size. Aortic arch calcification is present.  Diffuse chronic interstitial lung disease.  No pneumothorax.  No left-sided rib fracture is seen.  Summary: 1. Chronic lung changes. 2. No rib fracture is seen. This report was finalized on 07/13/2022 19:23 by Dr. Eric Arreaga MD.    ED Course  ED Course as of 07/13/22 1954 Wed Jul 13, 2022 1951 Chest x-ray clear, no evidence for rib fracture.  We will give incentive spirometer, pain medication and have her follow-up with primary doctor as an outpatient. [AW]      ED Course User Index  [AW] Mingo Horowitz MD                                           [facility]    Final diagnoses:   Contusion of left side of back, initial encounter   Contusion of chest wall, unspecified laterality, initial encounter       ED Disposition  ED Disposition     ED Disposition   Discharge    Condition   Stable    Comment   --             Shahla Beltran, DO  [address]  [address] [id number] [phone]    Schedule an appointment as soon as possible for a visit   As needed         Medication List      New Prescriptions    HYDROcodone-acetaminophen 5-325 MG per tablet  Commonly known as: NORCO  Take 1 tablet by mouth Every 6 (Six) Hours As Needed for Severe Pain .           Where to Get Your Medications      These medications were sent to [id number] - [place], [place] - [address] AT [facility] [address]  [phone] [facility] [phone] FX  [address], [facility]  [address]    Phone: [phone]   · HYDROcodone-acetaminophen 5-325 MG per tablet          Mingo Horowitz MD  07/13/22 1954

## 2022-07-14 NOTE — ED NOTES
Patient up to bedside commode to urinate. Patient able to do so with tolerable pain and slight assistance. MD pizano aware.

## 2022-07-22 ENCOUNTER — HOSPITAL ENCOUNTER (OUTPATIENT)
Dept: GENERAL RADIOLOGY | Facility: HOSPITAL | Age: 87
Discharge: HOME | End: 2022-07-22

## 2022-07-22 ENCOUNTER — HOSPITAL ENCOUNTER (OUTPATIENT)
Dept: CT IMAGING | Facility: HOSPITAL | Age: 87
Discharge: HOME | End: 2022-07-22

## 2022-07-22 ENCOUNTER — TRANSCRIBE ORDERS (OUTPATIENT)
Dept: ADMINISTRATIVE | Facility: HOSPITAL | Age: 87
End: 2022-07-22

## 2022-07-22 ENCOUNTER — LAB (OUTPATIENT)
Dept: LAB | Facility: HOSPITAL | Age: 87
End: 2022-07-22

## 2022-07-22 DIAGNOSIS — M54.9 DORSALGIA, UNSPECIFIED: Primary | ICD-10-CM

## 2022-07-22 DIAGNOSIS — Z79.01 CURRENT USE OF LONG TERM ANTICOAGULATION: ICD-10-CM

## 2022-07-22 DIAGNOSIS — I48.91 ATRIAL FIBRILLATION, UNSPECIFIED TYPE: ICD-10-CM

## 2022-07-22 DIAGNOSIS — R29.6 REPEATED FALLS: ICD-10-CM

## 2022-07-22 DIAGNOSIS — R53.1 WEAKNESS: ICD-10-CM

## 2022-07-22 LAB
INR PPP: 2.8 (ref 0.91–1.09)
PROTHROMBIN TIME: 33.2 SECONDS (ref 10–13.8)

## 2022-07-22 PROCEDURE — 72128 CT CHEST SPINE W/O DYE: CPT

## 2022-07-22 PROCEDURE — 85610 PROTHROMBIN TIME: CPT | Performed by: FAMILY MEDICINE

## 2022-07-22 PROCEDURE — 71046 X-RAY EXAM CHEST 2 VIEWS: CPT

## 2022-07-22 PROCEDURE — 73010 X-RAY EXAM OF SHOULDER BLADE: CPT

## 2022-07-25 ENCOUNTER — TRANSCRIBE ORDERS (OUTPATIENT)
Dept: HOME HEALTH SERVICES | Facility: HOME HEALTHCARE | Age: 87
End: 2022-07-25

## 2022-07-25 ENCOUNTER — HOME HEALTH ADMISSION (OUTPATIENT)
Dept: HOME HEALTH SERVICES | Facility: HOME HEALTHCARE | Age: 87
End: 2022-07-25

## 2022-07-25 DIAGNOSIS — N39.0 URINARY TRACT INFECTION WITHOUT HEMATURIA, SITE UNSPECIFIED: Primary | ICD-10-CM

## 2022-07-28 ENCOUNTER — HOME CARE VISIT (OUTPATIENT)
Dept: HOME HEALTH SERVICES | Facility: CLINIC | Age: 87
End: 2022-07-28

## 2022-07-28 ENCOUNTER — ANTICOAGULATION VISIT (OUTPATIENT)
Dept: CARDIOLOGY | Facility: CLINIC | Age: 87
End: 2022-07-28

## 2022-07-28 VITALS
RESPIRATION RATE: 18 BRPM | SYSTOLIC BLOOD PRESSURE: 132 MMHG | DIASTOLIC BLOOD PRESSURE: 70 MMHG | OXYGEN SATURATION: 97 % | HEART RATE: 95 BPM | TEMPERATURE: 98.2 F

## 2022-07-28 PROCEDURE — G0299 HHS/HOSPICE OF RN EA 15 MIN: HCPCS

## 2022-07-29 ENCOUNTER — HOME CARE VISIT (OUTPATIENT)
Dept: HOME HEALTH SERVICES | Facility: CLINIC | Age: 87
End: 2022-07-29

## 2022-07-29 VITALS
HEART RATE: 88 BPM | OXYGEN SATURATION: 96 % | SYSTOLIC BLOOD PRESSURE: 118 MMHG | RESPIRATION RATE: 16 BRPM | DIASTOLIC BLOOD PRESSURE: 70 MMHG | TEMPERATURE: 96.8 F

## 2022-07-29 PROCEDURE — G0300 HHS/HOSPICE OF LPN EA 15 MIN: HCPCS

## 2022-07-29 PROCEDURE — G0151 HHCP-SERV OF PT,EA 15 MIN: HCPCS

## 2022-07-29 NOTE — HOME HEALTH
pre-screened covid 19    Patient is 86 year old female with history of falls and back pain.  Patient is walking with a rollator.  Patient is sleeping in the recliner and transferring to a bedside toilet due to weakness. Patient is high fall risk.  Patient requires physical therapy to reduce fall risk, improve strength, improve balance, improve endurance, improve safety, and equipment training.

## 2022-07-30 NOTE — HOME HEALTH
SOC Note: Pt admitted due to multiple falls, poor memory, medication education, COPD, and stage 2 pressure ulcer of left buttock. Pts last fall was approximately 2 weeks ago. She sustained bruised ribs. No LOC reported. Pt reports not always taking her medications when she is suppose to because she has too many to remember. Pt is on Warfarin. She has a history of stroke. Pt is blind in her right eye. She has poor balance when ambulating and admits to not using her rollator at times. Pt lives alone, but her daughter, son, and DIL are close and check on her frequently.     Home Health ordered for: SN, PT, OT, SW, medication education, fall precautions, wound care and assessment of stage 2 pressure ulcer of left buttock    Reason for Hosp/Primary Dx/Co-morbidities: Stage 2 pressure ulcer of left buttock    Focus of Care: medication education and wound care    Current Functional status/mobility/DME: Pt has poor balance when ambulating. She uses a rollator    HB status/Living Arrangements: Pt lives alone. Son, Dtr, and DIL are primary cgs    Skin Integrity/wound status: Stage 2 pressure ulcer of left buttock    Code Status: Full code, CPR    Fall Risk: 10

## 2022-08-02 ENCOUNTER — HOME CARE VISIT (OUTPATIENT)
Dept: HOME HEALTH SERVICES | Facility: CLINIC | Age: 87
End: 2022-08-02

## 2022-08-02 VITALS
OXYGEN SATURATION: 94 % | DIASTOLIC BLOOD PRESSURE: 80 MMHG | HEART RATE: 90 BPM | RESPIRATION RATE: 18 BRPM | TEMPERATURE: 98.1 F | SYSTOLIC BLOOD PRESSURE: 130 MMHG

## 2022-08-02 PROCEDURE — G0157 HHC PT ASSISTANT EA 15: HCPCS

## 2022-08-02 PROCEDURE — G0155 HHCP-SVS OF CSW,EA 15 MIN: HCPCS

## 2022-08-02 PROCEDURE — G0299 HHS/HOSPICE OF RN EA 15 MIN: HCPCS

## 2022-08-03 VITALS
HEART RATE: 80 BPM | DIASTOLIC BLOOD PRESSURE: 78 MMHG | SYSTOLIC BLOOD PRESSURE: 122 MMHG | RESPIRATION RATE: 18 BRPM | TEMPERATURE: 97.9 F | OXYGEN SATURATION: 98 %

## 2022-08-03 NOTE — HOME HEALTH
FOCUS OF CARE/SKILLED NEED: HOME SAFETY/FALL PREVENTION, MEDICATION EDUCATION, PAIN MANAGEMENT, PREVENTATIVE SKIN CARE, wound care, s/s of infection    TEACHING/INTERVENTIONS: FALL PREVENTION, MEDICATION EDUCATION, PAIN MANAGEMENT, PREVENTATIVE SKIN CARE, wound care, s/s of infection    PROGRESS TOWARDS GOALS: progressing    RECENT FALLS: none since last nurse visit    RECENT MEDICATION CHANGES: none    D/C PLAN:  DISCHARGE WITH GOALS MET    PHYSICIAN CONTACT:  none    INSURANCE CHANGES: none    PRE-SCREEN FOR COVID 19:  denies any s/s of infection

## 2022-08-03 NOTE — HOME HEALTH
SKILLED SERVICES PROVIDED / MEDICAL   ASSESSMENT OF SOCIAL AND EMOTIONAL FACTORS  COMMUNITY RESOURCE PLANNING    HOME/SOCIAL ENVIRONMENT- Patient lives alone. Her son and daughter-in-law were present during visit and report patient is having difficulty managing her home due to illness. Patient was oriented and cooperative. She states she wants to remain in her own home and seemed willing to allow in-home assistance.       Patient denies any Covid signs or symptoms or an exposure to anyone with signs or symptoms of Covid.

## 2022-08-04 ENCOUNTER — HOME CARE VISIT (OUTPATIENT)
Dept: HOME HEALTH SERVICES | Facility: CLINIC | Age: 87
End: 2022-08-04

## 2022-08-04 VITALS
TEMPERATURE: 98 F | HEART RATE: 104 BPM | SYSTOLIC BLOOD PRESSURE: 164 MMHG | DIASTOLIC BLOOD PRESSURE: 80 MMHG | OXYGEN SATURATION: 97 % | RESPIRATION RATE: 16 BRPM

## 2022-08-04 VITALS
OXYGEN SATURATION: 96 % | SYSTOLIC BLOOD PRESSURE: 104 MMHG | TEMPERATURE: 97.7 F | HEART RATE: 102 BPM | RESPIRATION RATE: 16 BRPM | DIASTOLIC BLOOD PRESSURE: 80 MMHG

## 2022-08-04 PROCEDURE — G0299 HHS/HOSPICE OF RN EA 15 MIN: HCPCS

## 2022-08-04 PROCEDURE — G0157 HHC PT ASSISTANT EA 15: HCPCS

## 2022-08-04 NOTE — HOME HEALTH
FOCUS OF CARE/SKILLED NEED: Dressing change to left buttock wound    TEACHING/INTERVENTIONS: pressure ulcer prevention, cpa    PROGRESS TOWARD GOALS: ongoing    PHYSICIAN CONTACT:  no    INSURANCE CHANGES?  no    FALLS SINCE LAST VISIT?  no    MEDICATION CHANGES? no    PLAN FOR NEXT VISIT: cpa, medication education    PRE-SCREENED FOR COVID? yes, no s/s

## 2022-08-05 ENCOUNTER — HOME CARE VISIT (OUTPATIENT)
Dept: HOME HEALTH SERVICES | Facility: HOME HEALTHCARE | Age: 87
End: 2022-08-05

## 2022-08-08 ENCOUNTER — OFFICE VISIT (OUTPATIENT)
Dept: CARDIOLOGY | Facility: CLINIC | Age: 87
End: 2022-08-08

## 2022-08-08 ENCOUNTER — LAB (OUTPATIENT)
Dept: LAB | Facility: HOSPITAL | Age: 87
End: 2022-08-08

## 2022-08-08 VITALS
BODY MASS INDEX: 21.44 KG/M2 | HEIGHT: 63 IN | SYSTOLIC BLOOD PRESSURE: 154 MMHG | DIASTOLIC BLOOD PRESSURE: 82 MMHG | HEART RATE: 104 BPM | OXYGEN SATURATION: 94 % | WEIGHT: 121 LBS

## 2022-08-08 DIAGNOSIS — I10 PRIMARY HYPERTENSION: ICD-10-CM

## 2022-08-08 DIAGNOSIS — I48.0 PAROXYSMAL ATRIAL FIBRILLATION: ICD-10-CM

## 2022-08-08 DIAGNOSIS — E78.2 MIXED HYPERLIPIDEMIA: ICD-10-CM

## 2022-08-08 DIAGNOSIS — Z79.01 CURRENT USE OF LONG TERM ANTICOAGULATION: ICD-10-CM

## 2022-08-08 DIAGNOSIS — Z98.890 H/O CARDIAC RADIOFREQUENCY ABLATION: ICD-10-CM

## 2022-08-08 DIAGNOSIS — I65.23 BILATERAL CAROTID ARTERY STENOSIS: ICD-10-CM

## 2022-08-08 DIAGNOSIS — I48.0 PAROXYSMAL ATRIAL FIBRILLATION: Primary | ICD-10-CM

## 2022-08-08 LAB
INR PPP: 3.4 (ref 0.91–1.09)
PROTHROMBIN TIME: 40.5 SECONDS (ref 10–13.8)
TSH SERPL DL<=0.05 MIU/L-ACNC: 15.4 UIU/ML (ref 0.27–4.2)

## 2022-08-08 PROCEDURE — 93000 ELECTROCARDIOGRAM COMPLETE: CPT | Performed by: NURSE PRACTITIONER

## 2022-08-08 PROCEDURE — 85610 PROTHROMBIN TIME: CPT | Performed by: NURSE PRACTITIONER

## 2022-08-08 PROCEDURE — 99214 OFFICE O/P EST MOD 30 MIN: CPT | Performed by: NURSE PRACTITIONER

## 2022-08-08 PROCEDURE — 84443 ASSAY THYROID STIM HORMONE: CPT

## 2022-08-08 PROCEDURE — 36415 COLL VENOUS BLD VENIPUNCTURE: CPT

## 2022-08-08 RX ORDER — METOPROLOL SUCCINATE 100 MG/1
150 TABLET, EXTENDED RELEASE ORAL NIGHTLY
Qty: 45 TABLET | Refills: 0 | Status: SHIPPED | OUTPATIENT
Start: 2022-08-08 | End: 2022-09-26

## 2022-08-08 NOTE — PROGRESS NOTES
"    Subjective:     Encounter Date:08/08/2022      Patient ID: Damaris Nash is a 86 y.o. female with paroxysmal atrial fibrillation s/p ablation on chronic anticoagulation, hypertension, hyperlipidemia, bilateral carotid artery disease and transient ischemic attack..    Chief Complaint: \"not feeling great\"  Atrial Fibrillation  Presents for follow-up visit. Symptoms include dizziness. Symptoms are negative for chest pain, palpitations, shortness of breath, syncope and weakness. The symptoms have been stable. Past medical history includes atrial fibrillation.   Hypertension  This is a chronic problem. The current episode started more than 1 year ago. The problem is controlled. Associated symptoms include malaise/fatigue. Pertinent negatives include no chest pain, orthopnea, palpitations, PND or shortness of breath.   Dizziness  Pertinent negatives include no chest pain, coughing, rash or weakness.     Patient presents today for a routine follow up. She is followed for PAF s/p ablation per Dr. Rajput in 2011 with no known recurrence. She had a holter monitor in 2017 that was noted to be benign. She remains anticoagulated on Coumadin with fairly controlled INRs and follows with our Coumadin clinic.     She reports she has not been feeling well for several months. She had a fall in July and hit her ribs due to a loss of balance when turning too quickly. 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 She notes she was given PRN pain medication for her pain what occurred with deep breathing and slept for almost an entire day and missed a day or two of her metoprolol. She reports she feels fatigued and has no stamina. She notes her previous symptom with arrhythmia was shortness of breath which she has not experience recently.  She denies chest pain, palpitations, edema, dyspnea, orthopnea and PND.     The following " portions of the patient's history were reviewed and updated as appropriate: allergies, current medications, past family history, past medical history, past social history, past surgical history and problem list.    Allergies   Allergen Reactions   • Epinephrine Shortness Of Breath   • Celebrex [Celecoxib]    • Ferrous Sulfate    • Other      Omnicef, some Thad, possible Rocephin allergy   • Simvastatin    • Sulfa Antibiotics        Current Outpatient Medications:   •  acetaminophen (TYLENOL) 325 MG tablet, Take 650 mg by mouth Daily As Needed for mild pain (1-3)., Disp: , Rfl:   •  ALPRAZolam (XANAX) 0.25 MG tablet, Take 0.25 mg by mouth 2 (Two) Times a Day As Needed for anxiety., Disp: , Rfl:   •  amLODIPine (NORVASC) 2.5 MG tablet, Take 2.5 mg by mouth Daily., Disp: , Rfl:   •  Cholecalciferol (VITAMIN D3) 2000 units capsule, Take 2,000 Units by mouth Daily., Disp: , Rfl:   •  hydrochlorothiazide (HYDRODIURIL) 25 MG tablet, Take 25 mg by mouth Daily., Disp: , Rfl:   •  levothyroxine (SYNTHROID, LEVOTHROID) 112 MCG tablet, Take 112 mcg by mouth Daily., Disp: , Rfl:   •  metoprolol succinate XL (TOPROL-XL) 100 MG 24 hr tablet, Take 1.5 tablets by mouth Every Night., Disp: 45 tablet, Rfl: 0  •  traMADol (ULTRAM) 50 MG tablet, Take 50 mg by mouth Every 6 (Six) Hours As Needed for moderate pain (4-6)., Disp: , Rfl:   •  warfarin (COUMADIN) 4 MG tablet, TAKE 1 TABLET BY MOUTH EVERY NIGHT, Disp: 90 tablet, Rfl: 3  Past Medical History:   Diagnosis Date   • A-fib (HCC)    • Acute ischemic right ICA stroke (HCC)    • Atrial fibrillation (HCC)    • Benign essential HTN    • Breast CA (HCC)    • Breast cancer (HCC)    • Carotid artery disease (HCC)    • CVA (cerebral vascular accident) (HCC)    • CVA (cerebral vascular accident) (HCC)    • Gallstones    • GERD (gastroesophageal reflux disease)    • Hyperlipidemia    • Hypertension    • Hypothyroid    • Hypothyroidism    • Osteopenia    • Osteopenia    • Primary pulmonary  HTN (HCC)    • S/P right hip fracture 2012   • Shortness of breath    • Stroke (HCC)    • Vitamin D deficiency        Social History     Socioeconomic History   • Marital status:    Tobacco Use   • Smoking status: Former Smoker     Years: 30.00     Types: Cigarettes     Start date:      Quit date: 1992     Years since quittin.6   • Smokeless tobacco: Never Used   Vaping Use   • Vaping Use: Never used   Substance and Sexual Activity   • Alcohol use: No   • Drug use: No   • Sexual activity: Defer       Review of Systems   Constitutional: Positive for malaise/fatigue. Negative for weight gain and weight loss.   Cardiovascular: Negative for chest pain, dyspnea on exertion, irregular heartbeat, leg swelling, near-syncope, orthopnea, palpitations, paroxysmal nocturnal dyspnea and syncope.   Respiratory: Negative for cough, shortness of breath, sleep disturbances due to breathing, sputum production and wheezing.    Skin: Negative for dry skin, flushing, itching and rash.   Musculoskeletal: Positive for falls and joint pain (right knee).   Gastrointestinal: Negative for hematemesis and hematochezia.   Neurological: Positive for dizziness and loss of balance. Negative for light-headedness and weakness.   All other systems reviewed and are negative.         Objective:     Vitals reviewed.   Constitutional:       General: Not in acute distress.     Appearance: Healthy appearance. Well-developed. Not diaphoretic.   Eyes:      General: No scleral icterus.     Conjunctiva/sclera: Conjunctivae normal.      Pupils: Pupils are equal, round, and reactive to light.   HENT:      Head: Normocephalic.    Mouth/Throat:      Pharynx: No oropharyngeal exudate.   Neck:      Vascular: No JVR.   Pulmonary:      Effort: Pulmonary effort is normal. No respiratory distress.      Breath sounds: Normal breath sounds. No wheezing. No rhonchi. No rales.   Chest:      Chest wall: Not tender to palpatation.   Cardiovascular:  "     Tachycardia present. Irregularly irregular rhythm.   Pulses:     Intact distal pulses.   Edema:     Peripheral edema absent.   Abdominal:      General: Bowel sounds are normal. There is no distension.      Palpations: Abdomen is soft.      Tenderness: There is no abdominal tenderness.   Musculoskeletal: Normal range of motion.      Cervical back: Normal range of motion and neck supple. Skin:     General: Skin is warm and dry.      Coloration: Skin is not pale.      Findings: No erythema or rash.   Neurological:      Mental Status: Alert, oriented to person, place, and time and oriented to person, place and time.      Deep Tendon Reflexes: Reflexes are normal and symmetric.   Psychiatric:         Behavior: Behavior normal.             ECG 12 Lead    Date/Time: 8/8/2022 3:53 PM  Performed by: Carolyn Smith APRN  Authorized by: Carolyn Smith APRN   Comparison: compared with previous ECG from 8/10/2021  Comparison to previous ECG: afib with RVR has replaced NSR  Rhythm: atrial fibrillation  Rate: tachycardic  BPM: 104  ST Segments: ST segments normal  T Waves: T waves normal  QRS axis: normal  Other: no other findings    Clinical impression: abnormal EKG          /82   Pulse 104   Ht 160 cm (63\")   Wt 54.9 kg (121 lb)   SpO2 94%   BMI 21.43 kg/m²     Lab Review:   I have reviewed previous office notes, recent labs and recent cardiac testing.     Lab Results   Component Value Date    CHLPL 248 (H) 08/07/2015    TRIG 152 (H) 08/07/2015    HDL 56 08/07/2015     (H) 08/07/2015     Lab Results   Component Value Date    INR 3.4 (H) 08/08/2022    INR 2.8 (H) 07/22/2022    INR 1.96 (H) 07/13/2022    PROTIME 40.5 (H) 08/08/2022    PROTIME 33.2 (H) 07/22/2022    PROTIME 21.6 (H) 07/13/2022           Assessment:          Diagnosis Plan   1. Paroxysmal atrial fibrillation (HCC)  Protime-INR    TSH   2. H/O cardiac radiofrequency ablation     3. Current use of long term anticoagulation     4. Primary " hypertension     5. Mixed hyperlipidemia     6. Bilateral carotid artery stenosis            Plan:       1. PAF- EKG reveals afib with RVR today. 1st known reoccurrence since ablation in 2011. Increase Toprol to 150mg daily. Will check TSH and INR today. 2 most recent INRs 1.96 and 2.8. check TSH today. If TSH is normal and INR is therapeutic will discuss cardioversion timing with Dr. Mota. [id number]-[id number] Score: 6  2. H/o radiofrequency ablation- in 2011 per Dr. Rajput  3. Anticoagulation- on Coumadin. Last INR therapeutic at 2.8. Follows with our coumadin clinic. Denies bleeding. Requests that we change her INR draws to be done at her PCP office due it being difficult to walk into our outpatient lab. I have reached out to SHEA Snider for the order to be faxed to her PCP lab.   4. HTN- controlled. Followed by PCP   5. HLD- followed by PCP   6. Carotid stenosis- followed by vascular      Follow up in 1 year or sooner if symptoms worsen.     I spent 30 minutes caring for Damaris on this date of service. This time includes time spent by me in the following activities:preparing for the visit, reviewing tests, obtaining and/or reviewing a separately obtained history, performing a medically appropriate examination and/or evaluation , counseling and educating the patient/family/caregiver, documenting information in the medical record, independently interpreting results and communicating that information with the patient/family/caregiver and care coordination

## 2022-08-09 ENCOUNTER — TELEPHONE (OUTPATIENT)
Dept: CARDIOLOGY | Facility: CLINIC | Age: 87
End: 2022-08-09

## 2022-08-09 ENCOUNTER — HOME CARE VISIT (OUTPATIENT)
Dept: HOME HEALTH SERVICES | Facility: CLINIC | Age: 87
End: 2022-08-09

## 2022-08-09 VITALS
TEMPERATURE: 98.2 F | OXYGEN SATURATION: 96 % | SYSTOLIC BLOOD PRESSURE: 128 MMHG | HEART RATE: 70 BPM | RESPIRATION RATE: 18 BRPM | DIASTOLIC BLOOD PRESSURE: 62 MMHG

## 2022-08-09 VITALS
SYSTOLIC BLOOD PRESSURE: 148 MMHG | OXYGEN SATURATION: 95 % | HEART RATE: 90 BPM | RESPIRATION RATE: 16 BRPM | DIASTOLIC BLOOD PRESSURE: 76 MMHG | TEMPERATURE: 98.6 F

## 2022-08-09 PROCEDURE — G0155 HHCP-SVS OF CSW,EA 15 MIN: HCPCS

## 2022-08-09 PROCEDURE — G0299 HHS/HOSPICE OF RN EA 15 MIN: HCPCS

## 2022-08-09 PROCEDURE — G0157 HHC PT ASSISTANT EA 15: HCPCS

## 2022-08-09 PROCEDURE — G0300 HHS/HOSPICE OF LPN EA 15 MIN: HCPCS

## 2022-08-09 NOTE — HOME HEALTH
FOCUS OF CARE/SKILLED NEED: HOME SAFETY/FALL PREVENTION, MEDICATION EDUCATION, PAIN MANAGEMENT, PREVENTATIVE SKIN CARE    TEACHING/INTERVENTIONS: FALL PREVENTION, MEDICATION EDUCATION, PAIN MANAGEMENT, PREVENTATIVE SKIN CARE    PROGRESS TOWARDS GOALS: progressing    RECENT FALLS:  none    RECENT MEDICATION CHANGES:  none    D/C PLAN:  DISCHARGE WITH GOALS MET    PHYSICIAN CONTACT:  none    INSURANCE CHANGES: none    PRE-SCREEN FOR COVID 19:  denies any s/s of  covid

## 2022-08-09 NOTE — HOME HEALTH
Covid 19 pre-screen performed.Pt states no falls or changes to insurance or medicaiton since last visit. Pt states that she went to Dr Mota's office yesterday and that she is back in A-fib.Next visit to address gt mechanics, knowledge of HEP, and transfer training. Pt states no c/o or adverse effects after rx.

## 2022-08-09 NOTE — TELEPHONE ENCOUNTER
The patient is called and notified of her TSH lab result and the recommendation to check in with her PCP to do any medication adjustment that might be needed.  She reports she has not been good about remembering to take her levothyroxine.  She voices understanding.  Shelly Hayes MA

## 2022-08-09 NOTE — TELEPHONE ENCOUNTER
----- Message from POLY Thornton sent at 8/9/2022  7:56 AM CDT -----  Please let her know her TSH is high and her synthroid needs to be adjusted per her PCP.

## 2022-08-10 ENCOUNTER — TELEPHONE (OUTPATIENT)
Dept: CARDIOLOGY | Facility: CLINIC | Age: 87
End: 2022-08-10

## 2022-08-10 ENCOUNTER — ANTICOAGULATION VISIT (OUTPATIENT)
Dept: CARDIOLOGY | Facility: CLINIC | Age: 87
End: 2022-08-10

## 2022-08-10 ENCOUNTER — HOME CARE VISIT (OUTPATIENT)
Dept: HOME HEALTH SERVICES | Facility: CLINIC | Age: 87
End: 2022-08-10

## 2022-08-10 VITALS
SYSTOLIC BLOOD PRESSURE: 128 MMHG | OXYGEN SATURATION: 94 % | TEMPERATURE: 98.4 F | DIASTOLIC BLOOD PRESSURE: 64 MMHG | HEART RATE: 78 BPM

## 2022-08-10 PROCEDURE — G0152 HHCP-SERV OF OT,EA 15 MIN: HCPCS

## 2022-08-10 NOTE — TELEPHONE ENCOUNTER
----- Message from Riley Mota MD sent at 8/10/2022  9:21 AM CDT -----  Will cardiovert as soon as the next INR is therapeutic  ----- Message -----  From: Carolyn Smith APRN  Sent: 8/9/2022   7:57 AM CDT  To: Riley Mota MD    She needs a cardioversion. Her last 4 INRs are 3.4, 2.8, 1.96, 1.8. do you want me to recheck and INR in a week and then schedule her if it is therapeutic or cardiovert her now?

## 2022-08-10 NOTE — HOME HEALTH
"OCCUPATIONAL THERAPY EVALUATION    REASON FOR REFERRAL-   Patient referred due to recent decline in strength and ADL's.  PRIMARY DIAGNOSIS-  UTI   SECONDARY DIAGNOSIS-  dorsalgia, patient reports \"a-fib has come back\" frequent falls  SURGICAL PROCEDURES-  none    PREVIOUS OCCUPATIONAL THERAPY- no  OXYGEN USE- n/a    CLINICAL FINDINGS:  WOUND / SKIN CONDITION- n/a  EDEMA-  none noted     EQUIPMENT : rollator                 DRIVING- yes          FUNCTIONAL ENDURANCE FOR SAFE ACTIVITIES OF DAILY LIVING / INSTRUMENTAL ACTIVITIES OF DAILY LIVING:  fair      WEIGHT BEARING STATUS/PRECAUTIONS-   WBAT    ASSISTIVE DEVICE-  rollator     ACTIVITIES OF DAILY LIVING MOBILITY/FALLS RISK ASSESSMENT- at risk for falls due to weakness, impaired gait and balance, dependence on AD      MEDICAL NECESSITY- Skilled OT medically necessary to address UB strength and ADL completion    PATIENT GOAL FOR THIS EPISODE OF CARE- to get stronger    TODAY'S INTERVENTIONS-   Initial eval completed. Goals and POC discussed with patient and family and ADL training initiated. Patient and patient's son/DIL educated on use of tub transfer bench and how to set up. Educated on how to order or where to possibly find donated items in [place] at [place].     REHAB POTENTIAL-   good for stated goals    DATE OF NEXT APPOINTMENT WITH DOCTOR- cardiologist in a few weeks to follow up on a-fib.  PATIENT / CAREGIVER AGREE WITH DISCHARGE PLAN- yes  COMMUNICATION / CARE COORDINATION- staff re: OT eval."

## 2022-08-11 ENCOUNTER — HOME CARE VISIT (OUTPATIENT)
Dept: HOME HEALTH SERVICES | Facility: CLINIC | Age: 87
End: 2022-08-11

## 2022-08-11 VITALS
TEMPERATURE: 98.2 F | OXYGEN SATURATION: 97 % | SYSTOLIC BLOOD PRESSURE: 124 MMHG | RESPIRATION RATE: 16 BRPM | DIASTOLIC BLOOD PRESSURE: 72 MMHG | HEART RATE: 85 BPM

## 2022-08-11 PROCEDURE — G0299 HHS/HOSPICE OF RN EA 15 MIN: HCPCS

## 2022-08-11 PROCEDURE — G0300 HHS/HOSPICE OF LPN EA 15 MIN: HCPCS

## 2022-08-11 PROCEDURE — G0157 HHC PT ASSISTANT EA 15: HCPCS

## 2022-08-11 NOTE — HOME HEALTH
Patient states she does not have any pain. She reports compliance with HEP. She notes she has problems with her balance and is afraid of falling.    Plan: Continue gait training outdoors, steps, and implement balance training.

## 2022-08-11 NOTE — HOME HEALTH
FOCUS OF CARE/SKILLED NEED: HOME SAFETY/FALL PREVENTION, MEDICATION EDUCATION, PAIN MANAGEMENT, PREVENTATIVE SKIN CARE    TEACHING/INTERVENTIONS: FALL PREVENTION, MEDICATION EDUCATION, PAIN MANAGEMENT, PREVENTATIVE SKIN CARE    PROGRESS TOWARDS GOALS: progressing    RECENT FALLS:  none    RECENT MEDICATION CHANGES: none    D/C PLAN:  DISCHARGE WITH GOALS MET    PHYSICIAN CONTACT:  none    INSURANCE CHANGES: none    PRE-SCREEN FOR COVID 19: denies any s/s of infection

## 2022-08-15 ENCOUNTER — HOME CARE VISIT (OUTPATIENT)
Dept: HOME HEALTH SERVICES | Facility: CLINIC | Age: 87
End: 2022-08-15

## 2022-08-15 VITALS
RESPIRATION RATE: 16 BRPM | SYSTOLIC BLOOD PRESSURE: 141 MMHG | OXYGEN SATURATION: 95 % | HEART RATE: 79 BPM | DIASTOLIC BLOOD PRESSURE: 80 MMHG

## 2022-08-15 PROCEDURE — G0157 HHC PT ASSISTANT EA 15: HCPCS

## 2022-08-15 NOTE — HOME HEALTH
SUBJECTIVE: Patient denies pain. She says she is doing her exercises everyday and has been up walking around her house often. She denies falls, changes in medication or insurance. Patient states she would like to be able to feel comfortable and balanced enough to take her cane outside and check her own mail. Patient requests to continue PT to address balance and gait with cane.    ASSESSMENT: Patient is unsteady with balance challenges and gait with cane. Patient instructed to use walker to ensure safety until balance can be addressed further. Patient agreeable to do so. She is compliant with HEP and is performing daily. She would benefit from continuation of therapy to address balance and gait with cane outdoors.    PLAN: Continue with focus on gait and balance.

## 2022-08-15 NOTE — CASE COMMUNICATION
Patient is scheduled for discipline DC from PT this Wed. but is requesting to continue PT in order to continue focusing on balance and gait training with her cane so that she can safely walk to her mailbox to get her mail. Patient is unsteady with gait using cane outdoors and would benefit from continued treatment that focuses on gait on on uneven surfaces and balance training. Anna, Is it possible to have a reassment on Wed. for poss ible continuation?

## 2022-08-16 ENCOUNTER — HOME CARE VISIT (OUTPATIENT)
Dept: HOME HEALTH SERVICES | Facility: CLINIC | Age: 87
End: 2022-08-16

## 2022-08-16 VITALS
SYSTOLIC BLOOD PRESSURE: 116 MMHG | TEMPERATURE: 98.3 F | DIASTOLIC BLOOD PRESSURE: 70 MMHG | HEART RATE: 103 BPM | RESPIRATION RATE: 16 BRPM | OXYGEN SATURATION: 98 %

## 2022-08-16 PROCEDURE — G0299 HHS/HOSPICE OF RN EA 15 MIN: HCPCS

## 2022-08-16 PROCEDURE — G0300 HHS/HOSPICE OF LPN EA 15 MIN: HCPCS

## 2022-08-18 ENCOUNTER — HOME CARE VISIT (OUTPATIENT)
Dept: HOME HEALTH SERVICES | Facility: CLINIC | Age: 87
End: 2022-08-18

## 2022-08-18 VITALS
HEART RATE: 87 BPM | TEMPERATURE: 97.9 F | DIASTOLIC BLOOD PRESSURE: 88 MMHG | RESPIRATION RATE: 16 BRPM | OXYGEN SATURATION: 97 % | SYSTOLIC BLOOD PRESSURE: 138 MMHG

## 2022-08-18 PROCEDURE — G0151 HHCP-SERV OF PT,EA 15 MIN: HCPCS

## 2022-08-18 PROCEDURE — G0300 HHS/HOSPICE OF LPN EA 15 MIN: HCPCS

## 2022-08-18 PROCEDURE — G0299 HHS/HOSPICE OF RN EA 15 MIN: HCPCS

## 2022-08-18 NOTE — HOME HEALTH
pre-screened covid 19    Patient request to continue with physical therapy to address her decreased balance and high fall risk.

## 2022-08-19 ENCOUNTER — HOME CARE VISIT (OUTPATIENT)
Dept: HOME HEALTH SERVICES | Facility: HOME HEALTHCARE | Age: 87
End: 2022-08-19

## 2022-08-19 ENCOUNTER — LAB (OUTPATIENT)
Dept: LAB | Facility: HOSPITAL | Age: 87
End: 2022-08-19

## 2022-08-19 VITALS
DIASTOLIC BLOOD PRESSURE: 56 MMHG | RESPIRATION RATE: 16 BRPM | OXYGEN SATURATION: 95 % | HEART RATE: 90 BPM | TEMPERATURE: 98.2 F | SYSTOLIC BLOOD PRESSURE: 100 MMHG

## 2022-08-19 DIAGNOSIS — Z79.01 CURRENT USE OF LONG TERM ANTICOAGULATION: ICD-10-CM

## 2022-08-19 DIAGNOSIS — I48.91 ATRIAL FIBRILLATION, UNSPECIFIED TYPE: ICD-10-CM

## 2022-08-19 LAB
INR PPP: 2.8 (ref 0.91–1.09)
PROTHROMBIN TIME: 34 SECONDS (ref 10–13.8)

## 2022-08-19 PROCEDURE — 85610 PROTHROMBIN TIME: CPT | Performed by: FAMILY MEDICINE

## 2022-08-19 NOTE — HOME HEALTH
FOCUS OF CARE/SKILLED NEED: HOME SAFETY/FALL PREVENTION, MEDICATION EDUCATION, PAIN MANAGEMENT, PREVENTATIVE SKIN CARE    TEACHING/INTERVENTIONS: FALL PREVENTION, MEDICATION EDUCATION, PAIN MANAGEMENT, PREVENTATIVE SKIN CARE    PROGRESS TOWARDS GOALS: progressing    RECENT FALLS:  none    RECENT MEDICATION CHANGES:  none    D/C PLAN:  DISCHARGE WITH GOALS MET    PHYSICIAN CONTACT:    none    INSURANCE CHANGES:   no none    PRE-SCREEN FOR COVID 19:  denies any s/s of covid

## 2022-08-22 ENCOUNTER — ANTICOAGULATION VISIT (OUTPATIENT)
Dept: CARDIOLOGY | Facility: CLINIC | Age: 87
End: 2022-08-22

## 2022-08-22 ENCOUNTER — TELEPHONE (OUTPATIENT)
Dept: CARDIOLOGY | Facility: CLINIC | Age: 87
End: 2022-08-22

## 2022-08-22 DIAGNOSIS — I48.0 PAROXYSMAL ATRIAL FIBRILLATION: Primary | ICD-10-CM

## 2022-08-22 RX ORDER — SODIUM CHLORIDE 0.9 % (FLUSH) 0.9 %
10 SYRINGE (ML) INJECTION AS NEEDED
Status: SHIPPED | OUTPATIENT
Start: 2022-08-22

## 2022-08-22 RX ORDER — SODIUM CHLORIDE 0.9 % (FLUSH) 0.9 %
10 SYRINGE (ML) INJECTION EVERY 12 HOURS SCHEDULED
Status: SHIPPED | OUTPATIENT
Start: 2022-08-22

## 2022-08-22 RX ORDER — SODIUM CHLORIDE 9 MG/ML
20 INJECTION, SOLUTION INTRAVENOUS CONTINUOUS
Status: CANCELLED | OUTPATIENT
Start: 2022-08-22

## 2022-08-23 ENCOUNTER — HOME CARE VISIT (OUTPATIENT)
Dept: HOME HEALTH SERVICES | Facility: CLINIC | Age: 87
End: 2022-08-23

## 2022-08-23 VITALS
OXYGEN SATURATION: 95 % | DIASTOLIC BLOOD PRESSURE: 62 MMHG | SYSTOLIC BLOOD PRESSURE: 116 MMHG | TEMPERATURE: 98.8 F | RESPIRATION RATE: 16 BRPM | HEART RATE: 82 BPM

## 2022-08-23 VITALS
SYSTOLIC BLOOD PRESSURE: 118 MMHG | DIASTOLIC BLOOD PRESSURE: 78 MMHG | HEART RATE: 70 BPM | OXYGEN SATURATION: 93 % | TEMPERATURE: 98.4 F

## 2022-08-23 PROCEDURE — G0495 RN CARE TRAIN/EDU IN HH: HCPCS

## 2022-08-23 PROCEDURE — G0152 HHCP-SERV OF OT,EA 15 MIN: HCPCS

## 2022-08-23 NOTE — HOME HEALTH
FOCUS OF CARE/SKILLED NEED: Teaching/education medications, fall safety/prevention    TEACHING/INTERVENTIONS: A&O X 4, VSS, Pt c/o of no pain today.    PROGRESS TOWARD GOALS:Ongpoing, progressing    PHYSICIAN CONTACT: No     INSURANCE CHANGES? No     FALLS SINCE LAST VISIT? No     MEDICATION CHANGES SINCE LAST VISIT? No    PRE-SCREENED FOR COVID? Yes, No s/s of COVID. No recent exposure.

## 2022-08-24 ENCOUNTER — HOME CARE VISIT (OUTPATIENT)
Dept: HOME HEALTH SERVICES | Facility: CLINIC | Age: 87
End: 2022-08-24

## 2022-08-24 VITALS
RESPIRATION RATE: 18 BRPM | DIASTOLIC BLOOD PRESSURE: 72 MMHG | TEMPERATURE: 97.7 F | OXYGEN SATURATION: 95 % | SYSTOLIC BLOOD PRESSURE: 118 MMHG | HEART RATE: 82 BPM

## 2022-08-24 PROCEDURE — G0157 HHC PT ASSISTANT EA 15: HCPCS

## 2022-08-24 NOTE — HOME HEALTH
Subjective:Covid screen completed.  Patient reports she is getting a cardioversion on Sept 7. Saw Dr. Mota last Friday    Falls:none reported    Medication changes:none reported    Insurance changes:none reported    Edema: none noted    Medical necessity for continued visits: Skilled OT medically necessary to aide in increasing ADL safety and strengthening    Next MD appt: Sept 7 for cardioversion, 8/26/at 1:10pm Dr. Beltran    Plan for next visit: Plan to address shower transfers as tolerated.

## 2022-08-24 NOTE — HOME HEALTH
SUBJECTIVE: Patient states her son has been helping her to her mailbox and she has been using the cane. She says she still does not feel confident walking herself to the mailbox for fear of falling. She says she is still using the rollator in the home. She says she will have a cardioverson on Sept. 7th.     TEACHING/INTERVENTIONS: balance train, gait training, transfer training, safety and fall prevention eduction    PROGRESS TOWARD GOALS: Patient is progressing with gait, balance and transfers. she is still apprehensive about walking outdoors by herself but is aloso practicing this with family when available. She fatigues easily and takes 2 short standing rest breaks with gait today.    PHYSICIAN CONTACT:  NO    INSURANCE CHANGES? NO    FALLS SINCE LAST VISIT? NO    MEDICATION CHANGES SINCE LAST VISIT? NO    SUPPLEMENTAL OXYGEN USE? NO    WOUNDS/ SKIN BREAKDOWN? NO    PLAN FOR NEXT VISIT:

## 2022-08-25 ENCOUNTER — HOME CARE VISIT (OUTPATIENT)
Dept: HOME HEALTH SERVICES | Facility: HOME HEALTHCARE | Age: 87
End: 2022-08-25

## 2022-08-25 ENCOUNTER — HOME CARE VISIT (OUTPATIENT)
Dept: HOME HEALTH SERVICES | Facility: CLINIC | Age: 87
End: 2022-08-25

## 2022-08-25 VITALS
OXYGEN SATURATION: 98 % | SYSTOLIC BLOOD PRESSURE: 110 MMHG | DIASTOLIC BLOOD PRESSURE: 72 MMHG | HEART RATE: 76 BPM | RESPIRATION RATE: 18 BRPM | TEMPERATURE: 98.2 F

## 2022-08-25 PROCEDURE — G0152 HHCP-SERV OF OT,EA 15 MIN: HCPCS

## 2022-08-25 PROCEDURE — G0299 HHS/HOSPICE OF RN EA 15 MIN: HCPCS

## 2022-08-25 NOTE — HOME HEALTH
FOCUS OF CARE/SKILLED NEED: CPA/EDUCATION    TEACHING/INTERVENTIONS: PT DENIES FALLS, CHEST PAIN OR WORSENING SOB. PT STATES SHE JUST FINISHED GETTING A SHOWER WITH THE OCCUPATIONAL THERAPIST. STATES IT IS THE FIRST SHOWER SHE HAS HAD IN SEVERAL MONTHS. PT STATES SHE FEELS SO MUCH BETTER AND FELT SAFE DURING THE PROCEDURE. PT IS SCHEDULED FOR A CARDIOVERSION ON 9/8 FOR HER WORSENING AFIB. PT IS IN AFIB TODAY AND STATES HAS BEEN FOR SINCE HER FALL IN JUNE. PT HAD AN INCREASE IN HER METROPOLOL THAT HAS HELPED LOWER HER PULSE SOME BUT NOT AS MUCH AS MD WOULD HAVE LIKED. PULSE TODAY 76 IRREGULAR. OTHER VSS. LS CLEAR BILAT. NO DISTRESS NOTED. EDUCATED PT AND DTR IN LAW ON COUMADIN, IT'S EFFECTS ON AFIB AND THE HEART AND HOW IT IS USED TO DECREASE CHANCE OF BLOOD CLOTS. BLEEDING PRECAUTIONS EDUCATED TO PT AND DTR IN LAW. ALL VERB UNDERSTANDING AND CAN REPEAT BACK EDUCATION. PT HAS A FOLLOW UP APPT TOMORROW WITH DR BECKER.     PROGRESS TOWARD GOALS: PROGRESSING AS EXPECTED.     PHYSICIAN CONTACT: NONE NEEDED THIS DATE    INSURANCE CHANGES? DENIES    FALLS SINCE LAST VISIT? DENIES    MEDICATION CHANGES? NONE    PLAN FOR NEXT VISIT: CPA/EDUCATION. BLEEDING PRECAUTIONS TAUGHT THIS DATE.     COVID PRESCREEN: NEGATIVE.

## 2022-08-26 ENCOUNTER — HOME CARE VISIT (OUTPATIENT)
Dept: HOME HEALTH SERVICES | Facility: CLINIC | Age: 87
End: 2022-08-26

## 2022-08-26 VITALS
OXYGEN SATURATION: 95 % | SYSTOLIC BLOOD PRESSURE: 121 MMHG | HEART RATE: 78 BPM | RESPIRATION RATE: 16 BRPM | DIASTOLIC BLOOD PRESSURE: 72 MMHG | TEMPERATURE: 98.2 F

## 2022-08-26 PROCEDURE — G0157 HHC PT ASSISTANT EA 15: HCPCS

## 2022-08-26 NOTE — HOME HEALTH
SUBJECTIVE: Patient says she saw her doctor today and she did a urine culture and it came back positive for a UTI..     TEACHING/INTERVENTIONS:     PROGRESS TOWARD GOALS:    PHYSICIAN CONTACT: NO    INSURANCE CHANGES? NO    FALLS SINCE LAST VISIT? NO    MEDICATION CHANGES SINCE LAST VISIT? yes, Nitrofuran 100mg. BID by mouth until gone    SUPPLEMENTAL OXYGEN USE? NO    WOUNDS/ SKIN BREAKDOWN? NO    PLAN FOR NEXT VISIT: Continue per poc with focus on gait training with cane and balance.

## 2022-08-28 VITALS
SYSTOLIC BLOOD PRESSURE: 124 MMHG | HEART RATE: 74 BPM | TEMPERATURE: 98.1 F | RESPIRATION RATE: 16 BRPM | DIASTOLIC BLOOD PRESSURE: 58 MMHG | OXYGEN SATURATION: 96 %

## 2022-08-28 NOTE — HOME HEALTH
Subjective: Pre-screened for Covid. Patient here with family today. Family cleaning and putting up grab bar in patient's bathroom.    Falls:none    Medication changes: none    Insurance changes:none    Edema:none    Medical necessity for continued visits: Skilled OT medically necessary to address ADL safety and strength.    Next MD appt: 8/26/22    Plan for next visit: UB strength/ADL's.

## 2022-08-29 ENCOUNTER — HOME CARE VISIT (OUTPATIENT)
Dept: HOME HEALTH SERVICES | Facility: CLINIC | Age: 87
End: 2022-08-29

## 2022-08-29 PROCEDURE — G0157 HHC PT ASSISTANT EA 15: HCPCS

## 2022-08-30 ENCOUNTER — HOME CARE VISIT (OUTPATIENT)
Dept: HOME HEALTH SERVICES | Facility: CLINIC | Age: 87
End: 2022-08-30

## 2022-08-30 VITALS
SYSTOLIC BLOOD PRESSURE: 120 MMHG | TEMPERATURE: 98 F | RESPIRATION RATE: 18 BRPM | HEART RATE: 88 BPM | OXYGEN SATURATION: 96 % | DIASTOLIC BLOOD PRESSURE: 56 MMHG

## 2022-08-30 VITALS
HEART RATE: 88 BPM | OXYGEN SATURATION: 96 % | TEMPERATURE: 98 F | DIASTOLIC BLOOD PRESSURE: 56 MMHG | RESPIRATION RATE: 18 BRPM | SYSTOLIC BLOOD PRESSURE: 120 MMHG

## 2022-08-30 PROCEDURE — G0299 HHS/HOSPICE OF RN EA 15 MIN: HCPCS

## 2022-08-30 PROCEDURE — G0152 HHCP-SERV OF OT,EA 15 MIN: HCPCS

## 2022-08-30 PROCEDURE — G0300 HHS/HOSPICE OF LPN EA 15 MIN: HCPCS

## 2022-08-30 NOTE — HOME HEALTH
FOCUS OF CARE/SKILLED NEED: HOME SAFETY/FALL PREVENTION, MEDICATION EDUCATION, PAIN MANAGEMENT, PREVENTATIVE SKIN CARE, AFib    TEACHING/INTERVENTIONS: FALL PREVENTION, MEDICATION EDUCATION, PAIN MANAGEMENT, PREVENTATIVE SKIN CARE, AFib    PROGRESS TOWARDS GOALS: progressing    RECENT FALLS:  none    RECENT MEDICATION CHANGES: none    D/C PLAN:  DISCHARGE WITH GOALS MET    PHYSICIAN CONTACT:  none    INSURANCE CHANGES: none    PRE-SCREEN FOR COVID 19:  denies any s/s of covid

## 2022-08-31 VITALS
RESPIRATION RATE: 18 BRPM | TEMPERATURE: 97.9 F | HEART RATE: 71 BPM | OXYGEN SATURATION: 97 % | DIASTOLIC BLOOD PRESSURE: 82 MMHG | SYSTOLIC BLOOD PRESSURE: 132 MMHG

## 2022-08-31 NOTE — HOME HEALTH
SUBJECTIVE: Patient says she is doing well today. She reports she continues to walk with her family outdoors and feels it is getting easier.     TEACHING/INTERVENTIONS: gait and step training, balance challenges, fall prevention education.    PROGRESS TOWARD GOALS: Patient is ambulating with safe technique inside/outside and on steps. She continues to fatigue with longer distances but is able to complete walk with short standing rest breaks. Patient tends to sit quickly when fatigued. Patient continued to be educated to sit with control and reach back prior to sitting to reduce risk for falls with verbalized understanding.    PHYSICIAN CONTACT: No    INSURANCE CHANGES? No    FALLS SINCE LAST VISIT? No    MEDICATION CHANGES SINCE LAST VISIT? No    SUPPLEMENTAL OXYGEN USE? No    WOUNDS/ SKIN BREAKDOWN? No    PLAN FOR NEXT VISIT: DC to continue at home independently and with assistance of family.

## 2022-09-01 ENCOUNTER — HOME CARE VISIT (OUTPATIENT)
Dept: HOME HEALTH SERVICES | Facility: CLINIC | Age: 87
End: 2022-09-01

## 2022-09-01 VITALS
RESPIRATION RATE: 16 BRPM | DIASTOLIC BLOOD PRESSURE: 62 MMHG | SYSTOLIC BLOOD PRESSURE: 126 MMHG | HEART RATE: 84 BPM | OXYGEN SATURATION: 96 % | TEMPERATURE: 97.9 F

## 2022-09-01 PROCEDURE — G0152 HHCP-SERV OF OT,EA 15 MIN: HCPCS

## 2022-09-01 PROCEDURE — G0300 HHS/HOSPICE OF LPN EA 15 MIN: HCPCS

## 2022-09-01 PROCEDURE — G0299 HHS/HOSPICE OF RN EA 15 MIN: HCPCS

## 2022-09-02 ENCOUNTER — HOME CARE VISIT (OUTPATIENT)
Dept: HOME HEALTH SERVICES | Facility: CLINIC | Age: 87
End: 2022-09-02

## 2022-09-02 VITALS
HEART RATE: 84 BPM | OXYGEN SATURATION: 96 % | SYSTOLIC BLOOD PRESSURE: 126 MMHG | DIASTOLIC BLOOD PRESSURE: 62 MMHG | TEMPERATURE: 97.9 F | RESPIRATION RATE: 18 BRPM

## 2022-09-02 VITALS
HEART RATE: 76 BPM | TEMPERATURE: 97.7 F | RESPIRATION RATE: 18 BRPM | SYSTOLIC BLOOD PRESSURE: 142 MMHG | DIASTOLIC BLOOD PRESSURE: 72 MMHG | OXYGEN SATURATION: 95 %

## 2022-09-02 PROCEDURE — G0157 HHC PT ASSISTANT EA 15: HCPCS

## 2022-09-02 NOTE — HOME HEALTH
SUBJECTIVE: Patient says she will be getting her heart shocked next Wed. and is kind of nervous and is hoping it works.  She denies pain. When she is asked to how her exercises are going she says she is performing them every other day on average and has been walking with her son outside.     TEACHING/INTERVENTIONS: gait training, step training, balance testing    PROGRESS TOWARD GOALS: Patient has gained more confidence using STC and walking outdoors to mailbox but for safety reasons is only walking outside with family. She is using both STC and rollator inside to ensure safety. Patient to DC and continue exercises independently and gait outdoors with family.    PHYSICIAN CONTACT: NO    INSURANCE CHANGES? NO    FALLS SINCE LAST VISIT? NO    MEDICATION CHANGES SINCE LAST VISIT? NO    SUPPLEMENTAL OXYGEN USE? NO    WOUNDS/ SKIN BREAKDOWN? NO    PLAN FOR NEXT VISIT: ME this visit

## 2022-09-02 NOTE — HOME HEALTH
Subjective: Covid screen completed. Patient states she has no energy today and would like to hold off on shower.    Falls:none reported    Medication changes:none reported    Insurance changes:none reported    Edema:none noted    Medical necessity for continued visits: Skilled OT medically necessary to further address shower skills with tub transfer bench.    Next MD appt:9/7/22 for cardioversion    Plan for next visit: Plan to address shower with tub bench.

## 2022-09-06 ENCOUNTER — HOME CARE VISIT (OUTPATIENT)
Dept: HOME HEALTH SERVICES | Facility: CLINIC | Age: 87
End: 2022-09-06

## 2022-09-06 VITALS
HEART RATE: 73 BPM | SYSTOLIC BLOOD PRESSURE: 138 MMHG | DIASTOLIC BLOOD PRESSURE: 82 MMHG | OXYGEN SATURATION: 97 % | RESPIRATION RATE: 16 BRPM | TEMPERATURE: 97.7 F

## 2022-09-06 PROCEDURE — G0152 HHCP-SERV OF OT,EA 15 MIN: HCPCS

## 2022-09-07 ENCOUNTER — ANESTHESIA (OUTPATIENT)
Dept: CARDIOLOGY | Facility: HOSPITAL | Age: 87
End: 2022-09-07

## 2022-09-07 ENCOUNTER — ANESTHESIA EVENT (OUTPATIENT)
Dept: CARDIOLOGY | Facility: HOSPITAL | Age: 87
End: 2022-09-07

## 2022-09-07 ENCOUNTER — HOSPITAL ENCOUNTER (OUTPATIENT)
Dept: CARDIOLOGY | Facility: HOSPITAL | Age: 87
Setting detail: HOSPITAL OUTPATIENT SURGERY
Discharge: HOME | End: 2022-09-07

## 2022-09-07 VITALS
DIASTOLIC BLOOD PRESSURE: 81 MMHG | OXYGEN SATURATION: 94 % | SYSTOLIC BLOOD PRESSURE: 156 MMHG | HEIGHT: 63 IN | BODY MASS INDEX: 20.73 KG/M2 | RESPIRATION RATE: 16 BRPM | WEIGHT: 117 LBS | HEART RATE: 60 BPM | TEMPERATURE: 96.1 F

## 2022-09-07 VITALS — SYSTOLIC BLOOD PRESSURE: 156 MMHG | OXYGEN SATURATION: 99 % | HEART RATE: 61 BPM | DIASTOLIC BLOOD PRESSURE: 70 MMHG

## 2022-09-07 DIAGNOSIS — I48.0 PAROXYSMAL ATRIAL FIBRILLATION: ICD-10-CM

## 2022-09-07 LAB
MAXIMAL PREDICTED HEART RATE: 134 BPM
STRESS TARGET HR: 114 BPM

## 2022-09-07 PROCEDURE — 92960 CARDIOVERSION ELECTRIC EXT: CPT

## 2022-09-07 PROCEDURE — 92960 CARDIOVERSION ELECTRIC EXT: CPT | Performed by: INTERNAL MEDICINE

## 2022-09-07 PROCEDURE — 93010 ELECTROCARDIOGRAM REPORT: CPT | Performed by: EMERGENCY MEDICINE

## 2022-09-07 PROCEDURE — 93005 ELECTROCARDIOGRAM TRACING: CPT | Performed by: NURSE PRACTITIONER

## 2022-09-07 PROCEDURE — 25010000002 PROPOFOL 10 MG/ML EMULSION: Performed by: NURSE ANESTHETIST, CERTIFIED REGISTERED

## 2022-09-07 RX ORDER — LIDOCAINE HYDROCHLORIDE 20 MG/ML
INJECTION, SOLUTION EPIDURAL; INFILTRATION; INTRACAUDAL; PERINEURAL AS NEEDED
Status: DISCONTINUED | OUTPATIENT
Start: 2022-09-07 | End: 2022-09-07 | Stop reason: SURG

## 2022-09-07 RX ORDER — SODIUM CHLORIDE 9 MG/ML
20 INJECTION, SOLUTION INTRAVENOUS CONTINUOUS
Status: DISCONTINUED | OUTPATIENT
Start: 2022-09-07 | End: 2022-09-08 | Stop reason: HOSPADM

## 2022-09-07 RX ORDER — PROPOFOL 10 MG/ML
VIAL (ML) INTRAVENOUS AS NEEDED
Status: DISCONTINUED | OUTPATIENT
Start: 2022-09-07 | End: 2022-09-07 | Stop reason: SURG

## 2022-09-07 RX ADMIN — SODIUM CHLORIDE 20 ML/HR: 9 INJECTION, SOLUTION INTRAVENOUS at 08:04

## 2022-09-07 RX ADMIN — PROPOFOL 40 MG: 10 INJECTION, EMULSION INTRAVENOUS at 09:19

## 2022-09-07 RX ADMIN — LIDOCAINE HYDROCHLORIDE 40 MG: 20 INJECTION, SOLUTION EPIDURAL; INFILTRATION; INTRACAUDAL; PERINEURAL at 09:19

## 2022-09-07 NOTE — NURSING NOTE
Reviewed discharge instructions, pt asking about lopressor, ?to continue with the 1.5tablet.    Spoke to Dr. Mota in nursing station, Dr. Mota said to continue with the 1.5 tablet of lopressor.     Pt's son at bedside, assisted pt with dressing. Pt ready for discharge.

## 2022-09-07 NOTE — ANESTHESIA PREPROCEDURE EVALUATION
Anesthesia Evaluation     Patient summary reviewed   no history of anesthetic complications:  NPO Solid Status: > 8 hours  NPO Liquid Status: > 8 hours           Airway   Dental      Pulmonary    (+) shortness of breath,   Cardiovascular     PT is on anticoagulation therapy    (+) hypertension, dysrhythmias Atrial Fib, hyperlipidemia,       Neuro/Psych  (+) TIA, CVA,    GI/Hepatic/Renal/Endo    (+)   thyroid problem     Musculoskeletal     Abdominal    Substance History      OB/GYN          Other      history of cancer (breast)                    Anesthesia Plan    ASA 3     MAC     (Chart reviewed, CRNA to complete R/B/A discussion, exam and review of history prior to proceeding.)          CODE STATUS:        abdominal pain

## 2022-09-07 NOTE — ANESTHESIA POSTPROCEDURE EVALUATION
"Patient: Damaris Nash    Procedure Summary     Date: 09/07/22 Room / Location: [facility] CATH LAB    Anesthesia Start: 0916 Anesthesia Stop: 0930    Procedure: CARDIOVERSION EXTERNAL IN CARDIOLOGY DEPARTMENT Diagnosis:       Paroxysmal atrial fibrillation (HCC)      (afib)    Scheduled Providers: Riley Mota MD Provider: Matthew Garnett CRNA    Anesthesia Type: MAC ASA Status: 3          Anesthesia Type: MAC    Vitals  Vitals Value Taken Time   /70 09/07/22 0926   Temp     Pulse 61 09/07/22 0929   Resp     SpO2 99 % 09/07/22 0929           Post Anesthesia Care and Evaluation    Patient location during evaluation: PHASE II  Patient participation: complete - patient participated  Level of consciousness: awake and alert  Pain management: adequate    Airway patency: patent  Anesthetic complications: No anesthetic complications    Cardiovascular status: acceptable  Respiratory status: acceptable  Hydration status: acceptable    Comments: Blood pressure 164/87, pulse 84, temperature 96.1 °F (35.6 °C), temperature source Tympanic, height 160 cm (63\"), weight 53.1 kg (117 lb), SpO2 95 %, not currently breastfeeding.    Pt discharged from PACU based on tracy score >8      " Jake Alves is a 28year old male presenting with right ear pain and itching X 2 1/2 weeks.

## 2022-09-08 ENCOUNTER — HOME CARE VISIT (OUTPATIENT)
Dept: HOME HEALTH SERVICES | Facility: CLINIC | Age: 87
End: 2022-09-08

## 2022-09-08 VITALS
OXYGEN SATURATION: 94 % | HEART RATE: 64 BPM | RESPIRATION RATE: 16 BRPM | TEMPERATURE: 98.3 F | DIASTOLIC BLOOD PRESSURE: 58 MMHG | SYSTOLIC BLOOD PRESSURE: 112 MMHG

## 2022-09-08 LAB
QT INTERVAL: 464 MS
QTC INTERVAL: 467 MS

## 2022-09-08 PROCEDURE — G0299 HHS/HOSPICE OF RN EA 15 MIN: HCPCS

## 2022-09-08 PROCEDURE — G0300 HHS/HOSPICE OF LPN EA 15 MIN: HCPCS

## 2022-09-08 NOTE — HOME HEALTH
FOCUS OF CARE/SKILLED NEED: HOME SAFETY/FALL PREVENTION, MEDICATION EDUCATION, PAIN MANAGEMENT, PREVENTATIVE SKIN CARE    TEACHING/INTERVENTIONS: FALL PREVENTION, MEDICATION EDUCATION, PAIN MANAGEMENT, PREVENTATIVE SKIN CARE    PROGRESS TOWARDS GOALS: progressing    RECENT FALLS: none    RECENT MEDICATION CHANGES: none    D/C PLAN:  DISCHARGE WITH GOALS MET    PHYSICIAN CONTACT:   none    INSURANCE CHANGES:  none    PRE-SCREEN FOR COVID 19: denies any s/s of covid    Patient had afib cardioversion yesterday. Assessed radial pulse and apical heart rate. Pulse and apical heart rate were in rhythm. Patient stated she feels much better and is glad to feel better.

## 2022-09-09 ENCOUNTER — HOME CARE VISIT (OUTPATIENT)
Dept: HOME HEALTH SERVICES | Facility: CLINIC | Age: 87
End: 2022-09-09

## 2022-09-13 ENCOUNTER — HOME CARE VISIT (OUTPATIENT)
Dept: HOME HEALTH SERVICES | Facility: CLINIC | Age: 87
End: 2022-09-13

## 2022-09-13 VITALS
RESPIRATION RATE: 16 BRPM | TEMPERATURE: 98.1 F | OXYGEN SATURATION: 95 % | HEART RATE: 63 BPM | SYSTOLIC BLOOD PRESSURE: 122 MMHG | DIASTOLIC BLOOD PRESSURE: 62 MMHG

## 2022-09-13 PROCEDURE — G0299 HHS/HOSPICE OF RN EA 15 MIN: HCPCS

## 2022-09-13 PROCEDURE — G0300 HHS/HOSPICE OF LPN EA 15 MIN: HCPCS

## 2022-09-14 ENCOUNTER — HOME CARE VISIT (OUTPATIENT)
Dept: HOME HEALTH SERVICES | Facility: CLINIC | Age: 87
End: 2022-09-14

## 2022-09-14 VITALS
DIASTOLIC BLOOD PRESSURE: 62 MMHG | SYSTOLIC BLOOD PRESSURE: 122 MMHG | OXYGEN SATURATION: 94 % | TEMPERATURE: 98.2 F | RESPIRATION RATE: 16 BRPM | HEART RATE: 63 BPM

## 2022-09-14 PROCEDURE — G0152 HHCP-SERV OF OT,EA 15 MIN: HCPCS

## 2022-09-14 NOTE — HOME HEALTH
Subjective: Covid screen completed    Falls: none reported    Medication changes:none reported    Insurance changes: none reported    Edema:none noted    Medical necessity for continued visits: d/c this date    Next MD appt:9/26/22 Dr. Romero, 9/20 Podiatrist

## 2022-09-15 ENCOUNTER — LAB (OUTPATIENT)
Dept: LAB | Facility: HOSPITAL | Age: 87
End: 2022-09-15

## 2022-09-15 ENCOUNTER — ANTICOAGULATION VISIT (OUTPATIENT)
Dept: CARDIOLOGY | Facility: CLINIC | Age: 87
End: 2022-09-15

## 2022-09-15 ENCOUNTER — HOME CARE VISIT (OUTPATIENT)
Dept: HOME HEALTH SERVICES | Facility: CLINIC | Age: 87
End: 2022-09-15

## 2022-09-15 VITALS
RESPIRATION RATE: 16 BRPM | OXYGEN SATURATION: 93 % | HEART RATE: 117 BPM | DIASTOLIC BLOOD PRESSURE: 72 MMHG | TEMPERATURE: 98.4 F | SYSTOLIC BLOOD PRESSURE: 124 MMHG

## 2022-09-15 DIAGNOSIS — I48.91 ATRIAL FIBRILLATION, UNSPECIFIED TYPE: ICD-10-CM

## 2022-09-15 DIAGNOSIS — Z79.01 CURRENT USE OF LONG TERM ANTICOAGULATION: ICD-10-CM

## 2022-09-15 LAB
INR PPP: 2.5 (ref 0.91–1.09)
PROTHROMBIN TIME: 30.5 SECONDS (ref 10–13.8)

## 2022-09-15 PROCEDURE — 85610 PROTHROMBIN TIME: CPT | Performed by: FAMILY MEDICINE

## 2022-09-15 PROCEDURE — G0300 HHS/HOSPICE OF LPN EA 15 MIN: HCPCS

## 2022-09-19 ENCOUNTER — HOME CARE VISIT (OUTPATIENT)
Dept: HOME HEALTH SERVICES | Facility: HOME HEALTHCARE | Age: 87
End: 2022-09-19

## 2022-09-19 ENCOUNTER — HOME CARE VISIT (OUTPATIENT)
Dept: HOME HEALTH SERVICES | Facility: CLINIC | Age: 87
End: 2022-09-19

## 2022-09-19 VITALS
RESPIRATION RATE: 18 BRPM | DIASTOLIC BLOOD PRESSURE: 74 MMHG | TEMPERATURE: 98.3 F | SYSTOLIC BLOOD PRESSURE: 138 MMHG | OXYGEN SATURATION: 94 % | HEART RATE: 62 BPM

## 2022-09-19 PROCEDURE — G0300 HHS/HOSPICE OF LPN EA 15 MIN: HCPCS

## 2022-09-19 NOTE — PROGRESS NOTES
[facility] - PODIATRY    Today's Date: 09/20/2022     Patient Name: Damaris Nash  MRN: [id number]  CSN: [id number]  PCP: Shahla Beltran DO  Referring Provider: Suzanna Fernandez PA     SUBJECTIVE     Chief Complaint   Patient presents with   • Establish Care     Shahla Beltran DO pcp08/19/2022 NEW PT, INCOMING REF, DX TOENAIL CARE- pt states having trouble cutting nails, hard to reach and getting to thick- pt denies pain- pt presents with long thick nails. Poss spot under 4th toe ball area of right foot     HPI: Damaris Nash, a 86 y.o.female, comes to clinic as a(n) new patient complaining of painful toenails. Patient has h/o AF, CVA, HTN, Breast CA, carotid artery disease, HLD, HTN, Hypothyroid, Pulmonary HTN, SOA. Patient presents with complaint of thickened, irregular, and tender toenails of both feet. States that she is unable to reach nails to cut them herself. Has had issues trimming nails due to irregular shape and thickness. Reports tenderness in shoes due to length of nails. Denies numbness or tingling in feet. Takes Coumadin daily. Reports tenderness due to long, thick toenails. Denies previous treatment. Denies any constitutional symptoms. No other pedal complaints at this time.    Past Medical History:   Diagnosis Date   • A-fib (HCC)    • Acute ischemic right ICA stroke (HCC)    • Atrial fibrillation (HCC)    • Benign essential HTN    • Breast CA (HCC)    • Breast cancer (HCC)    • Carotid artery disease (HCC)    • CVA (cerebral vascular accident) (HCC)    • CVA (cerebral vascular accident) (HCC)    • Gallstones    • GERD (gastroesophageal reflux disease)    • Hyperlipidemia    • Hypertension    • Hypothyroid    • Hypothyroidism    • Osteopenia    • Osteopenia    • Primary pulmonary HTN (HCC)    • S/P right hip fracture 06/01/2012   • Shortness of breath    • Stroke (HCC)    • Vitamin D deficiency      Past Surgical History:   Procedure Laterality Date   • BREAST  LUMPECTOMY     • CARDIAC ABLATION      cardiac ablation procedure for af   • CATARACT EXTRACTION Right    • PARTIAL HIP ARTHROPLASTY Right      Family History   Problem Relation Age of Onset   • Stroke Mother    • Cancer Father    • COPD Sister      Social History     Socioeconomic History   • Marital status:    Tobacco Use   • Smoking status: Former Smoker     Years: 30.00     Types: Cigarettes     Start date:      Quit date: 1992     Years since quittin.7   • Smokeless tobacco: Never Used   Vaping Use   • Vaping Use: Never used   Substance and Sexual Activity   • Alcohol use: No   • Drug use: No   • Sexual activity: Defer     Allergies   Allergen Reactions   • Epinephrine Shortness Of Breath   • Celebrex [Celecoxib]    • Ferrous Sulfate    • Other      Omnicef, some Thad, possible Rocephin allergy   • Simvastatin    • Sulfa Antibiotics      Current Outpatient Medications   Medication Sig Dispense Refill   • acetaminophen (TYLENOL) 325 MG tablet Take 650 mg by mouth Daily As Needed for mild pain (1-3).     • ALPRAZolam (XANAX) 0.25 MG tablet Take 0.25 mg by mouth 2 (Two) Times a Day As Needed for anxiety.     • amLODIPine (NORVASC) 2.5 MG tablet Take 2.5 mg by mouth Daily.     • Cholecalciferol (VITAMIN D3) 2000 units capsule Take 2,000 Units by mouth Daily.     • hydrochlorothiazide (HYDRODIURIL) 25 MG tablet Take 25 mg by mouth Daily.     • levothyroxine (SYNTHROID, LEVOTHROID) 112 MCG tablet Take 112 mcg by mouth Daily.     • metoprolol succinate XL (TOPROL-XL) 100 MG 24 hr tablet Take 1.5 tablets by mouth Every Night. 45 tablet 0   • traMADol (ULTRAM) 50 MG tablet Take 50 mg by mouth Every 6 (Six) Hours As Needed for moderate pain (4-6).     • warfarin (COUMADIN) 4 MG tablet TAKE 1 TABLET BY MOUTH EVERY NIGHT 90 tablet 3     Current Facility-Administered Medications   Medication Dose Route Frequency Provider Last Rate Last Admin   • sodium chloride 0.9 % flush 10 mL  10 mL Intravenous  Q12H Carolyn Smith APRN       • sodium chloride 0.9 % flush 10 mL  10 mL Intravenous PRN Carolyn Smith APRN         Review of Systems   Constitutional: Negative for chills and fever.   HENT: Negative for congestion.    Respiratory: Negative for shortness of breath.    Cardiovascular: Negative for chest pain and leg swelling.   Gastrointestinal: Negative for constipation, diarrhea, nausea and vomiting.   Musculoskeletal: Positive for arthralgias.        Foot pain   Skin: Negative for wound.        Thickened, elongated nails   Neurological: Negative for numbness.       OBJECTIVE     Vitals:    09/20/22 1418   BP: 124/76   Pulse: 67   SpO2: 95%       PHYSICAL EXAM  GEN:   Accompanied by son.     Foot/Ankle Exam:       General:   Appearance: appears stated age and healthy and elderly    Orientation: AAOx3    Affect: appropriate    Assistance: cane and wheelchair    Shoe Gear:  Casual shoes    VASCULAR      Right Foot Vascularity   Dorsalis pedis:  2+  Posterior tibial:  2+  Skin Temperature: warm    Edema Grading:  None  CFT:  3  Pedal Hair Growth:  Present  Varicosities: moderate varicosities       Left Foot Vascularity   Dorsalis pedis:  2+  Posterior tibial:  2+  Skin Temperature: warm    Edema Grading:  None  CFT:  3  Pedal Hair Growth:  Present  Varicosities: moderate varicosities        NEUROLOGIC     Right Foot Neurologic   Normal sensation    Light touch sensation:  Normal  Vibratory sensation:  Normal  Hot/Cold sensation: normal    Protective Sensation using [place]al Monofilament:  10     Left Foot Neurologic   Normal sensation    Light touch sensation:  Normal  Vibratory sensation:  Normal  Hot/cold sensation: normal    Protective Sensation using [place]al Monofilament:  10     MUSCULOSKELETAL      Right Foot Musculoskeletal   Ecchymosis:  None  Tenderness: toenails    Arch:  Normal     Left Foot Musculoskeletal   Ecchymosis:  None  Tenderness: left foot callus and toenails    Arch:   Normal     MUSCLE STRENGTH     Right Foot Muscle Strength   Foot dorsiflexion:  4+  Foot plantar flexion:  4+  Foot inversion:  4+  Foot eversion:  4+     Left Foot Muscle Strength   Foot dorsiflexion:  4+  Foot plantar flexion:  4+  Foot inversion:  4+  Foot eversion:  4+     RANGE OF MOTION      Right Foot Range of Motion   Foot and ankle ROM within normal limits       Left Foot Range of Motion   Foot and ankle ROM within normal limits       DERMATOLOGIC     Right Foot Dermatologic   Skin: skin intact and atrophic    Nails: onychomycosis, abnormally thick, subungual debris and dystrophic nails    Nails comment:  1-5     Left Foot Dermatologic   Skin: corn and atrophic    Nails: onychomycosis, abnormally thick, subungual debris and dystrophic nails    Nails comment:  1-5     Image:       RADIOLOGY/NUCLEAR:  Cardioversion External in Cardiology Department    Result Date: 9/7/2022  Narrative: · Post cardioversion the patient displayed a sinus rhythm. · The cardioversion was successful.        LABORATORY/CULTURE RESULTS:      PATHOLOGY RESULTS:       ASSESSMENT/PLAN     Diagnoses and all orders for this visit:    1. Pain in toes of both feet (Primary)    2. Onychomycosis    3. Foot callus    4. Anticoagulant long-term use    5. Advanced age      Comprehensive lower extremity examination and evaluation was performed.  Discussed findings and treatment plan including risks, benefits, and treatment options with patient in detail. Patient agreed with treatment plan.  Discussed options for treatment of nail issues with patient including continued self care at home with/without family assistance, care of nails in salon setting, and/or dedridement by podiatry services but advised that this service is unlikely to be routinely covered. Nail care provided today per patient and family request.  After verbal consent obtained, nail(s) x10 debrided of length and thickness with nail nipper without incidence  After verbal consent  obtained, calluses x1 pared utilizing dermal curette and/or scalpel without incidence  Patient may maintain nails and calluses at home utilizing emery board or pumice stone between visits as needed   An After Visit Summary was printed and given to the patient at discharge, including (if requested) any available informative/educational handouts regarding diagnosis, treatment, or medications. All questions were answered to patient/family satisfaction. Should symptoms fail to improve or worsen they agree to call or return to clinic or to go to the Emergency Department. Discussed the importance of following up with any needed screening tests/labs/specialist appointments and any requested follow-up recommended by me today. Importance of maintaining follow-up discussed and patient accepts that missed appointments can delay diagnosis and potentially lead to worsening of conditions.  Return if symptoms worsen or fail to improve., or sooner if acute issues arise.    Lab Frequency Next Occurrence   US Carotid Bilateral Once 01/15/2022   COVID PRE-OP / PRE-PROCEDURE SCREENING ORDER (NO ISOLATION) - Swab, Oropharynx Once 09/30/2022   Protime-INR Twice a Week 9/20/2022, 9/23/2022, 9/27/2022, 9/30/2022, 10/4/2022, 10/7/2022, 10/11/2022, 10/14/2022, 10/18/2022, 10/21/2022, 10/25/2022, 10/28/2022   POC Protime / INR Twice a Week 9/20/2022, 9/23/2022, 9/27/2022, 9/30/2022, 10/4/2022, 10/7/2022, 10/11/2022, 10/14/2022, 10/18/2022, 10/21/2022, 10/25/2022, 10/28/2022       This document has been electronically signed by Laurent Gonzalez DPM on September 20, 2022 14:40 CDT

## 2022-09-20 ENCOUNTER — OFFICE VISIT (OUTPATIENT)
Dept: PODIATRY | Facility: CLINIC | Age: 87
End: 2022-09-20

## 2022-09-20 VITALS
BODY MASS INDEX: 20.73 KG/M2 | WEIGHT: 117 LBS | HEIGHT: 63 IN | SYSTOLIC BLOOD PRESSURE: 124 MMHG | DIASTOLIC BLOOD PRESSURE: 76 MMHG | HEART RATE: 67 BPM | OXYGEN SATURATION: 95 %

## 2022-09-20 DIAGNOSIS — M79.674 PAIN IN TOES OF BOTH FEET: Primary | ICD-10-CM

## 2022-09-20 DIAGNOSIS — B35.1 ONYCHOMYCOSIS: ICD-10-CM

## 2022-09-20 DIAGNOSIS — R54 ADVANCED AGE: ICD-10-CM

## 2022-09-20 DIAGNOSIS — L84 FOOT CALLUS: ICD-10-CM

## 2022-09-20 DIAGNOSIS — M79.675 PAIN IN TOES OF BOTH FEET: Primary | ICD-10-CM

## 2022-09-20 DIAGNOSIS — Z79.01 ANTICOAGULANT LONG-TERM USE: ICD-10-CM

## 2022-09-20 PROCEDURE — 99203 OFFICE O/P NEW LOW 30 MIN: CPT | Performed by: PODIATRIST

## 2022-09-20 PROCEDURE — 11055 PARING/CUTG B9 HYPRKER LES 1: CPT | Performed by: PODIATRIST

## 2022-09-20 PROCEDURE — 11721 DEBRIDE NAIL 6 OR MORE: CPT | Performed by: PODIATRIST

## 2022-09-23 ENCOUNTER — HOME CARE VISIT (OUTPATIENT)
Dept: HOME HEALTH SERVICES | Facility: CLINIC | Age: 87
End: 2022-09-23

## 2022-09-23 VITALS
DIASTOLIC BLOOD PRESSURE: 80 MMHG | TEMPERATURE: 97.2 F | SYSTOLIC BLOOD PRESSURE: 116 MMHG | OXYGEN SATURATION: 98 % | HEART RATE: 78 BPM | RESPIRATION RATE: 18 BRPM

## 2022-09-23 PROCEDURE — G0299 HHS/HOSPICE OF RN EA 15 MIN: HCPCS

## 2022-09-23 NOTE — HOME HEALTH
FOCUS OF CARE/SKILLED NEED: Discharge from services r/t goals met.     TEACHING/INTERVENTIONS: Patient agreeable to homecare discharge. Discharge instructions reviewed and signed by the pt. Medication reconciliation completed and no issues found. Pt had no further questions or concerns regarding the pt's medications or discharge.    PROGRESS TOWARD GOALS: Goals met.     PHYSICIAN CONTACT: Dr. Shahla Beltran r/t goals met.     INSURANCE CHANGES? Denies    FALLS SINCE LAST VISIT? Denies    MEDICATION CHANGES? Denies    PRE-SCREENED FOR COVID? Yes, No s/s of COVID. No recent exposure.

## 2022-09-26 ENCOUNTER — OFFICE VISIT (OUTPATIENT)
Dept: CARDIOLOGY | Facility: CLINIC | Age: 87
End: 2022-09-26

## 2022-09-26 VITALS
OXYGEN SATURATION: 96 % | WEIGHT: 115 LBS | BODY MASS INDEX: 20.38 KG/M2 | HEART RATE: 62 BPM | SYSTOLIC BLOOD PRESSURE: 120 MMHG | DIASTOLIC BLOOD PRESSURE: 74 MMHG | HEIGHT: 63 IN

## 2022-09-26 DIAGNOSIS — Z79.01 CURRENT USE OF LONG TERM ANTICOAGULATION: ICD-10-CM

## 2022-09-26 DIAGNOSIS — I48.0 PAROXYSMAL ATRIAL FIBRILLATION: Primary | ICD-10-CM

## 2022-09-26 DIAGNOSIS — E78.2 MIXED HYPERLIPIDEMIA: ICD-10-CM

## 2022-09-26 DIAGNOSIS — I10 PRIMARY HYPERTENSION: ICD-10-CM

## 2022-09-26 DIAGNOSIS — I65.23 BILATERAL CAROTID ARTERY STENOSIS: ICD-10-CM

## 2022-09-26 PROCEDURE — 99214 OFFICE O/P EST MOD 30 MIN: CPT | Performed by: NURSE PRACTITIONER

## 2022-09-26 PROCEDURE — 93000 ELECTROCARDIOGRAM COMPLETE: CPT | Performed by: NURSE PRACTITIONER

## 2022-09-26 RX ORDER — METOPROLOL SUCCINATE 100 MG/1
100 TABLET, EXTENDED RELEASE ORAL NIGHTLY
Qty: 90 TABLET | Refills: 3 | Status: SHIPPED | OUTPATIENT
Start: 2022-09-26

## 2022-09-26 RX ORDER — LEVOTHYROXINE SODIUM 137 UG/1
TABLET ORAL
COMMUNITY
Start: 2022-08-09

## 2022-09-26 RX ORDER — WARFARIN SODIUM 4 MG/1
4 TABLET ORAL NIGHTLY
Qty: 90 TABLET | Refills: 3 | Status: SHIPPED | OUTPATIENT
Start: 2022-09-26

## 2022-09-26 NOTE — PROGRESS NOTES
"    Subjective:     Encounter Date:09/26/2022      Patient ID: Damaris Nash is a 86 y.o. female with paroxysmal atrial fibrillation s/p ablation on chronic anticoagulation, hypertension, hyperlipidemia, bilateral carotid artery disease and transient ischemic attack..    Chief Complaint: \"no complaints\"  Atrial Fibrillation  Presents for follow-up visit. Symptoms are negative for chest pain, dizziness, palpitations, shortness of breath, syncope and weakness. The symptoms have been stable. Past medical history includes atrial fibrillation.   Dizziness  Pertinent negatives include no chest pain, coughing, rash or weakness.   Hypertension  This is a chronic problem. The current episode started more than 1 year ago. The problem is controlled. Associated symptoms include malaise/fatigue. Pertinent negatives include no chest pain, orthopnea, palpitations, PND or shortness of breath.     Patient presents today for a follow up after cardioversion. She is followed for PAF s/p ablation per Dr. Rajput in 2011. She was seen 6 weeks ago as a routine follow up and noted to be in afib with RVR. She had a fall in July and hit her ribs due to a loss of balance when turning too quickly. 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 She notes she was given PRN pain medication for her pain what occurred with deep breathing and slept for almost an entire day and missed a day or two of her metoprolol. She was scheduled for a cardioversion which was successful on 9/7.     Today she notes she has had some improvement in fatigue and stamina since cardioversion. She also notes \"the inside of my chest has calmed down\" since her cardioversion as well. She is inquiring about watchman placement. She denies chest pain, palpitations, edema, dyspnea, orthopnea and PND.     The following portions of the patient's history were reviewed and " updated as appropriate: allergies, current medications, past family history, past medical history, past social history, past surgical history and problem list.    Allergies   Allergen Reactions   • Epinephrine Shortness Of Breath   • Celebrex [Celecoxib]    • Ferrous Sulfate    • Other      Omnicef, some Thad, possible Rocephin allergy   • Simvastatin    • Sulfa Antibiotics        Current Outpatient Medications:   •  acetaminophen (TYLENOL) 325 MG tablet, Take 650 mg by mouth Daily As Needed for mild pain (1-3)., Disp: , Rfl:   •  ALPRAZolam (XANAX) 0.25 MG tablet, Take 0.25 mg by mouth 2 (Two) Times a Day As Needed for anxiety., Disp: , Rfl:   •  amLODIPine (NORVASC) 2.5 MG tablet, Take 2.5 mg by mouth Daily., Disp: , Rfl:   •  Cholecalciferol (VITAMIN D3) 2000 units capsule, Take 2,000 Units by mouth Daily., Disp: , Rfl:   •  hydrochlorothiazide (HYDRODIURIL) 25 MG tablet, Take 25 mg by mouth Daily., Disp: , Rfl:   •  levothyroxine (SYNTHROID, LEVOTHROID) 137 MCG tablet, TAKE 1 TABLET BY MOUTH ONCE DAILY ON AN EMPTY STOMACH AT LEAST 30 MINUTES PRIOR TO OTHER FOOD, MED, OR DRINK., Disp: , Rfl:   •  metoprolol succinate XL (TOPROL-XL) 100 MG 24 hr tablet, Take 1 tablet by mouth Every Night., Disp: 90 tablet, Rfl: 3  •  traMADol (ULTRAM) 50 MG tablet, Take 50 mg by mouth Every 6 (Six) Hours As Needed for moderate pain (4-6)., Disp: , Rfl:   •  warfarin (COUMADIN) 4 MG tablet, Take 1 tablet by mouth Every Night., Disp: 90 tablet, Rfl: 3    Current Facility-Administered Medications:   •  sodium chloride 0.9 % flush 10 mL, 10 mL, Intravenous, Q12H, Carolyn Smith, APRN  •  sodium chloride 0.9 % flush 10 mL, 10 mL, Intravenous, PRN, Carolyn Smith, APRN  Past Medical History:   Diagnosis Date   • A-fib (HCC)    • Acute ischemic right ICA stroke (HCC)    • Atrial fibrillation (HCC)    • Benign essential HTN    • Breast CA (HCC)    • Breast cancer (HCC)    • Carotid artery disease (HCC)    • CVA (cerebral vascular  accident) ([facility])    • CVA (cerebral vascular accident) ([facility])    • Gallstones    • GERD (gastroesophageal reflux disease)    • Hyperlipidemia    • Hypertension    • Hypothyroid    • Hypothyroidism    • Osteopenia    • Osteopenia    • Primary pulmonary HTN ([facility])    • S/P right hip fracture 2012   • Shortness of breath    • Stroke ([facility])    • Vitamin D deficiency        Social History     Socioeconomic History   • Marital status:    Tobacco Use   • Smoking status: Former Smoker     Years: 30.00     Types: Cigarettes     Start date:      Quit date: 1992     Years since quittin.7   • Smokeless tobacco: Never Used   Vaping Use   • Vaping Use: Never used   Substance and Sexual Activity   • Alcohol use: No   • Drug use: No   • Sexual activity: Defer       Review of Systems   Constitutional: Positive for malaise/fatigue. Negative for weight gain and weight loss.   Cardiovascular: Negative for chest pain, dyspnea on exertion, irregular heartbeat, leg swelling, near-syncope, orthopnea, palpitations, paroxysmal nocturnal dyspnea and syncope.   Respiratory: Negative for cough, shortness of breath, sleep disturbances due to breathing, sputum production and wheezing.    Skin: Negative for dry skin, flushing, itching and rash.   Musculoskeletal: Positive for falls and joint pain (right knee).   Gastrointestinal: Negative for hematemesis and hematochezia.   Neurological: Positive for loss of balance. Negative for dizziness, light-headedness and weakness.   All other systems reviewed and are negative.         Objective:     Vitals reviewed.   Constitutional:       General: Not in acute distress.     Appearance: Healthy appearance. Well-developed. Not diaphoretic.   Eyes:      General: No scleral icterus.     Conjunctiva/sclera: Conjunctivae normal.      Pupils: Pupils are equal, round, and reactive to light.   HENT:      Head: Normocephalic.    Mouth/Throat:      Pharynx: No oropharyngeal exudate.   Neck:      " Vascular: No JVR.   Pulmonary:      Effort: Pulmonary effort is normal. No respiratory distress.      Breath sounds: Normal breath sounds. No wheezing. No rhonchi. No rales.   Chest:      Chest wall: Not tender to palpatation.   Cardiovascular:      Normal rate. Regular rhythm.   Pulses:     Intact distal pulses.   Edema:     Peripheral edema absent.   Abdominal:      General: Bowel sounds are normal. There is no distension.      Palpations: Abdomen is soft.      Tenderness: There is no abdominal tenderness.   Musculoskeletal: Normal range of motion.      Cervical back: Normal range of motion and neck supple. Skin:     General: Skin is warm and dry.      Coloration: Skin is not pale.      Findings: No erythema or rash.   Neurological:      Mental Status: Alert, oriented to person, place, and time and oriented to person, place and time.      Deep Tendon Reflexes: Reflexes are normal and symmetric.   Psychiatric:         Behavior: Behavior normal.             ECG 12 Lead    Date/Time: 9/26/2022 3:12 PM  Performed by: Carolyn Smith APRN  Authorized by: Carolyn Smith APRN   Comparison: compared with previous ECG from 9/7/2022  Similar to previous ECG  Rhythm: sinus rhythm  Rate: normal  BPM: 62  T inversion: III and V1  QRS axis: left  Other findings: T wave abnormality    Clinical impression: abnormal EKG          /74 (BP Location: Left arm, Patient Position: Sitting)   Pulse 62   Ht 160 cm (63\")   Wt 52.2 kg (115 lb)   SpO2 96%   BMI 20.37 kg/m²     Lab Review:   I have reviewed previous office notes, recent labs and recent cardiac testing.       Lab Results   Component Value Date    INR 2.5 (H) 09/15/2022    INR 2.8 (H) 08/19/2022    INR 3.4 (H) 08/08/2022    PROTIME 30.5 (H) 09/15/2022    PROTIME 34.0 (H) 08/19/2022    PROTIME 40.5 (H) 08/08/2022           Assessment:          Diagnosis Plan   1. Paroxysmal atrial fibrillation (HCC)     2. Current use of long term anticoagulation     3. Primary " hypertension     4. Mixed hyperlipidemia     5. Bilateral carotid artery stenosis            Plan:       1. PAF- stable. S/p DCCV on 9/6 with improvement in palpitations, fatigue and stamina. 1st known reoccurrence since ablation in 2011. Will decrease Toprol back to 100mg daily. 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 Spoke with her about watchman and she is agreeable. Orders placed. [id number] 6 (HTN, age, prior CVA, gender).  2. Anticoagulation- on Coumadin. Last INR therapeutic at 2.8. Follows with our coumadin clinic. Denies bleeding.   4. HTN- controlled. Followed by PCP   5. HLD- followed by PCP   6. Carotid stenosis- followed by vascular      Follow up in 6 months or sooner if symptoms worsen.     I spent 30 minutes caring for Damaris on this date of service. This time includes time spent by me in the following activities:preparing for the visit, reviewing tests, obtaining and/or reviewing a separately obtained history, performing a medically appropriate examination and/or evaluation , counseling and educating the patient/family/caregiver, documenting information in the medical record, independently interpreting results and communicating that information with the patient/family/caregiver and care coordination

## 2022-10-12 ENCOUNTER — HOSPITAL ENCOUNTER (OUTPATIENT)
Dept: CT IMAGING | Facility: HOSPITAL | Age: 87
Discharge: HOME | End: 2022-10-12

## 2022-10-12 DIAGNOSIS — I48.0 PAROXYSMAL ATRIAL FIBRILLATION: ICD-10-CM

## 2022-10-12 PROCEDURE — 71250 CT THORAX DX C-: CPT

## 2022-10-12 PROCEDURE — 75572 CT HRT W/3D IMAGE: CPT

## 2022-10-12 PROCEDURE — 0 IOPAMIDOL PER 1 ML: Performed by: NURSE PRACTITIONER

## 2022-10-12 RX ADMIN — IOPAMIDOL 100 ML: 755 INJECTION, SOLUTION INTRAVENOUS at 10:55

## 2022-10-13 ENCOUNTER — ANTICOAGULATION VISIT (OUTPATIENT)
Dept: CARDIOLOGY | Facility: CLINIC | Age: 87
End: 2022-10-13

## 2022-10-13 ENCOUNTER — LAB (OUTPATIENT)
Dept: LAB | Facility: HOSPITAL | Age: 87
End: 2022-10-13

## 2022-10-13 DIAGNOSIS — I48.91 ATRIAL FIBRILLATION, UNSPECIFIED TYPE: ICD-10-CM

## 2022-10-13 DIAGNOSIS — Z79.01 CURRENT USE OF LONG TERM ANTICOAGULATION: ICD-10-CM

## 2022-10-13 LAB
INR PPP: 2 (ref 0.91–1.09)
PROTHROMBIN TIME: 23.8 SECONDS (ref 10–13.8)

## 2022-10-13 PROCEDURE — 85610 PROTHROMBIN TIME: CPT | Performed by: FAMILY MEDICINE

## 2022-10-14 ENCOUNTER — TRANSCRIBE ORDERS (OUTPATIENT)
Dept: ADMINISTRATIVE | Facility: HOSPITAL | Age: 87
End: 2022-10-14

## 2022-10-14 DIAGNOSIS — R91.1 SOLITARY LUNG NODULE: Primary | ICD-10-CM

## 2022-10-18 ENCOUNTER — TELEPHONE (OUTPATIENT)
Dept: CARDIOLOGY | Facility: CLINIC | Age: 87
End: 2022-10-18

## 2022-10-18 NOTE — TELEPHONE ENCOUNTER
Call to pt to discuss planning a shared decision appt for Mary. Her CT scan showed a lung nodule. She has a PET scan scheduled on Friday. She would like to wait for the results of this before moving forward with Mary.

## 2022-10-21 ENCOUNTER — HOSPITAL ENCOUNTER (OUTPATIENT)
Dept: CT IMAGING | Facility: HOSPITAL | Age: 87
Discharge: HOME | End: 2022-10-21

## 2022-10-21 DIAGNOSIS — R91.1 SOLITARY LUNG NODULE: ICD-10-CM

## 2022-10-21 PROCEDURE — 0 FLUDEOXYGLUCOSE F18 SOLUTION: Performed by: PHYSICIAN ASSISTANT

## 2022-10-21 PROCEDURE — 78815 PET IMAGE W/CT SKULL-THIGH: CPT

## 2022-10-21 PROCEDURE — A9552 F18 FDG: HCPCS | Performed by: PHYSICIAN ASSISTANT

## 2022-10-21 RX ADMIN — FLUDEOXYGLUCOSE F18 1 DOSE: 300 INJECTION INTRAVENOUS at 13:00

## 2022-11-10 ENCOUNTER — ANTICOAGULATION VISIT (OUTPATIENT)
Dept: CARDIOLOGY | Facility: CLINIC | Age: 87
End: 2022-11-10

## 2022-11-10 ENCOUNTER — LAB (OUTPATIENT)
Dept: LAB | Facility: HOSPITAL | Age: 87
End: 2022-11-10

## 2022-11-10 DIAGNOSIS — Z79.01 CURRENT USE OF LONG TERM ANTICOAGULATION: ICD-10-CM

## 2022-11-10 DIAGNOSIS — I48.91 ATRIAL FIBRILLATION, UNSPECIFIED TYPE: ICD-10-CM

## 2022-11-10 LAB
INR PPP: 2.7 (ref 0.91–1.09)
PROTHROMBIN TIME: 32 SECONDS (ref 10–13.8)

## 2022-11-10 PROCEDURE — 85610 PROTHROMBIN TIME: CPT | Performed by: FAMILY MEDICINE

## 2022-11-14 ENCOUNTER — TRANSCRIBE ORDERS (OUTPATIENT)
Dept: ADMINISTRATIVE | Facility: HOSPITAL | Age: 87
End: 2022-11-14

## 2022-11-14 DIAGNOSIS — R91.1 SOLITARY PULMONARY NODULE: Primary | ICD-10-CM

## 2022-11-17 ENCOUNTER — PREP FOR SURGERY (OUTPATIENT)
Dept: OTHER | Facility: HOSPITAL | Age: 87
End: 2022-11-17

## 2022-11-17 ENCOUNTER — OFFICE VISIT (OUTPATIENT)
Dept: PULMONOLOGY | Facility: CLINIC | Age: 87
End: 2022-11-17

## 2022-11-17 VITALS
BODY MASS INDEX: 20.38 KG/M2 | WEIGHT: 115 LBS | SYSTOLIC BLOOD PRESSURE: 122 MMHG | HEIGHT: 63 IN | OXYGEN SATURATION: 93 % | DIASTOLIC BLOOD PRESSURE: 74 MMHG | HEART RATE: 66 BPM

## 2022-11-17 DIAGNOSIS — R91.1 RIGHT UPPER LOBE PULMONARY NODULE: Primary | ICD-10-CM

## 2022-11-17 PROCEDURE — 99204 OFFICE O/P NEW MOD 45 MIN: CPT | Performed by: INTERNAL MEDICINE

## 2022-11-17 NOTE — PROGRESS NOTES
"Background:  Pt w emphysema, 7 mm nodule rul   Chief Complaint  hypermetabolic mediastinal lymph node    Subjective    History of Present Illness       Damaris Nash presents to [facility].  History of Present Illness  She was going for a watchman and had imaging which showed pulmonary abnormalities.  No history of lung disease.  No pulmonary symptoms.  Further workup for watchman was put on hold and I have been asked to see her.  She has had ct chest followed by pet scan.  Former smoker who quit smoking 30 years ago.     Tobacco Use: Medium Risk   • Smoking Tobacco Use: Former   • Smokeless Tobacco Use: Never   • Passive Exposure: Not on file      Current Outpatient Medications   Medication Instructions   • acetaminophen (TYLENOL) 650 mg, Oral, Daily PRN   • ALPRAZolam (XANAX) 0.25 mg, Oral, 2 Times Daily PRN   • amLODIPine (NORVASC) 2.5 mg, Oral, Daily   • hydroCHLOROthiazide (HYDRODIURIL) 25 mg, Oral, Daily   • levothyroxine (SYNTHROID, LEVOTHROID) 137 MCG tablet TAKE 1 TABLET BY MOUTH ONCE DAILY ON AN EMPTY STOMACH AT LEAST 30 MINUTES PRIOR TO OTHER FOOD, MED, OR DRINK.   • metoprolol succinate XL (TOPROL-XL) 100 mg, Oral, Nightly   • traMADol (ULTRAM) 50 mg, Oral, Every 6 Hours PRN   • Vitamin D3 2,000 Units, Oral, Daily   • warfarin (COUMADIN) 4 mg, Oral, Nightly      Objective     Vital Signs:   /74   Pulse 66   Ht 160 cm (63\")   Wt 52.2 kg (115 lb)   SpO2 93% Comment: RA  BMI 20.37 kg/m²   Physical Exam  Constitutional:       General: She is not in acute distress.     Appearance: She is well-developed. She is not ill-appearing or toxic-appearing.   HENT:      Head: Atraumatic.      Mouth/Throat:      Mouth: Mucous membranes are moist.   Eyes:      General: No scleral icterus.     Conjunctiva/sclera: Conjunctivae normal.   Cardiovascular:      Rate and Rhythm: Normal rate and regular rhythm.      Heart sounds: S1 normal and S2 normal. "   Pulmonary:      Effort: Pulmonary effort is normal.      Breath sounds: Normal breath sounds.   Abdominal:      General: There is no distension.   Musculoskeletal:         General: No deformity.      Cervical back: Neck supple.   Skin:     General: Skin is warm and dry.      Coloration: Skin is not pale.      Findings: No rash.   Neurological:      Mental Status: She is alert.        Result Review  Data Reviewed:{ Labs  Result Review  Imaging  Media :23}     NM PET/CT Skull Base to Mid Thigh    Result Date: 10/21/2022  Impression:  1.  Single enlarged 1.3 cm hypermetabolic mediastinal lymph node. Differential includes a neoplastic process as well as reactive lymph node secondary to an infectious or inflammatory process. Management options include short interval follow-up chest CT in one to 3 months versus transbronchial biopsy.  2.  The 7 mm RIGHT upper lobe pulmonary nodule of concern identified on recent chest CT is not metabolically active. Favor an infectious or inflammatory process but recommend continued imaging surveillance to exclude an indolent neoplasm with lobe metabolic activity.  3.  Mild hypermetabolic activity throughout the thyroid. Correlate for thyroiditis.  4.  Cholelithiasis without evidence of cholecystitis. This report was finalized on 10/21/2022 14:54 by Dr. Bruce Vásquez MD.    My interpretation of radiograph: isolated hypermetabolic node in either low station 4R or station 7.                Assessment and Plan  {CC Problem List  Visit Diagnosis  ROS  Review (Popup)  [employer] Maintenance  Quality  BestPractice  Medications  SmartSets  SnapShot Encounters  Media :23}   Diagnoses and all orders for this visit:    1. Right upper lobe pulmonary nodule (Primary)  Comments:  7 mm identified 10/2022, not hypermetabolic, with 13 mm hypermetabolic precarinal LN    lesion in lung is not hypermetabolic  Significance of mediastinal node unclear; may be reactive; malignancy not ruled  out  Given age and comorbidities, risks associated with biopsy of lung or node outweigh benefits currently  will plan follow up after 3 mos f/u ct in January. Reconsider whether invasive workup is warranted after that  Go ahead and proceed with watchman    Follow Up {Instructions Charge Capture  Follow-up Communications :23}   Return in about 2 months (around 1/17/2023).  Patient was given instructions and counseling regarding her condition or for health maintenance advice. Please see specific information pulled into the AVS if appropriate.    Electronically signed by Nikolai Leiva MD, 11/17/2022, 16:38 CST

## 2022-11-28 ENCOUNTER — TELEPHONE (OUTPATIENT)
Dept: CARDIOLOGY | Facility: CLINIC | Age: 87
End: 2022-11-28

## 2022-12-02 ENCOUNTER — OFFICE VISIT (OUTPATIENT)
Dept: CARDIOLOGY | Facility: CLINIC | Age: 87
End: 2022-12-02

## 2022-12-02 VITALS
OXYGEN SATURATION: 95 % | WEIGHT: 113 LBS | DIASTOLIC BLOOD PRESSURE: 70 MMHG | HEART RATE: 61 BPM | BODY MASS INDEX: 20.02 KG/M2 | SYSTOLIC BLOOD PRESSURE: 138 MMHG | HEIGHT: 63 IN

## 2022-12-02 DIAGNOSIS — I65.23 BILATERAL CAROTID ARTERY STENOSIS: ICD-10-CM

## 2022-12-02 DIAGNOSIS — R29.6 FREQUENT FALLS: ICD-10-CM

## 2022-12-02 DIAGNOSIS — I10 PRIMARY HYPERTENSION: ICD-10-CM

## 2022-12-02 DIAGNOSIS — I48.0 PAROXYSMAL ATRIAL FIBRILLATION: Primary | ICD-10-CM

## 2022-12-02 DIAGNOSIS — Z79.01 CURRENT USE OF LONG TERM ANTICOAGULATION: ICD-10-CM

## 2022-12-02 DIAGNOSIS — Z86.73 HX OF TRANSIENT ISCHEMIC ATTACK (TIA): ICD-10-CM

## 2022-12-02 PROCEDURE — 99214 OFFICE O/P EST MOD 30 MIN: CPT | Performed by: INTERNAL MEDICINE

## 2022-12-02 NOTE — PROGRESS NOTES
Reason for Visit: Left atrial appendage closure shared decision making.     HPI:  Damaris Nash is a 87 y.o. female is here today for Left atrial appendage closure shared decision making.  She follows with Dr. Mota in cardiology clinic due to paroxysmal atrial fibrillation.  She previously underwent ablation with Dr. Rajput.  She has felt to be a poor candidate for chronic anticoagulation due to her history of falls and fall risk.  She is therefore searching for alternative means of thromboembolic prophylaxis and is being worked up for left atrial appendage occlusion with the Watchman device.  She underwent CT cardiac morphology on 10/12/2022 to assess for left atrial appendage.  She has had multiple different falls over the past year or two.  One was bad enough to require a visit to the ED.        Patient Active Problem List   Diagnosis   • A-fib (HCC)   • Carotid artery disease (HCC)   • Hyperlipidemia   • Hypertension   • Amaurosis fugax of right eye   • Bilateral carotid artery stenosis   • Hx of transient ischemic attack (TIA)   • Current use of long term anticoagulation   • H/O cardiac radiofrequency ablation       Social History     Tobacco Use   • Smoking status: Former     Years: 30.00     Types: Cigarettes     Start date:      Quit date: 1992     Years since quittin.9   • Smokeless tobacco: Never   Vaping Use   • Vaping Use: Never used   Substance Use Topics   • Alcohol use: No   • Drug use: No       Family History   Problem Relation Age of Onset   • Stroke Mother    • Cancer Father    • COPD Sister        The following portions of the patient's history were reviewed and updated as appropriate: allergies, current medications, past family history, past medical history, past social history, past surgical history and problem list.      Current Outpatient Medications:   •  acetaminophen (TYLENOL) 325 MG tablet, Take 650 mg by mouth Daily As Needed for mild pain (1-3)., Disp: , Rfl:   •   "ALPRAZolam (XANAX) 0.25 MG tablet, Take 0.25 mg by mouth 2 (Two) Times a Day As Needed for anxiety., Disp: , Rfl:   •  amLODIPine (NORVASC) 2.5 MG tablet, Take 2.5 mg by mouth Daily., Disp: , Rfl:   •  Cholecalciferol (VITAMIN D3) 2000 units capsule, Take 2,000 Units by mouth Daily., Disp: , Rfl:   •  hydrochlorothiazide (HYDRODIURIL) 25 MG tablet, Take 25 mg by mouth Daily., Disp: , Rfl:   •  levothyroxine (SYNTHROID, LEVOTHROID) 137 MCG tablet, TAKE 1 TABLET BY MOUTH ONCE DAILY ON AN EMPTY STOMACH AT LEAST 30 MINUTES PRIOR TO OTHER FOOD, MED, OR DRINK., Disp: , Rfl:   •  metoprolol succinate XL (TOPROL-XL) 100 MG 24 hr tablet, Take 1 tablet by mouth Every Night., Disp: 90 tablet, Rfl: 3  •  traMADol (ULTRAM) 50 MG tablet, Take 50 mg by mouth Every 6 (Six) Hours As Needed for moderate pain (4-6)., Disp: , Rfl:   •  warfarin (COUMADIN) 4 MG tablet, Take 1 tablet by mouth Every Night., Disp: 90 tablet, Rfl: 3    Current Facility-Administered Medications:   •  sodium chloride 0.9 % flush 10 mL, 10 mL, Intravenous, Q12H, Carolyn Smith, APRN  •  sodium chloride 0.9 % flush 10 mL, 10 mL, Intravenous, PRN, Carolyn Smith, APRN    Review of Systems   Constitutional: Negative for chills and fever.   Cardiovascular: Negative for chest pain and paroxysmal nocturnal dyspnea.   Respiratory: Negative for cough and shortness of breath.    Skin: Negative for rash.   Musculoskeletal: Positive for falls.   Gastrointestinal: Negative for abdominal pain and heartburn.   Neurological: Positive for disturbances in coordination and loss of balance. Negative for dizziness and numbness.       Objective   /70   Pulse 61   Ht 160 cm (63\")   Wt 51.3 kg (113 lb)   SpO2 95%   BMI 20.02 kg/m²   Constitutional:       Appearance: Well-developed.   HENT:      Head: Normocephalic and atraumatic.   Pulmonary:      Effort: Pulmonary effort is normal.      Breath sounds: Normal breath sounds.   Cardiovascular:      Normal rate. Regular " rhythm.      Murmurs: There is no murmur.      No gallop. No click.   Skin:     General: Skin is warm and dry.   Neurological:      Mental Status: Alert and oriented to person, place, and time.       Procedures  CHADS-VASc Risk Assessment            6 Total Score    1 Hypertension    2 Age >/= 75    2 PRIOR STROKE/TIA/THROMBO    1 Sex: Female        Criteria that do not apply:    CHF    DM    Vascular Disease    Age 65-74           ICD-10-CM ICD-9-CM   1. Paroxysmal atrial fibrillation (HCC)  I48.0 427.31   2. Bilateral carotid artery stenosis  I65.23 433.10     433.30   3. Primary hypertension  I10 401.9   4. Current use of long term anticoagulation  Z79.01 V58.61   5. Hx of transient ischemic attack (TIA)  Z86.73 V12.54   6. Frequent falls  R29.6 V15.88         Assessment/Plan:  Based on the history, it has been determined that the patient is a poor candidate for long-term oral anticoagulation.  The patient may, however, be tolerant to short-term anticoagulation as necessary.    Specifically regarding anticoagulation they have demonstrated: Falls    We have discussed that you need stroke and bleeding risk on and off anticoagulation.  The individual [id number] stroke risk store is 6 (history of TIA/CVA, hypertension, age greater than 75, female sex), indicating an adjusted stroke rate of 9.8%/year.    Based on both stroke and bleeding risk, shared decision has been made to pursue closure of the left atrial appendage is a safe, effective alternative to long-term oral anticoagulation therapy for stroke prophylaxis.  This will reduce her long-term risk of incidence of bleeding.  Information regarding left atrial appendage closure and shared decision making were provided to patient.

## 2022-12-06 ENCOUNTER — TELEPHONE (OUTPATIENT)
Dept: CARDIOLOGY | Facility: CLINIC | Age: 87
End: 2022-12-06

## 2022-12-06 NOTE — TELEPHONE ENCOUNTER
Call to pt's son to discuss Watchman planning. Offered for her to be done on Dec 29. They would like to wait until the first of the year.

## 2022-12-16 DIAGNOSIS — I48.91 ATRIAL FIBRILLATION, UNSPECIFIED TYPE: ICD-10-CM

## 2022-12-16 DIAGNOSIS — Z79.01 CURRENT USE OF LONG TERM ANTICOAGULATION: Primary | ICD-10-CM

## 2022-12-19 ENCOUNTER — LAB (OUTPATIENT)
Dept: LAB | Facility: HOSPITAL | Age: 87
End: 2022-12-19

## 2022-12-19 ENCOUNTER — ANTICOAGULATION VISIT (OUTPATIENT)
Dept: CARDIOLOGY | Facility: CLINIC | Age: 87
End: 2022-12-19
Payer: MEDICARE

## 2022-12-19 DIAGNOSIS — I48.91 ATRIAL FIBRILLATION, UNSPECIFIED TYPE: ICD-10-CM

## 2022-12-19 DIAGNOSIS — Z79.01 CURRENT USE OF LONG TERM ANTICOAGULATION: ICD-10-CM

## 2022-12-19 LAB
INR PPP: 2.2 (ref 0.91–1.09)
PROTHROMBIN TIME: 26.3 SECONDS (ref 10–13.8)

## 2022-12-19 PROCEDURE — 85610 PROTHROMBIN TIME: CPT | Performed by: FAMILY MEDICINE

## 2023-01-05 ENCOUNTER — PREP FOR SURGERY (OUTPATIENT)
Dept: OTHER | Facility: HOSPITAL | Age: 88
End: 2023-01-05
Payer: MEDICARE

## 2023-01-05 ENCOUNTER — TELEPHONE (OUTPATIENT)
Dept: CARDIOLOGY | Facility: CLINIC | Age: 88
End: 2023-01-05
Payer: MEDICARE

## 2023-01-05 DIAGNOSIS — I48.0 PAF (PAROXYSMAL ATRIAL FIBRILLATION): Primary | ICD-10-CM

## 2023-01-05 RX ORDER — SODIUM CHLORIDE 0.9 % (FLUSH) 0.9 %
10 SYRINGE (ML) INJECTION AS NEEDED
Status: CANCELLED | OUTPATIENT
Start: 2023-01-05

## 2023-01-05 RX ORDER — ASPIRIN 81 MG/1
324 TABLET, CHEWABLE ORAL ONCE
Status: CANCELLED | OUTPATIENT
Start: 2023-01-05 | End: 2023-01-05

## 2023-01-05 RX ORDER — SODIUM CHLORIDE 9 MG/ML
100 INJECTION, SOLUTION INTRAVENOUS CONTINUOUS
Status: CANCELLED | OUTPATIENT
Start: 2023-01-05

## 2023-01-05 RX ORDER — BUPIVACAINE HCL/0.9 % NACL/PF 0.1 %
2 PLASTIC BAG, INJECTION (ML) EPIDURAL ONCE
Status: CANCELLED | OUTPATIENT
Start: 2023-01-05 | End: 2023-01-05

## 2023-01-05 RX ORDER — SODIUM CHLORIDE 0.9 % (FLUSH) 0.9 %
10 SYRINGE (ML) INJECTION EVERY 12 HOURS SCHEDULED
Status: CANCELLED | OUTPATIENT
Start: 2023-01-05

## 2023-01-05 NOTE — TELEPHONE ENCOUNTER
Call to pt and her son to discuss Watchman planning. They are agreeable to plan her Watchman implant for Jan 26.

## 2023-01-11 ENCOUNTER — HOSPITAL ENCOUNTER (OUTPATIENT)
Dept: CT IMAGING | Facility: HOSPITAL | Age: 88
Discharge: HOME | End: 2023-01-11
Admitting: FAMILY MEDICINE
Payer: MEDICARE

## 2023-01-11 DIAGNOSIS — R91.1 SOLITARY PULMONARY NODULE: ICD-10-CM

## 2023-01-11 PROCEDURE — 71250 CT THORAX DX C-: CPT

## 2023-01-17 ENCOUNTER — LAB (OUTPATIENT)
Dept: LAB | Facility: HOSPITAL | Age: 88
End: 2023-01-17
Payer: MEDICARE

## 2023-01-17 ENCOUNTER — ANTICOAGULATION VISIT (OUTPATIENT)
Dept: CARDIOLOGY | Facility: CLINIC | Age: 88
End: 2023-01-17
Payer: MEDICARE

## 2023-01-17 DIAGNOSIS — I48.0 PAF (PAROXYSMAL ATRIAL FIBRILLATION): ICD-10-CM

## 2023-01-17 LAB
INR PPP: 2.3 (ref 0.91–1.09)
PROTHROMBIN TIME: 27.2 SECONDS (ref 10–13.8)

## 2023-01-17 PROCEDURE — 85610 PROTHROMBIN TIME: CPT | Performed by: FAMILY MEDICINE

## 2023-01-25 ENCOUNTER — HOSPITAL ENCOUNTER (OUTPATIENT)
Dept: CARDIOLOGY | Facility: HOSPITAL | Age: 88
Setting detail: HOSPITAL OUTPATIENT SURGERY
Discharge: HOME | End: 2023-01-25
Payer: MEDICARE

## 2023-01-25 ENCOUNTER — TELEPHONE (OUTPATIENT)
Dept: CARDIOLOGY | Facility: CLINIC | Age: 88
End: 2023-01-25
Payer: MEDICARE

## 2023-01-25 NOTE — TELEPHONE ENCOUNTER
Call from pt's family. They report that the pt is very constipated and very nauseous. They were unsure if she should still have her Watchman procedure or not.     She was given an enema and did have small BM results from that, but not much. We will cancel the procedure tomorrow and work on getting her scheduled next month. I did advise them to consider taking her to ER if she is unable to have another BM and continues feeling nauseous or starts to throw up. They verbalized understanding.     Dr. Mota has been made aware of the situation as well.

## 2023-02-03 NOTE — PROGRESS NOTES
Chief Complaint  right upper pulmonary nodule    Subjective    History of Present Illness {CC  Problem List  Visit Diagnosis   Encounters  Notes  Medications  Labs  Result Review Imaging  Media     Damaris Nash presents to [facility]MON[facility] for:    History of Present Illness  Ms. aNsh is here for follow up and management of right upper lobe lung nodule and enlarged precarinal node that was hypermetabolic on PET scan. She has been doing good since last office visit. No pulmonary complaints. She tells me she is preparing to undergo watchman procedure per cardiology. She had follow up Ct chest in January and is here to review imaging.        Prior to Admission medications    Medication Sig Start Date End Date Taking? Authorizing Provider   acetaminophen (TYLENOL) 325 MG tablet Take 650 mg by mouth Daily As Needed for mild pain (1-3).    Alejandro Epps MD   ALPRAZolam (XANAX) 0.25 MG tablet Take 0.25 mg by mouth 2 (Two) Times a Day As Needed for anxiety.    Alejandro Epps MD   amLODIPine (NORVASC) 2.5 MG tablet Take 2.5 mg by mouth Daily.    Alejandro Epps MD   Cholecalciferol (VITAMIN D3) 2000 units capsule Take 2,000 Units by mouth Daily.    Alejandro Epps MD   hydrochlorothiazide (HYDRODIURIL) 25 MG tablet Take 25 mg by mouth Daily.    Alejandro Epps MD   levothyroxine (SYNTHROID, LEVOTHROID) 137 MCG tablet TAKE 1 TABLET BY MOUTH ONCE DAILY ON AN EMPTY STOMACH AT LEAST 30 MINUTES PRIOR TO OTHER FOOD, MED, OR DRINK. 8/9/22   Alejandro Epps MD   metoprolol succinate XL (TOPROL-XL) 100 MG 24 hr tablet Take 1 tablet by mouth Every Night. 9/26/22   Carolyn Smith APRN   traMADol (ULTRAM) 50 MG tablet Take 50 mg by mouth Every 6 (Six) Hours As Needed for moderate pain (4-6).    Alejandro Epps MD   warfarin (COUMADIN) 4 MG tablet Take 1 tablet by mouth Every Night. 9/26/22   Carolyn Smith APRN       Social  "History     Socioeconomic History   • Marital status:    Tobacco Use   • Smoking status: Former     Packs/day: 1.50     Years: 30.00     Pack years: 45.00     Types: Cigarettes     Start date:      Quit date: 1992     Years since quittin.1   • Smokeless tobacco: Never   Vaping Use   • Vaping Use: Never used   Substance and Sexual Activity   • Alcohol use: No   • Drug use: No   • Sexual activity: Defer       Objective   Vital Signs:   /80   Pulse 65   Ht 160 cm (63\")   Wt 51.3 kg (113 lb)   SpO2 98% Comment: RA  BMI 20.02 kg/m²     Physical Exam  Constitutional:       General: She is not in acute distress.     Interventions: Face mask in place.   HENT:      Head: Normocephalic.      Nose: Nose normal.      Mouth/Throat:      Mouth: Mucous membranes are moist.   Eyes:      General: No scleral icterus.  Cardiovascular:      Rate and Rhythm: Normal rate.   Pulmonary:      Effort: No respiratory distress.   Abdominal:      General: There is no distension.   Neurological:      Mental Status: She is alert and oriented to person, place, and time.   Psychiatric:         Mood and Affect: Mood normal.         Behavior: Behavior is cooperative.        Result Review :{ Labs  Result Review  Imaging  Med Tab  Media :          Results for orders placed during the hospital encounter of 19    Pulmonary Function Test    [facility] - Pulmonary Function Test    [address] [place] [address]  [id number]  [phone]    Patient : Damaris Nash  MRN : [id number]  CSN : [id number]  Pulmonologist : Jone Faust MD  Date : 2019    ______________________________________________________________________    Interpretation :  1.  Spirometry is consistent with a moderate obstructive ventilatory defect.  2.  Lung volumes reveal a coexisting mild restrictive ventilatory defect.  There is also a decrease in inspiratory capacity.  3.  There is a severe diffusion impairment which " when corrected for alveolar volume is normalized.      Jone Faust MD              CT Chest Without Contrast Diagnostic (01/11/2023 15:04)      My interpretation of imaging:  Right upper lobe nodule, stable, stable precarinal lymph node, trace daniele pleural effusions         Assessment and Plan {CC Problem List  Visit Diagnosis  ROS  Review (Popup)  Health Maintenance  Quality  BestPractice  Medications  SmartSets  SnapShot Encounters  Media      Diagnoses and all orders for this visit:    1. Right upper lobe pulmonary nodule (Primary)  -     Cancel: CT Chest Without Contrast; Future  -     CT Chest Without Contrast; Future    2. Adenopathy  -     Cancel: CT Chest Without Contrast; Future  -     CT Chest Without Contrast; Future        BMI is within normal parameters. No other follow-up for BMI required.      We reviewed imaging together showing stability in rul nodule and stability in mediastinal node. Significance of mediastinal node unclear; may be reactive; malignancy not ruled out. We discussed differential diagnosis and after discussions with patient and family have opted to continue surveillance scans to monitor mediastinal node. CT chest in 3 months from prior. Office follow up in 3 months to review CT. Call in the interim with issues.     Latha Martin, APRN  2/8/2023  13:40 CST    Follow Up {Instructions Charge Capture  Follow-up Communications   Return in about 2 months (around 4/8/2023) for Review CT.    Patient was given instructions and counseling regarding her condition or for health maintenance advice. Please see specific information pulled into the AVS if appropriate.

## 2023-02-07 ENCOUNTER — TELEPHONE (OUTPATIENT)
Dept: CARDIOLOGY | Facility: CLINIC | Age: 88
End: 2023-02-07
Payer: MEDICARE

## 2023-02-07 NOTE — TELEPHONE ENCOUNTER
Call to pt's son to discuss Watchman implant planning for Feb 23. He was agreeable to this date. Office will call her back with pre-procedure details.

## 2023-02-08 ENCOUNTER — OFFICE VISIT (OUTPATIENT)
Dept: PULMONOLOGY | Facility: CLINIC | Age: 88
End: 2023-02-08
Payer: MEDICARE

## 2023-02-08 VITALS
HEIGHT: 63 IN | BODY MASS INDEX: 20.02 KG/M2 | WEIGHT: 113 LBS | HEART RATE: 65 BPM | OXYGEN SATURATION: 98 % | DIASTOLIC BLOOD PRESSURE: 80 MMHG | SYSTOLIC BLOOD PRESSURE: 126 MMHG

## 2023-02-08 DIAGNOSIS — R91.1 RIGHT UPPER LOBE PULMONARY NODULE: Primary | ICD-10-CM

## 2023-02-08 DIAGNOSIS — R59.9 ADENOPATHY: ICD-10-CM

## 2023-02-08 PROCEDURE — 99213 OFFICE O/P EST LOW 20 MIN: CPT | Performed by: NURSE PRACTITIONER

## 2023-02-09 NOTE — LETTER
January 16, 2017     Shahla Beltran DO  [address]  [address]  [facility] [address]    Patient: Damaris Nash   YOB: 1935   Date of Visit: 1/16/2017       Dear Dr. Devin DO:    Damaris Nash was in my office today. Below is a copy of my note.    If you have questions, please do not hesitate to call me. I look forward to following Damaris along with you.         Sincerely,        [facility] NP        CC: Isidoro Rajput MD        Subjective:     Encounter Date:01/16/2017      Patient ID: Damaris Nash is a 81 y.o. female.    Chief Complaint:  Atrial Fibrillation   Presents for follow-up visit. Symptoms include hypertension. Symptoms are negative for chest pain, dizziness, palpitations, shortness of breath, syncope, tachycardia and weakness. The symptoms have been improving. Past medical history includes atrial fibrillation.   Hypertension   This is a chronic problem. The current episode started more than 1 year ago. The problem is unchanged. Associated symptoms include blurred vision. Pertinent negatives include no chest pain, headaches, malaise/fatigue, orthopnea, palpitations, PND or shortness of breath. Risk factors for coronary artery disease include dyslipidemia.     The pt states she has had trouble with her vision. She woke up a few weeks ago with transient vision loss in her R eye. Since this time she continues with blurred/double vision intermittently. She says she's seen Dr. Isidoro Rajput about this issue and he recommended a carotid study. It showed 50-69% stenosis on both sides. The CTA neck showed similar results, per Dr. Rogel. He recommends a conservative treatment approach. The pt also had an echo and Holter monitor performed. The echo showed LVEF 65%, grade I diastolic dysfn and mild mitral regurg. The holter was unremarkable. The pt states that Dr. Rajput suggested the vision loss may have been caused from nighttime hypotension and recommended that she take her Toprol earlier  PDMP reviewed; no aberrant behavior identified, prescription authorized.     MME:0    LAST OV: 06/07/22  NEXT OV:NONE  LAST FILL:01/03/23   in the evening. The pt states her BP has been running 120s-130s and occasionally 150s systolic. There are no documented episodes of hypotension. Other than the transient vision loss, the pt has no new complaints. She denies any CP/pressure, dyspnea, syncope, dizziness, palpitations, edema, or increased fatigue.     The following portions of the patient's history were reviewed and updated as appropriate: allergies, current medications, past family history, past medical history, past social history, past surgical history and problem list.    Review of Systems   Constitution: Negative for weakness, malaise/fatigue and weight gain.   HENT: Negative for headaches.    Eyes: Positive for blurred vision, double vision and vision loss in right eye (resolved now).   Cardiovascular: Negative for chest pain, claudication, dyspnea on exertion, irregular heartbeat, leg swelling, near-syncope, orthopnea, palpitations, paroxysmal nocturnal dyspnea and syncope.   Respiratory: Negative for hemoptysis and shortness of breath.    Hematologic/Lymphatic: Negative for bleeding problem. Does not bruise/bleed easily.   Skin: Negative for rash.   Musculoskeletal: Negative for myalgias.   Gastrointestinal: Negative for abdominal pain, melena, nausea and vomiting.   Genitourinary: Negative for hematuria.   Neurological: Negative for dizziness, focal weakness and light-headedness.   Psychiatric/Behavioral: Negative for depression and memory loss. The patient is not nervous/anxious.    All other systems reviewed and are negative.    Procedures       Objective:     Physical Exam   Constitutional: She is oriented to person, place, and time. She appears well-developed and well-nourished.   HENT:   Head: Normocephalic and atraumatic.   Eyes: Conjunctivae and EOM are normal. Pupils are equal, round, and reactive to light.   Neck: Normal range of motion. Neck supple. No JVD present.   Cardiovascular: Normal rate, regular rhythm, S1 normal, S2 normal,  "normal heart sounds, intact distal pulses and normal pulses.    No murmur heard.  Pulmonary/Chest: Effort normal and breath sounds normal. No respiratory distress.   Abdominal: Soft. Bowel sounds are normal. She exhibits no distension.   Musculoskeletal: She exhibits no edema.   Neurological: She is alert and oriented to person, place, and time.   Skin: Skin is warm and dry.   Psychiatric: She has a normal mood and affect. Judgment normal.   Vitals reviewed.    Visit Vitals   • /70 (BP Location: Left arm, Patient Position: Sitting, Cuff Size: Adult)   • Pulse 78   • Resp 18   • Ht 63\" (160 cm)   • Wt 149 lb (67.6 kg)   • Breastfeeding No   • BMI 26.39 kg/m2         Lab Review: INR 12/27/16: 3.1      Assessment:          Diagnosis Plan   1. Mixed hyperlipidemia      Followed by Dr. Beltran, pt states she will be placed back on statin    2. Bilateral carotid artery disease      Followed by Dr. Rogel   3. Atrial fibrillation, unspecified type      Ablation 2012. in NSR today. Chronically anticoagulated on warfarin   4. Essential hypertension      Well controlled today. Pt reports 130s-150s. Follow-up with PCP.    5. Hx of transient ischemic attack (TIA)            Plan:       1. Will not treat for hypotension at this time, as no documentation   2. Continue Coumadin 4 mg TTHSS and 2 mg MWF.  3. Recheck INR as scheduled.  4. Continue all other medication as prescribed  5. Follow-up with PCP in 2 weeks, as scheduled, regarding HTN and HLD  6. Pt not currently on statin drug, but states her PCP has said she would be started on one soon  7. Follow-up in 1 year, unless needed sooner  8. Verbalized understanding of instructions.        "

## 2023-02-15 ENCOUNTER — LAB (OUTPATIENT)
Dept: LAB | Facility: HOSPITAL | Age: 88
End: 2023-02-15
Payer: MEDICARE

## 2023-02-15 ENCOUNTER — ANTICOAGULATION VISIT (OUTPATIENT)
Dept: CARDIOLOGY | Facility: CLINIC | Age: 88
End: 2023-02-15
Payer: MEDICARE

## 2023-02-15 DIAGNOSIS — I48.0 PAF (PAROXYSMAL ATRIAL FIBRILLATION): ICD-10-CM

## 2023-02-15 LAB
INR PPP: 2.2 (ref 0.91–1.09)
PROTHROMBIN TIME: 26.5 SECONDS (ref 10–13.8)

## 2023-02-15 PROCEDURE — 85610 PROTHROMBIN TIME: CPT | Performed by: FAMILY MEDICINE

## 2023-02-22 ENCOUNTER — ANTICOAGULATION VISIT (OUTPATIENT)
Dept: CARDIOLOGY | Facility: CLINIC | Age: 88
End: 2023-02-22
Payer: MEDICARE

## 2023-02-22 ENCOUNTER — HOSPITAL ENCOUNTER (OUTPATIENT)
Dept: CARDIOLOGY | Facility: HOSPITAL | Age: 88
Discharge: HOME | DRG: 274 | End: 2023-02-22
Admitting: INTERNAL MEDICINE
Payer: MEDICARE

## 2023-02-22 DIAGNOSIS — I48.0 PAF (PAROXYSMAL ATRIAL FIBRILLATION): ICD-10-CM

## 2023-02-22 LAB
ABO GROUP BLD: NORMAL
ALBUMIN SERPL-MCNC: 4.4 G/DL (ref 3.5–5.2)
ALBUMIN/GLOB SERPL: 1.4 G/DL
ALP SERPL-CCNC: 61 U/L (ref 39–117)
ALT SERPL W P-5'-P-CCNC: 8 U/L (ref 1–33)
ANION GAP SERPL CALCULATED.3IONS-SCNC: 9 MMOL/L (ref 5–15)
AST SERPL-CCNC: 21 U/L (ref 1–32)
BILIRUB SERPL-MCNC: 0.8 MG/DL (ref 0–1.2)
BLD GP AB SCN SERPL QL: NEGATIVE
BUN SERPL-MCNC: 22 MG/DL (ref 8–23)
BUN/CREAT SERPL: 28.6 (ref 7–25)
CALCIUM SPEC-SCNC: 9.4 MG/DL (ref 8.6–10.5)
CHLORIDE SERPL-SCNC: 102 MMOL/L (ref 98–107)
CO2 SERPL-SCNC: 30 MMOL/L (ref 22–29)
CREAT SERPL-MCNC: 0.77 MG/DL (ref 0.57–1)
DEPRECATED RDW RBC AUTO: 49.9 FL (ref 37–54)
EGFRCR SERPLBLD CKD-EPI 2021: 74.8 ML/MIN/1.73
ERYTHROCYTE [DISTWIDTH] IN BLOOD BY AUTOMATED COUNT: 14.3 % (ref 12.3–15.4)
GLOBULIN UR ELPH-MCNC: 3.1 GM/DL
GLUCOSE SERPL-MCNC: 107 MG/DL (ref 65–99)
HCT VFR BLD AUTO: 43.2 % (ref 34–46.6)
HGB BLD-MCNC: 13.1 G/DL (ref 12–15.9)
INR PPP: 1.21 (ref 0.91–1.09)
MCH RBC QN AUTO: 28.8 PG (ref 26.6–33)
MCHC RBC AUTO-ENTMCNC: 30.3 G/DL (ref 31.5–35.7)
MCV RBC AUTO: 94.9 FL (ref 79–97)
PLATELET # BLD AUTO: 219 10*3/MM3 (ref 140–450)
PMV BLD AUTO: 10.9 FL (ref 6–12)
POTASSIUM SERPL-SCNC: 3.8 MMOL/L (ref 3.5–5.2)
PROT SERPL-MCNC: 7.5 G/DL (ref 6–8.5)
PROTHROMBIN TIME: 15.5 SECONDS (ref 11.8–14.8)
RBC # BLD AUTO: 4.55 10*6/MM3 (ref 3.77–5.28)
RH BLD: NEGATIVE
SODIUM SERPL-SCNC: 141 MMOL/L (ref 136–145)
T&S EXPIRATION DATE: NORMAL
WBC NRBC COR # BLD: 6.15 10*3/MM3 (ref 3.4–10.8)

## 2023-02-22 PROCEDURE — 85610 PROTHROMBIN TIME: CPT

## 2023-02-22 PROCEDURE — 86900 BLOOD TYPING SEROLOGIC ABO: CPT | Performed by: INTERNAL MEDICINE

## 2023-02-22 PROCEDURE — 86901 BLOOD TYPING SEROLOGIC RH(D): CPT | Performed by: INTERNAL MEDICINE

## 2023-02-22 PROCEDURE — 85027 COMPLETE CBC AUTOMATED: CPT

## 2023-02-22 PROCEDURE — 86850 RBC ANTIBODY SCREEN: CPT | Performed by: INTERNAL MEDICINE

## 2023-02-22 PROCEDURE — 36415 COLL VENOUS BLD VENIPUNCTURE: CPT

## 2023-02-22 PROCEDURE — 80053 COMPREHEN METABOLIC PANEL: CPT

## 2023-02-23 ENCOUNTER — ANTICOAGULATION VISIT (OUTPATIENT)
Dept: CARDIOLOGY | Facility: CLINIC | Age: 88
End: 2023-02-23
Payer: MEDICARE

## 2023-02-23 ENCOUNTER — HOSPITAL ENCOUNTER (INPATIENT)
Facility: HOSPITAL | Age: 88
LOS: 1 days | Discharge: HOME | DRG: 274 | End: 2023-02-23
Attending: INTERNAL MEDICINE | Admitting: INTERNAL MEDICINE
Payer: MEDICARE

## 2023-02-23 ENCOUNTER — APPOINTMENT (OUTPATIENT)
Dept: CARDIOLOGY | Facility: HOSPITAL | Age: 88
DRG: 274 | End: 2023-02-23
Payer: MEDICARE

## 2023-02-23 VITALS
HEIGHT: 63 IN | HEART RATE: 113 BPM | BODY MASS INDEX: 20.06 KG/M2 | WEIGHT: 113.2 LBS | SYSTOLIC BLOOD PRESSURE: 156 MMHG | TEMPERATURE: 97.3 F | OXYGEN SATURATION: 95 % | RESPIRATION RATE: 18 BRPM | DIASTOLIC BLOOD PRESSURE: 95 MMHG

## 2023-02-23 DIAGNOSIS — I48.0 PAF (PAROXYSMAL ATRIAL FIBRILLATION): ICD-10-CM

## 2023-02-23 LAB
MAXIMAL PREDICTED HEART RATE: 133 BPM
STRESS TARGET HR: 113 BPM

## 2023-02-23 PROCEDURE — 93662 INTRACARDIAC ECG (ICE): CPT | Performed by: INTERNAL MEDICINE

## 2023-02-23 PROCEDURE — 0 IOPAMIDOL PER 1 ML: Performed by: INTERNAL MEDICINE

## 2023-02-23 PROCEDURE — 85347 COAGULATION TIME ACTIVATED: CPT

## 2023-02-23 PROCEDURE — 25010000002 CEFAZOLIN PER 500 MG: Performed by: INTERNAL MEDICINE

## 2023-02-23 PROCEDURE — C1893 INTRO/SHEATH, FIXED,NON-PEEL: HCPCS | Performed by: INTERNAL MEDICINE

## 2023-02-23 PROCEDURE — 99152 MOD SED SAME PHYS/QHP 5/>YRS: CPT | Performed by: INTERNAL MEDICINE

## 2023-02-23 PROCEDURE — C1894 INTRO/SHEATH, NON-LASER: HCPCS | Performed by: INTERNAL MEDICINE

## 2023-02-23 PROCEDURE — 25010000002 DIGOXIN PER 500 MCG: Performed by: INTERNAL MEDICINE

## 2023-02-23 PROCEDURE — B215YZZ FLUOROSCOPY OF LEFT HEART USING OTHER CONTRAST: ICD-10-PCS | Performed by: INTERNAL MEDICINE

## 2023-02-23 PROCEDURE — C1889 IMPLANT/INSERT DEVICE, NOC: HCPCS | Performed by: INTERNAL MEDICINE

## 2023-02-23 PROCEDURE — 25010000002 HEPARIN (PORCINE) PER 1000 UNITS: Performed by: INTERNAL MEDICINE

## 2023-02-23 PROCEDURE — S0260 H&P FOR SURGERY: HCPCS | Performed by: INTERNAL MEDICINE

## 2023-02-23 PROCEDURE — 25010000002 MIDAZOLAM PER 1 MG: Performed by: INTERNAL MEDICINE

## 2023-02-23 PROCEDURE — 25010000002 HEPARIN (PORCINE) 1000-0.9 UT/500ML-% SOLUTION: Performed by: INTERNAL MEDICINE

## 2023-02-23 PROCEDURE — 93325 DOPPLER ECHO COLOR FLOW MAPG: CPT | Performed by: INTERNAL MEDICINE

## 2023-02-23 PROCEDURE — C1759 CATH, INTRA ECHOCARDIOGRAPHY: HCPCS | Performed by: INTERNAL MEDICINE

## 2023-02-23 PROCEDURE — 93308 TTE F-UP OR LMTD: CPT | Performed by: INTERNAL MEDICINE

## 2023-02-23 PROCEDURE — C1760 CLOSURE DEV, VASC: HCPCS | Performed by: INTERNAL MEDICINE

## 2023-02-23 PROCEDURE — 25010000002 FENTANYL CITRATE (PF) 50 MCG/ML SOLUTION: Performed by: INTERNAL MEDICINE

## 2023-02-23 PROCEDURE — B246YZZ ULTRASONOGRAPHY OF RIGHT AND LEFT HEART USING OTHER CONTRAST: ICD-10-PCS | Performed by: INTERNAL MEDICINE

## 2023-02-23 PROCEDURE — 33340 PERQ CLSR TCAT L ATR APNDGE: CPT | Performed by: INTERNAL MEDICINE

## 2023-02-23 PROCEDURE — 99153 MOD SED SAME PHYS/QHP EA: CPT | Performed by: INTERNAL MEDICINE

## 2023-02-23 PROCEDURE — 25010000002 DIPHENHYDRAMINE PER 50 MG: Performed by: INTERNAL MEDICINE

## 2023-02-23 PROCEDURE — 02L73DK OCCLUSION OF LEFT ATRIAL APPENDAGE WITH INTRALUMINAL DEVICE, PERCUTANEOUS APPROACH: ICD-10-PCS | Performed by: INTERNAL MEDICINE

## 2023-02-23 PROCEDURE — 93325 DOPPLER ECHO COLOR FLOW MAPG: CPT

## 2023-02-23 PROCEDURE — C1769 GUIDE WIRE: HCPCS | Performed by: INTERNAL MEDICINE

## 2023-02-23 PROCEDURE — 93308 TTE F-UP OR LMTD: CPT

## 2023-02-23 DEVICE — LEFT ATRIAL APPENDAGE CLOSURE DEVICE WITH DELIVERY SYSTEM
Type: IMPLANTABLE DEVICE | Site: HEART | Status: FUNCTIONAL
Brand: WATCHMAN FLX™

## 2023-02-23 DEVICE — CAP WATCHMAN FLX PROC: Type: IMPLANTABLE DEVICE | Status: FUNCTIONAL

## 2023-02-23 DEVICE — CAP SYS WATCHMAN TRUSEAL ACC PROC: Type: IMPLANTABLE DEVICE | Status: FUNCTIONAL

## 2023-02-23 RX ORDER — LIDOCAINE HYDROCHLORIDE 20 MG/ML
INJECTION, SOLUTION INFILTRATION; PERINEURAL
Status: DISCONTINUED | OUTPATIENT
Start: 2023-02-23 | End: 2023-02-23 | Stop reason: HOSPADM

## 2023-02-23 RX ORDER — ALPRAZOLAM 0.5 MG/1
0.5 TABLET ORAL 3 TIMES DAILY PRN
Status: DISCONTINUED | OUTPATIENT
Start: 2023-02-23 | End: 2023-02-23 | Stop reason: HOSPADM

## 2023-02-23 RX ORDER — HEPARIN SODIUM 1000 [USP'U]/ML
INJECTION, SOLUTION INTRAVENOUS; SUBCUTANEOUS
Status: DISCONTINUED | OUTPATIENT
Start: 2023-02-23 | End: 2023-02-23 | Stop reason: HOSPADM

## 2023-02-23 RX ORDER — SODIUM CHLORIDE 9 MG/ML
100 INJECTION, SOLUTION INTRAVENOUS CONTINUOUS
Status: DISCONTINUED | OUTPATIENT
Start: 2023-02-23 | End: 2023-02-23 | Stop reason: HOSPADM

## 2023-02-23 RX ORDER — HEPARIN SODIUM 200 [USP'U]/100ML
INJECTION, SOLUTION INTRAVENOUS
Status: DISCONTINUED | OUTPATIENT
Start: 2023-02-23 | End: 2023-02-23 | Stop reason: HOSPADM

## 2023-02-23 RX ORDER — DIPHENHYDRAMINE HYDROCHLORIDE 50 MG/ML
INJECTION INTRAMUSCULAR; INTRAVENOUS
Status: DISCONTINUED | OUTPATIENT
Start: 2023-02-23 | End: 2023-02-23 | Stop reason: HOSPADM

## 2023-02-23 RX ORDER — METOPROLOL TARTRATE 50 MG/1
TABLET, FILM COATED ORAL
Status: DISCONTINUED
Start: 2023-02-23 | End: 2023-02-23 | Stop reason: WASHOUT

## 2023-02-23 RX ORDER — ASPIRIN 81 MG/1
TABLET, CHEWABLE ORAL
Status: COMPLETED
Start: 2023-02-23 | End: 2023-02-23

## 2023-02-23 RX ORDER — SODIUM CHLORIDE 0.9 % (FLUSH) 0.9 %
10 SYRINGE (ML) INJECTION EVERY 12 HOURS SCHEDULED
Status: DISCONTINUED | OUTPATIENT
Start: 2023-02-23 | End: 2023-02-23 | Stop reason: HOSPADM

## 2023-02-23 RX ORDER — BUPIVACAINE HCL/0.9 % NACL/PF 0.1 %
2 PLASTIC BAG, INJECTION (ML) EPIDURAL ONCE
Status: DISCONTINUED | OUTPATIENT
Start: 2023-02-23 | End: 2023-02-23 | Stop reason: HOSPADM

## 2023-02-23 RX ORDER — DIGOXIN 0.25 MG/ML
500 INJECTION INTRAMUSCULAR; INTRAVENOUS ONCE
Status: COMPLETED | OUTPATIENT
Start: 2023-02-23 | End: 2023-02-23

## 2023-02-23 RX ORDER — ACETAMINOPHEN 325 MG/1
650 TABLET ORAL EVERY 4 HOURS PRN
Status: DISCONTINUED | OUTPATIENT
Start: 2023-02-23 | End: 2023-02-23 | Stop reason: HOSPADM

## 2023-02-23 RX ORDER — METOPROLOL SUCCINATE 100 MG/1
100 TABLET, EXTENDED RELEASE ORAL ONCE
Status: COMPLETED | OUTPATIENT
Start: 2023-02-23 | End: 2023-02-23

## 2023-02-23 RX ORDER — MIDAZOLAM HYDROCHLORIDE 1 MG/ML
INJECTION INTRAMUSCULAR; INTRAVENOUS
Status: DISCONTINUED | OUTPATIENT
Start: 2023-02-23 | End: 2023-02-23 | Stop reason: HOSPADM

## 2023-02-23 RX ORDER — SODIUM CHLORIDE 9 MG/ML
75 INJECTION, SOLUTION INTRAVENOUS CONTINUOUS
Status: DISPENSED | OUTPATIENT
Start: 2023-02-23 | End: 2023-02-23

## 2023-02-23 RX ORDER — SODIUM CHLORIDE 0.9 % (FLUSH) 0.9 %
10 SYRINGE (ML) INJECTION AS NEEDED
Status: DISCONTINUED | OUTPATIENT
Start: 2023-02-23 | End: 2023-02-23 | Stop reason: HOSPADM

## 2023-02-23 RX ORDER — FENTANYL CITRATE 50 UG/ML
INJECTION, SOLUTION INTRAMUSCULAR; INTRAVENOUS
Status: DISCONTINUED | OUTPATIENT
Start: 2023-02-23 | End: 2023-02-23 | Stop reason: HOSPADM

## 2023-02-23 RX ORDER — ASPIRIN 81 MG/1
324 TABLET, CHEWABLE ORAL ONCE
Status: COMPLETED | OUTPATIENT
Start: 2023-02-23 | End: 2023-02-23

## 2023-02-23 RX ADMIN — DIGOXIN 500 MCG: 0.25 INJECTION INTRAMUSCULAR; INTRAVENOUS at 16:42

## 2023-02-23 RX ADMIN — ASPIRIN 324 MG: 81 TABLET, CHEWABLE ORAL at 10:03

## 2023-02-23 RX ADMIN — METOPROLOL SUCCINATE 100 MG: 100 TABLET, FILM COATED, EXTENDED RELEASE ORAL at 10:41

## 2023-02-24 ENCOUNTER — PREP FOR SURGERY (OUTPATIENT)
Dept: OTHER | Facility: HOSPITAL | Age: 88
End: 2023-02-24
Payer: MEDICARE

## 2023-02-24 DIAGNOSIS — I48.0 PAF (PAROXYSMAL ATRIAL FIBRILLATION): Primary | ICD-10-CM

## 2023-02-24 LAB
ACT BLD: 215 SECONDS (ref 82–152)
ACT BLD: 215 SECONDS (ref 82–152)

## 2023-02-24 RX ORDER — SODIUM CHLORIDE 0.9 % (FLUSH) 0.9 %
10 SYRINGE (ML) INJECTION AS NEEDED
OUTPATIENT
Start: 2023-02-24

## 2023-02-24 RX ORDER — SODIUM CHLORIDE 0.9 % (FLUSH) 0.9 %
10 SYRINGE (ML) INJECTION EVERY 12 HOURS SCHEDULED
OUTPATIENT
Start: 2023-02-24

## 2023-02-24 RX ORDER — SODIUM CHLORIDE 9 MG/ML
20 INJECTION, SOLUTION INTRAVENOUS CONTINUOUS
OUTPATIENT
Start: 2023-02-24

## 2023-03-13 ENCOUNTER — ANTICOAGULATION VISIT (OUTPATIENT)
Dept: CARDIOLOGY | Facility: CLINIC | Age: 88
End: 2023-03-13
Payer: MEDICARE

## 2023-03-15 NOTE — PROGRESS NOTES
01/05/2017      Shahla Beltran,   [address]  [address]  [place] [address]    Damaris Nash  1935    Chief Complaint   Patient presents with   • Carotid Artery Disease     US daniele carotid completed yesterday. Can not see out off right eye since Monday night       Dear Shahla Beltran, *:      HPI  I had the pleasure of seeing your patient Damaris Nash in the office today.  Thank you kindly for this consultation.  As you recall, Damaris Nash is a 81 y.o. left-handed female who you are currently following for routine health maintenance.  She states she has loss of vision to her right eye three days ago.  She was sent to Dr. Rajput at that time.  She does have a history of stroke with symptoms of slurred speech and right-sided weakness.  Her most recent episodes only involved right eye vision loss.  She had a carotid duplex which was personally reviewed by me.    Past Medical History   Diagnosis Date   • A-fib    • Breast CA    • Carotid artery disease    • Hyperlipidemia    • Hypertension    • Hypothyroid    • Osteopenia    • Stroke    • Vitamin D deficiency        Past Surgical History   Procedure Laterality Date   • Breast lumpectomy     • Partial hip arthroplasty Right    • Cataract extraction Right        History reviewed. No pertinent family history.    Social History     Social History   • Marital status:      Spouse name: N/A   • Number of children: N/A   • Years of education: N/A     Occupational History   • Not on file.     Social History Main Topics   • Smoking status: Former Smoker     Quit date: 1/5/1994   • Smokeless tobacco: Never Used   • Alcohol use No   • Drug use: No   • Sexual activity: Defer     Other Topics Concern   • Not on file     Social History Narrative       Allergies   Allergen Reactions   • Celebrex [Celecoxib]    • Epinephrine    • Ferrous Sulfate    • Other      Omnicef, some Thad, possible Rocephin allergy   • Simvastatin    • Sulfa Antibiotics   · Diet and exercise counseling as appropriate       Prior to Admission medications    Medication Sig Start Date End Date Taking? Authorizing Provider   acetaminophen (TYLENOL) 325 MG tablet Take 650 mg by mouth Daily As Needed for mild pain (1-3).   Yes Historical Provider, MD   alendronate (FOSAMAX) 70 MG tablet Take 70 mg by mouth Every 7 (Seven) Days.   Yes Historical Provider, MD   ALPRAZolam (XANAX) 0.25 MG tablet Take 0.25 mg by mouth 2 (Two) Times a Day As Needed for anxiety.   Yes Historical Provider, MD   Ergocalciferol (VITAMIN D2) 2000 UNITS tablet Take  by mouth Daily.   Yes Historical Provider, MD   Glucosamine-Chondroitin (MOVE FREE PO) Take  by mouth Daily.   Yes Historical Provider, MD   hydrochlorothiazide (HYDRODIURIL) 25 MG tablet Take 25 mg by mouth Daily.   Yes Historical Provider, MD   ibuprofen (ADVIL,MOTRIN) 200 MG tablet Take 200 mg by mouth Daily As Needed for mild pain (1-3).   Yes Historical Provider, MD   levothyroxine (SYNTHROID, LEVOTHROID) 75 MCG tablet Take 75 mcg by mouth Daily.   Yes Historical Provider, MD   lisinopril (PRINIVIL,ZESTRIL) 30 MG tablet Take 30 mg by mouth Daily.   Yes Historical Provider, MD   metoprolol succinate XL (TOPROL-XL) 100 MG 24 hr tablet Take 100 mg by mouth Every Night.   Yes Historical Provider, MD   Multiple Vitamins-Minerals (PRESERVISION AREDS 2 PO) Take 2 capsules by mouth Daily.   Yes Historical Provider, MD   traMADol (ULTRAM) 50 MG tablet Take 50 mg by mouth Every 6 (Six) Hours As Needed for moderate pain (4-6).   Yes Historical Provider, MD   warfarin (COUMADIN) 5 MG tablet Take 5 mg by mouth Daily.   Yes Historical Provider, MD       Review of Systems   Constitutional: Negative.    HENT: Negative.    Eyes: Positive for visual disturbance.   Respiratory: Negative.    Cardiovascular: Negative.    Gastrointestinal: Negative.    Endocrine: Negative.    Genitourinary: Negative.    Musculoskeletal: Negative.    Skin: Negative.    Allergic/Immunologic: Negative.    Neurological: Negative.   "  Hematological: Negative.    Psychiatric/Behavioral: Negative.        Visit Vitals   • /68   • Pulse 80   • Ht 63\" (160 cm)   • Wt 152 lb (68.9 kg)   • BMI 26.93 kg/m2     Physical Exam   Constitutional: She is oriented to person, place, and time. She appears well-developed and well-nourished. No distress.   HENT:   Head: Normocephalic and atraumatic.   Mouth/Throat: Oropharynx is clear and moist.   Eyes: Pupils are equal, round, and reactive to light. No scleral icterus.   Neck: Normal range of motion. Neck supple. No JVD present. Carotid bruit is not present. No thyromegaly present.   Cardiovascular: Normal rate, regular rhythm, S2 normal, normal heart sounds, intact distal pulses and normal pulses.  Exam reveals no gallop and no friction rub.    No murmur heard.  Abdominal: Soft. Normal aorta and bowel sounds are normal. There is no hepatosplenomegaly.   Musculoskeletal: Normal range of motion.   Neurological: She is alert and oriented to person, place, and time. No cranial nerve deficit.   Skin: Skin is warm and dry. She is not diaphoretic.   Psychiatric: She has a normal mood and affect. Her behavior is normal. Judgment and thought content normal.   Nursing note and vitals reviewed.      Diagnostic Data:  EXAMINATION: US CAROTID BILATERAL- 1/3/2017 1:54 PM CST      HISTORY: Hypertension, right eye visual disturbance.      Color Doppler and duplex imaging of the carotid arteries is obtained.  The right common carotid artery is visualized, color flow is present.  Peak systolic velocity right common carotid artery is 52 cm/s. The  proximal right internal carotid artery peak systolic velocities 109  cm/s. This may be associated with 50-69% stenosis.      Antegrade flow is demonstrated in the right vertebral.      Left common carotid artery is visualized. Atherosclerotic plaque is  present. Color flow is demonstrated in the left common carotid artery.  Peak systolic velocity is 67 cm/s. Atherosclerotic plaque " is present at  its bifurcation. Peak systolic velocity left internal carotid artery 149  cm/s. This is associated with 50-69% stenosis.      Antegrade flow is demonstrated in the left vertebral artery.      IMPRESSION:  1. Turbulence is noted in the proximal right internal carotid artery  50-69% associated stenosis.  2. Turbulence is present in the proximal left internal carotid artery  50-69% stenosis.  3. Correlation with CT angiography may be suggested.  This report was finalized on 01/03/2017 15:45 by Dr. Nate Mendes MD.    Patient Active Problem List   Diagnosis   • Atrial fibrillation (aka ATRIAL FIBRILLATION)   • CVA (cerebral infarction)   • A-fib   • Carotid artery disease   • Hyperlipidemia   • Hypertension   • Amaurosis fugax of right eye   • Bilateral carotid artery stenosis         ICD-10-CM ICD-9-CM   1. Bilateral carotid artery stenosis I65.23 433.10     433.30   2. Atrial fibrillation, unspecified type I48.91 427.31   3. Essential hypertension I10 401.9   4. Hyperlipidemia, unspecified hyperlipidemia type E78.5 272.4   5. Amaurosis fugax of right eye G45.3 362.34       Lab Frequency Next Occurrence   Protime-INR     POC Protime / INR         Plan: After thoroughly evaluating Damaris Nash, I believe the best course of action is to proceed with a CTA of neck to further evaluate her carotid disease.  She does have loss of vision to right eye, without return.  She should continue taking her current medication regimen as previously planned.  We will see her back on Tuesday with a CTA of the neck.  If she does have significant carotid occlusive disease she will need to have a carotid endarterectomy for stroke risk reduction.  This was all discussed in full with complete understanding.  Thank you for allowing me to participate in the care of your patient.  Please do not hesitate with any questions or concerns.  I will keep you aware of any further encounters with Damaris Nash.        Sincerely  yours,         Natan Rogel, DO    Shahla Beltran, DO

## 2023-03-17 ENCOUNTER — LAB (OUTPATIENT)
Dept: LAB | Facility: HOSPITAL | Age: 88
End: 2023-03-17
Payer: MEDICARE

## 2023-03-17 DIAGNOSIS — Z79.01 CURRENT USE OF LONG TERM ANTICOAGULATION: ICD-10-CM

## 2023-03-17 DIAGNOSIS — I48.91 ATRIAL FIBRILLATION, UNSPECIFIED TYPE: ICD-10-CM

## 2023-03-17 LAB
INR PPP: 2.8 (ref 0.91–1.09)
PROTHROMBIN TIME: 33.4 SECONDS (ref 10–13.8)

## 2023-03-17 PROCEDURE — 85610 PROTHROMBIN TIME: CPT | Performed by: FAMILY MEDICINE

## 2023-03-27 ENCOUNTER — OFFICE VISIT (OUTPATIENT)
Dept: CARDIOLOGY | Facility: CLINIC | Age: 88
End: 2023-03-27
Payer: MEDICARE

## 2023-03-27 ENCOUNTER — PREP FOR SURGERY (OUTPATIENT)
Dept: OTHER | Facility: HOSPITAL | Age: 88
End: 2023-03-27
Payer: MEDICARE

## 2023-03-27 VITALS
SYSTOLIC BLOOD PRESSURE: 146 MMHG | OXYGEN SATURATION: 96 % | HEART RATE: 106 BPM | DIASTOLIC BLOOD PRESSURE: 70 MMHG | BODY MASS INDEX: 19.67 KG/M2 | WEIGHT: 111 LBS | HEIGHT: 63 IN

## 2023-03-27 DIAGNOSIS — E78.2 MIXED HYPERLIPIDEMIA: ICD-10-CM

## 2023-03-27 DIAGNOSIS — I48.92 ATRIAL FLUTTER, UNSPECIFIED TYPE: Primary | ICD-10-CM

## 2023-03-27 DIAGNOSIS — E03.9 ACQUIRED HYPOTHYROIDISM: ICD-10-CM

## 2023-03-27 DIAGNOSIS — I10 PRIMARY HYPERTENSION: ICD-10-CM

## 2023-03-27 DIAGNOSIS — I65.23 BILATERAL CAROTID ARTERY STENOSIS: ICD-10-CM

## 2023-03-27 DIAGNOSIS — I48.0 PAROXYSMAL ATRIAL FIBRILLATION: ICD-10-CM

## 2023-03-27 PROCEDURE — 1160F RVW MEDS BY RX/DR IN RCRD: CPT | Performed by: NURSE PRACTITIONER

## 2023-03-27 PROCEDURE — 93000 ELECTROCARDIOGRAM COMPLETE: CPT | Performed by: NURSE PRACTITIONER

## 2023-03-27 PROCEDURE — 99214 OFFICE O/P EST MOD 30 MIN: CPT | Performed by: NURSE PRACTITIONER

## 2023-03-27 PROCEDURE — 1159F MED LIST DOCD IN RCRD: CPT | Performed by: NURSE PRACTITIONER

## 2023-03-27 RX ORDER — SODIUM CHLORIDE 0.9 % (FLUSH) 0.9 %
10 SYRINGE (ML) INJECTION AS NEEDED
Status: CANCELLED | OUTPATIENT
Start: 2023-03-27

## 2023-03-27 RX ORDER — SODIUM CHLORIDE 0.9 % (FLUSH) 0.9 %
10 SYRINGE (ML) INJECTION EVERY 12 HOURS SCHEDULED
Status: CANCELLED | OUTPATIENT
Start: 2023-03-27

## 2023-03-27 RX ORDER — SODIUM CHLORIDE 9 MG/ML
20 INJECTION, SOLUTION INTRAVENOUS CONTINUOUS
Status: CANCELLED | OUTPATIENT
Start: 2023-03-27

## 2023-03-27 RX ORDER — ASPIRIN 81 MG/1
81 TABLET ORAL DAILY
COMMUNITY

## 2023-03-27 RX ORDER — AMLODIPINE BESYLATE 2.5 MG/1
TABLET ORAL
COMMUNITY

## 2023-03-27 RX ORDER — SODIUM CHLORIDE 9 MG/ML
40 INJECTION, SOLUTION INTRAVENOUS AS NEEDED
Status: CANCELLED | OUTPATIENT
Start: 2023-03-27

## 2023-03-27 NOTE — H&P (VIEW-ONLY)
"    Subjective:     Encounter Date:03/27/2023      Patient ID: Damaris Nash is a 87 y.o. female     Chief Complaint: \"no complaints\"  Atrial Fibrillation  Presents for follow-up visit. Symptoms are negative for chest pain, dizziness, palpitations, shortness of breath, syncope and weakness. The symptoms have been stable. Past medical history includes atrial fibrillation.   Dizziness  Pertinent negatives include no chest pain, coughing, rash or weakness.   Hypertension  This is a chronic problem. The current episode started more than 1 year ago. The problem is controlled. Associated symptoms include malaise/fatigue. Pertinent negatives include no chest pain, orthopnea, palpitations, PND or shortness of breath.     Patient presents today for a follow up. She is followed for PAF s/p ablation per Dr. Rajput in 2011 with known reoccurrence in 8/2022 at which time she underwent a successful cardioversion. 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     She reports she is tired and has no energy  However notes this is not new and no worse than it has been in the past. She saw her PCP 10days ago and was told her she needed to take her thyroid medication due to her thyroid being high. She notes she has not been taking her Synthroid regularly. She reports her HR has been 'faster\" for the last week or so with -110 per her BP machine. She has had no falls since her last visit. She denies chest pain, palpitations, edema, dyspnea, orthopnea and PND.     The following portions of the patient's history were reviewed and updated as appropriate: allergies, current medications, past family history, past medical history, past social history, past surgical history and problem list.    Allergies "   Allergen Reactions   • Epinephrine Shortness Of Breath   • Celebrex [Celecoxib]    • Ferrous Sulfate    • Omnicef [Cefdinir] Unknown - Low Severity     Possibly rocephin too   • Other      Omnicef, some Thad, possible Rocephin allergy   • Rocephin [Ceftriaxone] Unknown - Low Severity   • Simvastatin    • Sulfa Antibiotics        Current Outpatient Medications:   •  acetaminophen (TYLENOL) 325 MG tablet, Take 2 tablets by mouth Daily As Needed for Mild Pain., Disp: , Rfl:   •  ALPRAZolam (XANAX) 0.25 MG tablet, Take 1 tablet by mouth 2 (Two) Times a Day As Needed for Anxiety., Disp: , Rfl:   •  amLODIPine (NORVASC) 2.5 MG tablet, Take 1 tablet every day by oral route., Disp: , Rfl:   •  aspirin 81 MG EC tablet, Take 1 tablet by mouth Daily., Disp: , Rfl:   •  Cholecalciferol (VITAMIN D3) 2000 units capsule, Take 1 capsule by mouth Daily., Disp: , Rfl:   •  hydrochlorothiazide (HYDRODIURIL) 25 MG tablet, Take 1 tablet by mouth Daily., Disp: , Rfl:   •  levothyroxine (SYNTHROID, LEVOTHROID) 137 MCG tablet, TAKE 1 TABLET BY MOUTH ONCE DAILY ON AN EMPTY STOMACH AT LEAST 30 MINUTES PRIOR TO OTHER FOOD, MED, OR DRINK., Disp: , Rfl:   •  metoprolol succinate XL (TOPROL-XL) 100 MG 24 hr tablet, Take 1 tablet by mouth Every Night. (Patient taking differently: Take 1.5 tablets by mouth Every Night.), Disp: 90 tablet, Rfl: 3  •  traMADol (ULTRAM) 50 MG tablet, Take 1 tablet by mouth Every 6 (Six) Hours As Needed for Moderate Pain., Disp: , Rfl:   •  warfarin (COUMADIN) 4 MG tablet, Take 1 tablet by mouth Every Night., Disp: 90 tablet, Rfl: 3    Current Facility-Administered Medications:   •  sodium chloride 0.9 % flush 10 mL, 10 mL, Intravenous, Q12H, Carolyn Smith APRN  •  sodium chloride 0.9 % flush 10 mL, 10 mL, Intravenous, PRN, Caorlyn Smith, APRN  Past Medical History:   Diagnosis Date   • A-fib (HCC)    • Acute ischemic right ICA stroke (HCC)    • Atrial fibrillation (HCC)    • Benign essential HTN    •  Blindness of right eye    • Breast CA (HCC)    • Breast cancer (HCC)    • Carotid artery disease (HCC)    • CVA (cerebral vascular accident) (HCC)    • CVA (cerebral vascular accident) (HCC)    • Gallstones    • GERD (gastroesophageal reflux disease)    • Hyperlipidemia    • Hypertension    • Hypothyroid    • Hypothyroidism    • Macular degeneration    • Osteopenia    • Osteopenia    • Primary pulmonary HTN (HCC)    • S/P right hip fracture 2012   • Shortness of breath    • Stroke (HCC)    • Vitamin D deficiency        Social History     Socioeconomic History   • Marital status:    Tobacco Use   • Smoking status: Former     Packs/day: 1.50     Years: 30.00     Pack years: 45.00     Types: Cigarettes     Start date:      Quit date: 1992     Years since quittin.2     Passive exposure: Current   • Smokeless tobacco: Never   Vaping Use   • Vaping Use: Never used   Substance and Sexual Activity   • Alcohol use: No   • Drug use: No   • Sexual activity: Defer       Review of Systems   Constitutional: Positive for malaise/fatigue. Negative for weight gain and weight loss.   Cardiovascular: Negative for chest pain, dyspnea on exertion, irregular heartbeat, leg swelling, near-syncope, orthopnea, palpitations, paroxysmal nocturnal dyspnea and syncope.   Respiratory: Negative for cough, shortness of breath, sleep disturbances due to breathing, sputum production and wheezing.    Skin: Negative for dry skin, flushing, itching and rash.   Gastrointestinal: Negative for hematemesis and hematochezia.   Neurological: Negative for dizziness, light-headedness, loss of balance and weakness.   All other systems reviewed and are negative.         Objective:     Vitals reviewed.   Constitutional:       General: Not in acute distress.     Appearance: Healthy appearance. Well-developed. Not diaphoretic.   Eyes:      General: No scleral icterus.     Conjunctiva/sclera: Conjunctivae normal.      Pupils: Pupils are  "equal, round, and reactive to light.   HENT:      Head: Normocephalic.    Mouth/Throat:      Pharynx: No oropharyngeal exudate.   Neck:      Vascular: No JVR.   Pulmonary:      Effort: Pulmonary effort is normal. No respiratory distress.      Breath sounds: Normal breath sounds. No wheezing. No rhonchi. No rales.   Chest:      Chest wall: Not tender to palpatation.   Cardiovascular:      Tachycardia present. Regular rhythm.   Pulses:     Intact distal pulses.   Edema:     Peripheral edema absent.   Abdominal:      General: Bowel sounds are normal. There is no distension.      Palpations: Abdomen is soft.      Tenderness: There is no abdominal tenderness.   Musculoskeletal: Normal range of motion.      Cervical back: Normal range of motion and neck supple. Skin:     General: Skin is warm and dry.      Coloration: Skin is not pale.      Findings: No erythema or rash.   Neurological:      Mental Status: Alert, oriented to person, place, and time and oriented to person, place and time.      Deep Tendon Reflexes: Reflexes are normal and symmetric.   Psychiatric:         Behavior: Behavior normal.             ECG 12 Lead    Date/Time: 3/27/2023 12:06 PM  Performed by: Carolyn Smith APRN  Authorized by: Carolyn Smith APRN   Comparison: compared with previous ECG from 9/26/2022  Comparison to previous ECG: Atrial flutter has replaced NSR  Rhythm: atrial flutter  Rate: tachycardic  BPM: 106  Conduction: conduction normal  ST Segments: ST segments normal  T Waves: T waves normal  QRS axis: normal  Other findings: T wave abnormality    Clinical impression: abnormal EKG          /70 (BP Location: Left arm, Patient Position: Sitting, Cuff Size: Adult)   Pulse 106   Ht 160 cm (63\")   Wt 50.3 kg (111 lb)   SpO2 96%   BMI 19.66 kg/m²     Lab Review:   I have reviewed previous office notes, recent labs and recent cardiac testing.       Lab Results   Component Value Date    INR 2.8 (H) 03/17/2023    INR 1.21 (H) " "02/22/2023    INR 2.2 (H) 02/15/2023    PROTIME 33.4 (H) 03/17/2023    PROTIME 15.5 (H) 02/22/2023    PROTIME 26.5 (H) 02/15/2023           Assessment:          Diagnosis Plan   1. Atrial flutter, unspecified type (HCC)        2. Paroxysmal atrial fibrillation (HCC)        3. Primary hypertension        4. Mixed hyperlipidemia        5. Bilateral carotid artery stenosis        6. Acquired hypothyroidism               Plan:       1. Atrial flutter- EKG appears to be aflutter with RVR and with 2:1 AV conduction. Will plan for cardioversion at the time of her REMY on 4/6. Educated on importance of thyroid management. Continue Toprol 150mg daily, Coumadin and ASA.   2. PAF- S/p ablation in 2011 with known reoccurrence in 8/2022 with successful DCCV in 9/2022. S/p Watchman on 2/23 continue Coumadin and ASA. 45 day REMY scheduled for 4/6.   3. HTN- controlled. Followed by PCP   4. HLD- followed by PCP   5. Carotid stenosis- followed by vascular  6. Hypothyroidism- reports her TSH was \"high\" per PCP. Will obtain a copy of lab results. Educated on importance of thyroid control.     Follow up in 5 weeks or sooner if symptoms worsen.     I spent 30 minutes caring for Damaris on this date of service. This time includes time spent by me in the following activities:preparing for the visit, reviewing tests, obtaining and/or reviewing a separately obtained history, performing a medically appropriate examination and/or evaluation , counseling and educating the patient/family/caregiver, documenting information in the medical record, independently interpreting results and communicating that information with the patient/family/caregiver and care coordination     I spent 2 minutes on the separately reported service of EKG interpretation. This time is not included in the time used to support the E/M service also reported today.    "

## 2023-03-27 NOTE — PROGRESS NOTES
"    Subjective:     Encounter Date:03/27/2023      Patient ID: Damaris Nash is a 87 y.o. female     Chief Complaint: \"no complaints\"  Atrial Fibrillation  Presents for follow-up visit. Symptoms are negative for chest pain, dizziness, palpitations, shortness of breath, syncope and weakness. The symptoms have been stable. Past medical history includes atrial fibrillation.   Dizziness  Pertinent negatives include no chest pain, coughing, rash or weakness.   Hypertension  This is a chronic problem. The current episode started more than 1 year ago. The problem is controlled. Associated symptoms include malaise/fatigue. Pertinent negatives include no chest pain, orthopnea, palpitations, PND or shortness of breath.     Patient presents today for a follow up. She is followed for PAF s/p ablation per Dr. Rajput in 2011 with known reoccurrence in 8/2022 at which time she underwent a successful cardioversion. 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     She reports she is tired and has no energy  However notes this is not new and no worse than it has been in the past. She saw her PCP 10days ago and was told her she needed to take her thyroid medication due to her thyroid being high. She notes she has not been taking her Synthroid regularly. She reports her HR has been 'faster\" for the last week or so with -110 per her BP machine. She has had no falls since her last visit. She denies chest pain, palpitations, edema, dyspnea, orthopnea and PND.     The following portions of the patient's history were reviewed and updated as appropriate: allergies, current medications, past family history, past medical history, past social history, past surgical history and problem list.    Allergies "   Allergen Reactions   • Epinephrine Shortness Of Breath   • Celebrex [Celecoxib]    • Ferrous Sulfate    • Omnicef [Cefdinir] Unknown - Low Severity     Possibly rocephin too   • Other      Omnicef, some Thad, possible Rocephin allergy   • Rocephin [Ceftriaxone] Unknown - Low Severity   • Simvastatin    • Sulfa Antibiotics        Current Outpatient Medications:   •  acetaminophen (TYLENOL) 325 MG tablet, Take 2 tablets by mouth Daily As Needed for Mild Pain., Disp: , Rfl:   •  ALPRAZolam (XANAX) 0.25 MG tablet, Take 1 tablet by mouth 2 (Two) Times a Day As Needed for Anxiety., Disp: , Rfl:   •  amLODIPine (NORVASC) 2.5 MG tablet, Take 1 tablet every day by oral route., Disp: , Rfl:   •  aspirin 81 MG EC tablet, Take 1 tablet by mouth Daily., Disp: , Rfl:   •  Cholecalciferol (VITAMIN D3) 2000 units capsule, Take 1 capsule by mouth Daily., Disp: , Rfl:   •  hydrochlorothiazide (HYDRODIURIL) 25 MG tablet, Take 1 tablet by mouth Daily., Disp: , Rfl:   •  levothyroxine (SYNTHROID, LEVOTHROID) 137 MCG tablet, TAKE 1 TABLET BY MOUTH ONCE DAILY ON AN EMPTY STOMACH AT LEAST 30 MINUTES PRIOR TO OTHER FOOD, MED, OR DRINK., Disp: , Rfl:   •  metoprolol succinate XL (TOPROL-XL) 100 MG 24 hr tablet, Take 1 tablet by mouth Every Night. (Patient taking differently: Take 1.5 tablets by mouth Every Night.), Disp: 90 tablet, Rfl: 3  •  traMADol (ULTRAM) 50 MG tablet, Take 1 tablet by mouth Every 6 (Six) Hours As Needed for Moderate Pain., Disp: , Rfl:   •  warfarin (COUMADIN) 4 MG tablet, Take 1 tablet by mouth Every Night., Disp: 90 tablet, Rfl: 3    Current Facility-Administered Medications:   •  sodium chloride 0.9 % flush 10 mL, 10 mL, Intravenous, Q12H, Carolyn Smith APRN  •  sodium chloride 0.9 % flush 10 mL, 10 mL, Intravenous, PRN, Carolyn Smith, APRN  Past Medical History:   Diagnosis Date   • A-fib (HCC)    • Acute ischemic right ICA stroke (HCC)    • Atrial fibrillation (HCC)    • Benign essential HTN    •  Blindness of right eye    • Breast CA (HCC)    • Breast cancer (HCC)    • Carotid artery disease (HCC)    • CVA (cerebral vascular accident) (HCC)    • CVA (cerebral vascular accident) (HCC)    • Gallstones    • GERD (gastroesophageal reflux disease)    • Hyperlipidemia    • Hypertension    • Hypothyroid    • Hypothyroidism    • Macular degeneration    • Osteopenia    • Osteopenia    • Primary pulmonary HTN (HCC)    • S/P right hip fracture 2012   • Shortness of breath    • Stroke (HCC)    • Vitamin D deficiency        Social History     Socioeconomic History   • Marital status:    Tobacco Use   • Smoking status: Former     Packs/day: 1.50     Years: 30.00     Pack years: 45.00     Types: Cigarettes     Start date:      Quit date: 1992     Years since quittin.2     Passive exposure: Current   • Smokeless tobacco: Never   Vaping Use   • Vaping Use: Never used   Substance and Sexual Activity   • Alcohol use: No   • Drug use: No   • Sexual activity: Defer       Review of Systems   Constitutional: Positive for malaise/fatigue. Negative for weight gain and weight loss.   Cardiovascular: Negative for chest pain, dyspnea on exertion, irregular heartbeat, leg swelling, near-syncope, orthopnea, palpitations, paroxysmal nocturnal dyspnea and syncope.   Respiratory: Negative for cough, shortness of breath, sleep disturbances due to breathing, sputum production and wheezing.    Skin: Negative for dry skin, flushing, itching and rash.   Gastrointestinal: Negative for hematemesis and hematochezia.   Neurological: Negative for dizziness, light-headedness, loss of balance and weakness.   All other systems reviewed and are negative.         Objective:     Vitals reviewed.   Constitutional:       General: Not in acute distress.     Appearance: Healthy appearance. Well-developed. Not diaphoretic.   Eyes:      General: No scleral icterus.     Conjunctiva/sclera: Conjunctivae normal.      Pupils: Pupils are  "equal, round, and reactive to light.   HENT:      Head: Normocephalic.    Mouth/Throat:      Pharynx: No oropharyngeal exudate.   Neck:      Vascular: No JVR.   Pulmonary:      Effort: Pulmonary effort is normal. No respiratory distress.      Breath sounds: Normal breath sounds. No wheezing. No rhonchi. No rales.   Chest:      Chest wall: Not tender to palpatation.   Cardiovascular:      Tachycardia present. Regular rhythm.   Pulses:     Intact distal pulses.   Edema:     Peripheral edema absent.   Abdominal:      General: Bowel sounds are normal. There is no distension.      Palpations: Abdomen is soft.      Tenderness: There is no abdominal tenderness.   Musculoskeletal: Normal range of motion.      Cervical back: Normal range of motion and neck supple. Skin:     General: Skin is warm and dry.      Coloration: Skin is not pale.      Findings: No erythema or rash.   Neurological:      Mental Status: Alert, oriented to person, place, and time and oriented to person, place and time.      Deep Tendon Reflexes: Reflexes are normal and symmetric.   Psychiatric:         Behavior: Behavior normal.             ECG 12 Lead    Date/Time: 3/27/2023 12:06 PM  Performed by: Carolyn Smith APRN  Authorized by: Carolyn Smith APRN   Comparison: compared with previous ECG from 9/26/2022  Comparison to previous ECG: Atrial flutter has replaced NSR  Rhythm: atrial flutter  Rate: tachycardic  BPM: 106  Conduction: conduction normal  ST Segments: ST segments normal  T Waves: T waves normal  QRS axis: normal  Other findings: T wave abnormality    Clinical impression: abnormal EKG          /70 (BP Location: Left arm, Patient Position: Sitting, Cuff Size: Adult)   Pulse 106   Ht 160 cm (63\")   Wt 50.3 kg (111 lb)   SpO2 96%   BMI 19.66 kg/m²     Lab Review:   I have reviewed previous office notes, recent labs and recent cardiac testing.       Lab Results   Component Value Date    INR 2.8 (H) 03/17/2023    INR 1.21 (H) " "02/22/2023    INR 2.2 (H) 02/15/2023    PROTIME 33.4 (H) 03/17/2023    PROTIME 15.5 (H) 02/22/2023    PROTIME 26.5 (H) 02/15/2023           Assessment:          Diagnosis Plan   1. Atrial flutter, unspecified type (HCC)        2. Paroxysmal atrial fibrillation (HCC)        3. Primary hypertension        4. Mixed hyperlipidemia        5. Bilateral carotid artery stenosis        6. Acquired hypothyroidism               Plan:       1. Atrial flutter- EKG appears to be aflutter with RVR and with 2:1 AV conduction. Will plan for cardioversion at the time of her REMY on 4/6. Educated on importance of thyroid management. Continue Toprol 150mg daily, Coumadin and ASA.   2. PAF- S/p ablation in 2011 with known reoccurrence in 8/2022 with successful DCCV in 9/2022. S/p Watchman on 2/23 continue Coumadin and ASA. 45 day REMY scheduled for 4/6.   3. HTN- controlled. Followed by PCP   4. HLD- followed by PCP   5. Carotid stenosis- followed by vascular  6. Hypothyroidism- reports her TSH was \"high\" per PCP. Will obtain a copy of lab results. Educated on importance of thyroid control.     Follow up in 5 weeks or sooner if symptoms worsen.     I spent 30 minutes caring for Damaris on this date of service. This time includes time spent by me in the following activities:preparing for the visit, reviewing tests, obtaining and/or reviewing a separately obtained history, performing a medically appropriate examination and/or evaluation , counseling and educating the patient/family/caregiver, documenting information in the medical record, independently interpreting results and communicating that information with the patient/family/caregiver and care coordination     I spent 2 minutes on the separately reported service of EKG interpretation. This time is not included in the time used to support the E/M service also reported today.    "

## 2023-03-28 ENCOUNTER — TELEPHONE (OUTPATIENT)
Dept: CARDIOLOGY | Facility: CLINIC | Age: 88
End: 2023-03-28
Payer: MEDICARE

## 2023-03-28 ENCOUNTER — ANTICOAGULATION VISIT (OUTPATIENT)
Dept: CARDIOLOGY | Facility: CLINIC | Age: 88
End: 2023-03-28
Payer: MEDICARE

## 2023-03-28 NOTE — TELEPHONE ENCOUNTER
----- Message from POLY Thornton sent at 3/27/2023 10:38 AM CDT -----  Can you call her PCP and see if she had a thyroid panel drawn last week? She said she did but I can't see anything.       Please and thank you.

## 2023-03-30 ENCOUNTER — TELEPHONE (OUTPATIENT)
Dept: CARDIOLOGY | Facility: CLINIC | Age: 88
End: 2023-03-30
Payer: MEDICARE

## 2023-03-30 DIAGNOSIS — E03.9 ACQUIRED HYPOTHYROIDISM: Primary | ICD-10-CM

## 2023-03-30 NOTE — TELEPHONE ENCOUNTER
----- Message from POLY Thornton sent at 3/29/2023  4:18 PM CDT -----  Regarding: RE:   Great. Will you let her know it wasn't checked but it needs to be checked and I'll place the order.   ----- Message -----  From: Kiera Roberts MA  Sent: 3/29/2023   4:10 PM CDT  To: POLY Thornton    It wasn't cut off, they did not run it.  Last TSH was 8/2022 and it was 15.4 per provider's office.  Kiera Roberts MA    ----- Message -----  From: Carolyn Smith APRN  Sent: 3/29/2023   9:52 AM CDT  To: Kiera Roberts MA    Her TSH result is cut off in the scanned labs.  ----- Message -----  From: Kiera Roberts MA  Sent: 3/28/2023   2:52 PM CDT  To: POLY Thornton    Per your request.

## 2023-03-30 NOTE — TELEPHONE ENCOUNTER
----- Message from POLY Thornton sent at 3/30/2023  8:46 AM CDT -----  Yes she will need to be cardioverted.   ----- Message -----  From: Kiera Roberts MA  Sent: 3/30/2023   8:40 AM CDT  To: POLY Thornton    Patient is agreeable to labs.  Please place order.  Also, she states her heart rate is back in the 60s and wants to know if she still needs to be shocked?  Please advise.  Kiera Roberts MA

## 2023-04-03 ENCOUNTER — LAB (OUTPATIENT)
Dept: LAB | Facility: HOSPITAL | Age: 88
End: 2023-04-03
Payer: MEDICARE

## 2023-04-03 DIAGNOSIS — E03.9 ACQUIRED HYPOTHYROIDISM: ICD-10-CM

## 2023-04-03 PROCEDURE — 36415 COLL VENOUS BLD VENIPUNCTURE: CPT

## 2023-04-03 PROCEDURE — 84443 ASSAY THYROID STIM HORMONE: CPT

## 2023-04-04 LAB — TSH SERPL DL<=0.05 MIU/L-ACNC: 1.57 UIU/ML (ref 0.27–4.2)

## 2023-04-05 ENCOUNTER — TELEPHONE (OUTPATIENT)
Dept: CARDIOLOGY | Facility: CLINIC | Age: 88
End: 2023-04-05
Payer: MEDICARE

## 2023-04-05 NOTE — TELEPHONE ENCOUNTER
----- Message from POLY Thornton sent at 4/5/2023 11:37 AM CDT -----  Please let patient know her tsh is normal

## 2023-04-07 ENCOUNTER — HOSPITAL ENCOUNTER (OUTPATIENT)
Dept: CARDIOLOGY | Facility: HOSPITAL | Age: 88
Discharge: HOME | End: 2023-04-07
Payer: MEDICARE

## 2023-04-07 ENCOUNTER — APPOINTMENT (OUTPATIENT)
Dept: CARDIOLOGY | Facility: HOSPITAL | Age: 88
End: 2023-04-07
Payer: MEDICARE

## 2023-04-07 ENCOUNTER — ANESTHESIA EVENT (OUTPATIENT)
Dept: CARDIOLOGY | Facility: HOSPITAL | Age: 88
End: 2023-04-07
Payer: MEDICARE

## 2023-04-07 ENCOUNTER — ANESTHESIA (OUTPATIENT)
Dept: CARDIOLOGY | Facility: HOSPITAL | Age: 88
End: 2023-04-07
Payer: MEDICARE

## 2023-04-07 VITALS
BODY MASS INDEX: 19.81 KG/M2 | WEIGHT: 111.8 LBS | HEIGHT: 63 IN | HEART RATE: 72 BPM | TEMPERATURE: 97.6 F | OXYGEN SATURATION: 96 % | DIASTOLIC BLOOD PRESSURE: 63 MMHG | SYSTOLIC BLOOD PRESSURE: 143 MMHG | RESPIRATION RATE: 19 BRPM

## 2023-04-07 DIAGNOSIS — I48.92 ATRIAL FLUTTER, UNSPECIFIED TYPE: ICD-10-CM

## 2023-04-07 DIAGNOSIS — I48.0 PAF (PAROXYSMAL ATRIAL FIBRILLATION): ICD-10-CM

## 2023-04-07 LAB
MAXIMAL PREDICTED HEART RATE: 133 BPM
STRESS TARGET HR: 113 BPM

## 2023-04-07 PROCEDURE — 93325 DOPPLER ECHO COLOR FLOW MAPG: CPT

## 2023-04-07 PROCEDURE — 93321 DOPPLER ECHO F-UP/LMTD STD: CPT | Performed by: INTERNAL MEDICINE

## 2023-04-07 PROCEDURE — 93321 DOPPLER ECHO F-UP/LMTD STD: CPT

## 2023-04-07 PROCEDURE — 93325 DOPPLER ECHO COLOR FLOW MAPG: CPT | Performed by: INTERNAL MEDICINE

## 2023-04-07 PROCEDURE — 93010 ELECTROCARDIOGRAM REPORT: CPT | Performed by: INTERNAL MEDICINE

## 2023-04-07 PROCEDURE — 93312 ECHO TRANSESOPHAGEAL: CPT

## 2023-04-07 PROCEDURE — 93312 ECHO TRANSESOPHAGEAL: CPT | Performed by: INTERNAL MEDICINE

## 2023-04-07 PROCEDURE — 25010000002 HYDRALAZINE PER 20 MG: Performed by: INTERNAL MEDICINE

## 2023-04-07 PROCEDURE — 93005 ELECTROCARDIOGRAM TRACING: CPT | Performed by: NURSE PRACTITIONER

## 2023-04-07 PROCEDURE — 92960 CARDIOVERSION ELECTRIC EXT: CPT

## 2023-04-07 PROCEDURE — 25010000002 PROPOFOL 1000 MG/100ML EMULSION

## 2023-04-07 RX ORDER — PROPOFOL 10 MG/ML
INJECTION, EMULSION INTRAVENOUS AS NEEDED
Status: DISCONTINUED | OUTPATIENT
Start: 2023-04-07 | End: 2023-04-07 | Stop reason: SURG

## 2023-04-07 RX ORDER — SODIUM CHLORIDE 9 MG/ML
40 INJECTION, SOLUTION INTRAVENOUS AS NEEDED
Status: DISCONTINUED | OUTPATIENT
Start: 2023-04-07 | End: 2023-04-08 | Stop reason: HOSPADM

## 2023-04-07 RX ORDER — SODIUM CHLORIDE 9 MG/ML
20 INJECTION, SOLUTION INTRAVENOUS CONTINUOUS
Status: DISCONTINUED | OUTPATIENT
Start: 2023-04-07 | End: 2023-04-08 | Stop reason: HOSPADM

## 2023-04-07 RX ORDER — SODIUM CHLORIDE 0.9 % (FLUSH) 0.9 %
10 SYRINGE (ML) INJECTION EVERY 12 HOURS SCHEDULED
Status: DISCONTINUED | OUTPATIENT
Start: 2023-04-07 | End: 2023-04-08 | Stop reason: HOSPADM

## 2023-04-07 RX ORDER — HYDRALAZINE HYDROCHLORIDE 20 MG/ML
10 INJECTION INTRAMUSCULAR; INTRAVENOUS ONCE
Status: COMPLETED | OUTPATIENT
Start: 2023-04-07 | End: 2023-04-07

## 2023-04-07 RX ORDER — LIDOCAINE HYDROCHLORIDE 20 MG/ML
INJECTION, SOLUTION EPIDURAL; INFILTRATION; INTRACAUDAL; PERINEURAL AS NEEDED
Status: DISCONTINUED | OUTPATIENT
Start: 2023-04-07 | End: 2023-04-07 | Stop reason: SURG

## 2023-04-07 RX ORDER — METOPROLOL SUCCINATE 100 MG/1
150 TABLET, EXTENDED RELEASE ORAL NIGHTLY
Start: 2023-04-07

## 2023-04-07 RX ORDER — CLOPIDOGREL BISULFATE 75 MG/1
75 TABLET ORAL DAILY
Qty: 90 TABLET | Refills: 3 | Status: SHIPPED | OUTPATIENT
Start: 2023-04-07

## 2023-04-07 RX ORDER — FENTANYL CITRATE 50 UG/ML
INJECTION, SOLUTION INTRAMUSCULAR; INTRAVENOUS
Status: DISCONTINUED
Start: 2023-04-07 | End: 2023-04-08 | Stop reason: HOSPADM

## 2023-04-07 RX ORDER — MIDAZOLAM HYDROCHLORIDE 1 MG/ML
INJECTION INTRAMUSCULAR; INTRAVENOUS
Status: DISCONTINUED
Start: 2023-04-07 | End: 2023-04-08 | Stop reason: HOSPADM

## 2023-04-07 RX ORDER — SODIUM CHLORIDE 0.9 % (FLUSH) 0.9 %
10 SYRINGE (ML) INJECTION AS NEEDED
Status: DISCONTINUED | OUTPATIENT
Start: 2023-04-07 | End: 2023-04-08 | Stop reason: HOSPADM

## 2023-04-07 RX ADMIN — LIDOCAINE HYDROCHLORIDE 50 MG: 20 INJECTION, SOLUTION EPIDURAL; INFILTRATION; INTRACAUDAL; PERINEURAL at 13:04

## 2023-04-07 RX ADMIN — PROPOFOL 50 MG: 10 INJECTION, EMULSION INTRAVENOUS at 13:04

## 2023-04-07 RX ADMIN — SODIUM CHLORIDE 100 ML/HR: 9 INJECTION, SOLUTION INTRAVENOUS at 13:00

## 2023-04-07 RX ADMIN — HYDRALAZINE HYDROCHLORIDE 10 MG: 20 INJECTION INTRAMUSCULAR; INTRAVENOUS at 13:46

## 2023-04-07 NOTE — ANESTHESIA POSTPROCEDURE EVALUATION
Patient: Damaris Nash    Procedure Summary     Date: 04/07/23 [facility]    Anesthesia Start: 1300 Anesthesia Stop: 1314    Procedure: ADULT TRANSESOPHAGEAL ECHO (REMY) W/ CONT IF NECESSARY PER PROTOCOL Diagnosis:       PAF (paroxysmal atrial fibrillation)      (Re-evaluation of Prior REMY for Interval Change)    Scheduled Providers: Riley Mota MD Provider: Berto Clay CRNA    Anesthesia Type: MAC ASA Status: 3          Anesthesia Type: MAC    Vitals  No vitals data found for the desired time range.          Post Anesthesia Care and Evaluation    Patient location during evaluation: PHASE II  Patient participation: complete - patient participated  Level of consciousness: awake and alert  Pain score: 0  Pain management: adequate    Airway patency: patent  Anesthetic complications: No anesthetic complications  PONV Status: none  Cardiovascular status: acceptable and blood pressure returned to baseline  Respiratory status: acceptable  Hydration status: acceptable  No anesthesia care post op

## 2023-04-07 NOTE — INTERVAL H&P NOTE
H&P updated. The patient was examined and the following changes are noted:  Patient has converted to SR

## 2023-04-07 NOTE — NURSING NOTE
MD discharge orders noted. Patient AAOx4 and in NAD. V/s stable. The patient successfully transferred via wheelchair to the restroom without any adverse events. IV and tele discontinued. An AVS was provided and discussed completely including the following: f/u appointments, changes to the med recc, and education pertinent to this visit. The patient verbalized their understanding and was escorted via wheelchair to their ride's private vehicle.

## 2023-04-07 NOTE — ANESTHESIA PREPROCEDURE EVALUATION
Anesthesia Evaluation     Patient summary reviewed   no history of anesthetic complications:  NPO Solid Status: > 8 hours  NPO Liquid Status: > 8 hours           Airway   Dental      Pulmonary    (+) shortness of breath,   Cardiovascular     PT is on anticoagulation therapy    (+) hypertension, dysrhythmias Atrial Fib, hyperlipidemia,       Neuro/Psych  (+) TIA, CVA,    GI/Hepatic/Renal/Endo    (+)   thyroid problem     Musculoskeletal     Abdominal    Substance History      OB/GYN          Other      history of cancer (breast)                      Anesthesia Plan    ASA 3     MAC     (Chart reviewed, CRNA to complete R/B/A discussion, exam and review of history prior to proceeding.)          CODE STATUS:

## 2023-04-10 ENCOUNTER — ANTICOAGULATION VISIT (OUTPATIENT)
Dept: CARDIOLOGY | Facility: CLINIC | Age: 88
End: 2023-04-10
Payer: MEDICARE

## 2023-04-10 LAB
LV EF 2D ECHO EST: 60 %
MAXIMAL PREDICTED HEART RATE: 133 BPM
QT INTERVAL: 450 MS
QTC INTERVAL: 475 MS
STRESS TARGET HR: 113 BPM

## 2023-04-17 NOTE — PROGRESS NOTES
Chief Complaint  Lung Nodule (Pt here for follow up) and imaging (Pt here to review recent imaging)    Subjective    History of Present Illness {CC  Problem List  Visit Diagnosis   Encounters  Notes  Medications  Labs  Result Review Imaging  Media     Damaris Nash presents to [facility]MON[facility] for:    History of Present Illness  Ms. Nash is here for follow-up and management of right upper lobe lung nodule and enlarged precarinal node.  She has been doing well since last office visit.  No pulmonary complaints today.  She is here to review imaging.       Prior to Admission medications    Medication Sig Start Date End Date Taking? Authorizing Provider   acetaminophen (TYLENOL) 325 MG tablet Take 2 tablets by mouth Daily As Needed for Mild Pain.    Alejandro Epps MD   ALPRAZolam (XANAX) 0.25 MG tablet Take 1 tablet by mouth 2 (Two) Times a Day As Needed for Anxiety.    Alejandro Epps MD   amLODIPine (NORVASC) 2.5 MG tablet Take 1 tablet every day by oral route.    Alejandro Epps MD   aspirin 81 MG EC tablet Take 1 tablet by mouth Daily.    Alejandro Epps MD   Cholecalciferol (VITAMIN D3) 2000 units capsule Take 1 capsule by mouth Daily.    Alejandro Epps MD   clopidogrel (PLAVIX) 75 MG tablet Take 1 tablet by mouth Daily. 4/7/23   Riley Mota MD   hydrochlorothiazide (HYDRODIURIL) 25 MG tablet Take 1 tablet by mouth Daily.    Alejandro Epps MD   levothyroxine (SYNTHROID, LEVOTHROID) 137 MCG tablet TAKE 1 TABLET BY MOUTH ONCE DAILY ON AN EMPTY STOMACH AT LEAST 30 MINUTES PRIOR TO OTHER FOOD, MED, OR DRINK. 8/9/22   Alejandro Epps MD   metoprolol succinate XL (TOPROL-XL) 100 MG 24 hr tablet Take 1.5 tablets by mouth Every Night. 4/7/23   Riley Mota MD   traMADol (ULTRAM) 50 MG tablet Take 1 tablet by mouth Every 6 (Six) Hours As Needed for Moderate Pain.    Alejandro Epps MD       Social History  "    Socioeconomic History   • Marital status:    Tobacco Use   • Smoking status: Former     Packs/day: 1.50     Years: 30.00     Pack years: 45.00     Types: Cigarettes     Start date:      Quit date: 1992     Years since quittin.3     Passive exposure: Current   • Smokeless tobacco: Never   Vaping Use   • Vaping Use: Never used   Substance and Sexual Activity   • Alcohol use: No   • Drug use: No   • Sexual activity: Defer       Objective   Vital Signs:   /80 (BP Location: Left arm, Patient Position: Sitting, Cuff Size: Adult)   Pulse 63   Ht 160 cm (63\")   Wt 50.3 kg (111 lb)   SpO2 95%   BMI 19.66 kg/m²     Physical Exam  Constitutional:       General: She is not in acute distress.  HENT:      Head: Normocephalic.      Nose: Nose normal.      Mouth/Throat:      Mouth: Mucous membranes are moist.   Eyes:      General: No scleral icterus.  Cardiovascular:      Rate and Rhythm: Normal rate.   Pulmonary:      Effort: No respiratory distress.   Abdominal:      General: There is no distension.   Neurological:      Mental Status: She is alert and oriented to person, place, and time.   Psychiatric:         Mood and Affect: Mood normal.         Behavior: Behavior is cooperative.        Result Review :{ Labs  Result Review  Imaging  Med Tab  Media :          Results for orders placed during the hospital encounter of 19    Pulmonary Function Test    [facility] - Pulmonary Function Test    [address] [place] [address]  [id number]  [phone]    Patient : Damaris Nash  MRN : [id number]  CSN : [id number]  Pulmonologist : Jone Faust MD  Date : 2019    ______________________________________________________________________    Interpretation :  1.  Spirometry is consistent with a moderate obstructive ventilatory defect.  2.  Lung volumes reveal a coexisting mild restrictive ventilatory defect.  There is also a decrease in inspiratory capacity.  3.  There " is a severe diffusion impairment which when corrected for alveolar volume is normalized.      Jone Faust MD               CT Chest Without Contrast Diagnostic (04/21/2023 11:59)    My interpretation of imaging: Stable right upper lobe pulmonary nodule, stable precarinal mediastinal lymph node    Assessment and Plan {CC Problem List  Visit Diagnosis  ROS  Review (Popup)  Health Maintenance  Quality  BestPractice  Medications  SmartSets  SnapShot Encounters  Media      Diagnoses and all orders for this visit:    1. Right upper lobe pulmonary nodule (Primary)  -     CT Chest Without Contrast; Future    2. Adenopathy  -     CT Chest Without Contrast; Future        BMI is within normal parameters. No other follow-up for BMI required.      We reviewed imaging together showing stability in rul nodule and stability in mediastinal node. Significance of mediastinal node unclear; may be reactive; malignancy not ruled out. We discussed differential diagnosis and after discussions with patient and family have opted to continue surveillance scans to continue to monitor mediastinal node.  Repeat CT chest in 6 months.  Office follow-up at that point.  Call in the interim if needed.    Latha Martin, APRN  4/24/2023  11:40 CDT    Follow Up {Instructions Charge Capture  Follow-up Communications   Return in about 6 months (around 10/24/2023).    Patient was given instructions and counseling regarding her condition or for health maintenance advice. Please see specific information pulled into the AVS if appropriate.

## 2023-04-21 ENCOUNTER — HOSPITAL ENCOUNTER (OUTPATIENT)
Dept: CT IMAGING | Facility: HOSPITAL | Age: 88
Discharge: HOME | End: 2023-04-21
Payer: MEDICARE

## 2023-04-21 DIAGNOSIS — R59.9 ADENOPATHY: ICD-10-CM

## 2023-04-21 DIAGNOSIS — R91.1 RIGHT UPPER LOBE PULMONARY NODULE: ICD-10-CM

## 2023-04-21 PROCEDURE — 71250 CT THORAX DX C-: CPT

## 2023-04-24 ENCOUNTER — OFFICE VISIT (OUTPATIENT)
Dept: PULMONOLOGY | Facility: CLINIC | Age: 88
End: 2023-04-24
Payer: MEDICARE

## 2023-04-24 VITALS
HEART RATE: 63 BPM | WEIGHT: 111 LBS | DIASTOLIC BLOOD PRESSURE: 80 MMHG | BODY MASS INDEX: 19.67 KG/M2 | SYSTOLIC BLOOD PRESSURE: 168 MMHG | OXYGEN SATURATION: 95 % | HEIGHT: 63 IN

## 2023-04-24 DIAGNOSIS — R59.9 ADENOPATHY: ICD-10-CM

## 2023-04-24 DIAGNOSIS — R91.1 RIGHT UPPER LOBE PULMONARY NODULE: Primary | ICD-10-CM

## 2023-04-24 PROCEDURE — 99213 OFFICE O/P EST LOW 20 MIN: CPT | Performed by: NURSE PRACTITIONER

## 2023-05-01 ENCOUNTER — OFFICE VISIT (OUTPATIENT)
Dept: CARDIOLOGY | Facility: CLINIC | Age: 88
End: 2023-05-01
Payer: MEDICARE

## 2023-05-01 VITALS
DIASTOLIC BLOOD PRESSURE: 62 MMHG | WEIGHT: 111 LBS | HEART RATE: 60 BPM | OXYGEN SATURATION: 97 % | HEIGHT: 63 IN | SYSTOLIC BLOOD PRESSURE: 140 MMHG | BODY MASS INDEX: 19.67 KG/M2

## 2023-05-01 DIAGNOSIS — E78.2 MIXED HYPERLIPIDEMIA: ICD-10-CM

## 2023-05-01 DIAGNOSIS — I10 PRIMARY HYPERTENSION: ICD-10-CM

## 2023-05-01 DIAGNOSIS — I48.0 PAROXYSMAL ATRIAL FIBRILLATION: ICD-10-CM

## 2023-05-01 DIAGNOSIS — E03.9 ACQUIRED HYPOTHYROIDISM: ICD-10-CM

## 2023-05-01 DIAGNOSIS — I48.92 ATRIAL FLUTTER, UNSPECIFIED TYPE: Primary | ICD-10-CM

## 2023-05-01 DIAGNOSIS — I65.23 BILATERAL CAROTID ARTERY STENOSIS: ICD-10-CM

## 2023-05-01 RX ORDER — POLYETHYLENE GLYCOL 3350 17 G/17G
17 POWDER, FOR SOLUTION ORAL DAILY
COMMUNITY

## 2023-05-01 NOTE — PROGRESS NOTES
"    Subjective:     Encounter Date:05/01/2023      Patient ID: Damaris Nash is a 87 y.o. female.    Chief Complaint:\"no complaints\"  Atrial Flutter  This is a chronic problem. The current episode started more than 1 year ago. The problem occurs intermittently. The problem has been waxing and waning. Pertinent negatives include no chest pain, coughing, rash or weakness.   Atrial Fibrillation  Presents for follow-up visit. Symptoms are negative for chest pain, dizziness, palpitations, shortness of breath, syncope and weakness. The symptoms have been stable. Past medical history includes atrial fibrillation.     Patient presents today for a follow up. She was last seen 5 weeks ago and noted to be in atrial flutter. She was scheduled for a cardioversion to be completed at the time of her 45 day post-watchman REMY. Upon arrival, pre-cardioversion EKG revealed she had converted to NSR. Her REMY revealed the watchman device was well seated without twila-device leak or thrombus.    She reports she noticed her HR lowered into the 60s from low 100s a few days prior to her REMY. She continues to report she remains fatigued and occasionally experiences dizziness with position changes.     She is followed for PAF s/p ablation per Dr. Rajput in 2011 with known reoccurrence in 8/2022 at which time she underwent a successful cardioversion. 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     The following portions of the patient's history were reviewed and updated as appropriate: allergies, current medications, past family history, past medical history, past social history, past surgical history and problem list.    Allergies   Allergen Reactions   • Epinephrine Shortness Of Breath   • Celebrex [Celecoxib]    • Ferrous Sulfate    • Omnicef " [Cefdinir] Unknown - Low Severity     Possibly rocephin too   • Other      Omnicef, some Thad, possible Rocephin allergy   • Rocephin [Ceftriaxone] Unknown - Low Severity   • Simvastatin    • Sulfa Antibiotics        Current Outpatient Medications:   •  acetaminophen (TYLENOL) 325 MG tablet, Take 2 tablets by mouth Daily As Needed for Mild Pain., Disp: , Rfl:   •  ALPRAZolam (XANAX) 0.25 MG tablet, Take 1 tablet by mouth 2 (Two) Times a Day As Needed for Anxiety., Disp: , Rfl:   •  amLODIPine (NORVASC) 2.5 MG tablet, Take 1 tablet every day by oral route., Disp: , Rfl:   •  aspirin 81 MG EC tablet, Take 1 tablet by mouth Daily., Disp: , Rfl:   •  Cholecalciferol (VITAMIN D3) 2000 units capsule, Take 1 capsule by mouth Daily., Disp: , Rfl:   •  clopidogrel (PLAVIX) 75 MG tablet, Take 1 tablet by mouth Daily., Disp: 90 tablet, Rfl: 3  •  hydrochlorothiazide (HYDRODIURIL) 25 MG tablet, Take 1 tablet by mouth Daily., Disp: , Rfl:   •  levothyroxine (SYNTHROID, LEVOTHROID) 137 MCG tablet, TAKE 1 TABLET BY MOUTH ONCE DAILY ON AN EMPTY STOMACH AT LEAST 30 MINUTES PRIOR TO OTHER FOOD, MED, OR DRINK., Disp: , Rfl:   •  metoprolol succinate XL (TOPROL-XL) 100 MG 24 hr tablet, Take 1.5 tablets by mouth Every Night., Disp: , Rfl:   •  polyethylene glycol (MiraLax) 17 GM/SCOOP powder, Take 17 g by mouth Daily., Disp: , Rfl:   •  traMADol (ULTRAM) 50 MG tablet, Take 1 tablet by mouth Every 6 (Six) Hours As Needed for Moderate Pain., Disp: , Rfl:     Current Facility-Administered Medications:   •  sodium chloride 0.9 % flush 10 mL, 10 mL, Intravenous, Q12H, Carolyn Smith, APRN  •  sodium chloride 0.9 % flush 10 mL, 10 mL, Intravenous, PRN, Carolyn Smith, APRN  Past Medical History:   Diagnosis Date   • A-fib    • Acute ischemic right ICA stroke    • Atrial fibrillation    • Benign essential HTN    • Blindness of right eye    • Breast CA    • Breast cancer    • Carotid artery disease    • CVA (cerebral vascular accident)    •  Gallstones    • GERD (gastroesophageal reflux disease)    • Hyperlipidemia    • Hypertension    • Hypothyroidism    • Macular degeneration    • Osteopenia    • Primary pulmonary HTN    • S/P right hip fracture 2012   • Shortness of breath    • Stroke    • Vitamin D deficiency        Social History     Socioeconomic History   • Marital status:    Tobacco Use   • Smoking status: Former     Packs/day: 1.50     Years: 30.00     Pack years: 45.00     Types: Cigarettes     Start date:      Quit date: 1992     Years since quittin.3     Passive exposure: Current   • Smokeless tobacco: Never   Vaping Use   • Vaping Use: Never used   Substance and Sexual Activity   • Alcohol use: No   • Drug use: No   • Sexual activity: Defer       Review of Systems   Constitutional: Negative for malaise/fatigue, weight gain and weight loss.   Cardiovascular: Negative for chest pain, dyspnea on exertion, irregular heartbeat, leg swelling, near-syncope, orthopnea, palpitations, paroxysmal nocturnal dyspnea and syncope.   Respiratory: Negative for cough, shortness of breath, sleep disturbances due to breathing, sputum production and wheezing.    Skin: Negative for dry skin, flushing, itching and rash.   Gastrointestinal: Negative for hematemesis and hematochezia.   Neurological: Negative for dizziness, light-headedness, loss of balance and weakness.   All other systems reviewed and are negative.         Objective:     Vitals reviewed.   Constitutional:       General: Not in acute distress.     Appearance: Healthy appearance. Well-developed. Not diaphoretic.   Eyes:      General: No scleral icterus.     Conjunctiva/sclera: Conjunctivae normal.      Pupils: Pupils are equal, round, and reactive to light.   HENT:      Head: Normocephalic.    Mouth/Throat:      Pharynx: No oropharyngeal exudate.   Neck:      Vascular: No JVR.   Pulmonary:      Effort: Pulmonary effort is normal. No respiratory distress.      Breath  "sounds: Normal breath sounds. No wheezing. No rhonchi. No rales.   Chest:      Chest wall: Not tender to palpatation.   Cardiovascular:      Normal rate. Occasional ectopic beats. Irregularly irregular rhythm.   Pulses:     Intact distal pulses.   Abdominal:      General: Bowel sounds are normal. There is no distension.      Palpations: Abdomen is soft.      Tenderness: There is no abdominal tenderness.   Musculoskeletal: Normal range of motion.      Cervical back: Normal range of motion and neck supple. Skin:     General: Skin is warm and dry.      Coloration: Skin is not pale.      Findings: No erythema or rash.   Neurological:      Mental Status: Alert, oriented to person, place, and time and oriented to person, place and time.      Deep Tendon Reflexes: Reflexes are normal and symmetric.   Psychiatric:         Behavior: Behavior normal.             ECG 12 Lead    Date/Time: 5/1/2023 11:51 AM  Performed by: Carolyn Smith APRN  Authorized by: Carolyn Smith APRN   Comparison: compared with previous ECG from 4/7/2023  Similar to previous ECG  Rhythm: sinus rhythm  Ectopy: atrial premature contractions  Rate: normal  BPM: 60  Conduction: left posterior fascicular block and 1st degree AV block  ST Segments: ST segments normal  QRS axis: left  Other findings: T wave abnormality    Clinical impression: abnormal EKG          /62 (BP Location: Left arm, Patient Position: Sitting, Cuff Size: Adult)   Pulse 60   Ht 160 cm (63\")   Wt 50.3 kg (111 lb)   SpO2 97%   BMI 19.66 kg/m²     Lab Review:   I have reviewed previous office notes, recent labs and recent cardiac testing.     REMY 4/10/2023:  Interpretation Summary       •  Left ventricular wall thickness is consistent with mild concentric hypertrophy.  •  The right atrial cavity is mildly  dilated.  •  The watchman device is well seated without peridevice leak or evidence of thrombus       Lab Results   Component Value Date    CHLPL 248 (H) 08/07/2015    " TRIG 152 (H) 08/07/2015    HDL 56 08/07/2015     (H) 08/07/2015           Assessment:          Diagnosis Plan   1. Atrial flutter, unspecified type        2. Paroxysmal atrial fibrillation        3. Primary hypertension        4. Mixed hyperlipidemia        5. Bilateral carotid artery stenosis        6. Acquired hypothyroidism               Plan:       1. Atrial flutter- spontaneously converted prior to DCCV. Educated to monitor her HR at home and if her HR remains elevated for >24hs she is to contact our office for an EKG order. At that time will refer to EP as she is borderline bradycardic while in NSR and tachycardic with atrial arrhythmia. Continue Toprol 150mg daily. S/p watchman, continue Plavix and ASA.   2. PAF- stable, NSR per EKG today. S/p ablation in 2011 with known reoccurrence in 8/2022 with successful DCCV in 9/2022. S/p Watchman on 2/23 with device well seated without leak or thrombus per REMY in April. Coumadin was changed to Plavix in April. continue ASA and plavix.   3. HTN- controlled. Followed by PCP   4. HLD- followed by PCP. Not on statin therapy.   5. Carotid stenosis- followed by vascular  6. Hypothyroidism- TSH normal in April.        Follow up in 6 months or sooner if symptoms worsen.     I spent 30 minutes caring for Damaris on this date of service. This time includes time spent by me in the following activities:preparing for the visit, reviewing tests, obtaining and/or reviewing a separately obtained history, performing a medically appropriate examination and/or evaluation , counseling and educating the patient/family/caregiver, ordering medications, tests, or procedures, documenting information in the medical record and independently interpreting results and communicating that information with the patient/family/caregiver    I spent 2 minutes on the separately reported service of EKG interpretation. This time is not included in the time used to support the E/M service also reported  today.

## 2023-05-09 ENCOUNTER — TELEPHONE (OUTPATIENT)
Dept: CARDIOLOGY | Facility: CLINIC | Age: 88
End: 2023-05-09
Payer: MEDICARE

## 2023-05-09 DIAGNOSIS — I48.0 PAF (PAROXYSMAL ATRIAL FIBRILLATION): Primary | ICD-10-CM

## 2023-05-09 NOTE — TELEPHONE ENCOUNTER
Patient has called with c/o irregular/elevated  HR >100 but <130 since Friday 05/05/23. LOV note states patient notify office for elevated HR, order EKG and refer to EP. [facility]/09/2023.

## 2023-05-10 ENCOUNTER — HOSPITAL ENCOUNTER (OUTPATIENT)
Dept: CARDIOLOGY | Facility: HOSPITAL | Age: 88
Discharge: HOME | End: 2023-05-10
Admitting: NURSE PRACTITIONER
Payer: MEDICARE

## 2023-05-10 ENCOUNTER — TELEPHONE (OUTPATIENT)
Dept: CARDIOLOGY | Facility: CLINIC | Age: 88
End: 2023-05-10
Payer: MEDICARE

## 2023-05-10 DIAGNOSIS — I48.0 PAF (PAROXYSMAL ATRIAL FIBRILLATION): ICD-10-CM

## 2023-05-10 PROCEDURE — 93005 ELECTROCARDIOGRAM TRACING: CPT | Performed by: NURSE PRACTITIONER

## 2023-05-10 NOTE — TELEPHONE ENCOUNTER
----- Message from POLY Thornton sent at 5/9/2023  3:07 PM CDT -----  Regarding: RE:   OK, once we confirm she is in afib I'll decide what to do further.  ----- Message -----  From: Kiera Roberts MA  Sent: 5/9/2023   2:43 PM CDT  To: POLY Thornton  Subject: RE:                                              She has noticed being out of rhythm since Friday 5/5.  You ordered an EKG for her through [facility] and she is getting that completed tomorrow.  Her Sx include dizzy and fatigue all weekend.  She states she is feeling a little better today.  Kiera Roberts MA    ----- Message -----  From: Carolyn Smith APRN  Sent: 5/9/2023   2:27 PM CDT  To: Kiera Roberts MA  Subject: RE:                                              How long has she been out of rhythm and what symptoms is she having?   ----- Message -----  From: Kiera Roberts MA  Sent: 5/9/2023   2:19 PM CDT  To: POLY Thornton    Patient has called with c/o afib.  She states she can feel that she is out of rhythm.   She wants to speak with POLY Hamm.  Please advise.  Kiera Roberts MA

## 2023-05-10 NOTE — TELEPHONE ENCOUNTER
Patient states she is getting the EKG after 10am 5/10/23/  She was given information and voiced understanding.  Kiera Roberts MA

## 2023-05-11 ENCOUNTER — TELEPHONE (OUTPATIENT)
Dept: CARDIOLOGY | Facility: CLINIC | Age: 88
End: 2023-05-11
Payer: MEDICARE

## 2023-05-11 ENCOUNTER — PREP FOR SURGERY (OUTPATIENT)
Dept: OTHER | Facility: HOSPITAL | Age: 88
End: 2023-05-11
Payer: MEDICARE

## 2023-05-11 DIAGNOSIS — I48.19 PERSISTENT ATRIAL FIBRILLATION: Primary | ICD-10-CM

## 2023-05-11 LAB
QT INTERVAL: 304 MS
QTC INTERVAL: 401 MS

## 2023-05-11 RX ORDER — SODIUM CHLORIDE 9 MG/ML
40 INJECTION, SOLUTION INTRAVENOUS AS NEEDED
Status: CANCELLED | OUTPATIENT
Start: 2023-05-11

## 2023-05-11 RX ORDER — SODIUM CHLORIDE 0.9 % (FLUSH) 0.9 %
10 SYRINGE (ML) INJECTION AS NEEDED
Status: CANCELLED | OUTPATIENT
Start: 2023-05-11

## 2023-05-11 RX ORDER — SODIUM CHLORIDE 9 MG/ML
20 INJECTION, SOLUTION INTRAVENOUS CONTINUOUS
Status: CANCELLED | OUTPATIENT
Start: 2023-05-11

## 2023-05-11 RX ORDER — SODIUM CHLORIDE 0.9 % (FLUSH) 0.9 %
10 SYRINGE (ML) INJECTION EVERY 12 HOURS SCHEDULED
Status: CANCELLED | OUTPATIENT
Start: 2023-05-11

## 2023-05-11 NOTE — TELEPHONE ENCOUNTER
----- Message from POLY Thornton sent at 5/11/2023 10:35 AM CDT -----  Please let her know she is in afib and I've scheduled her a cardioversion

## 2023-05-15 ENCOUNTER — ANESTHESIA EVENT (OUTPATIENT)
Dept: CARDIOLOGY | Facility: HOSPITAL | Age: 88
End: 2023-05-15
Payer: MEDICARE

## 2023-05-15 ENCOUNTER — HOSPITAL ENCOUNTER (OUTPATIENT)
Dept: CARDIOLOGY | Facility: HOSPITAL | Age: 88
Setting detail: HOSPITAL OUTPATIENT SURGERY
Discharge: HOME | End: 2023-05-15
Payer: MEDICARE

## 2023-05-15 ENCOUNTER — ANESTHESIA (OUTPATIENT)
Dept: CARDIOLOGY | Facility: HOSPITAL | Age: 88
End: 2023-05-15
Payer: MEDICARE

## 2023-05-15 VITALS
TEMPERATURE: 97.2 F | HEIGHT: 63 IN | SYSTOLIC BLOOD PRESSURE: 170 MMHG | BODY MASS INDEX: 19.49 KG/M2 | DIASTOLIC BLOOD PRESSURE: 77 MMHG | RESPIRATION RATE: 22 BRPM | HEART RATE: 61 BPM | WEIGHT: 110 LBS | OXYGEN SATURATION: 90 %

## 2023-05-15 DIAGNOSIS — I48.19 PERSISTENT ATRIAL FIBRILLATION: ICD-10-CM

## 2023-05-15 PROCEDURE — 93005 ELECTROCARDIOGRAM TRACING: CPT | Performed by: NURSE PRACTITIONER

## 2023-05-15 PROCEDURE — 92960 CARDIOVERSION ELECTRIC EXT: CPT

## 2023-05-15 PROCEDURE — 25010000002 ENOXAPARIN PER 10 MG: Performed by: INTERNAL MEDICINE

## 2023-05-15 PROCEDURE — 25010000002 PROPOFOL 10 MG/ML EMULSION

## 2023-05-15 RX ORDER — PROPOFOL 10 MG/ML
VIAL (ML) INTRAVENOUS AS NEEDED
Status: DISCONTINUED | OUTPATIENT
Start: 2023-05-15 | End: 2023-05-15 | Stop reason: SURG

## 2023-05-15 RX ORDER — SODIUM CHLORIDE 9 MG/ML
40 INJECTION, SOLUTION INTRAVENOUS AS NEEDED
Status: DISCONTINUED | OUTPATIENT
Start: 2023-05-15 | End: 2023-05-16 | Stop reason: HOSPADM

## 2023-05-15 RX ORDER — SODIUM CHLORIDE 0.9 % (FLUSH) 0.9 %
10 SYRINGE (ML) INJECTION EVERY 12 HOURS SCHEDULED
Status: DISCONTINUED | OUTPATIENT
Start: 2023-05-15 | End: 2023-05-16 | Stop reason: HOSPADM

## 2023-05-15 RX ORDER — SODIUM CHLORIDE 9 MG/ML
20 INJECTION, SOLUTION INTRAVENOUS CONTINUOUS
Status: DISCONTINUED | OUTPATIENT
Start: 2023-05-15 | End: 2023-05-16 | Stop reason: HOSPADM

## 2023-05-15 RX ORDER — ENOXAPARIN SODIUM 100 MG/ML
INJECTION SUBCUTANEOUS
Status: COMPLETED | OUTPATIENT
Start: 2023-05-15 | End: 2023-05-15

## 2023-05-15 RX ORDER — LIDOCAINE HYDROCHLORIDE 20 MG/ML
INJECTION, SOLUTION EPIDURAL; INFILTRATION; INTRACAUDAL; PERINEURAL AS NEEDED
Status: DISCONTINUED | OUTPATIENT
Start: 2023-05-15 | End: 2023-05-15 | Stop reason: SURG

## 2023-05-15 RX ORDER — SODIUM CHLORIDE 0.9 % (FLUSH) 0.9 %
10 SYRINGE (ML) INJECTION AS NEEDED
Status: DISCONTINUED | OUTPATIENT
Start: 2023-05-15 | End: 2023-05-16 | Stop reason: HOSPADM

## 2023-05-15 RX ADMIN — LIDOCAINE HYDROCHLORIDE 50 MG: 20 INJECTION, SOLUTION EPIDURAL; INFILTRATION; INTRACAUDAL; PERINEURAL at 13:23

## 2023-05-15 RX ADMIN — PROPOFOL 30 MG: 10 INJECTION, EMULSION INTRAVENOUS at 13:23

## 2023-05-15 RX ADMIN — ENOXAPARIN SODIUM 37.5 MG: 30 INJECTION SUBCUTANEOUS at 13:31

## 2023-05-15 RX ADMIN — SODIUM CHLORIDE 20 ML/HR: 9 INJECTION, SOLUTION INTRAVENOUS at 11:50

## 2023-05-15 NOTE — ANESTHESIA PREPROCEDURE EVALUATION
Anesthesia Evaluation     Patient summary reviewed   no history of anesthetic complications:  NPO Solid Status: > 8 hours  NPO Liquid Status: > 8 hours           Airway   Mallampati: II  No difficulty expected  Dental    (+) edentulous, upper dentures and lower dentures    Pulmonary    (+) shortness of breath,   Cardiovascular   Exercise tolerance: poor (<4 METS)    ECG reviewed  PT is on anticoagulation therapy    (+) hypertension, dysrhythmias Atrial Fib, hyperlipidemia,       Neuro/Psych  (+) TIA, CVA,    GI/Hepatic/Renal/Endo    (+)   thyroid problem     Musculoskeletal     Abdominal    Substance History      OB/GYN          Other      history of cancer (breast)                    Anesthesia Plan    ASA 3     MAC         Plan discussed with CRNA.        CODE STATUS:

## 2023-05-15 NOTE — ANESTHESIA POSTPROCEDURE EVALUATION
Patient: Damaris Nash    Procedure Summary     Date: 05/15/23 Room / Location: [facility] CATH LAB    Anesthesia Start: 1319 Anesthesia Stop: 1336    Procedure: CARDIOVERSION EXTERNAL IN CARDIOLOGY DEPARTMENT Diagnosis:       Persistent atrial fibrillation      (persistent Afib)    Scheduled Providers: Riley Mtoa MD Provider: Brett Worthington CRNA    Anesthesia Type: MAC ASA Status: 3          Anesthesia Type: MAC    Vitals  No vitals data found for the desired time range.          Post Anesthesia Care and Evaluation    Patient location during evaluation: PHASE II  Patient participation: complete - patient participated  Level of consciousness: awake and alert  Pain score: 0    Airway patency: patent  Anesthetic complications: No anesthetic complications  PONV Status: none  Cardiovascular status: acceptable  Respiratory status: acceptable  Hydration status: acceptable  No anesthesia care post op

## 2023-05-18 LAB
MAXIMAL PREDICTED HEART RATE: 133 BPM
QT INTERVAL: 388 MS
QTC INTERVAL: 477 MS
STRESS TARGET HR: 113 BPM

## 2023-08-31 ENCOUNTER — TELEPHONE (OUTPATIENT)
Dept: CARDIOLOGY | Facility: CLINIC | Age: 88
End: 2023-08-31
Payer: MEDICARE

## 2023-08-31 NOTE — TELEPHONE ENCOUNTER
Call to pt and her son answered. Let him know that the pt is 6 months post Watchman and can stop her Plavix. He was instructed that she should continue her baby ASA daily. He verbalized understanding.

## 2023-09-22 ENCOUNTER — TRANSCRIBE ORDERS (OUTPATIENT)
Dept: ADMINISTRATIVE | Facility: HOSPITAL | Age: 88
End: 2023-09-22
Payer: MEDICARE

## 2023-09-22 DIAGNOSIS — M25.551 PAIN OF RIGHT HIP JOINT: Primary | ICD-10-CM

## 2023-09-25 ENCOUNTER — HOSPITAL ENCOUNTER (OUTPATIENT)
Dept: GENERAL RADIOLOGY | Facility: HOSPITAL | Age: 88
Discharge: HOME | End: 2023-09-25
Admitting: PHYSICIAN ASSISTANT
Payer: MEDICARE

## 2023-09-25 DIAGNOSIS — M25.551 PAIN OF RIGHT HIP JOINT: ICD-10-CM

## 2023-09-25 PROCEDURE — 73502 X-RAY EXAM HIP UNI 2-3 VIEWS: CPT

## 2023-09-26 ENCOUNTER — HOME HEALTH ADMISSION (OUTPATIENT)
Dept: HOME HEALTH SERVICES | Facility: HOME HEALTHCARE | Age: 88
End: 2023-09-26
Payer: MEDICARE

## 2023-09-26 ENCOUNTER — TRANSCRIBE ORDERS (OUTPATIENT)
Dept: HOME HEALTH SERVICES | Facility: HOME HEALTHCARE | Age: 88
End: 2023-09-26
Payer: MEDICARE

## 2023-09-26 DIAGNOSIS — R26.89 ABNORMALITY OF GAIT DUE TO IMPAIRMENT OF BALANCE: Primary | ICD-10-CM

## 2023-09-29 ENCOUNTER — HOSPITAL ENCOUNTER (OUTPATIENT)
Dept: GENERAL RADIOLOGY | Facility: HOSPITAL | Age: 88
Discharge: HOME | End: 2023-09-29
Admitting: PHYSICIAN ASSISTANT
Payer: MEDICARE

## 2023-09-29 ENCOUNTER — TRANSCRIBE ORDERS (OUTPATIENT)
Dept: ADMINISTRATIVE | Facility: HOSPITAL | Age: 88
End: 2023-09-29
Payer: MEDICARE

## 2023-09-29 DIAGNOSIS — M25.562 LEFT KNEE PAIN, UNSPECIFIED CHRONICITY: ICD-10-CM

## 2023-09-29 DIAGNOSIS — M25.561 RIGHT KNEE PAIN, UNSPECIFIED CHRONICITY: ICD-10-CM

## 2023-09-29 DIAGNOSIS — R29.6 REPEATED FALLS: ICD-10-CM

## 2023-09-29 DIAGNOSIS — R29.6 REPEATED FALLS: Primary | ICD-10-CM

## 2023-09-29 PROCEDURE — 73562 X-RAY EXAM OF KNEE 3: CPT

## 2023-10-04 ENCOUNTER — TRANSCRIBE ORDERS (OUTPATIENT)
Dept: HOME HEALTH SERVICES | Facility: HOME HEALTHCARE | Age: 88
End: 2023-10-04
Payer: MEDICARE

## 2023-10-04 ENCOUNTER — HOME HEALTH ADMISSION (OUTPATIENT)
Dept: HOME HEALTH SERVICES | Facility: HOME HEALTHCARE | Age: 88
End: 2023-10-04
Payer: MEDICARE

## 2023-10-04 DIAGNOSIS — M17.11 PRIMARY OSTEOARTHRITIS OF RIGHT KNEE: Primary | ICD-10-CM

## 2023-10-06 ENCOUNTER — HOME CARE VISIT (OUTPATIENT)
Dept: HOME HEALTH SERVICES | Facility: CLINIC | Age: 88
End: 2023-10-06
Payer: MEDICARE

## 2023-10-06 VITALS
HEART RATE: 70 BPM | OXYGEN SATURATION: 93 % | SYSTOLIC BLOOD PRESSURE: 134 MMHG | DIASTOLIC BLOOD PRESSURE: 74 MMHG | TEMPERATURE: 96.8 F | RESPIRATION RATE: 16 BRPM

## 2023-10-06 PROCEDURE — G0151 HHCP-SERV OF PT,EA 15 MIN: HCPCS

## 2023-10-06 NOTE — HOME HEALTH
"          Reason for Hosp/Primary Dx : 88 yo female Primary Dx: Unilateral primary osteoarthritis, right knee . Pt reports steady decline in strength and mobility     \" Gayatri \"       referral orders : R knee OA   Focus of Care: deconditioning, gait, transfers, safety per osteoarthritis, right knee with altered gait balance fall risk   (  xrays R  knee mod + OA change )       Current Functional status/mobility/DME:     See DME    HB status/Living Arrangements: lived alone prior ( 24/7 prn) - 4 step entry     Skin Integrity/wound status: na    Code Status: FULL CODE    Fall Risk: high risk    PmHx:  R LUCA - afib ( watchman 4-2023 ) -     PLOF : household amb - community prn with A      Infectious disease screen : Pt denies s/s or a exposure to anyone with  s/s of Covid -19     Skills :Education in ther ex to faciltate functional strength for transfers and mobility - reduce fall risk     Education in transfers with hand and AD placement for safety     Education in AD use to promote reduced fall risk - safe functional gait     Education on proper hand/foot placement, transitional movements, and safety awareness to decrease risk of falls"

## 2023-10-11 ENCOUNTER — HOME CARE VISIT (OUTPATIENT)
Dept: HOME HEALTH SERVICES | Facility: CLINIC | Age: 88
End: 2023-10-11
Payer: MEDICARE

## 2023-10-11 VITALS
TEMPERATURE: 98.4 F | HEART RATE: 62 BPM | SYSTOLIC BLOOD PRESSURE: 160 MMHG | OXYGEN SATURATION: 93 % | DIASTOLIC BLOOD PRESSURE: 72 MMHG | RESPIRATION RATE: 16 BRPM

## 2023-10-11 PROCEDURE — G0157 HHC PT ASSISTANT EA 15: HCPCS

## 2023-10-11 NOTE — HOME HEALTH
Covid 19 pre-screen performed. Pt states no falls or changes to insurance or medicaiton since last visit. Next visit to address gt training, bal, HEP, and transfer training.

## 2023-10-13 ENCOUNTER — HOME CARE VISIT (OUTPATIENT)
Dept: HOME HEALTH SERVICES | Facility: CLINIC | Age: 88
End: 2023-10-13
Payer: MEDICARE

## 2023-10-13 VITALS
OXYGEN SATURATION: 93 % | SYSTOLIC BLOOD PRESSURE: 150 MMHG | DIASTOLIC BLOOD PRESSURE: 78 MMHG | HEART RATE: 104 BPM | RESPIRATION RATE: 16 BRPM | TEMPERATURE: 98.4 F

## 2023-10-13 PROCEDURE — G0157 HHC PT ASSISTANT EA 15: HCPCS

## 2023-10-13 NOTE — HOME HEALTH
Covid 19 pre-screen performed. Pt states no falls or changes to insurance or medicaiton since last visit. Pt states she hurt her R leg yesterday and it is sore today. Next visit to address gt training, transfer training, ed on HEP, & monitor pain.

## 2023-10-16 ENCOUNTER — HOME CARE VISIT (OUTPATIENT)
Dept: HOME HEALTH SERVICES | Facility: CLINIC | Age: 88
End: 2023-10-16
Payer: MEDICARE

## 2023-10-16 VITALS
OXYGEN SATURATION: 93 % | HEART RATE: 62 BPM | TEMPERATURE: 97.9 F | DIASTOLIC BLOOD PRESSURE: 70 MMHG | RESPIRATION RATE: 18 BRPM | SYSTOLIC BLOOD PRESSURE: 142 MMHG

## 2023-10-16 PROCEDURE — G0157 HHC PT ASSISTANT EA 15: HCPCS

## 2023-10-16 NOTE — HOME HEALTH
No new falls  No new meds  No edema observed  No signs or sx of covid    Plan:  continue strengthening and gait

## 2023-10-17 ENCOUNTER — HOSPITAL ENCOUNTER (OUTPATIENT)
Dept: CT IMAGING | Facility: HOSPITAL | Age: 88
Discharge: HOME | End: 2023-10-17
Admitting: NURSE PRACTITIONER
Payer: MEDICARE

## 2023-10-17 DIAGNOSIS — R59.9 ADENOPATHY: ICD-10-CM

## 2023-10-17 DIAGNOSIS — R91.1 RIGHT UPPER LOBE PULMONARY NODULE: ICD-10-CM

## 2023-10-17 PROCEDURE — 71250 CT THORAX DX C-: CPT

## 2023-10-18 ENCOUNTER — HOME CARE VISIT (OUTPATIENT)
Dept: HOME HEALTH SERVICES | Facility: CLINIC | Age: 88
End: 2023-10-18
Payer: MEDICARE

## 2023-10-18 VITALS
OXYGEN SATURATION: 98 % | DIASTOLIC BLOOD PRESSURE: 60 MMHG | RESPIRATION RATE: 18 BRPM | HEART RATE: 55 BPM | TEMPERATURE: 97.5 F | SYSTOLIC BLOOD PRESSURE: 160 MMHG

## 2023-10-18 PROCEDURE — G0157 HHC PT ASSISTANT EA 15: HCPCS

## 2023-10-18 NOTE — HOME HEALTH
No new meds  No new falls  No signs or sx of covid  No edema    Plan:  continue strengthening and gait training

## 2023-10-23 ENCOUNTER — HOME CARE VISIT (OUTPATIENT)
Dept: HOME HEALTH SERVICES | Facility: CLINIC | Age: 88
End: 2023-10-23
Payer: MEDICARE

## 2023-10-23 VITALS
OXYGEN SATURATION: 97 % | HEART RATE: 98 BPM | TEMPERATURE: 97.6 F | DIASTOLIC BLOOD PRESSURE: 70 MMHG | SYSTOLIC BLOOD PRESSURE: 140 MMHG | RESPIRATION RATE: 18 BRPM

## 2023-10-23 PROCEDURE — G0157 HHC PT ASSISTANT EA 15: HCPCS

## 2023-10-23 NOTE — PROGRESS NOTES
Chief Complaint  Lung Nodule    Subjective    History of Present Illness {CC  Problem List  Visit Diagnosis   Encounters  Notes  Medications  Labs  Result Review Imaging  Media     Damaris Nash presents to [facility]ULMON[facility]IC[facility] for:    History of Present Illness  Ms. Nash is here for follow-up and management of right upper lobe lung nodule and enlarged precarinal node.  She has been doing well since last office visit.  No pulmonary complaints today.  She is here to review imaging. She is needing to get up to date on vaccines. She has a friend with shingles and is interested in the shingles vaccine.        Prior to Admission medications    Medication Sig Start Date End Date Taking? Authorizing Provider   acetaminophen (TYLENOL) 325 MG tablet Take 2 tablets by mouth Daily As Needed for Mild Pain. Indications: Pain    ProviderAlejandro MD   ALPRAZolam (XANAX) 0.25 MG tablet Take 1 tablet by mouth 2 (Two) Times a Day As Needed for Anxiety. Indications: Feeling Anxious    Alejandro Epps MD   amLODIPine (NORVASC) 2.5 MG tablet Take 1 tablet every day by oral route.    ProviderAlejandro MD   aspirin 81 MG EC tablet Take 1 tablet by mouth Daily. 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 Indications: Osteoporosis, Vitamin D Deficiency    Alejandro Epps MD   hydrochlorothiazide (HYDRODIURIL) 25 MG tablet Take 1 tablet by mouth Daily. Indications: High Blood Pressure Disorder    Alejandro Epps MD   levothyroxine (SYNTHROID, LEVOTHROID) 137 MCG tablet Take 137 mcg by mouth Daily. Indications: Underactive Thyroid 10/1/23   Alejandro Epps MD   metoprolol succinate XL (TOPROL-XL) 100 MG 24 hr tablet Take 1.5 tablets by mouth Every Night. 4/7/23   Riley Mtoa MD   polyethylene glycol (MIRALAX) 17 GM/SCOOP powder  "Take 17 g by mouth Daily. Indications: Constipation    ProviderAlejandro MD   traMADol (ULTRAM) 50 MG tablet Take 1 tablet by mouth Every 6 (Six) Hours As Needed for Moderate Pain. Indications: Pain    ProviderAlejandro MD   levothyroxine sodium (TIROSINT) 75 MCG capsule Take 75 mcg by mouth Daily. 1 tablet daily  Indications: Underactive Thyroid 10/11/23   ProviderAlejandro MD       Social History     Socioeconomic History    Marital status:    Tobacco Use    Smoking status: Former     Packs/day: 1.50     Years: 30.00     Additional pack years: 0.00     Total pack years: 45.00     Types: Cigarettes     Start date:      Quit date: 1992     Years since quittin.8     Passive exposure: Current    Smokeless tobacco: Never   Vaping Use    Vaping Use: Never used   Substance and Sexual Activity    Alcohol use: No    Drug use: No    Sexual activity: Defer       Objective   Vital Signs:   /72   Pulse 72   Ht 160 cm (63\")   Wt 46.5 kg (102 lb 9.6 oz)   SpO2 93% Comment: RA  BMI 18.17 kg/m²     Physical Exam  Constitutional:       General: She is not in acute distress.  HENT:      Head: Normocephalic.      Nose: Nose normal.      Mouth/Throat:      Mouth: Mucous membranes are moist.   Eyes:      General: No scleral icterus.  Cardiovascular:      Rate and Rhythm: Normal rate.   Pulmonary:      Effort: No respiratory distress.   Abdominal:      General: There is no distension.   Neurological:      Mental Status: She is alert and oriented to person, place, and time.   Psychiatric:         Mood and Affect: Mood normal.         Behavior: Behavior is cooperative.        Result Review :{ Labs  Result Review  Imaging  Med Tab  Media :          Results for orders placed during the hospital encounter of 19    Pulmonary Function Test    [facility] - Pulmonary Function Test    [address] [place] [address]  [place]  [address]  [phone]    Patient : Damaris Nash  MRN : " [id number]  [facility] : [id number]  Pulmonologist : Jone Fauts MD  Date : 12/17/2019    ______________________________________________________________________    Interpretation :  1.  Spirometry is consistent with a moderate obstructive ventilatory defect.  2.  Lung volumes reveal a coexisting mild restrictive ventilatory defect.  There is also a decrease in inspiratory capacity.  3.  There is a severe diffusion impairment which when corrected for alveolar volume is normalized.      Jone Faust MD             CT Chest Without Contrast Diagnostic (10/17/2023 13:40)     My interpretation of imaging:  right upper lobe lung nodule, stable, precarinal node, stable, increasing bilateral small pleural effusions.           Assessment and Plan {CC Problem List  Visit Diagnosis  ROS  Review (Popup)  Health Maintenance  Quality  BestPractice  Medications  SmartSets  SnapShot Encounters  Media      Diagnoses and all orders for this visit:    1. Right upper lobe pulmonary nodule (Primary)    2. Need for vaccination  -     Fluzone High-Dose 65+yrs  -     Pneumococcal Conjugate Vaccine 20-Valent All        BMI is within normal parameters. No other follow-up for BMI required.    We reviewed imaging and compared to prior films showing stability of precranial node and right upper lobe lung nodule. Follow up CT in 1 year to ensure long term stability. Influenza and pneumococcal vaccines today. Office follow up in 1 year, call in the interim with issues.     POLY Combs  10/24/2023  16:08 CDT    Follow Up {Instructions Charge Capture  Follow-up Communications   Return in about 1 year (around 10/24/2024) for Review CT.    Patient was given instructions and counseling regarding her condition or for health maintenance advice. Please see specific information pulled into the AVS if appropriate.

## 2023-10-24 ENCOUNTER — OFFICE VISIT (OUTPATIENT)
Dept: PULMONOLOGY | Facility: CLINIC | Age: 88
End: 2023-10-24
Payer: MEDICARE

## 2023-10-24 VITALS
HEART RATE: 72 BPM | OXYGEN SATURATION: 93 % | DIASTOLIC BLOOD PRESSURE: 72 MMHG | WEIGHT: 102.6 LBS | HEIGHT: 63 IN | BODY MASS INDEX: 18.18 KG/M2 | SYSTOLIC BLOOD PRESSURE: 128 MMHG

## 2023-10-24 DIAGNOSIS — Z23 NEED FOR VACCINATION: ICD-10-CM

## 2023-10-24 DIAGNOSIS — R91.1 RIGHT UPPER LOBE PULMONARY NODULE: Primary | ICD-10-CM

## 2023-10-25 ENCOUNTER — HOME CARE VISIT (OUTPATIENT)
Dept: HOME HEALTH SERVICES | Facility: CLINIC | Age: 88
End: 2023-10-25
Payer: MEDICARE

## 2023-10-25 VITALS
DIASTOLIC BLOOD PRESSURE: 66 MMHG | RESPIRATION RATE: 16 BRPM | TEMPERATURE: 98.3 F | SYSTOLIC BLOOD PRESSURE: 126 MMHG | OXYGEN SATURATION: 98 % | HEART RATE: 63 BPM

## 2023-10-25 PROCEDURE — G0159 HHC PT MAINT EA 15 MIN: HCPCS

## 2023-10-25 NOTE — HOME HEALTH
"    pt family agree to dc with HEP AD use and family A   - discussed ortho consult per R knee pain -         Reason for Hosp/Primary Dx : 88 yo female Primary Dx: Unilateral primary osteoarthritis, right knee . Pt reports steady decline in strength and mobility   \" Gayatri \"   referral orders : R knee OA   Focus of Care: deconditioning, gait, transfers, safety per osteoarthritis, right knee with altered gait balance fall risk   ( xrays R knee mod + OA change )   Current Functional status/mobility/DME:   See DME   HB status/Living Arrangements: lived alone prior ( 24/7 prn) - 4 step entry   Skin Integrity/wound status: na   Code Status: FULL CODE   Fall Risk: high risk   PmHx: R LUCA - afib ( watchman 4-2023 ) -   PLOF : household amb - community prn with A   Infectious disease screen : Pt denies s/s or a exposure to anyone with s/s of Covid -19   Skills :Education in ther ex to faciltate functional strength for transfers and mobility - reduce fall risk   Education in transfers with hand and AD placement for safety   Education in AD use to promote reduced fall risk - safe functional gait   Education on proper hand/foot placement, transitional movements, and safety awareness to decrease risk of falls"

## 2024-02-19 ENCOUNTER — TELEPHONE (OUTPATIENT)
Dept: CARDIOLOGY | Facility: CLINIC | Age: 89
End: 2024-02-19
Payer: MEDICARE

## 2024-02-19 ENCOUNTER — PREP FOR SURGERY (OUTPATIENT)
Dept: OTHER | Facility: HOSPITAL | Age: 89
End: 2024-02-19
Payer: MEDICARE

## 2024-02-19 DIAGNOSIS — I48.0 PAF (PAROXYSMAL ATRIAL FIBRILLATION): Primary | ICD-10-CM

## 2024-02-19 RX ORDER — SODIUM CHLORIDE 9 MG/ML
40 INJECTION, SOLUTION INTRAVENOUS AS NEEDED
OUTPATIENT
Start: 2024-02-19

## 2024-02-19 RX ORDER — SODIUM CHLORIDE 0.9 % (FLUSH) 0.9 %
10 SYRINGE (ML) INJECTION EVERY 12 HOURS SCHEDULED
OUTPATIENT
Start: 2024-02-19

## 2024-02-19 RX ORDER — SODIUM CHLORIDE 0.9 % (FLUSH) 0.9 %
10 SYRINGE (ML) INJECTION AS NEEDED
OUTPATIENT
Start: 2024-02-19

## 2024-02-19 NOTE — TELEPHONE ENCOUNTER
Call to pt to discuss her 1 year post Watchman REMY. She is agreeable to proceed and chose Dr. Santos to do her follow up.

## 2024-02-28 ENCOUNTER — HOSPITAL ENCOUNTER (OUTPATIENT)
Dept: CARDIOLOGY | Facility: HOSPITAL | Age: 89
Discharge: HOME | End: 2024-02-28
Payer: MEDICARE

## 2024-02-28 ENCOUNTER — ANESTHESIA (OUTPATIENT)
Dept: CARDIOLOGY | Facility: HOSPITAL | Age: 89
End: 2024-02-28
Payer: MEDICARE

## 2024-02-28 ENCOUNTER — ANESTHESIA EVENT (OUTPATIENT)
Dept: CARDIOLOGY | Facility: HOSPITAL | Age: 89
End: 2024-02-28
Payer: MEDICARE

## 2024-02-28 VITALS
RESPIRATION RATE: 16 BRPM | SYSTOLIC BLOOD PRESSURE: 167 MMHG | OXYGEN SATURATION: 93 % | BODY MASS INDEX: 17.89 KG/M2 | HEART RATE: 60 BPM | TEMPERATURE: 97.6 F | WEIGHT: 101 LBS | HEIGHT: 63 IN | DIASTOLIC BLOOD PRESSURE: 77 MMHG

## 2024-02-28 VITALS — HEART RATE: 53 BPM | DIASTOLIC BLOOD PRESSURE: 52 MMHG | SYSTOLIC BLOOD PRESSURE: 125 MMHG | OXYGEN SATURATION: 93 %

## 2024-02-28 DIAGNOSIS — I48.0 PAF (PAROXYSMAL ATRIAL FIBRILLATION): ICD-10-CM

## 2024-02-28 PROCEDURE — 25010000002 PROPOFOL 10 MG/ML EMULSION: Performed by: NURSE ANESTHETIST, CERTIFIED REGISTERED

## 2024-02-28 PROCEDURE — 93321 DOPPLER ECHO F-UP/LMTD STD: CPT

## 2024-02-28 PROCEDURE — 93325 DOPPLER ECHO COLOR FLOW MAPG: CPT

## 2024-02-28 RX ORDER — SODIUM CHLORIDE 0.9 % (FLUSH) 0.9 %
10 SYRINGE (ML) INJECTION AS NEEDED
Status: DISCONTINUED | OUTPATIENT
Start: 2024-02-28 | End: 2024-02-29 | Stop reason: HOSPADM

## 2024-02-28 RX ORDER — SODIUM CHLORIDE 0.9 % (FLUSH) 0.9 %
10 SYRINGE (ML) INJECTION EVERY 12 HOURS SCHEDULED
Status: DISCONTINUED | OUTPATIENT
Start: 2024-02-28 | End: 2024-02-29 | Stop reason: HOSPADM

## 2024-02-28 RX ORDER — LIDOCAINE HYDROCHLORIDE 20 MG/ML
INJECTION, SOLUTION EPIDURAL; INFILTRATION; INTRACAUDAL; PERINEURAL AS NEEDED
Status: DISCONTINUED | OUTPATIENT
Start: 2024-02-28 | End: 2024-02-28 | Stop reason: SURG

## 2024-02-28 RX ORDER — SODIUM CHLORIDE 9 MG/ML
40 INJECTION, SOLUTION INTRAVENOUS AS NEEDED
Status: DISCONTINUED | OUTPATIENT
Start: 2024-02-28 | End: 2024-02-29 | Stop reason: HOSPADM

## 2024-02-28 RX ORDER — PROPOFOL 10 MG/ML
VIAL (ML) INTRAVENOUS AS NEEDED
Status: DISCONTINUED | OUTPATIENT
Start: 2024-02-28 | End: 2024-02-28 | Stop reason: SURG

## 2024-02-28 RX ADMIN — LIDOCAINE HYDROCHLORIDE 120 MG: 20 INJECTION, SOLUTION EPIDURAL; INFILTRATION; INTRACAUDAL; PERINEURAL at 12:46

## 2024-02-28 RX ADMIN — PROPOFOL 50 MG: 10 INJECTION, EMULSION INTRAVENOUS at 12:46

## 2024-02-28 NOTE — ANESTHESIA POSTPROCEDURE EVALUATION
"Patient: Damaris Nash    Procedure Summary       Date: 02/28/24 Room / Location: [facility] CATH LAB; [facility] CARDIOLOGY    Anesthesia Start: 1240 Anesthesia Stop: 1257    Procedure: ADULT TRANSESOPHAGEAL ECHO (REMY) W/ CONT IF NECESSARY PER PROTOCOL Diagnosis:       PAF (paroxysmal atrial fibrillation)      (Pre-op or Device)      (Intracardiac Device)    Scheduled Providers: Jose R Santos MD Provider: Matthew Garnett CRNA    Anesthesia Type: MAC ASA Status: 3            Anesthesia Type: MAC    Vitals  Vitals Value Taken Time   /52 02/28/24 1256   Temp     Pulse 53 02/28/24 1256   Resp 0 02/28/24 1256   SpO2 93 % 02/28/24 1256           Post Anesthesia Care and Evaluation    Patient location during evaluation: PACU  Patient participation: complete - patient participated  Level of consciousness: awake and alert  Pain management: adequate    Airway patency: patent  Anesthetic complications: No anesthetic complications    Cardiovascular status: acceptable  Respiratory status: acceptable  Hydration status: acceptable    Comments: Blood pressure 161/67, pulse 54, temperature 97.6 °F (36.4 °C), temperature source Temporal, resp. rate 18, height 160 cm (63\"), weight 45.8 kg (101 lb), SpO2 96%, not currently breastfeeding.    Pt discharged from PACU based on tracy score >8    "

## 2024-02-28 NOTE — ANESTHESIA PREPROCEDURE EVALUATION
Anesthesia Evaluation     Patient summary reviewed   no history of anesthetic complications:   NPO Solid Status: > 8 hours  NPO Liquid Status: > 8 hours           Airway   Mallampati: II  No difficulty expected  Dental    (+) edentulous, upper dentures and lower dentures    Pulmonary    (+) ,shortness of breath  Cardiovascular   Exercise tolerance: poor (<4 METS)    ECG reviewed  PT is on anticoagulation therapy    (+) hypertension, dysrhythmias Atrial Fib, hyperlipidemia      Neuro/Psych  (+) TIA, CVA  GI/Hepatic/Renal/Endo    (+) thyroid problem     Musculoskeletal     Abdominal    Substance History      OB/GYN          Other      history of cancer (breast)                      Anesthesia Plan    ASA 3     MAC         Plan discussed with CRNA.        CODE STATUS:

## 2024-05-13 NOTE — HOME HEALTH
Subjective: Covid screen completed. Patient states she is fatigued but would like to take a shower.    Falls:none    Medication changes:none    Insurance changes:none    Edema:none    Medical necessity for continued visits: Skilled OT medically necessary to address shower skills and prepare for d/c    Next MD appt: 9/7/22 for cardioversion    Plan for next visit: d/c and shower training. none

## 2024-05-28 ENCOUNTER — OFFICE VISIT (OUTPATIENT)
Dept: CARDIOLOGY | Facility: CLINIC | Age: 89
End: 2024-05-28
Payer: MEDICARE

## 2024-05-28 VITALS
WEIGHT: 95 LBS | DIASTOLIC BLOOD PRESSURE: 60 MMHG | BODY MASS INDEX: 16.83 KG/M2 | HEART RATE: 64 BPM | OXYGEN SATURATION: 96 % | SYSTOLIC BLOOD PRESSURE: 158 MMHG | HEIGHT: 63 IN

## 2024-05-28 DIAGNOSIS — I48.92 ATRIAL FLUTTER, UNSPECIFIED TYPE: Primary | ICD-10-CM

## 2024-05-28 DIAGNOSIS — E03.9 ACQUIRED HYPOTHYROIDISM: ICD-10-CM

## 2024-05-28 DIAGNOSIS — I48.0 PAF (PAROXYSMAL ATRIAL FIBRILLATION): ICD-10-CM

## 2024-05-28 DIAGNOSIS — I10 PRIMARY HYPERTENSION: ICD-10-CM

## 2024-05-28 PROCEDURE — 99214 OFFICE O/P EST MOD 30 MIN: CPT | Performed by: NURSE PRACTITIONER

## 2024-05-28 PROCEDURE — 93000 ELECTROCARDIOGRAM COMPLETE: CPT | Performed by: NURSE PRACTITIONER

## 2024-05-28 RX ORDER — MEGESTROL ACETATE 40 MG/ML
SUSPENSION ORAL
COMMUNITY
Start: 2024-03-27

## 2024-05-28 NOTE — PROGRESS NOTES
"  Subjective:     Encounter Date:05/28/2024      Patient ID: Damaris Nash is a 88 y.o. female.    Chief Complaint:\"no complaints\"  Atrial Flutter  This is a chronic problem. The current episode started more than 1 year ago. The problem occurs rarely. The problem has been rapidly improving. Pertinent negatives include no chest pain, coughing, rash or weakness.   Atrial Fibrillation  Presents for follow-up visit. Symptoms are negative for chest pain, dizziness, palpitations, shortness of breath, syncope and weakness. The symptoms have been stable. Past medical history includes atrial fibrillation.     Patient presents today for a routine follow up. She is followed for PAF s/p ablation per Dr. Rajput in 2011 with known reoccurrence in 8/2022 at which time she underwent a successful cardioversion. 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 She was noted to be in atrial flutter at a follow up on 3/2023 and was scheduled for a cardioversion but spontaneously converted to NSR prior to. She had a 1 year post-watchman REMY in March that revealed a 3-4mm \"gutter\" of flow around the watchman device with no evidence of thrombus and presence of a 2mm iatrogenic ASD remained.    She presents today with her son and notes she feels well. She denies chest pain, dyspnea, palpitations and edema. She reports she checks her BP \"frequently\" at home with readings 140/70s.     The following portions of the patient's history were reviewed and updated as appropriate: allergies, current medications, past family history, past medical history, past social history, past surgical history and problem list.    Allergies   Allergen Reactions    Epinephrine Shortness Of Breath    Celebrex [Celecoxib] Anxiety    Ferrous Sulfate GI Intolerance    Omnicef " [Cefdinir] Unknown - Low Severity     Possibly rocephin too    Other Anxiety     Omnicef, some Thad, possible Rocephin allergy    Rocephin [Ceftriaxone] Unknown - Low Severity    Simvastatin Itching    Sulfa Antibiotics Rash       Current Outpatient Medications:     acetaminophen (TYLENOL) 325 MG tablet, Take 2 tablets by mouth Daily As Needed for Mild Pain. Indications: Pain, Disp: , Rfl:     ALPRAZolam (XANAX) 0.25 MG tablet, Take 1 tablet by mouth 2 (Two) Times a Day As Needed for Anxiety. Indications: Feeling Anxious, Disp: , Rfl:     amLODIPine (NORVASC) 2.5 MG tablet, Take 1 tablet every day by oral route., Disp: , Rfl:     aspirin 81 MG EC tablet, Take 1 tablet by mouth Daily. Indications: Disease involving Lipid Deposits in the Arteries, Disp: , Rfl:     Cholecalciferol (VITAMIN D3) 2000 units capsule, Take 1 capsule by mouth Daily. Indications: Osteoporosis, Vitamin D Deficiency, Disp: , Rfl:     hydrochlorothiazide (HYDRODIURIL) 25 MG tablet, Take 1 tablet by mouth Daily. Indications: High Blood Pressure Disorder, Disp: , Rfl:     levothyroxine (SYNTHROID, LEVOTHROID) 137 MCG tablet, Take 1 tablet by mouth Daily. Indications: Underactive Thyroid, Disp: , Rfl:     megestrol (MEGACE) 40 MG/ML suspension, , Disp: , Rfl:     metoprolol succinate XL (TOPROL-XL) 100 MG 24 hr tablet, Take 1.5 tablets by mouth Every Night., Disp: , Rfl:     polyethylene glycol (MIRALAX) 17 GM/SCOOP powder, Take 17 g by mouth Daily. Indications: Constipation, Disp: , Rfl:     traMADol (ULTRAM) 50 MG tablet, Take 1 tablet by mouth Every 6 (Six) Hours As Needed for Moderate Pain. Indications: Pain, Disp: , Rfl:   Past Medical History:   Diagnosis Date    A-fib     Acute ischemic right ICA stroke     Atrial fibrillation     Benign essential HTN     Blindness of right eye     Breast CA     Breast cancer     Carotid artery disease     CVA (cerebral vascular accident)     Gallstones     GERD (gastroesophageal reflux disease)      Hyperlipidemia     Hypertension     Hypothyroidism     Macular degeneration     Osteopenia     Primary pulmonary HTN     S/P right hip fracture 2012    Shortness of breath     Stroke     Vitamin D deficiency        Social History     Socioeconomic History    Marital status:    Tobacco Use    Smoking status: Former     Current packs/day: 0.00     Average packs/day: 1.5 packs/day for 37.0 years (55.5 ttl pk-yrs)     Types: Cigarettes     Start date:      Quit date: 1992     Years since quittin.4     Passive exposure: Current    Smokeless tobacco: Never   Vaping Use    Vaping status: Never Used   Substance and Sexual Activity    Alcohol use: No    Drug use: No    Sexual activity: Defer       Review of Systems   Constitutional: Negative for malaise/fatigue, weight gain and weight loss.   Cardiovascular:  Negative for chest pain, dyspnea on exertion, irregular heartbeat, leg swelling, near-syncope, orthopnea, palpitations, paroxysmal nocturnal dyspnea and syncope.   Respiratory:  Negative for cough, shortness of breath, sleep disturbances due to breathing, sputum production and wheezing.    Skin:  Negative for dry skin, flushing, itching and rash.   Gastrointestinal:  Negative for hematemesis and hematochezia.   Neurological:  Negative for dizziness, light-headedness, loss of balance and weakness.   All other systems reviewed and are negative.         Objective:     Vitals reviewed.   Constitutional:       General: Not in acute distress.     Appearance: Healthy appearance. Well-developed. Not diaphoretic.   Eyes:      General: No scleral icterus.     Conjunctiva/sclera: Conjunctivae normal.      Pupils: Pupils are equal, round, and reactive to light.   HENT:      Head: Normocephalic.    Mouth/Throat:      Pharynx: No oropharyngeal exudate.   Neck:      Vascular: No JVR.   Pulmonary:      Effort: Pulmonary effort is normal. No respiratory distress.      Breath sounds: Normal breath sounds. No  "wheezing. No rhonchi. No rales.   Chest:      Chest wall: Not tender to palpatation.   Cardiovascular:      Normal rate. Occasional ectopic beats. Regular rhythm.   Pulses:     Intact distal pulses.   Abdominal:      General: Bowel sounds are normal. There is no distension.      Palpations: Abdomen is soft.      Tenderness: There is no abdominal tenderness.   Musculoskeletal: Normal range of motion.      Cervical back: Normal range of motion and neck supple. Skin:     General: Skin is warm and dry.      Coloration: Skin is not pale.      Findings: No erythema or rash.   Neurological:      Mental Status: Alert, oriented to person, place, and time and oriented to person, place and time.      Deep Tendon Reflexes: Reflexes are normal and symmetric.   Psychiatric:         Behavior: Behavior normal.             ECG 12 Lead    Date/Time: 5/28/2024 11:30 AM  Performed by: Carolyn Smith APRN    Authorized by: Carolyn Smith APRN  Comparison: compared with previous ECG from 5/15/2023  Comparison to previous ECG: NSR with 1 degree AV block and PACs has replaced Afib.   Rhythm: sinus rhythm  Ectopy: atrial premature contractions  Rate: normal  BPM: 64  Conduction: left posterior fascicular block and 1st degree AV block  ST Segments: ST segments normal  QRS axis: right  Other findings: T wave abnormality    Clinical impression: abnormal EKG        /60 (BP Location: Right arm, Patient Position: Sitting, Cuff Size: Adult)   Pulse 64   Ht 160 cm (63\")   Wt 43.1 kg (95 lb)   SpO2 96%   BMI 16.83 kg/m²     Lab Review:   I have reviewed previous office notes, recent labs and recent cardiac testing.   Results for orders placed during the hospital encounter of 02/28/24    Adult Transesophageal Echo (REMY) W/ Cont if Necessary Per Protocol    Interpretation Summary    When compared to previous exam from 4/7/2023, no significant change (though not reported by the performing cardiologist then, based upon my independent " "review now, there was a 2 mm \"gutter\" of flow around the device then).    24mm Watchman FLX device in similar position and left atrial appendage is compared to previous imaging, with an approximate 3-4 mm \"gutter\" around the device on the mitral valve side.  There is no evidence of device related thrombus.    A 2mm iatrogenic ASD remains.    The left atrial cavity is dilated.      REMY 4/10/2023:  Interpretation Summary         Left ventricular wall thickness is consistent with mild concentric hypertrophy.    The right atrial cavity is mildly  dilated.    The watchman device is well seated without peridevice leak or evidence of thrombus       Lab Results   Component Value Date    CHLPL 248 (H) 08/07/2015    TRIG 152 (H) 08/07/2015    HDL 56 08/07/2015     (H) 08/07/2015           Assessment:          Diagnosis Plan   1. Atrial flutter, unspecified type        2. PAF (paroxysmal atrial fibrillation)        3. Primary hypertension        4. Acquired hypothyroidism                 Plan:       1. Atrial flutter- stable. NSR per EKG today. She knows she is to monitor her HR at home and if her HR remains elevated for >24hs she is to contact our office for an EKG order. At that time will refer to EP as she is borderline bradycardic while in NSR and tachycardic with atrial arrhythmia. Continue Toprol 150mg daily. S/p watchman, continue ASA indefinitely  2. PAF- stable, NSR per EKG today. S/p ablation in 2011 with known reoccurrence in 8/2022 with successful DCCV in 9/2022. S/p Watchman in 2/2023 with device well seated without leak or thrombus per REMY in 3/2024 continue ASA    3. HTN- controlled. Followed by PCP   4. Hypothyroidism- being monitored per PCP       Follow up in 1 year or sooner if symptoms worsen.     I spent 30 minutes caring for Damaris on this date of service. This time includes time spent by me in the following activities:preparing for the visit, reviewing tests, obtaining and/or reviewing a separately " obtained history, performing a medically appropriate examination and/or evaluation , counseling and educating the patient/family/caregiver, ordering medications, tests, or procedures, documenting information in the medical record and independently interpreting results and communicating that information with the patient/family/caregiver    I spent 2 minutes on the separately reported service of EKG interpretation. This time is not included in the time used to support the E/M service also reported today.

## 2024-10-08 DIAGNOSIS — R91.1 RIGHT UPPER LOBE PULMONARY NODULE: Primary | ICD-10-CM

## 2024-10-22 ENCOUNTER — HOSPITAL ENCOUNTER (OUTPATIENT)
Dept: CT IMAGING | Facility: HOSPITAL | Age: 89
Discharge: HOME | End: 2024-10-22
Admitting: NURSE PRACTITIONER
Payer: MEDICARE

## 2024-10-22 ENCOUNTER — TELEPHONE (OUTPATIENT)
Dept: PULMONOLOGY | Facility: CLINIC | Age: 89
End: 2024-10-22
Payer: MEDICARE

## 2024-10-22 DIAGNOSIS — R91.1 RIGHT UPPER LOBE PULMONARY NODULE: ICD-10-CM

## 2024-10-22 PROCEDURE — 71250 CT THORAX DX C-: CPT

## 2024-10-22 NOTE — TELEPHONE ENCOUNTER
----- Message from Latha Martin sent at 10/22/2024  3:37 PM CDT -----  Lung nodule stable we will review together at upcoming visit

## 2024-10-22 NOTE — PROGRESS NOTES
Chief Complaint  Right upper lobe pulmonary nodule    Subjective    History of Present Illness {CC  Problem List  Visit Diagnosis   Encounters  Notes  Medications  Labs  Result Review Imaging  Media     Damaris Nash presents to [facility] for:    History of Present Illness  Ms. Nash is here for follow-up and management of right upper lobe lung nodule and enlarged precarinal node.  She has been doing well since last office visit. No pulmonary complaints today. She is here to review follow up CT today.         Prior to Admission medications    Medication Sig Start Date End Date Taking? Authorizing Provider   acetaminophen (TYLENOL) 325 MG tablet Take 2 tablets by mouth Daily As Needed for Mild Pain. Indications: Pain    ProviderAlejandro MD   ALPRAZolam (XANAX) 0.25 MG tablet Take 1 tablet by mouth 2 (Two) Times a Day As Needed for Anxiety. Indications: Feeling Anxious    ProviderAlejandro MD   amLODIPine (NORVASC) 2.5 MG tablet Take 1 tablet every day by oral route.    ProviderAlejandro MD   aspirin 81 MG EC tablet Take 1 tablet by mouth Daily. 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 Indications: Osteoporosis, Vitamin D Deficiency    Alejandro Epps MD   hydrochlorothiazide (HYDRODIURIL) 25 MG tablet Take 1 tablet by mouth Daily. Indications: High Blood Pressure Disorder    Alejandro Epps MD   levothyroxine (SYNTHROID, LEVOTHROID) 137 MCG tablet Take 1 tablet by mouth Daily. Indications: Underactive Thyroid 10/1/23   Alejandro Epps MD   megestrol (MEGACE) 40 MG/ML suspension  3/27/24   Alejandro Epps MD   metoprolol succinate XL (TOPROL-XL) 100 MG 24 hr tablet Take 1.5 tablets by mouth Every Night. 4/7/23   Riley Mota MD   polyethylene glycol (MIRALAX) 17 GM/SCOOP powder Take 17 g  "by mouth Daily. Indications: Constipation    Provider, MD Alejandro   traMADol (ULTRAM) 50 MG tablet Take 1 tablet by mouth Every 6 (Six) Hours As Needed for Moderate Pain. Indications: Pain    ProviderAlejandro MD   levothyroxine sodium (TIROSINT) 75 MCG capsule Take 75 mcg by mouth Daily. 1 tablet daily  Indications: Underactive Thyroid 10/11/23   ProviderAlejandro MD       Social History     Socioeconomic History    Marital status:    Tobacco Use    Smoking status: Former     Current packs/day: 0.00     Average packs/day: 1.5 packs/day for 37.0 years (55.5 ttl pk-yrs)     Types: Cigarettes     Start date:      Quit date: 1992     Years since quittin.8     Passive exposure: Current    Smokeless tobacco: Never   Vaping Use    Vaping status: Never Used   Substance and Sexual Activity    Alcohol use: No    Drug use: No    Sexual activity: Defer       Objective   Vital Signs:   /72   Pulse 82   Ht 160 cm (63\")   Wt 41.3 kg (91 lb)   SpO2 98% Comment: RA  BMI 16.12 kg/m²     Physical Exam  Constitutional:       General: She is not in acute distress.  HENT:      Head: Normocephalic.      Nose: Nose normal.      Mouth/Throat:      Mouth: Mucous membranes are moist.   Eyes:      General: No scleral icterus.  Cardiovascular:      Rate and Rhythm: Normal rate.   Pulmonary:      Effort: No respiratory distress.   Abdominal:      General: There is no distension.   Musculoskeletal:      Comments: cane   Neurological:      Mental Status: She is alert and oriented to person, place, and time.   Psychiatric:         Mood and Affect: Mood normal.         Behavior: Behavior is cooperative.        Result Review :{ Labs  Result Review  Imaging  Med Tab  Media :          Results for orders placed during the hospital encounter of 19    Pulmonary Function Test    Narrative  [facility] - Pulmonary Function Test    [address] [place] [address]  [facility]  " [address]  [phone]    Patient : Damaris Nash  MRN : [id number]  CSN : [id number]  Pulmonologist : Jone Faust MD  Date : 12/17/2019    ______________________________________________________________________    Interpretation :  1.  Spirometry is consistent with a moderate obstructive ventilatory defect.  2.  Lung volumes reveal a coexisting mild restrictive ventilatory defect.  There is also a decrease in inspiratory capacity.  3.  There is a severe diffusion impairment which when corrected for alveolar volume is normalized.      Jone Faust MD             CT Chest Without Contrast Diagnostic (10/22/2024 14:03)     My interpretation of imaging:  apical lung scarring, RUL nodule unchanged           Assessment and Plan {CC Problem List  Visit Diagnosis  ROS  Review (Popup)  Health Maintenance  Quality  BestPractice  Medications  SmartSets  SnapShot Encounters  Media      Diagnoses and all orders for this visit:    1. Right upper lobe pulmonary nodule (Primary)  Comments:  RUL nodule stable x2 years. Considered benign.          Plan as above. Office follow up if needed.     Latha Martin, POLY  10/29/2024  13:26 CDT    Follow Up {Instructions Charge Capture  Follow-up Communications   Return if symptoms worsen or fail to improve.    Patient was given instructions and counseling regarding her condition or for health maintenance advice. Please see specific information pulled into the AVS if appropriate.

## 2024-10-29 ENCOUNTER — OFFICE VISIT (OUTPATIENT)
Dept: PULMONOLOGY | Facility: CLINIC | Age: 89
End: 2024-10-29
Payer: MEDICARE

## 2024-10-29 VITALS
WEIGHT: 91 LBS | BODY MASS INDEX: 16.12 KG/M2 | SYSTOLIC BLOOD PRESSURE: 120 MMHG | DIASTOLIC BLOOD PRESSURE: 72 MMHG | OXYGEN SATURATION: 98 % | HEIGHT: 63 IN | HEART RATE: 82 BPM

## 2024-10-29 DIAGNOSIS — R91.1 RIGHT UPPER LOBE PULMONARY NODULE: Primary | Chronic | ICD-10-CM

## 2024-10-29 PROCEDURE — 99213 OFFICE O/P EST LOW 20 MIN: CPT | Performed by: NURSE PRACTITIONER

## 2024-11-25 ENCOUNTER — APPOINTMENT (OUTPATIENT)
Dept: GENERAL RADIOLOGY | Facility: HOSPITAL | Age: 89
End: 2024-11-25
Payer: MEDICARE

## 2024-11-25 ENCOUNTER — HOSPITAL ENCOUNTER (INPATIENT)
Facility: HOSPITAL | Age: 89
LOS: 4 days | Discharge: NURSING, REHAB OR LONG-TERM CARE | End: 2024-11-30
Attending: HOSPITALIST | Admitting: HOSPITALIST
Payer: MEDICARE

## 2024-11-25 DIAGNOSIS — I48.92 ATRIAL FLUTTER WITH RAPID VENTRICULAR RESPONSE: Primary | ICD-10-CM

## 2024-11-25 DIAGNOSIS — R53.1 WEAKNESS: ICD-10-CM

## 2024-11-25 DIAGNOSIS — Z91.148 NOT TAKING MEDICATION AS DIRECTED: ICD-10-CM

## 2024-11-25 DIAGNOSIS — Z74.09 IMPAIRED MOBILITY: ICD-10-CM

## 2024-11-25 PROBLEM — Z95.818 PRESENCE OF WATCHMAN LEFT ATRIAL APPENDAGE CLOSURE DEVICE: Status: ACTIVE | Noted: 2024-11-25

## 2024-11-25 PROBLEM — E05.80 SECONDARY HYPERTHYROIDISM: Status: ACTIVE | Noted: 2024-11-25

## 2024-11-25 LAB
ALBUMIN SERPL-MCNC: 4.2 G/DL (ref 3.5–5.2)
ALBUMIN/GLOB SERPL: 1.3 G/DL
ALP SERPL-CCNC: 69 U/L (ref 39–117)
ALT SERPL W P-5'-P-CCNC: 16 U/L (ref 1–33)
ANION GAP SERPL CALCULATED.3IONS-SCNC: 13 MMOL/L (ref 5–15)
AST SERPL-CCNC: 25 U/L (ref 1–32)
B PARAPERT DNA SPEC QL NAA+PROBE: NOT DETECTED
B PERT DNA SPEC QL NAA+PROBE: NOT DETECTED
BACTERIA UR QL AUTO: ABNORMAL /HPF
BASOPHILS # BLD AUTO: 0.04 10*3/MM3 (ref 0–0.2)
BASOPHILS NFR BLD AUTO: 0.7 % (ref 0–1.5)
BILIRUB SERPL-MCNC: 0.8 MG/DL (ref 0–1.2)
BILIRUB UR QL STRIP: NEGATIVE
BUN SERPL-MCNC: 20 MG/DL (ref 8–23)
BUN/CREAT SERPL: 27 (ref 7–25)
C PNEUM DNA NPH QL NAA+NON-PROBE: NOT DETECTED
CALCIUM SPEC-SCNC: 9.6 MG/DL (ref 8.6–10.5)
CHLORIDE SERPL-SCNC: 97 MMOL/L (ref 98–107)
CLARITY UR: CLEAR
CO2 SERPL-SCNC: 31 MMOL/L (ref 22–29)
COLOR UR: ABNORMAL
CREAT SERPL-MCNC: 0.74 MG/DL (ref 0.57–1)
DEPRECATED RDW RBC AUTO: 51.9 FL (ref 37–54)
EGFRCR SERPLBLD CKD-EPI 2021: 77.4 ML/MIN/1.73
EOSINOPHIL # BLD AUTO: 0.16 10*3/MM3 (ref 0–0.4)
EOSINOPHIL NFR BLD AUTO: 2.8 % (ref 0.3–6.2)
ERYTHROCYTE [DISTWIDTH] IN BLOOD BY AUTOMATED COUNT: 15.2 % (ref 12.3–15.4)
FLUAV SUBTYP SPEC NAA+PROBE: NOT DETECTED
FLUBV RNA ISLT QL NAA+PROBE: NOT DETECTED
GEN 5 2HR TROPONIN T REFLEX: 19 NG/L
GLOBULIN UR ELPH-MCNC: 3.2 GM/DL
GLUCOSE SERPL-MCNC: 107 MG/DL (ref 65–99)
GLUCOSE UR STRIP-MCNC: NEGATIVE MG/DL
HADV DNA SPEC NAA+PROBE: NOT DETECTED
HCOV 229E RNA SPEC QL NAA+PROBE: NOT DETECTED
HCOV HKU1 RNA SPEC QL NAA+PROBE: NOT DETECTED
HCOV NL63 RNA SPEC QL NAA+PROBE: NOT DETECTED
HCOV OC43 RNA SPEC QL NAA+PROBE: NOT DETECTED
HCT VFR BLD AUTO: 41.8 % (ref 34–46.6)
HGB BLD-MCNC: 13.4 G/DL (ref 12–15.9)
HGB UR QL STRIP.AUTO: ABNORMAL
HMPV RNA NPH QL NAA+NON-PROBE: NOT DETECTED
HOLD SPECIMEN: NORMAL
HPIV1 RNA ISLT QL NAA+PROBE: NOT DETECTED
HPIV2 RNA SPEC QL NAA+PROBE: NOT DETECTED
HPIV3 RNA NPH QL NAA+PROBE: NOT DETECTED
HPIV4 P GENE NPH QL NAA+PROBE: NOT DETECTED
HYALINE CASTS UR QL AUTO: ABNORMAL /LPF
IMM GRANULOCYTES # BLD AUTO: 0.01 10*3/MM3 (ref 0–0.05)
IMM GRANULOCYTES NFR BLD AUTO: 0.2 % (ref 0–0.5)
KETONES UR QL STRIP: ABNORMAL
LEUKOCYTE ESTERASE UR QL STRIP.AUTO: ABNORMAL
LYMPHOCYTES # BLD AUTO: 1.61 10*3/MM3 (ref 0.7–3.1)
LYMPHOCYTES NFR BLD AUTO: 28.2 % (ref 19.6–45.3)
M PNEUMO IGG SER IA-ACNC: NOT DETECTED
MAGNESIUM SERPL-MCNC: 2.1 MG/DL (ref 1.6–2.4)
MCH RBC QN AUTO: 29.9 PG (ref 26.6–33)
MCHC RBC AUTO-ENTMCNC: 32.1 G/DL (ref 31.5–35.7)
MCV RBC AUTO: 93.3 FL (ref 79–97)
MONOCYTES # BLD AUTO: 0.92 10*3/MM3 (ref 0.1–0.9)
MONOCYTES NFR BLD AUTO: 16.1 % (ref 5–12)
NEUTROPHILS NFR BLD AUTO: 2.97 10*3/MM3 (ref 1.7–7)
NEUTROPHILS NFR BLD AUTO: 52 % (ref 42.7–76)
NITRITE UR QL STRIP: NEGATIVE
NRBC BLD AUTO-RTO: 0 /100 WBC (ref 0–0.2)
NT-PROBNP SERPL-MCNC: 2744 PG/ML (ref 0–1800)
PH UR STRIP.AUTO: 5.5 [PH] (ref 5–8)
PLATELET # BLD AUTO: 250 10*3/MM3 (ref 140–450)
PMV BLD AUTO: 10.8 FL (ref 6–12)
POTASSIUM SERPL-SCNC: 3.8 MMOL/L (ref 3.5–5.2)
PROT SERPL-MCNC: 7.4 G/DL (ref 6–8.5)
PROT UR QL STRIP: NEGATIVE
RBC # BLD AUTO: 4.48 10*6/MM3 (ref 3.77–5.28)
RBC # UR STRIP: ABNORMAL /HPF
REF LAB TEST METHOD: ABNORMAL
RHINOVIRUS RNA SPEC NAA+PROBE: NOT DETECTED
RSV RNA NPH QL NAA+NON-PROBE: NOT DETECTED
SARS-COV-2 RNA NPH QL NAA+NON-PROBE: NOT DETECTED
SODIUM SERPL-SCNC: 141 MMOL/L (ref 136–145)
SP GR UR STRIP: 1.02 (ref 1–1.03)
SQUAMOUS #/AREA URNS HPF: ABNORMAL /HPF
T4 FREE SERPL-MCNC: 4.05 NG/DL (ref 0.93–1.7)
TROPONIN T DELTA: -4 NG/L
TROPONIN T SERPL HS-MCNC: 23 NG/L
TSH SERPL DL<=0.05 MIU/L-ACNC: 0.08 UIU/ML (ref 0.27–4.2)
UROBILINOGEN UR QL STRIP: ABNORMAL
WBC # UR STRIP: ABNORMAL /HPF
WBC NRBC COR # BLD AUTO: 5.71 10*3/MM3 (ref 3.4–10.8)
WHOLE BLOOD HOLD COAG: NORMAL
WHOLE BLOOD HOLD SPECIMEN: NORMAL

## 2024-11-25 PROCEDURE — 84443 ASSAY THYROID STIM HORMONE: CPT | Performed by: PHYSICIAN ASSISTANT

## 2024-11-25 PROCEDURE — 93010 ELECTROCARDIOGRAM REPORT: CPT | Performed by: HOSPITALIST

## 2024-11-25 PROCEDURE — 84484 ASSAY OF TROPONIN QUANT: CPT | Performed by: PHYSICIAN ASSISTANT

## 2024-11-25 PROCEDURE — 36415 COLL VENOUS BLD VENIPUNCTURE: CPT

## 2024-11-25 PROCEDURE — 99285 EMERGENCY DEPT VISIT HI MDM: CPT

## 2024-11-25 PROCEDURE — 83880 ASSAY OF NATRIURETIC PEPTIDE: CPT | Performed by: PHYSICIAN ASSISTANT

## 2024-11-25 PROCEDURE — 93005 ELECTROCARDIOGRAM TRACING: CPT | Performed by: EMERGENCY MEDICINE

## 2024-11-25 PROCEDURE — 93005 ELECTROCARDIOGRAM TRACING: CPT | Performed by: HOSPITALIST

## 2024-11-25 PROCEDURE — 84439 ASSAY OF FREE THYROXINE: CPT | Performed by: PHYSICIAN ASSISTANT

## 2024-11-25 PROCEDURE — 80053 COMPREHEN METABOLIC PANEL: CPT | Performed by: PHYSICIAN ASSISTANT

## 2024-11-25 PROCEDURE — 71045 X-RAY EXAM CHEST 1 VIEW: CPT

## 2024-11-25 PROCEDURE — 81001 URINALYSIS AUTO W/SCOPE: CPT | Performed by: PHYSICIAN ASSISTANT

## 2024-11-25 PROCEDURE — 85025 COMPLETE CBC W/AUTO DIFF WBC: CPT | Performed by: PHYSICIAN ASSISTANT

## 2024-11-25 PROCEDURE — 93005 ELECTROCARDIOGRAM TRACING: CPT | Performed by: PHYSICIAN ASSISTANT

## 2024-11-25 PROCEDURE — 83735 ASSAY OF MAGNESIUM: CPT | Performed by: PHYSICIAN ASSISTANT

## 2024-11-25 PROCEDURE — G0378 HOSPITAL OBSERVATION PER HR: HCPCS

## 2024-11-25 PROCEDURE — 0202U NFCT DS 22 TRGT SARS-COV-2: CPT | Performed by: PHYSICIAN ASSISTANT

## 2024-11-25 PROCEDURE — P9612 CATHETERIZE FOR URINE SPEC: HCPCS

## 2024-11-25 RX ORDER — POLYETHYLENE GLYCOL 3350 17 G/17G
17 POWDER, FOR SOLUTION ORAL DAILY
Status: DISCONTINUED | OUTPATIENT
Start: 2024-11-25 | End: 2024-11-30 | Stop reason: HOSPADM

## 2024-11-25 RX ORDER — ACETAMINOPHEN 650 MG/1
650 SUPPOSITORY RECTAL EVERY 4 HOURS PRN
Status: DISCONTINUED | OUTPATIENT
Start: 2024-11-25 | End: 2024-11-30 | Stop reason: HOSPADM

## 2024-11-25 RX ORDER — ACETAMINOPHEN 325 MG/1
650 TABLET ORAL EVERY 4 HOURS PRN
Status: DISCONTINUED | OUTPATIENT
Start: 2024-11-25 | End: 2024-11-30 | Stop reason: HOSPADM

## 2024-11-25 RX ORDER — BISACODYL 10 MG
10 SUPPOSITORY, RECTAL RECTAL DAILY PRN
Status: DISCONTINUED | OUTPATIENT
Start: 2024-11-25 | End: 2024-11-30 | Stop reason: HOSPADM

## 2024-11-25 RX ORDER — TRAMADOL HYDROCHLORIDE 50 MG/1
50 TABLET ORAL EVERY 6 HOURS PRN
Status: DISCONTINUED | OUTPATIENT
Start: 2024-11-25 | End: 2024-11-30 | Stop reason: HOSPADM

## 2024-11-25 RX ORDER — DILTIAZEM HCL/D5W 125 MG/125
5-15 PLASTIC BAG, INJECTION (ML) INTRAVENOUS
Status: DISCONTINUED | OUTPATIENT
Start: 2024-11-25 | End: 2024-11-27

## 2024-11-25 RX ORDER — SODIUM CHLORIDE 0.9 % (FLUSH) 0.9 %
10 SYRINGE (ML) INJECTION AS NEEDED
Status: DISCONTINUED | OUTPATIENT
Start: 2024-11-25 | End: 2024-11-30 | Stop reason: HOSPADM

## 2024-11-25 RX ORDER — ALPRAZOLAM 0.5 MG
0.25 TABLET ORAL 2 TIMES DAILY PRN
Status: DISCONTINUED | OUTPATIENT
Start: 2024-11-25 | End: 2024-11-30 | Stop reason: HOSPADM

## 2024-11-25 RX ORDER — NITROGLYCERIN 0.4 MG/1
0.4 TABLET SUBLINGUAL
Status: DISCONTINUED | OUTPATIENT
Start: 2024-11-25 | End: 2024-11-30 | Stop reason: HOSPADM

## 2024-11-25 RX ORDER — BISACODYL 5 MG/1
5 TABLET, DELAYED RELEASE ORAL DAILY PRN
Status: DISCONTINUED | OUTPATIENT
Start: 2024-11-25 | End: 2024-11-30 | Stop reason: HOSPADM

## 2024-11-25 RX ORDER — HYDROCHLOROTHIAZIDE 25 MG/1
25 TABLET ORAL DAILY
Status: DISCONTINUED | OUTPATIENT
Start: 2024-11-25 | End: 2024-11-30 | Stop reason: HOSPADM

## 2024-11-25 RX ORDER — DILTIAZEM HYDROCHLORIDE 5 MG/ML
10 INJECTION INTRAVENOUS ONCE
Status: COMPLETED | OUTPATIENT
Start: 2024-11-25 | End: 2024-11-25

## 2024-11-25 RX ORDER — ONDANSETRON 4 MG/1
4 TABLET, ORALLY DISINTEGRATING ORAL EVERY 6 HOURS PRN
Status: DISCONTINUED | OUTPATIENT
Start: 2024-11-25 | End: 2024-11-30 | Stop reason: HOSPADM

## 2024-11-25 RX ORDER — SODIUM CHLORIDE 0.9 % (FLUSH) 0.9 %
10 SYRINGE (ML) INJECTION EVERY 12 HOURS SCHEDULED
Status: DISCONTINUED | OUTPATIENT
Start: 2024-11-25 | End: 2024-11-30 | Stop reason: HOSPADM

## 2024-11-25 RX ORDER — AMOXICILLIN 250 MG
2 CAPSULE ORAL 2 TIMES DAILY PRN
Status: DISCONTINUED | OUTPATIENT
Start: 2024-11-25 | End: 2024-11-30 | Stop reason: HOSPADM

## 2024-11-25 RX ORDER — ACETAMINOPHEN 160 MG/5ML
650 SOLUTION ORAL EVERY 4 HOURS PRN
Status: DISCONTINUED | OUTPATIENT
Start: 2024-11-25 | End: 2024-11-30 | Stop reason: HOSPADM

## 2024-11-25 RX ORDER — SODIUM CHLORIDE 0.9 % (FLUSH) 0.9 %
10 SYRINGE (ML) INJECTION AS NEEDED
Status: DISCONTINUED | OUTPATIENT
Start: 2024-11-25 | End: 2024-11-29 | Stop reason: SDUPTHER

## 2024-11-25 RX ORDER — ASPIRIN 81 MG/1
81 TABLET ORAL DAILY
Status: DISCONTINUED | OUTPATIENT
Start: 2024-11-25 | End: 2024-11-30 | Stop reason: HOSPADM

## 2024-11-25 RX ORDER — ONDANSETRON 2 MG/ML
4 INJECTION INTRAMUSCULAR; INTRAVENOUS EVERY 6 HOURS PRN
Status: DISCONTINUED | OUTPATIENT
Start: 2024-11-25 | End: 2024-11-30 | Stop reason: HOSPADM

## 2024-11-25 RX ADMIN — Medication 10 ML: at 20:19

## 2024-11-25 RX ADMIN — Medication 5 MG/HR: at 14:33

## 2024-11-25 RX ADMIN — DILTIAZEM HYDROCHLORIDE 10 MG: 5 INJECTION INTRAVENOUS at 14:33

## 2024-11-25 RX ADMIN — METOPROLOL SUCCINATE 150 MG: 50 TABLET, EXTENDED RELEASE ORAL at 20:18

## 2024-11-25 NOTE — ED NOTES
Nursing report ED to floor  Damaris Nash  89 y.o.  female    HPI:   Chief Complaint   Patient presents with    Hypertension    Weakness - Generalized       Admitting doctor:   Polina Kramer MD    Consulting provider(s):  Consults       No orders found from 10/27/2024 to 11/26/2024.             Admitting diagnosis:   The primary encounter diagnosis was Atrial flutter with rapid ventricular response. Diagnoses of Weakness and Not taking medication as directed were also pertinent to this visit.    Code status:   Current Code Status       Date Active Code Status Order ID Comments User Context       Prior            Allergies:   Epinephrine, Celebrex [celecoxib], Ferrous sulfate, Omnicef [cefdinir], Other, Rocephin [ceftriaxone], Simvastatin, and Sulfa antibiotics    Intake and Output  No intake or output data in the 24 hours ending 11/25/24 1712    Weight:       11/25/24  1351   Weight: 41.3 kg (91 lb)       Most recent vitals:   Vitals:    11/25/24 1546 11/25/24 1600 11/25/24 1616 11/25/24 1617   BP: 151/88  141/83    BP Location:       Patient Position:       Pulse: (!) 123 118  96   Resp:       Temp:       TempSrc:       SpO2: 95% 97%  96%   Weight:       Height:         Oxygen Therapy: .    Active LDAs/IV Access:   Lines, Drains & Airways       Active LDAs       Name Placement date Placement time Site Days    Peripheral IV 11/25/24 1413 Right Antecubital 11/25/24  1413  Antecubital  less than 1                    Labs (abnormal labs have a star):   Labs Reviewed   COMPREHENSIVE METABOLIC PANEL - Abnormal; Notable for the following components:       Result Value    Glucose 107 (*)     Chloride 97 (*)     CO2 31.0 (*)     BUN/Creatinine Ratio 27.0 (*)     All other components within normal limits    Narrative:     GFR Normal >60  Chronic Kidney Disease <60  Kidney Failure <15    The GFR formula is only valid for adults with stable renal function between ages 18 and 70.   TSH - Abnormal; Notable for the following  components:    TSH 0.076 (*)     All other components within normal limits   T4, FREE - Abnormal; Notable for the following components:    Free T4 4.05 (*)     All other components within normal limits   BNP (IN-HOUSE) - Abnormal; Notable for the following components:    proBNP 2,744.0 (*)     All other components within normal limits    Narrative:     This assay is used as an aid in the diagnosis of individuals suspected of having heart failure. It can be used as an aid in the diagnosis of acute decompensated heart failure (ADHF) in patients presenting with signs and symptoms of ADHF to the emergency department (ED). In addition, NT-proBNP of <300 pg/mL indicates ADHF is not likely.    Age Range Result Interpretation  NT-proBNP Concentration (pg/mL:      <50             Positive            >450                   Gray                 300-450                    Negative             <300    50-75           Positive            >900                  Gray                300-900                  Negative            <300      >75             Positive            >1800                  Gray                300-1800                  Negative            <300   TROPONIN - Abnormal; Notable for the following components:    HS Troponin T 23 (*)     All other components within normal limits    Narrative:     High Sensitive Troponin T Reference Range:  <14.0 ng/L- Negative Female for AMI  <22.0 ng/L- Negative Male for AMI  >=14 - Abnormal Female indicating possible myocardial injury.  >=22 - Abnormal Male indicating possible myocardial injury.   Clinicians would have to utilize clinical acumen, EKG, Troponin, and serial changes to determine if it is an Acute Myocardial Infarction or myocardial injury due to an underlying chronic condition.        URINALYSIS W/ CULTURE IF INDICATED - Abnormal; Notable for the following components:    Color, UA Dark Yellow (*)     Ketones, UA Trace (*)     Blood, UA Trace (*)     Leuk Esterase, UA  Large (3+) (*)     All other components within normal limits    Narrative:     In absence of clinical symptoms, the presence of pyuria, bacteria, and/or nitrites on the urinalysis result does not correlate with infection.   CBC WITH AUTO DIFFERENTIAL - Abnormal; Notable for the following components:    Monocyte % 16.1 (*)     Monocytes, Absolute 0.92 (*)     All other components within normal limits   URINALYSIS, MICROSCOPIC ONLY - Abnormal; Notable for the following components:    RBC, UA 3-5 (*)     WBC, UA 3-5 (*)     Bacteria, UA Trace (*)     All other components within normal limits   HIGH SENSITIVITIY TROPONIN T 2HR - Abnormal; Notable for the following components:    HS Troponin T 19 (*)     Troponin T Delta -4 (*)     All other components within normal limits    Narrative:     High Sensitive Troponin T Reference Range:  <14.0 ng/L- Negative Female for AMI  <22.0 ng/L- Negative Male for AMI  >=14 - Abnormal Female indicating possible myocardial injury.  >=22 - Abnormal Male indicating possible myocardial injury.   Clinicians would have to utilize clinical acumen, EKG, Troponin, and serial changes to determine if it is an Acute Myocardial Infarction or myocardial injury due to an underlying chronic condition.        RESPIRATORY PANEL PCR W/ COVID-19 (SARS-COV-2), NP SWAB IN UTM/VTP, 2 HR TAT - Normal    Narrative:     In the setting of a positive respiratory panel with a viral infection PLUS a negative procalcitonin without other underlying concern for bacterial infection, consider observing off antibiotics or discontinuation of antibiotics and continue supportive care. If the respiratory panel is positive for atypical bacterial infection (Bordetella pertussis, Chlamydophila pneumoniae, or Mycoplasma pneumoniae), consider antibiotic de-escalation to target atypical bacterial infection.   MAGNESIUM - Normal   RAINBOW DRAW    Narrative:     The following orders were created for panel order [place]  Draw.  Procedure                               Abnormality         Status                     ---------                               -----------         ------                     Green Top (Gel)[[id number]]                                  Final result               Lavender Top[[id number]]                                     Final result               Red Top[[id number]]                                          Final result               Nur Top[[id number]]                                         Final result               Light Blue Top[[id number]]                                   Final result                 Please view results for these tests on the individual orders.   GREEN TOP   LAVENDER TOP   RED TOP   GRAY TOP   LIGHT BLUE TOP   CBC AND DIFFERENTIAL    Narrative:     The following orders were created for panel order CBC & Differential.  Procedure                               Abnormality         Status                     ---------                               -----------         ------                     CBC Auto Differential[[id number]]        Abnormal            Final result                 Please view results for these tests on the individual orders.       Meds given in ED:   Medications   dilTIAZem (CARDIZEM) 125 mg in 125 mL D5W infusion (15 mg/hr Intravenous Rate/Dose Change 11/25/24 1601)   sodium chloride 0.9 % flush 10 mL (has no administration in time range)   dilTIAZem (CARDIZEM) injection 10 mg (10 mg Intravenous Given 11/25/24 1433)     dilTIAZem, 5-15 mg/hr, Last Rate: 15 mg/hr (11/25/24 1601)         NIH Stroke Scale:       Isolation/Infection(s):  No active isolations   No active infections     COVID Testing  Collected .  Resulted .    Nursing report ED to floor:  Mental status: .  Ambulatory status: .  Precautions: .    ED nurse phone extentsion- ..

## 2024-11-25 NOTE — H&P
[facility] Medicine Services  HISTORY AND PHYSICAL    Date of Admission: 11/25/2024  Primary Care Physician: Shahla Beltran,     Subjective   Primary Historian: Patient and family at bedside    Chief Complaint: Worsening fatigue    History of Present Illness  Damaris Nash is a 89-year-old female who presents to ED with her son.  Chief complaint is palpitations with increased weakness and elevated blood pressure.     Patient does have significant medical history of previous paroxysmal atrial fibrillation and completed a watchman's procedure therefore no longer anticoagulated, acute ischemia to the right ICA stroke, essential hypertension, blindness of right eye, breast cancer, carotid artery disease, gallstones, GERD, hyperlipidemia, hypothyroidism, macular degeneration, previous right hip fracture, vitamin D deficiency.  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     In the past 2 weeks, she moved in with her son because she has been declining over the past several months to years.  She has been gradually losing weight, decreased appetite.  Has been treated with Megace however I am unsure if she is taking her medications. She ambulates with assistance.  She has not been taking her medications as prescribed.  The patient and children believe she was not taking her HCTZ, reports not taking thyroid supplement.  Both medications were restarted over the past 2 weeks.  In the past several days, she is felt increasingly fatigued more than normal.  She has felt palpitations intermittently and it just did not feel right into her chest.  She denies specifically any chest pain, pressure, tightness.  She does feel a bit shortness of breath.  She denies any history of congestive heart failure or edema.  She denies any orthopnea or PND.  She denies any fever.  Son states she has been coughing a dry cough. They denied any recent changes  medications except that she is taking her medications as prescribed now.  She denies any leg pain or swelling.    ER workup reveals atrial flutter RVR rate 135, troponin 23, proBNP two 744.0, chloride 97, CO2 31, glucose 107, TSH 0.076, free T44.05, WBC without abnormal findings, urinalysis with large leukocytes, 3-5 RBCs, 3-5 WBCs with trace of bacteria, respiratory panel negative.  Chest x-ray reveals small right effusion with right basilar atelectasis. Lungs are  otherwise clear.  CT of the chest without contrast reveals stable right upper lobe nodule is likely benign. Stable chronic changes with scarring and remote granulomatous disease.    Review of Systems   Otherwise complete ROS reviewed and negative except as mentioned in the HPI.    Past Medical History:   Past Medical History:   Diagnosis Date    A-fib     Acute ischemic right ICA stroke     Atrial fibrillation     Benign essential HTN     Blindness of right eye     Breast CA     Breast cancer     Carotid artery disease     CVA (cerebral vascular accident)     Gallstones     GERD (gastroesophageal reflux disease)     Hyperlipidemia     Hypertension     Hypothyroidism     Macular degeneration     Osteopenia     Primary pulmonary HTN     S/P right hip fracture 06/01/2012    Shortness of breath     Stroke     Vitamin D deficiency      Past Surgical History:  Past Surgical History:   Procedure Laterality Date    ATRIAL APPENDAGE EXCLUSION LEFT WITH TRANSESOPHAGEAL ECHOCARDIOGRAM Right 02/23/2023    Procedure: Atrial Appendage Occlusion;  Surgeon: Riley Mota MD;  Location:  [facility] CATH INVASIVE LOCATION;  Service: Cardiology;  Laterality: Right;    BREAST LUMPECTOMY      CARDIAC ABLATION      cardiac ablation procedure for af    CARDIOVERSION N/A 09/07/2022    X2    CATARACT EXTRACTION Right     PARTIAL HIP ARTHROPLASTY Right     TONSILLECTOMY       Social History:  reports that she quit smoking about 32 years ago. Her smoking use included cigarettes. She  started smoking about 69 years ago. She has a 55.5 pack-year smoking history. She has been exposed to tobacco smoke. She has never used smokeless tobacco. She reports that she does not drink alcohol and does not use drugs.    Family History: family history includes COPD in her sister; Cancer in her father; Stroke in her mother.       Allergies:  Allergies   Allergen Reactions    Epinephrine Shortness Of Breath    Celebrex [Celecoxib] Anxiety    Ferrous Sulfate GI Intolerance    Omnicef [Cefdinir] Unknown - Low Severity     Possibly rocephin too    Other Anxiety     Omnicef, some Thad, possible Rocephin allergy    Rocephin [Ceftriaxone] Unknown - Low Severity    Simvastatin Itching    Sulfa Antibiotics Rash       Medications:  Prior to Admission medications    Medication Sig Start Date End Date Taking? Authorizing Provider   acetaminophen (TYLENOL) 325 MG tablet Take 2 tablets by mouth Daily As Needed for Mild Pain. Indications: Pain    ProviderAlejandro MD   ALPRAZolam (XANAX) 0.25 MG tablet Take 1 tablet by mouth 2 (Two) Times a Day As Needed for Anxiety. Indications: Feeling Anxious    Alejandro Epps MD   amLODIPine (NORVASC) 2.5 MG tablet Take 1 tablet every day by oral route.    ProviderAlejandro MD   aspirin 81 MG EC tablet Take 1 tablet by mouth Daily. 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 Indications: Osteoporosis, Vitamin D Deficiency    Alejandro Epps MD   hydrochlorothiazide (HYDRODIURIL) 25 MG tablet Take 1 tablet by mouth Daily. Indications: High Blood Pressure    Alejandro Epps MD   levothyroxine (SYNTHROID, LEVOTHROID) 137 MCG tablet Take 1 tablet by mouth Daily. Indications: Underactive Thyroid 10/1/23   Alejandro Epps MD   megestrol (MEGACE) 40 MG/ML suspension  3/27/24   Alejandro Epps MD   metoprolol succinate XL  "(TOPROL-XL) 100 MG 24 hr tablet Take 1.5 tablets by mouth Every Night. 4/7/23   Riley Mota MD   polyethylene glycol (MIRALAX) 17 GM/SCOOP powder Take 17 g by mouth Daily. Indications: Constipation    ProviderAlejandro MD   traMADol (ULTRAM) 50 MG tablet Take 1 tablet by mouth Every 6 (Six) Hours As Needed for Moderate Pain. Indications: Pain    ProviderAlejandro MD   levothyroxine sodium (TIROSINT) 75 MCG capsule Take 75 mcg by mouth Daily. 1 tablet daily  Indications: Underactive Thyroid 10/11/23   ProviderAlejandro MD     I have utilized all available immediate resources to obtain, update, or review the patient's current medications (including all prescriptions, over-the-counter products, herbals, cannabis/cannabidiol products, and vitamin/mineral/dietary (nutritional) supplements).    Objective     Vital Signs: /57   Pulse 80   Temp 97.5 °F (36.4 °C) (Oral)   Resp 18   Ht 160 cm (63\")   Wt 41.3 kg (91 lb)   SpO2 96%   BMI 16.12 kg/m²   Physical Exam  Vitals reviewed.   Constitutional:       Appearance: She is cachectic. She is ill-appearing.   HENT:      Head: Normocephalic and atraumatic.      Mouth/Throat:      Mouth: Mucous membranes are moist.      Pharynx: Oropharynx is clear.      Comments: Edentulous  Eyes:      Extraocular Movements: Extraocular movements intact.      Conjunctiva/sclera: Conjunctivae normal.   Cardiovascular:      Rate and Rhythm: Normal rate. Rhythm irregular.   Pulmonary:      Effort: Pulmonary effort is normal.      Breath sounds: Normal breath sounds.   Abdominal:      General: There is no distension.      Palpations: Abdomen is soft.   Musculoskeletal:      Cervical back: Normal range of motion and neck supple.      Right lower leg: No edema.      Left lower leg: No edema.      Comments: Generalized weakness and debility   Skin:     General: Skin is warm and dry.   Neurological:      General: No focal deficit present.      Mental Status: She is alert and " oriented to person, place, and time.   Psychiatric:         Mood and Affect: Mood normal.         Behavior: Behavior normal.        Results Reviewed:  Lab Results (last 24 hours)       Procedure Component Value Units Date/Time    High Sensitivity Troponin T 2Hr [[id number]] Collected: 11/25/24 1617    Specimen: Blood Updated: 11/25/24 1630    Respiratory Panel PCR w/COVID-19(SARS-CoV-2) FRANKO/ROBERTO/TELLY/[facility]/EMMANUELLE In-House, NP Swab in UTM/VTM, 2 HR TAT - Swab, Nasopharynx [[id number]]  (Normal) Collected: 11/25/24 1437    Specimen: Swab from Nasopharynx Updated: 11/25/24 1538     ADENOVIRUS, PCR Not Detected     Coronavirus 229E Not Detected     Coronavirus HKU1 Not Detected     Coronavirus NL63 Not Detected     Coronavirus OC43 Not Detected     COVID19 Not Detected     Human Metapneumovirus Not Detected     Human Rhinovirus/Enterovirus Not Detected     Influenza A PCR Not Detected     Influenza B PCR Not Detected     Parainfluenza Virus 1 Not Detected     Parainfluenza Virus 2 Not Detected     Parainfluenza Virus 3 Not Detected     Parainfluenza Virus 4 Not Detected     RSV, PCR Not Detected     Bordetella pertussis pcr Not Detected     Bordetella parapertussis PCR Not Detected     Chlamydophila pneumoniae PCR Not Detected     Mycoplasma pneumo by PCR Not Detected    TSH [[id number]]  (Abnormal) Collected: 11/25/24 1411    Specimen: Blood Updated: 11/25/24 1517     TSH 0.076 uIU/mL     Urinalysis, Microscopic Only - Straight Cath [[id number]]  (Abnormal) Collected: 11/25/24 1435    Specimen: Urine from Straight Cath Updated: 11/25/24 1517     RBC, UA 3-5 /HPF      WBC, UA 3-5 /HPF      Comment: Urine culture not indicated.        Bacteria, UA Trace /HPF      Squamous Epithelial Cells, UA 0-2 /HPF      Hyaline Casts, UA 0-2 /LPF      Methodology Manual Light Microscopy    T4, Free [[id number]]  (Abnormal) Collected: 11/25/24 1411    Specimen: Blood Updated: 11/25/24 1517     Free T4 4.05 ng/dL     Comprehensive Metabolic  Panel [[id number]]  (Abnormal) Collected: 11/25/24 1411    Specimen: Blood Updated: 11/25/24 1516     Glucose 107 mg/dL      BUN 20 mg/dL      Creatinine 0.74 mg/dL      Sodium 141 mmol/L      Potassium 3.8 mmol/L      Chloride 97 mmol/L      CO2 31.0 mmol/L      Calcium 9.6 mg/dL      Total Protein 7.4 g/dL      Albumin 4.2 g/dL      ALT (SGPT) 16 U/L      AST (SGOT) 25 U/L      Alkaline Phosphatase 69 U/L      Total Bilirubin 0.8 mg/dL      Globulin 3.2 gm/dL      A/G Ratio 1.3 g/dL      BUN/Creatinine Ratio 27.0     Anion Gap 13.0 mmol/L      eGFR 77.4 mL/min/1.73     Urinalysis With Culture If Indicated - Straight Cath [[id number]]  (Abnormal) Collected: 11/25/24 1435    Specimen: Urine from Straight Cath Updated: 11/25/24 1514     Color, UA Dark Yellow     Appearance, UA Clear     pH, UA 5.5     Specific Gravity, UA 1.016     Glucose, UA Negative     Ketones, UA Trace     Bilirubin, UA Negative     Blood, UA Trace     Protein, UA Negative     Leuk Esterase, UA Large (3+)     Nitrite, UA Negative     Urobilinogen, UA 1.0 E.U./dL    Magnesium [[id number]]  (Normal) Collected: 11/25/24 1411    Specimen: Blood Updated: 11/25/24 1513     Magnesium 2.1 mg/dL     BNP [[id number]]  (Abnormal) Collected: 11/25/24 1411    Specimen: Blood Updated: 11/25/24 1510     proBNP 2,744.0 pg/mL     High Sensitivity Troponin T [[id number]]  (Abnormal) Collected: 11/25/24 1411    Specimen: Blood Updated: 11/25/24 1510     HS Troponin T 23 ng/L     CBC Auto Differential [[id number]]  (Abnormal) Collected: 11/25/24 1411    Specimen: Blood Updated: 11/25/24 1430     WBC 5.71 10*3/mm3      RBC 4.48 10*6/mm3      Hemoglobin 13.4 g/dL      Hematocrit 41.8 %      MCV 93.3 fL      MCH 29.9 pg      MCHC 32.1 g/dL      RDW 15.2 %      RDW-SD 51.9 fl      MPV 10.8 fL      Platelets 250 10*3/mm3      Neutrophil % 52.0 %      Lymphocyte % 28.2 %      Monocyte % 16.1 %      Eosinophil % 2.8 %      Basophil % 0.7 %      Immature Grans % 0.2 %       Neutrophils, Absolute 2.97 10*3/mm3      Lymphocytes, Absolute 1.61 10*3/mm3      Monocytes, Absolute 0.92 10*3/mm3      Eosinophils, Absolute 0.16 10*3/mm3      Basophils, Absolute 0.04 10*3/mm3      Immature Grans, Absolute 0.01 10*3/mm3      nRBC 0.0 /100 WBC           Imaging Results (Last 24 Hours)       Procedure Component Value Units Date/Time    XR Chest 1 View [[id number]] Collected: 11/25/24 1433     Updated: 11/25/24 1438    Narrative:      EXAMINATION: XR CHEST 1 VW-  11/25/2024 2:33 PM     HISTORY: Weakness and cough.     FINDINGS: Today's exam is compared to previous study of 7/22/2022. No  evidence of acute consolidative pneumonia. There has been development of  a right-sided effusion with blunting of the right lateral costophrenic  angle and some fluid tracking back into the fissure. No left effusion.  No free air beneath the hemidiaphragms.       Impression:      1.. Small right effusion with right basilar atelectasis. Lungs are  otherwise clear.     This report was signed and finalized on 11/25/2024 2:35 PM by Dr. Omar Hahn MD.               Assessment / Plan   Assessment:   Active Hospital Problems    Diagnosis     **Atrial flutter with rapid ventricular response     Secondary hyperthyroidism     Presence of Watchman left atrial appendage closure device     Hypertension        Treatment Plan  The patient will be admitted to Dr. Kramer's service here at [facility].     1.  Atrial flutter with rapid ventricular response, rate controlled with Cardizem drip, will continue and reevaluate in a.m., continue home metoprolol, routine telemetry orders, supplemental oxygen as needed    2.  Secondary hypothyroidism felt to be due to medication, will hold for now    3.  Presence of watchman's device, DVT prophylaxis with SCDs, no anticoagulation    4.  Hypertension, per son recently poorly controlled, stable now on Cardizem, home medications reviewed and restarted as appropriate    5.  Home  medications reviewed and restarted as appropriate, consult PT and OT, consult , oral care, labs in a.m.    Medical Decision Making  Number and Complexity of problems: 4    2 problems, acute, high complexity, improving  2 problems chronic, moderate complexity unchanged    Differential Diagnosis: None    Conditions and Status        Condition is unchanged.     [facility] Data  External documents reviewed: No  Cardiac tracing (EKG, telemetry) interpretation: Reviewed  Radiology interpretation: Reviewed  Labs reviewed: Yes  Any tests that were considered but not ordered: No     Decision rules/scores evaluated (example [id number], Wells, etc): No     Discussed with: Patient, children at bedside and Dr. Kramer     Care Planning  Shared decision making: Patient, children at bedside and Dr. Kramer  Code status and discussions: DNR/DNI    Disposition  Social Determinants of Health that impact treatment or disposition: None  Estimated length of stay is 1-2 days.     I confirmed that the patient's advanced care plan is present, code status is documented, and a surrogate decision maker is listed in the patient's medical record.     The patient's surrogate decision maker is children are oneill of .     The patient was seen and examined by me on 11/25/2024 at 4:44 PM.    Electronically signed by POLY Aguilar, 11/25/24, 17:31 CST.

## 2024-11-25 NOTE — ED PROVIDER NOTES
Subjective   History of Present Illness    Patient is a 89-year-old female who presents to ED with her son.  Chief complaint is palpitations with increased weakness and elevated blood pressure.    Patient does have significant medical history of previous paroxysmal atrial fibrillation and completed a watchman's procedure no longer anticoagulated, acute ischemia to the right ICA stroke, essential hypertension, blindness of right eye, breast cancer, carotid artery disease, gallstones, GERD, hyperlipidemia, hypothyroidism, macular degeneration, previous right hip fracture, vitamin D deficiency.    In the past 2 weeks, she moved in with her son because she has been declining over the past several months to years.  She is losing weight bit by a bit.  She is eating minimally.  She is have difficult time getting around.  She ambulates with assistance.  She has not been taking her medications as prescribed.  The patient believes she was not taking her HCTZ but believes she was taking the other medicines.    In the past several days, she is felt increasingly fatigued more than normal.  She has felt palpitations intermittently and it just did not feel right into her chest.  She denies specifically any chest pain, pressure, tightness.  She does feel a bit shortness of breath.  She denies any history of congestive heart failure or edema.  She denies any orthopnea or PND.  She denies any fever.  Son states she has been coughing a dry cough.  She eats minimally but he is hoping she has gained weight.  They deny any abnormal thyroid issues.  They denied any recent changes medications except that she is taking her medications as prescribed now.  She denies any leg pain or swelling.      Review of Systems   Constitutional:  Positive for activity change.   HENT: Negative.     Respiratory: Negative.     Cardiovascular: Negative.  Positive for palpitations.   Gastrointestinal: Negative.  Negative for abdominal pain.   Genitourinary:  Negative.    Musculoskeletal: Negative.    Neurological: Negative.  Positive for weakness.   Psychiatric/Behavioral: Negative.     All other systems reviewed and are negative.      Past Medical History:   Diagnosis Date    A-fib     Acute ischemic right ICA stroke     Atrial fibrillation     Benign essential HTN     Blindness of right eye     Breast CA     Breast cancer     Carotid artery disease     CVA (cerebral vascular accident)     Gallstones     GERD (gastroesophageal reflux disease)     Hyperlipidemia     Hypertension     Hypothyroidism     Macular degeneration     Osteopenia     Primary pulmonary HTN     S/P right hip fracture 06/01/2012    Shortness of breath     Stroke     Vitamin D deficiency        Allergies   Allergen Reactions    Epinephrine Shortness Of Breath    Celebrex [Celecoxib] Anxiety    Ferrous Sulfate GI Intolerance    Omnicef [Cefdinir] Unknown - Low Severity     Possibly rocephin too    Other Anxiety     Omnicef, some Thad, possible Rocephin allergy    Rocephin [Ceftriaxone] Unknown - Low Severity    Simvastatin Itching    Sulfa Antibiotics Rash       Past Surgical History:   Procedure Laterality Date    ATRIAL APPENDAGE EXCLUSION LEFT WITH TRANSESOPHAGEAL ECHOCARDIOGRAM Right 02/23/2023    Procedure: Atrial Appendage Occlusion;  Surgeon: Riley Mota MD;  Location:  [facility];  Service: Cardiology;  Laterality: Right;    BREAST LUMPECTOMY      CARDIAC ABLATION      cardiac ablation procedure for af    CARDIOVERSION N/A 09/07/2022    X2    CATARACT EXTRACTION Right     PARTIAL HIP ARTHROPLASTY Right     TONSILLECTOMY         Family History   Problem Relation Age of Onset    Stroke Mother     Cancer Father     COPD Sister        Social History     Socioeconomic History    Marital status:    Tobacco Use    Smoking status: Former     Current packs/day: 0.00     Average packs/day: 1.5 packs/day for 37.0 years (55.5 ttl pk-yrs)     Types: Cigarettes     Start date:       Quit date: 1992     Years since quittin.9     Passive exposure: Current    Smokeless tobacco: Never   Vaping Use    Vaping status: Never Used   Substance and Sexual Activity    Alcohol use: No    Drug use: No    Sexual activity: Defer       Prior to Admission medications    Medication Sig Start Date End Date Taking? Authorizing Provider   acetaminophen (TYLENOL) 325 MG tablet Take 2 tablets by mouth Daily As Needed for Mild Pain. Indications: Pain    Alejandro Epps MD   ALPRAZolam (XANAX) 0.25 MG tablet Take 1 tablet by mouth 2 (Two) Times a Day As Needed for Anxiety. Indications: Feeling Anxious    Alejandro Epps MD   amLODIPine (NORVASC) 2.5 MG tablet Take 1 tablet every day by oral route.    Alejandro Epps MD   aspirin 81 MG EC tablet Take 1 tablet by mouth Daily. 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 Indications: Osteoporosis, Vitamin D Deficiency    Alejandro Epps MD   hydrochlorothiazide (HYDRODIURIL) 25 MG tablet Take 1 tablet by mouth Daily. Indications: High Blood Pressure    Alejandro Epps MD   levothyroxine (SYNTHROID, LEVOTHROID) 137 MCG tablet Take 1 tablet by mouth Daily. Indications: Underactive Thyroid 10/1/23   Alejandro Epps MD   megestrol (MEGACE) 40 MG/ML suspension  3/27/24   Alejandro Epps MD   metoprolol succinate XL (TOPROL-XL) 100 MG 24 hr tablet Take 1.5 tablets by mouth Every Night. 23   Riley Mota MD   polyethylene glycol (MIRALAX) 17 GM/SCOOP powder Take 17 g by mouth Daily. Indications: Constipation    ProviderAlejandro MD   traMADol (ULTRAM) 50 MG tablet Take 1 tablet by mouth Every 6 (Six) Hours As Needed for Moderate Pain. Indications: Pain    ProviderAlejandro MD   levothyroxine sodium (TIROSINT) 75 MCG capsule Take 75 mcg by mouth Daily. 1 tablet daily  Indications:  "Underactive Thyroid 10/11/23   Provider, MD Alejandro       Medications   dilTIAZem (CARDIZEM) 125 mg in 125 mL D5W infusion (15 mg/hr Intravenous Rate/Dose Change 11/25/24 1601)   sodium chloride 0.9 % flush 10 mL (has no administration in time range)   dilTIAZem (CARDIZEM) injection 10 mg (10 mg Intravenous Given 11/25/24 1433)       /83   Pulse 96   Temp 97.5 °F (36.4 °C) (Oral)   Resp 18   Ht 160 cm (63\")   Wt 41.3 kg (91 lb)   SpO2 96%   BMI 16.12 kg/m²       Objective   Physical Exam  Vitals and nursing note reviewed.   Constitutional:       General: She is not in acute distress.     Appearance: She is underweight. She is not diaphoretic.   HENT:      Head: Normocephalic and atraumatic.   Eyes:      Conjunctiva/sclera: Conjunctivae normal.      Pupils: Pupils are equal, round, and reactive to light.   Neck:      Trachea: No tracheal deviation.   Cardiovascular:      Rate and Rhythm: Tachycardia present. Rhythm irregular.      Heart sounds: Normal heart sounds. No murmur heard.  Pulmonary:      Effort: Pulmonary effort is normal.      Breath sounds: Normal breath sounds.   Abdominal:      General: Bowel sounds are normal. There is no distension.      Palpations: Abdomen is soft. There is no mass.      Tenderness: There is no abdominal tenderness. There is no guarding or rebound.   Musculoskeletal:         General: No swelling or tenderness. Normal range of motion.      Cervical back: Normal range of motion and neck supple.      Right lower leg: No edema.      Left lower leg: No edema.   Skin:     General: Skin is warm and dry.      Capillary Refill: Capillary refill takes less than 2 seconds.   Neurological:      General: No focal deficit present.      Mental Status: She is alert and oriented to person, place, and time.   Psychiatric:         Behavior: Behavior normal. Behavior is cooperative.         Thought Content: Thought content normal.         Judgment: Judgment normal. "         Procedures         Lab Results (last 24 hours)       Procedure Component Value Units Date/Time    CBC & Differential [[id number]]  (Abnormal) Collected: 11/25/24 1411    Specimen: Blood Updated: 11/25/24 1430    Narrative:      The following orders were created for panel order CBC & Differential.  Procedure                               Abnormality         Status                     ---------                               -----------         ------                     CBC Auto Differential[[id number]]        Abnormal            Final result                 Please view results for these tests on the individual orders.    Comprehensive Metabolic Panel [[id number]]  (Abnormal) Collected: 11/25/24 1411    Specimen: Blood Updated: 11/25/24 1516     Glucose 107 mg/dL      BUN 20 mg/dL      Creatinine 0.74 mg/dL      Sodium 141 mmol/L      Potassium 3.8 mmol/L      Chloride 97 mmol/L      CO2 31.0 mmol/L      Calcium 9.6 mg/dL      Total Protein 7.4 g/dL      Albumin 4.2 g/dL      ALT (SGPT) 16 U/L      AST (SGOT) 25 U/L      Alkaline Phosphatase 69 U/L      Total Bilirubin 0.8 mg/dL      Globulin 3.2 gm/dL      A/G Ratio 1.3 g/dL      BUN/Creatinine Ratio 27.0     Anion Gap 13.0 mmol/L      eGFR 77.4 mL/min/1.73     Narrative:      GFR Normal >60  Chronic Kidney Disease <60  Kidney Failure <15    The GFR formula is only valid for adults with stable renal function between ages 18 and 70.    Magnesium [[id number]]  (Normal) Collected: 11/25/24 1411    Specimen: Blood Updated: 11/25/24 1513     Magnesium 2.1 mg/dL     TSH [[id number]]  (Abnormal) Collected: 11/25/24 1411    Specimen: Blood Updated: 11/25/24 1517     TSH 0.076 uIU/mL     T4, Free [[id number]]  (Abnormal) Collected: 11/25/24 1411    Specimen: Blood Updated: 11/25/24 1517     Free T4 4.05 ng/dL     BNP [[id number]]  (Abnormal) Collected: 11/25/24 1411    Specimen: Blood Updated: 11/25/24 1510     proBNP 2,744.0 pg/mL     Narrative:      This assay is used as  an aid in the diagnosis of individuals suspected of having heart failure. It can be used as an aid in the diagnosis of acute decompensated heart failure (ADHF) in patients presenting with signs and symptoms of ADHF to the emergency department (ED). In addition, NT-proBNP of <300 pg/mL indicates ADHF is not likely.    Age Range Result Interpretation  NT-proBNP Concentration (pg/mL:      <50             Positive            >450                   Gray                 300-450                    Negative             <300    50-75           Positive            >900                  Gray                300-900                  Negative            <300      >75             Positive            >1800                  Gray                300-1800                  Negative            <300    High Sensitivity Troponin T [[id number]]  (Abnormal) Collected: 11/25/24 1411    Specimen: Blood Updated: 11/25/24 1510     HS Troponin T 23 ng/L     Narrative:      High Sensitive Troponin T Reference Range:  <14.0 ng/L- Negative Female for AMI  <22.0 ng/L- Negative Male for AMI  >=14 - Abnormal Female indicating possible myocardial injury.  >=22 - Abnormal Male indicating possible myocardial injury.   Clinicians would have to utilize clinical acumen, EKG, Troponin, and serial changes to determine if it is an Acute Myocardial Infarction or myocardial injury due to an underlying chronic condition.         CBC Auto Differential [[id number]]  (Abnormal) Collected: 11/25/24 1411    Specimen: Blood Updated: 11/25/24 1430     WBC 5.71 10*3/mm3      RBC 4.48 10*6/mm3      Hemoglobin 13.4 g/dL      Hematocrit 41.8 %      MCV 93.3 fL      MCH 29.9 pg      MCHC 32.1 g/dL      RDW 15.2 %      RDW-SD 51.9 fl      MPV 10.8 fL      Platelets 250 10*3/mm3      Neutrophil % 52.0 %      Lymphocyte % 28.2 %      Monocyte % 16.1 %      Eosinophil % 2.8 %      Basophil % 0.7 %      Immature Grans % 0.2 %      Neutrophils, Absolute 2.97 10*3/mm3       Lymphocytes, Absolute 1.61 10*3/mm3      Monocytes, Absolute 0.92 10*3/mm3      Eosinophils, Absolute 0.16 10*3/mm3      Basophils, Absolute 0.04 10*3/mm3      Immature Grans, Absolute 0.01 10*3/mm3      nRBC 0.0 /100 WBC     Urinalysis With Culture If Indicated - Straight Cath [[id number]]  (Abnormal) Collected: 11/25/24 1435    Specimen: Urine from Straight Cath Updated: 11/25/24 1514     Color, UA Dark Yellow     Appearance, UA Clear     pH, UA 5.5     Specific Gravity, UA 1.016     Glucose, UA Negative     Ketones, UA Trace     Bilirubin, UA Negative     Blood, UA Trace     Protein, UA Negative     Leuk Esterase, UA Large (3+)     Nitrite, UA Negative     Urobilinogen, UA 1.0 E.U./dL    Narrative:      In absence of clinical symptoms, the presence of pyuria, bacteria, and/or nitrites on the urinalysis result does not correlate with infection.    Urinalysis, Microscopic Only - Straight Cath [[id number]]  (Abnormal) Collected: 11/25/24 1435    Specimen: Urine from Straight Cath Updated: 11/25/24 1517     RBC, UA 3-5 /HPF      WBC, UA 3-5 /HPF      Comment: Urine culture not indicated.        Bacteria, UA Trace /HPF      Squamous Epithelial Cells, UA 0-2 /HPF      Hyaline Casts, UA 0-2 /LPF      Methodology Manual Light Microscopy    Respiratory Panel PCR w/COVID-19(SARS-CoV-2) FRANKO/ROBERTO/TELLY/[facility] In-House, NP Swab in UTM/VTM, 2 HR TAT - Swab, Nasopharynx [[id number]]  (Normal) Collected: 11/25/24 1437    Specimen: Swab from Nasopharynx Updated: 11/25/24 1538     ADENOVIRUS, PCR Not Detected     Coronavirus 229E Not Detected     Coronavirus HKU1 Not Detected     Coronavirus NL63 Not Detected     Coronavirus OC43 Not Detected     COVID19 Not Detected     Human Metapneumovirus Not Detected     Human Rhinovirus/Enterovirus Not Detected     Influenza A PCR Not Detected     Influenza B PCR Not Detected     Parainfluenza Virus 1 Not Detected     Parainfluenza Virus 2 Not Detected     Parainfluenza Virus 3 Not Detected      Parainfluenza Virus 4 Not Detected     RSV, PCR Not Detected     Bordetella pertussis pcr Not Detected     Bordetella parapertussis PCR Not Detected     Chlamydophila pneumoniae PCR Not Detected     Mycoplasma pneumo by PCR Not Detected    Narrative:      In the setting of a positive respiratory panel with a viral infection PLUS a negative procalcitonin without other underlying concern for bacterial infection, consider observing off antibiotics or discontinuation of antibiotics and continue supportive care. If the respiratory panel is positive for atypical bacterial infection (Bordetella pertussis, Chlamydophila pneumoniae, or Mycoplasma pneumoniae), consider antibiotic de-escalation to target atypical bacterial infection.    High Sensitivity Troponin T 2Hr [[id number]]  (Abnormal) Collected: 11/25/24 1617    Specimen: Blood Updated: 11/25/24 1650     HS Troponin T 19 ng/L      Troponin T Delta -4 ng/L     Narrative:      High Sensitive Troponin T Reference Range:  <14.0 ng/L- Negative Female for AMI  <22.0 ng/L- Negative Male for AMI  >=14 - Abnormal Female indicating possible myocardial injury.  >=22 - Abnormal Male indicating possible myocardial injury.   Clinicians would have to utilize clinical acumen, EKG, Troponin, and serial changes to determine if it is an Acute Myocardial Infarction or myocardial injury due to an underlying chronic condition.                 XR Chest 1 View    Result Date: 11/25/2024  Narrative: EXAMINATION: XR CHEST 1 VW-  11/25/2024 2:33 PM  HISTORY: Weakness and cough.  FINDINGS: Today's exam is compared to previous study of 7/22/2022. No evidence of acute consolidative pneumonia. There has been development of a right-sided effusion with blunting of the right lateral costophrenic angle and some fluid tracking back into the fissure. No left effusion. No free air beneath the hemidiaphragms.      Impression: 1.. Small right effusion with right basilar atelectasis. Lungs are otherwise  clear.  This report was signed and finalized on 11/25/2024 2:35 PM by Dr. Omar Hahn MD.       ED Course  ED Course as of 11/25/24 1655 Mon Nov 25, 2024 1654 Patient's heart rate has improved after receiving Cardizem bolus and drip.  She is currently on 15 mg/h IV.  Heart rate is down to 80s-rate controlled.  Educated the family members about abnormal thyroid level as well as current treatment for atrial flutter with RVR.  They are agreeable for admission.  I did speak with POLY Campos, with hospitalist who is gracious to admit the patient under their services. [TK]      ED Course User Index  [TK] Jarad Pugh PA          [facility]      Final diagnoses:   Atrial flutter with rapid ventricular response   Weakness   Not taking medication as directed       Disposition: Patient will be admitted under hospitalists' services.       Jarad Pugh PA  11/25/24 1655

## 2024-11-26 ENCOUNTER — APPOINTMENT (OUTPATIENT)
Dept: CARDIOLOGY | Facility: HOSPITAL | Age: 89
End: 2024-11-26
Payer: MEDICARE

## 2024-11-26 PROBLEM — I49.9 SUPRAVENTRICULAR ARRHYTHMIA: Status: ACTIVE | Noted: 2024-11-26

## 2024-11-26 LAB
AMMONIA BLD-SCNC: 23 UMOL/L (ref 11–51)
ANION GAP SERPL CALCULATED.3IONS-SCNC: 17 MMOL/L (ref 5–15)
BUN SERPL-MCNC: 16 MG/DL (ref 8–23)
BUN/CREAT SERPL: 23.9 (ref 7–25)
CALCIUM SPEC-SCNC: 9.1 MG/DL (ref 8.6–10.5)
CHLORIDE SERPL-SCNC: 98 MMOL/L (ref 98–107)
CHOLEST SERPL-MCNC: 150 MG/DL (ref 0–200)
CO2 SERPL-SCNC: 26 MMOL/L (ref 22–29)
CREAT SERPL-MCNC: 0.67 MG/DL (ref 0.57–1)
DEPRECATED RDW RBC AUTO: 51.4 FL (ref 37–54)
EGFRCR SERPLBLD CKD-EPI 2021: 83.7 ML/MIN/1.73
ERYTHROCYTE [DISTWIDTH] IN BLOOD BY AUTOMATED COUNT: 15.1 % (ref 12.3–15.4)
GLUCOSE SERPL-MCNC: 95 MG/DL (ref 65–99)
HBA1C MFR BLD: 5.8 % (ref 4.8–5.6)
HCT VFR BLD AUTO: 37.8 % (ref 34–46.6)
HDLC SERPL-MCNC: 49 MG/DL (ref 40–60)
HGB BLD-MCNC: 12.1 G/DL (ref 12–15.9)
LDLC SERPL CALC-MCNC: 87 MG/DL (ref 0–100)
LDLC/HDLC SERPL: 1.76 {RATIO}
MCH RBC QN AUTO: 29.7 PG (ref 26.6–33)
MCHC RBC AUTO-ENTMCNC: 32 G/DL (ref 31.5–35.7)
MCV RBC AUTO: 92.6 FL (ref 79–97)
PLATELET # BLD AUTO: 245 10*3/MM3 (ref 140–450)
PMV BLD AUTO: 11.3 FL (ref 6–12)
POTASSIUM SERPL-SCNC: 3.3 MMOL/L (ref 3.5–5.2)
QT INTERVAL: 322 MS
QT INTERVAL: 362 MS
QTC INTERVAL: 457 MS
QTC INTERVAL: 483 MS
RBC # BLD AUTO: 4.08 10*6/MM3 (ref 3.77–5.28)
SODIUM SERPL-SCNC: 141 MMOL/L (ref 136–145)
TRIGL SERPL-MCNC: 74 MG/DL (ref 0–150)
VIT B12 BLD-MCNC: 760 PG/ML (ref 211–946)
VLDLC SERPL-MCNC: 14 MG/DL (ref 5–40)
WBC NRBC COR # BLD AUTO: 6.51 10*3/MM3 (ref 3.4–10.8)

## 2024-11-26 PROCEDURE — 25010000002 ONDANSETRON PER 1 MG: Performed by: NURSE PRACTITIONER

## 2024-11-26 PROCEDURE — 93306 TTE W/DOPPLER COMPLETE: CPT

## 2024-11-26 PROCEDURE — 25510000001 PERFLUTREN 6.52 MG/ML SUSPENSION 2 ML VIAL: Performed by: HOSPITALIST

## 2024-11-26 PROCEDURE — 80061 LIPID PANEL: CPT | Performed by: NURSE PRACTITIONER

## 2024-11-26 PROCEDURE — 36415 COLL VENOUS BLD VENIPUNCTURE: CPT | Performed by: NURSE PRACTITIONER

## 2024-11-26 PROCEDURE — 97166 OT EVAL MOD COMPLEX 45 MIN: CPT | Performed by: OCCUPATIONAL THERAPIST

## 2024-11-26 PROCEDURE — 83036 HEMOGLOBIN GLYCOSYLATED A1C: CPT | Performed by: NURSE PRACTITIONER

## 2024-11-26 PROCEDURE — 25010000002 FUROSEMIDE PER 20 MG: Performed by: HOSPITALIST

## 2024-11-26 PROCEDURE — 99223 1ST HOSP IP/OBS HIGH 75: CPT | Performed by: HOSPITALIST

## 2024-11-26 PROCEDURE — 80048 BASIC METABOLIC PNL TOTAL CA: CPT | Performed by: NURSE PRACTITIONER

## 2024-11-26 PROCEDURE — 97162 PT EVAL MOD COMPLEX 30 MIN: CPT

## 2024-11-26 PROCEDURE — 82607 VITAMIN B-12: CPT | Performed by: HOSPITALIST

## 2024-11-26 PROCEDURE — 93306 TTE W/DOPPLER COMPLETE: CPT | Performed by: HOSPITALIST

## 2024-11-26 PROCEDURE — 85027 COMPLETE CBC AUTOMATED: CPT | Performed by: NURSE PRACTITIONER

## 2024-11-26 PROCEDURE — 82140 ASSAY OF AMMONIA: CPT | Performed by: HOSPITALIST

## 2024-11-26 RX ORDER — DOXYCYCLINE 100 MG/1
100 TABLET ORAL EVERY 12 HOURS SCHEDULED
Status: COMPLETED | OUTPATIENT
Start: 2024-11-26 | End: 2024-11-28

## 2024-11-26 RX ORDER — FUROSEMIDE 10 MG/ML
20 INJECTION INTRAMUSCULAR; INTRAVENOUS
Status: DISCONTINUED | OUTPATIENT
Start: 2024-11-26 | End: 2024-11-29

## 2024-11-26 RX ORDER — HYDROCODONE BITARTRATE AND ACETAMINOPHEN 5; 325 MG/1; MG/1
0.5 TABLET ORAL EVERY 12 HOURS PRN
COMMUNITY

## 2024-11-26 RX ORDER — IBUPROFEN 200 MG
200 TABLET ORAL AS NEEDED
COMMUNITY
End: 2024-11-30 | Stop reason: HOSPADM

## 2024-11-26 RX ADMIN — ONDANSETRON 4 MG: 2 INJECTION INTRAMUSCULAR; INTRAVENOUS at 13:10

## 2024-11-26 RX ADMIN — METOPROLOL SUCCINATE 150 MG: 50 TABLET, EXTENDED RELEASE ORAL at 20:43

## 2024-11-26 RX ADMIN — ALPRAZOLAM 0.25 MG: 0.5 TABLET ORAL at 09:29

## 2024-11-26 RX ADMIN — FUROSEMIDE 20 MG: 10 INJECTION, SOLUTION INTRAMUSCULAR; INTRAVENOUS at 10:45

## 2024-11-26 RX ADMIN — ASPIRIN 81 MG: 81 TABLET, COATED ORAL at 09:29

## 2024-11-26 RX ADMIN — DOXYCYCLINE 100 MG: 100 TABLET ORAL at 20:44

## 2024-11-26 RX ADMIN — HYDROCHLOROTHIAZIDE 25 MG: 25 TABLET ORAL at 09:29

## 2024-11-26 RX ADMIN — POLYETHYLENE GLYCOL 3350 17 G: 17 POWDER, FOR SOLUTION ORAL at 09:30

## 2024-11-26 RX ADMIN — PERFLUTREN 10 ML: 6.52 INJECTION, SUSPENSION INTRAVENOUS at 16:08

## 2024-11-26 RX ADMIN — Medication 5 MG/HR: at 10:57

## 2024-11-26 RX ADMIN — FUROSEMIDE 20 MG: 10 INJECTION, SOLUTION INTRAMUSCULAR; INTRAVENOUS at 17:10

## 2024-11-26 RX ADMIN — DOXYCYCLINE 100 MG: 100 TABLET ORAL at 10:45

## 2024-11-26 NOTE — CASE MANAGEMENT/SOCIAL WORK
Discharge Planning Assessment  [facility]     Patient Name: Damaris Nash  MRN: [id number]  Today's Date: 11/26/2024    Admit Date: 11/25/2024    Plan: Referral to [facility]   Discharge Needs Assessment       Row Name 11/26/24 1201       Living Environment    People in Home alone    Current Living Arrangements home    Potentially Unsafe Housing Conditions none    Primary Care Provided by self;child(madelyn)    Provides Primary Care For no one, unable/limited ability to care for self    Family Caregiver if Needed none    Quality of Family Relationships supportive;helpful;involved    Able to Return to Prior Arrangements no       Resource/Environmental Concerns    Resource/Environmental Concerns none       Transition Planning    Patient/Family Anticipates Transition to long-term care facility    Patient/Family Anticipated Services at Transition skilled nursing    Transportation Anticipated family or friend will provide       Discharge Needs Assessment    Equipment Currently Used at Home rollator;cane, straight    Concerns to be Addressed basic needs;discharge planning    Anticipated Changes Related to Illness inability to care for self    Equipment Needed After Discharge none    Outpatient/Agency/Support Group Needs skilled nursing facility    Discharge Facility/Level of Care Needs nursing facility, skilled    Provided Post Acute Provider List? Yes    Post Acute Provider List Nursing Home    Provided Post Acute Provider Quality & Resource List? Yes    Post Acute Provider Quality and Resource List Nursing Home    Delivered To Support Person    Method of Delivery In person    Current Discharge Risk chronically ill                   Discharge Plan       Row Name 11/26/24 1203       Plan    Plan Referral to [facility]    Plan Comments Spoke with pt/family in room to assess for d/c needs. Pt has RX coverage/PCP.  Pt has been living with son and dtr in law at home.  They all agree pt needs short term SNF and with  options/choices they prefer [facility]. Will list and inform when decision made.                  Continued Care and Services - Admitted Since 11/25/2024    No active coordination exists for this encounter.       Expected Discharge Date and Time       Expected Discharge Date Expected Discharge Time    Nov 29, 2024            Demographic Summary    No documentation.                  Functional Status    No documentation.                  Psychosocial    No documentation.                  Abuse/Neglect    No documentation.                  Legal    No documentation.                  Substance Abuse    No documentation.                  Patient Forms    No documentation.                     DMITRIY MonsivaisW

## 2024-11-26 NOTE — CONSULTS
Adult Nutrition  Assessment/PES    Patient Name:  Damaris Nash  YOB: 1935  MRN: [id number]  Admit Date:  11/25/2024    Assessment Date:  11/26/2024   Reason for Assessment       Row Name 11/26/24 0944          Reason for Assessment    Reason For Assessment physician consult     Diagnosis cardiac disease     Identified At Risk by Screening Criteria BMI;MST SCORE 2+               Nutrition/Diet History       Row Name 11/26/24 0945          Nutrition/Diet History    Typical Intake (Food/Fluid/EN/PN) Pt is in the room alone, eating breakfast (35% consumed, observed by RD), and reports a chronic low appetite. She was prescribed Megace, but when asked if she is taking it, pt was unsure. Dietitian recommends restarting Megace while inpatient to help increase appetite. Pt has experienced a weight loss of 40 lb over the past couple of years, with an UBW of 152 lb. According to medical records, pt weighed 101 lb on the standing scale as of 2/28/2024. Current wt is 91 lb, reflecting a 10% wt loss in the past 9 months. Pt denies any difficulty with chewing, swallowing, or N/V, but reports increased fatigue and intermittent diarrhea. At home, pt drinks one ONS per day and will continue this while inpatient for additional nutrition. Pt tolerates smaller meals better and was advised on available snacks. Will monitor.     Food Intolerance(s) NKFA     Herbal/Other Supplements 1 Boost chocolate/day     Factors Affecting Nutritional Intake appetite;other (see comments);fatigue  advanced age               Labs/Tests/Procedures/Meds       Row Name 11/26/24 1000          Labs/Procedures/Meds    Lab Results Reviewed reviewed     Lab Results Comments K 3.3, A1C 5.8        Diagnostic Tests/Procedures    Diagnostic Test/Procedure Reviewed reviewed        Medications    Pertinent Medications Reviewed reviewed               Physical Findings       Row Name 11/26/24 1000          Physical Findings    Overall Physical  Appearance Fragile, Bacilio Score 20, BM 11/24, room air, missing teeth               Estimated/Assessed Needs - Anthropometrics       Row Name 11/26/24 1001          Anthropometrics    Calculation Weight 53 kg (116 lb 13.5 oz)        Estimated/Assessed Needs    Additional Documentation KCAL/KG (Group);Protein Requirements (Group);Fluid Requirements (Group)        KCAL/KG    KCAL/KG 30 Kcal/Kg (kcal);35 Kcal/Kg (kcal)     30 Kcal/Kg (kcal) 1590     35 Kcal/Kg (kcal) 1855        Protein Requirements    Weight Used For Protein Calculations 53 kg (116 lb 13.5 oz)     Est Protein Requirement Amount (gms/kg) 1.1 gm protein     Estimated Protein Requirements (gms/day) 58.3        Fluid Requirements    Fluid Requirements (mL/day) 1590               Nutrition Prescription Ordered       Row Name 11/26/24 1003          Nutrition Prescription PO    Current PO Diet Regular     Fluid Consistency Thin     Common Modifiers Cardiac               Evaluation of Received Nutrient/Fluid Intake       Row Name 11/26/24 1003          Nutrient/Fluid Evaluation    Number of Days Evaluated --  Admitted <24hr, insufficient data        Fluid Intake Evaluation    Oral Fluid (mL) --  Admitted <24hr, insufficient data        PO Evaluation    Number of Days PO Intake Evaluated 1 day     Number of Meals 1     % PO Intake 35% of breakfast this morning               Malnutrition Severity Assessment       Row Name 11/26/24 1004          Malnutrition Severity Assessment    Malnutrition Type --        Unintentional Weight Loss     Unintentional Weight Loss Findings --     Unintentional Weight Loss  --        Muscle Loss    Loss of Muscle Mass Findings --     [place] --     Clavicle Bone Region --     Dorsal Hand Region --     Patellar Region --     Anterior Thigh Region --     Posterior Calf Region --        Fat Loss    Subcutaneous Fat Loss Findings --     Orbital Region  --     Upper Arm Region --        Declining Functional Status    Declining  Functional Status Findings --        Criteria Met (Must meet criteria for severity in at least 2 of these categories: M Wasting, Fat Loss, Fluid, Secondary Signs, Wt. Status, Intake)    Patient meets criteria for  --            Problem/Interventions:   Problem 1       Row Name 11/26/24 1005          Nutrition Diagnoses Problem 1    Problem 1 Predicted Suboptimal Intake     Etiology (related to) Factors Affecting Nutrition     Appetite Poor     Other Advance age     Signs/Symptoms (evidenced by) Report of Mnimal PO Intake;Unintended Weight Change;% UBW;BMI;% IBW;PO Intake;Report/Observation     Percent (%) intake recorded 35 %     Over number of meals 1  Breakfast on 11/26     BMI 16 - 16.9     Percent (%) IBW 78 %     Percent (%) UBW 91 %     Unintended Weight Change Loss     Number of Pounds Lost 10lb     Weight loss time period last 9 months     Reported/Observed By RD/Tech               Intervention Goal       Row Name 11/26/24 1010          Intervention Goal    General Meet nutritional needs for age/condition     PO Increase intake;Meet estimated needs;PO intake (%)     PO Intake % 75 %     Weight Appropriate weight gain               Nutrition Intervention       Row Name 11/26/24 1010          Nutrition Intervention    RD/Tech Action Follow Tx progress;Care plan reviewd;Encourage intake;Recommend/ordered;Advise available snack     Recommended/Ordered Supplement               Nutrition Prescription       Row Name 11/26/24 1010          Nutrition Prescription PO    PO Prescription Begin/change supplement     Supplement Boost     Supplement Frequency 2 times a day               Education/Evaluation       Row Name 11/26/24 1011          Education    Education No discharge needs identified at this time        Monitor/Evaluation    Monitor Per protocol            Electronically signed by:  Mag Elias RDN, MARLENI  11/26/24 10:15 CST

## 2024-11-26 NOTE — PLAN OF CARE
Goal Outcome Evaluation:  Plan of Care Reviewed With: patient           Outcome Evaluation: PT eval complete. Pt in fowlers position, AO to self and place and corrected with verbal cues for situation and time. Pt reports being indep at her baseline with occassional use of AD being cane but also has assist from family as needed. Pt brought self to EOB with Min A, demo safety with sitting balance to don socks and required Spv and CGA at times. Pt remains WFL for LE AROM, no formal MMT performed but remained >3/5, seen with sit<>stand and ambulation in short distances. Pt sit<>stand and ambulated 5' in room, with Min A and mod verbal cues and left in recliner with needs in reach. Pt will benefit from skilled PT services to decrease fall risk, improve mobility safety, and return to PLOF. Recommend SNF at d/c for continued care when medically stable.    Anticipated Discharge Disposition (PT): skilled nursing facility

## 2024-11-26 NOTE — THERAPY EVALUATION
Patient Name: Damaris Nash  : 1935    MRN: [id number]                              Today's Date: 2024       Admit Date: 2024    Visit Dx:     ICD-10-CM ICD-9-CM   1. Atrial flutter with rapid ventricular response  I48.92 427.32   2. Weakness  R53.1 780.79   3. Not taking medication as directed  Z91.148 V15.81   4. Impaired mobility [Z74.09]  Z74.09 799.89     Patient Active Problem List   Diagnosis    Carotid artery disease    Hyperlipidemia    Hypertension    Amaurosis fugax of right eye    Bilateral carotid artery stenosis    Hx of transient ischemic attack (TIA)    Current use of long term anticoagulation    H/O cardiac radiofrequency ablation    PAF (paroxysmal atrial fibrillation)    Acquired hypothyroidism    Secondary hyperthyroidism    Atrial flutter with rapid ventricular response    Presence of Watchman left atrial appendage closure device    Supraventricular arrhythmia     Past Medical History:   Diagnosis Date    A-fib     Acute ischemic right ICA stroke     Atrial fibrillation     Benign essential HTN     Blindness of right eye     Breast CA     Breast cancer     Carotid artery disease     CVA (cerebral vascular accident)     Gallstones     GERD (gastroesophageal reflux disease)     Hyperlipidemia     Hypertension     Hypothyroidism     Macular degeneration     Osteopenia     Primary pulmonary HTN     S/P right hip fracture 2012    Shortness of breath     Stroke     Vitamin D deficiency      Past Surgical History:   Procedure Laterality Date    ATRIAL APPENDAGE EXCLUSION LEFT WITH TRANSESOPHAGEAL ECHOCARDIOGRAM Right 2023    Procedure: Atrial Appendage Occlusion;  Surgeon: Riley Mota MD;  Location: [facility] CATH INVASIVE LOCATION;  Service: Cardiology;  Laterality: Right;    BREAST LUMPECTOMY      CARDIAC ABLATION      cardiac ablation procedure for af    CARDIOVERSION N/A 09/07/2022    X2    CATARACT EXTRACTION Right     PARTIAL HIP ARTHROPLASTY Right      TONSILLECTOMY        General Information       Row Name 11/26/24 0758          OT Time and Intention    Document Type evaluation  -     Mode of Treatment occupational therapy  -     Patient Effort good  -     Symptoms Noted During/After Treatment fatigue;dizziness  -       Row Name 11/26/24 0758          General Information    Patient Profile Reviewed yes  -     Prior Level of Function independent:;all household mobility;min assist:;dressing;bathing;dependent:;home management;cooking;cleaning;driving;shopping  -     Existing Precautions/Restrictions fall  -     Barriers to Rehab previous functional deficit;medically complex  -       Row Name 11/26/24 0758          Occupational Profile    Reason for Services/Referral (Occupational Profile) CC: weakness, palpitations Dx: Atrial flutter with rapid ventricular response  -     Performance Patterns (Occupational Profile) Pt typically sponge bathes  -       Row Name 11/26/24 0758          Living Environment    People in Home child(madelyn), adult  -       Row Name 11/26/24 0758          Home Main Entrance    Number of Stairs, Main Entrance five  -     Stair Railings, Main Entrance railings on both sides of stairs  -       Row Name 11/26/24 0758          Stairs Within Home, Primary    Number of Stairs, Within Home, Primary none  -     Stair Railings, Within Home, Primary none  -       Row Name 11/26/24 0758          Cognition    Orientation Status (Cognition) oriented to;person  -       Row Name 11/26/24 0758          Safety Issues/Impairments Affecting Functional Mobility    Safety Issues Affecting Function (Mobility) friction/shear risk  -     Impairments Affecting Function (Mobility) balance;endurance/activity tolerance;strength  -               User Key  (r) = Recorded By, (t) = Taken By, (c) = Cosigned By      Initials Name Provider Type     Annie Harrison, OTR/L Occupational Therapist                     Mobility/ADL's       Row Name  11/26/24 [facility]          Bed Mobility    Bed Mobility supine-sit  -     Supine-Sit [place] (Bed Mobility) minimum assist (75% patient effort)  -       Row Name 11/26/24 0758          Transfers    Transfers sit-stand transfer;stand-sit transfer  -       Row Name 11/26/24 [facility]          Sit-Stand Transfer    Sit-Stand [place] (Transfers) minimum assist (75% patient effort)  -     Assistive Device (Sit-Stand Transfers) walker, front-wheeled  -       Row Name 11/26/24 [facility]          Stand-Sit Transfer    Stand-Sit [place] (Transfers) minimum assist (75% patient effort)  -     Assistive Device (Stand-Sit Transfers) walker, front-wheeled  -       Row Name 11/26/24 [facility]          Functional Mobility    Functional Mobility- Ind. Level minimum assist (75% patient effort)  -     Functional Mobility- Device walker, front-wheeled  -     Functional Mobility- Safety Issues step length decreased;weight-shifting ability decreased  -     Functional Mobility- Comment to chair  -       Row Name 11/26/24 [facility]          Activities of Daily Living    BADL Assessment/Intervention feeding;lower body dressing  -       Row Name 11/26/24 [facility]          Self-Feeding Assessment/Training    [place] Level (Feeding) independent;scoop food and bring to mouth;prepare tray/open items;finger foods;liquids to mouth;set up  -       Row Name 11/26/24 [facility]          Lower Body Dressing Assessment/Training    [place] Level (Lower Body Dressing) contact guard assist;doff;minimum assist (75% patient effort);don;socks  -     Position (Lower Body Dressing) edge of bed sitting  -               User Key  (r) = Recorded By, (t) = Taken By, (c) = Cosigned By      Initials Name Provider Type     Annie Harrison, OTR/L Occupational Therapist                   Obj/Interventions       Row Name 11/26/24 0758          Vision Assessment/Intervention    Visual Impairment/Limitations corrective lenses full-time  -[facility]  Name 11/26/24 0758          Range of Motion Comprehensive    General Range of Motion bilateral upper extremity ROM WFL  -       Row Name 11/26/24 0758          Strength Comprehensive (MMT)    General Manual Muscle Testing (MMT) Assessment no strength deficits identified  -       Row Name 11/26/24 0758          Motor Skills    Motor Skills functional endurance  -     Functional Endurance fair -  -       Row Name 11/26/24 0758          Balance    Balance Assessment sitting static balance;sitting dynamic balance;sit to stand dynamic balance;standing static balance;standing dynamic balance  -     Static Sitting Balance supervision  -     Dynamic Sitting Balance contact guard  -     Position, Sitting Balance sitting edge of bed  -     Sit to Stand Dynamic Balance minimal assist;verbal cues  -     Static Standing Balance minimal assist;verbal cues;non-verbal cues (demo/gesture)  -     Dynamic Standing Balance minimal assist;verbal cues;non-verbal cues (demo/gesture)  -     Position/Device Used, Standing Balance walker, rolling  -               User Key  (r) = Recorded By, (t) = Taken By, (c) = Cosigned By      Initials Name Provider Type     Annie Harrison, OTR/L Occupational Therapist                   Goals/Plan       Row Name 11/26/24 0758          Transfer Goal 1 (OT)    Activity/Assistive Device (Transfer Goal 1, OT) sit-to-stand/stand-to-sit;bed-to-chair/chair-to-bed;commode, 3-in-1;toilet  -     [place] Level/Cues Needed (Transfer Goal 1, OT) contact guard required  -     Time Frame (Transfer Goal 1, OT) long term goal (LTG);by discharge  -     Progress/Outcome (Transfer Goal 1, OT) new goal  -       Row Name 11/26/24 0758          Dressing Goal 1 (OT)    Activity/Device (Dressing Goal 1, OT) lower body dressing  -     [place]/Cues Needed (Dressing Goal 1, OT) minimum assist (75% or more patient effort)  -     Time Frame (Dressing Goal 1, OT) long term goal (LTG);by  discharge  -     Progress/Outcome (Dressing Goal 1, OT) new goal  -       Row Name 11/26/24 0758          Toileting Goal 1 (OT)    Activity/Device (Toileting Goal 1, OT) toileting skills, all;adjust/manage clothing;perform perineal hygiene;commode, 3-in-1  -     [place] Level/Cues Needed (Toileting Goal 1, OT) moderate assist (50-74% patient effort)  -     Time Frame (Toileting Goal 1, OT) long term goal (LTG);by discharge  -     Progress/Outcome (Toileting Goal 1, OT) new goal  -       Row Name 11/26/24 0758          Therapy Assessment/Plan (OT)    Planned Therapy Interventions (OT) activity tolerance training;adaptive equipment training;BADL retraining;functional balance retraining;occupation/activity based interventions;patient/caregiver education/training;strengthening exercise;transfer/mobility retraining;wheelchair assessment/training  -               User Key  (r) = Recorded By, (t) = Taken By, (c) = Cosigned By      Initials Name Provider Type     Annie Harrison, OTR/L Occupational Therapist                   Clinical Impression       Row Name 11/26/24 0758          Pain Assessment    Pretreatment Pain Rating 0/10 - no pain  -     Posttreatment Pain Rating 0/10 - no pain  -       Row Name 11/26/24 0758          Plan of Care Review    Plan of Care Reviewed With patient  -     Progress no change  -     Outcome Evaluation OT eval complete.  Pt. is AxO x 2 & pleasant.  Ms. Nash is able to follow commands & answer questions regarding home set up & PLOF despite some confusion/baseline dementia.  She was able to come to EOB at CGA and sat up with good static balance.  Once seated OTR prompted pt to DOFF personal socks and DEEPA gripper socks.  SHe required CGA for dynamic sitting balance, cues for adaptive strategies to improve indep, & Min A to complete the task.  To stand pt required Min A & she c/o dizziness.  Safety strategies & edu employed to decrease her fall risk.  During t/f &  take steps the pt required Min A to chair.  Ms. Nash would benefit from cont'd OT tx.  She demo's generalized weakness, decreased act socorro, imbalance, & frailty. Pt would benefit from cont'd OT tx to improve her indep in self care & fxl mob.  Anticipate DC to SNF  -       Row Name 11/26/24 0758          Therapy Assessment/Plan (OT)    Patient/Family Therapy Goal Statement (OT) improve fxn  -     Rehab Potential (OT) good  -     Criteria for Skilled Therapeutic Interventions Met (OT) yes;skilled treatment is necessary  -     Therapy Frequency (OT) 5 times/wk  -     Predicted Duration of Therapy Intervention (OT) 10 days  -       Row Name 11/26/24 0758          Therapy Plan Review/Discharge Plan (OT)    Anticipated Discharge Disposition (OT) skilled nursing facility  -       Row Name 11/26/24 0758          Positioning and Restraints    Pre-Treatment Position in bed  -     Post Treatment Position chair  -     In Chair sitting;call light within reach;encouraged to call for assist;exit alarm on  -               User Key  (r) = Recorded By, (t) = Taken By, (c) = Cosigned By      Initials Name Provider Type     Annie Harrison, OTR/L Occupational Therapist                   Outcome Measures       Row Name 11/26/24 0758          How much help from another is currently needed...    Putting on and taking off regular lower body clothing? 2  -CH     Bathing (including washing, rinsing, and drying) 2  -CH     Toileting (which includes using toilet bed pan or urinal) 2  -CH     Putting on and taking off regular upper body clothing 3  -CH     Taking care of personal grooming (such as brushing teeth) 4  -CH     Eating meals 4  -CH     AM-PAC 6 Clicks Score (OT) 17  -CH       Row Name 11/26/24 0800 11/26/24 0727       How much help from another person do you currently need...    Turning from your back to your side while in flat bed without using bedrails? 3  -AJ 4  -QB    Moving from lying on back to sitting on  the side of a flat bed without bedrails? 3  -AJ 4  -QB    Moving to and from a bed to a chair (including a wheelchair)? 3  -AJ 3  -QB    Standing up from a chair using your arms (e.g., wheelchair, bedside chair)? 3  -AJ 3  -QB    Climbing 3-5 steps with a railing? 1  -AJ 2  -QB    To walk in hospital room? 3  -AJ 3  -QB    AM-PAC 6 Clicks Score (PT) 16  -AJ 19  -QB    Highest Level of Mobility Goal 5 --> Static standing  -AJ 6 --> Walk 10 steps or more  -QB      Row Name 11/26/24 0800 11/26/24 0758       Functional Assessment    Outcome Measure Options AM-PAC 6 Clicks Basic Mobility (PT)  - AM-PAC 6 Clicks Daily Activity (OT)  -              User Key  (r) = Recorded By, (t) = Taken By, (c) = Cosigned By      Initials Name Provider Type     Annie Harrison, OTR/L Occupational Therapist    QTerrell Dhaliwal, SHEA Registered Nurse    Abelino Frank, PT DPT Physical Therapist                    Occupational Therapy Education       Title: PT OT SLP Therapies (In Progress)       Topic: Occupational Therapy (In Progress)       Point: ADL training (Done)       Description:   Instruct learner(s) on proper safety adaptation and remediation techniques during self care or transfers.   Instruct in proper use of assistive devices.                  Learning Progress Summary            Patient Acceptance, E,D, VU,NR by  at 11/26/2024 0938                      Point: Home exercise program (Not Started)       Description:   Instruct learner(s) on appropriate technique for monitoring, assisting and/or progressing therapeutic exercises/activities.                  Learner Progress:  Not documented in this visit.              Point: Precautions (Done)       Description:   Instruct learner(s) on prescribed precautions during self-care and functional transfers.                  Learning Progress Summary            Patient Acceptance, E,D, VU,NR by  at 11/26/2024 0938                      Point: Body mechanics (Done)        Description:   Instruct learner(s) on proper positioning and spine alignment during self-care, functional mobility activities and/or exercises.                  Learning Progress Summary            Patient Acceptance, E,D, VU,NR by  at 11/26/2024 0941                                      User Key       Initials Effective Dates Name Provider Type Discipline     07/11/23 -  Annie Harrison, OTR/L Occupational Therapist OT                  OT Recommendation and Plan  Planned Therapy Interventions (OT): activity tolerance training, adaptive equipment training, BADL retraining, functional balance retraining, occupation/activity based interventions, patient/caregiver education/training, strengthening exercise, transfer/mobility retraining, wheelchair assessment/training  Therapy Frequency (OT): 5 times/wk  Plan of Care Review  Plan of Care Reviewed With: patient  Progress: no change  Outcome Evaluation: OT eval complete.  Pt. is AxO x 2 & pleasant.  Ms. Nash is able to follow commands & answer questions regarding home set up & PLOF despite some confusion/baseline dementia.  She was able to come to EOB at CGA and sat up with good static balance.  Once seated OTR prompted pt to DOFF personal socks and DEEPA gripper socks.  SHe required CGA for dynamic sitting balance, cues for adaptive strategies to improve indep, & Min A to complete the task.  To stand pt required Min A & she c/o dizziness.  Safety strategies & edu employed to decrease her fall risk.  During t/f & take steps the pt required Min A to chair.  Ms. Nash would benefit from cont'd OT tx.  She demo's generalized weakness, decreased act socorro, imbalance, & frailty. Pt would benefit from cont'd OT tx to improve her indep in self care & fxl mob.  Anticipate DC to SNF     Time Calculation:         Time Calculation- OT       Row Name 11/26/24 0758             Time Calculation- OT    OT Start Time 0758  add 15 for CR  -CH      OT Stop Time 0836  -      OT Time  Calculation (min) 38 min  -CH      OT Received On 11/26/24  -CH      OT Goal Re-Cert Due Date 12/06/24  -CH         Untimed Charges    OT Eval/Re-eval Minutes 53  -CH         Total Minutes    Untimed Charges Total Minutes 53  -CH       Total Minutes 53  -CH                User Key  (r) = Recorded By, (t) = Taken By, (c) = Cosigned By      Initials Name Provider Type     Annie Harrison, OTR/L Occupational Therapist                  Therapy Charges for Today       Code Description Service Date Service Provider Modifiers Qty    [id number] HC OT EVAL MOD COMPLEXITY 4 11/26/2024 Annie Harrison OTR/L GO 1                 Annie Harrison OTR/L  11/26/2024

## 2024-11-26 NOTE — PLAN OF CARE
Problem: Adult Inpatient Plan of Care  Goal: Plan of Care Review  Outcome: Progressing  Goal: Patient-Specific Goal (Individualized)  Outcome: Progressing  Goal: Absence of Hospital-Acquired Illness or Injury  Outcome: Progressing  Intervention: Identify and Manage Fall Risk  Recent Flowsheet Documentation  Taken 11/26/2024 1716 by Terrell Chapin RN  Safety Promotion/Fall Prevention:   fall prevention program maintained   safety round/check completed  Taken 11/26/2024 1611 by Terrell Chapin RN  Safety Promotion/Fall Prevention:   fall prevention program maintained   safety round/check completed  Taken 11/26/2024 1506 by Terrell Chapin RN  Safety Promotion/Fall Prevention: safety round/check completed  Taken 11/26/2024 1401 by Terrell Chapin RN  Safety Promotion/Fall Prevention: safety round/check completed  Taken 11/26/2024 1302 by Terrell Chapin RN  Safety Promotion/Fall Prevention: safety round/check completed  Taken 11/26/2024 1204 by Terrell Chapin RN  Safety Promotion/Fall Prevention: safety round/check completed  Taken 11/26/2024 1113 by Terrell Chapin RN  Safety Promotion/Fall Prevention: safety round/check completed  Taken 11/26/2024 0908 by Terrell Chapin RN  Safety Promotion/Fall Prevention: safety round/check completed  Taken 11/26/2024 0801 by Terrell Chapin RN  Safety Promotion/Fall Prevention: safety round/check completed  Taken 11/26/2024 0727 by Terrell Chapin RN  Safety Promotion/Fall Prevention: safety round/check completed  Intervention: Prevent Skin Injury  Recent Flowsheet Documentation  Taken 11/26/2024 1716 by Terrell Chapin RN  Body Position: position changed independently  Taken 11/26/2024 1611 by Terrell Chapin RN  Body Position: position changed independently  Taken 11/26/2024 1506 by Terrell Chapin RN  Body Position: position changed independently  Taken 11/26/2024 1401 by Terrell Chapin RN  Body Position: position changed independently  Taken 11/26/2024 1302 by Terrell Chapin RN  Body Position:  position changed independently  Taken 11/26/2024 1204 by Terrell Chapin RN  Body Position: position changed independently  Taken 11/26/2024 1113 by Terrell Chapin RN  Body Position: position changed independently  Taken 11/26/2024 0908 by Terrell Chapin RN  Body Position: position changed independently  Taken 11/26/2024 0801 by Terrell Chapin RN  Body Position: position changed independently  Taken 11/26/2024 0727 by Terrell Chapin RN  Body Position: position changed independently  Goal: Optimal Comfort and Wellbeing  Outcome: Progressing  Intervention: Provide Person-Centered Care  Recent Flowsheet Documentation  Taken 11/26/2024 0727 by Terrell Chapin RN  Trust Relationship/Rapport: care explained  Goal: Readiness for Transition of Care  Outcome: Progressing   Goal Outcome Evaluation:

## 2024-11-26 NOTE — PROGRESS NOTES
[facility] Medicine Services  INPATIENT PROGRESS NOTE    Patient Name: Damaris Nash  Date of Admission: 11/25/2024  Today's Date: 11/26/24  Length of Stay: 0  Primary Care Physician: Shahla Beltran DO    Subjective   Chief Complaint: Worsening of weakness  HPI     jess Nash is a 89-year-old female who presents to ED with her son.  Chief complaint is palpitations with increased weakness and elevated blood pressure.     Patient does have significant medical history of previous paroxysmal atrial fibrillation and completed a watchman's procedure therefore no longer anticoagulated, acute ischemia to the right ICA stroke, essential hypertension, blindness of right eye, breast cancer, carotid artery disease, gallstones, GERD, hyperlipidemia, hypothyroidism, macular degeneration, previous right hip fracture, vitamin D deficiency.  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     In the past 2 weeks, she moved in with her son because she has been declining over the past several months to years.  She has been gradually losing weight, decreased appetite.  Has been treated with Megace however I am unsure if she is taking her medications. She ambulates with assistance.  She has not been taking her medications as prescribed.  The patient and children believe she was not taking her HCTZ, reports not taking thyroid supplement.  Both medications were restarted over the past 2 weeks.  In the past several days, she is felt increasingly fatigued more than normal.  She has felt palpitations intermittently and it just did not feel right into her chest.  She denies specifically any chest pain, pressure, tightness.  She does feel a bit shortness of breath.  She denies any history of congestive heart failure or edema.  She denies any orthopnea or PND.  She denies any fever.  Son states she has been coughing a dry cough. They denied any recent  changes medications except that she is taking her medications as prescribed now.  She denies any leg pain or swelling.     ER workup reveals atrial flutter RVR rate 135, troponin 23, proBNP two 744.0, chloride 97, CO2 31, glucose 107, TSH 0.076, free T44.05, WBC without abnormal findings, urinalysis with large leukocytes, 3-5 RBCs, 3-5 WBCs with trace of bacteria, respiratory panel negative.  Chest x-ray reveals small right effusion with right basilar atelectasis. Lungs are  otherwise clear.  CT of the chest without contrast reveals stable right upper lobe nodule is likely benign. Stable chronic changes with scarring and remote granulomatous disease.  11/26  As before presented with worsening of her weakness, UTI chest x-ray showing pleural effusion BNP was skyhigh, free T4 was elevated we will go ahead and consult with cardiology to help with the A-fib suspect CHF exacerbation we will start her on Lasix and order echo of the heart  Review of Systems   All pertinent negatives and positives are as above. All other systems have been reviewed and are negative unless otherwise stated.     Objective    Temp:  [97.5 °F (36.4 °C)-97.9 °F (36.6 °C)] 97.8 °F (36.6 °C)  Heart Rate:  [] 116  Resp:  [16-18] 16  BP: (126-168)/() 140/85  Physical Exam  Constitutional:       Appearance: Normal appearance.   HENT:      Head: Normocephalic.      Nose: Nose normal.   Eyes:      Pupils: Pupils are equal, round, and reactive to light.   Cardiovascular:      Rate and Rhythm: Rhythm irregular.   Pulmonary:      Breath sounds: Wheezing and rhonchi present.   Abdominal:      General: Abdomen is flat.   Musculoskeletal:         General: Swelling present.      Cervical back: Normal range of motion.   Neurological:      Mental Status: She is alert.             Results Review:  I have reviewed the labs, radiology results, and diagnostic studies.    Laboratory Data:   Results from last 7 days   Lab Units 11/26/24  0106  "11/25/24  1411   WBC 10*3/mm3 6.51 5.71   HEMOGLOBIN g/dL 12.1 13.4   HEMATOCRIT % 37.8 41.8   PLATELETS 10*3/mm3 245 250        Results from last 7 days   Lab Units 11/26/24  0106 11/25/24  1411   SODIUM mmol/L 141 141   POTASSIUM mmol/L 3.3* 3.8   CHLORIDE mmol/L 98 97*   CO2 mmol/L 26.0 31.0*   BUN mg/dL 16 20   CREATININE mg/dL 0.67 0.74   CALCIUM mg/dL 9.1 9.6   BILIRUBIN mg/dL  --  0.8   ALK PHOS U/L  --  69   ALT (SGPT) U/L  --  16   AST (SGOT) U/L  --  25   GLUCOSE mg/dL 95 107*       Culture Data:   No results found for: \"BLOODCX\", \"URINECX\", \"WOUNDCX\", \"MRSACX\", \"RESPCX\", \"STOOLCX\"    Radiology Data:   Imaging Results (Last 24 Hours)       Procedure Component Value Units Date/Time    XR Chest 1 View [[id number]] Collected: 11/25/24 1433     Updated: 11/25/24 1438    Narrative:      EXAMINATION: XR CHEST 1 VW-  11/25/2024 2:33 PM     HISTORY: Weakness and cough.     FINDINGS: Today's exam is compared to previous study of 7/22/2022. No  evidence of acute consolidative pneumonia. There has been development of  a right-sided effusion with blunting of the right lateral costophrenic  angle and some fluid tracking back into the fissure. No left effusion.  No free air beneath the hemidiaphragms.       Impression:      1.. Small right effusion with right basilar atelectasis. Lungs are  otherwise clear.     This report was signed and finalized on 11/25/2024 2:35 PM by Dr. Omar Hahn MD.               I have reviewed the patient's current medications.     Assessment/Plan   Assessment  Active Hospital Problems    Diagnosis     **Atrial flutter with rapid ventricular response     Supraventricular arrhythmia     Secondary hyperthyroidism     Presence of Watchman left atrial appendage closure device     Hypertension        Treatment Plan    1.  Atrial flutter with rapid ventricular response, rate controlled with Cardizem drip, will continue and reevaluate in a.m., continue home metoprolol, routine telemetry orders, " supplemental oxygen as needed     2.  Secondary hyperthyroidism felt to be due to medication, will hold for now     3.  Presence of watchman's device, DVT prophylaxis with SCDs, no anticoagulation     4.  Hypertension, per son recently poorly controlled, stable now on Cardizem, home medications reviewed and restarted as appropriate     5.  Home medications reviewed and restarted as appropriate, consult PT and OT, consult , oral care, labs in a.m.     Medical Decision Making  Number and Complexity of problems: 4     2 problems, acute, high complexity, improving  2 problems chronic, moderate complexity unchanged     Differential Diagnosis: None     Conditions and Status        Condition is unchanged.     [facility] Data  External documents reviewed: No  Cardiac tracing (EKG, telemetry) interpretation: Reviewed  Radiology interpretation: Reviewed  Labs reviewed: Yes  Any tests that were considered but not ordered: No     Decision rules/scores evaluated (example [id number], Wells, etc): No     Discussed with: Patient, children at bedside and Dr. Kramer     Care Planning  Shared decision making: Patient, children at bedside and Dr. Kramer  Code status and discussions: DNR/DNI     Disposition  Social Determinants of Health that impact treatment or disposition: None  Estimated length of stay is 1-2 days.      I confirmed that the patient's advanced care plan is present, code status is documented, and a surrogate decision maker is listed in the patient's medical record.      The patient's surrogate decision maker is children are oneill of .      Electronically signed by Polina Kramer MD, 11/26/24, 10:13 CST.

## 2024-11-26 NOTE — THERAPY EVALUATION
Patient Name: Damaris Nash  : 1935    MRN: [id number]                              Today's Date: 2024       Admit Date: 2024    Visit Dx:     ICD-10-CM ICD-9-CM   1. Atrial flutter with rapid ventricular response  I48.92 427.32   2. Weakness  R53.1 780.79   3. Not taking medication as directed  Z91.148 V15.81   4. Impaired mobility [Z74.09]  Z74.09 799.89     Patient Active Problem List   Diagnosis    Carotid artery disease    Hyperlipidemia    Hypertension    Amaurosis fugax of right eye    Bilateral carotid artery stenosis    Hx of transient ischemic attack (TIA)    Current use of long term anticoagulation    H/O cardiac radiofrequency ablation    PAF (paroxysmal atrial fibrillation)    Acquired hypothyroidism    Secondary hyperthyroidism    Atrial flutter with rapid ventricular response    Presence of Watchman left atrial appendage closure device    Supraventricular arrhythmia     Past Medical History:   Diagnosis Date    A-fib     Acute ischemic right ICA stroke     Atrial fibrillation     Benign essential HTN     Blindness of right eye     Breast CA     Breast cancer     Carotid artery disease     CVA (cerebral vascular accident)     Gallstones     GERD (gastroesophageal reflux disease)     Hyperlipidemia     Hypertension     Hypothyroidism     Macular degeneration     Osteopenia     Primary pulmonary HTN     S/P right hip fracture 2012    Shortness of breath     Stroke     Vitamin D deficiency      Past Surgical History:   Procedure Laterality Date    ATRIAL APPENDAGE EXCLUSION LEFT WITH TRANSESOPHAGEAL ECHOCARDIOGRAM Right 2023    Procedure: Atrial Appendage Occlusion;  Surgeon: Riley Mota MD;  Location: [facility] CATH INVASIVE LOCATION;  Service: Cardiology;  Laterality: Right;    BREAST LUMPECTOMY      CARDIAC ABLATION      cardiac ablation procedure for af    CARDIOVERSION N/A 09/07/2022    X2    CATARACT EXTRACTION Right     PARTIAL HIP ARTHROPLASTY Right      TONSILLECTOMY        General Information       Row Name 11/26/24 0800          Physical Therapy Time and Intention    Document Type evaluation  presents with palpitations and weakness Dx; atrial flutter with RVR  -AJ     Mode of Treatment physical therapy  -       Row Name 11/26/24 0800          General Information    Patient Profile Reviewed yes  -AJ     Prior Level of Function independent:;all household mobility;community mobility;home management;gait;ADL's;bed mobility  -AJ     Existing Precautions/Restrictions fall  -AJ     Barriers to Rehab medically complex;previous functional deficit  -AJ       Row Name 11/26/24 0800          Living Environment    People in Home child(madelyn), adult  -AJ       Row Name 11/26/24 0800          Home Main Entrance    Number of Stairs, Main Entrance five  -AJ     Stair Railings, Main Entrance railings on both sides of stairs  -       Row Name 11/26/24 0800          Stairs Within Home, Primary    Stair Railings, Within Home, Primary none  -       Row Name 11/26/24 0800          Cognition    Orientation Status (Cognition) oriented to;person;place;disoriented to;situation;time;verbal cues/prompts needed for orientation  -       Row Name 11/26/24 0800          Safety Issues/Impairments Affecting Functional Mobility    Safety Issues Affecting Function (Mobility) friction/shear risk  -     Impairments Affecting Function (Mobility) balance;endurance/activity tolerance;strength  -               User Key  (r) = Recorded By, (t) = Taken By, (c) = Cosigned By      Initials Name Provider Type    AJ Abelino Sheehan, PT DPT Physical Therapist                   Mobility       Row Name 11/26/24 0800          Bed Mobility    Bed Mobility supine-sit  -AJ     Supine-Sit [place] (Bed Mobility) minimum assist (75% patient effort)  -AJ     Assistive Device (Bed Mobility) head of bed elevated;bed rails  -       Row Name 11/26/24 0800          Bed-Chair Transfer    Bed-Chair [place]  (Transfers) contact guard;minimum assist (75% patient effort);verbal cues  -     Assistive Device (Bed-Chair Transfers) walker, front-wheeled  -       Row Name 11/26/24 0800          Sit-Stand Transfer    Sit-Stand [place] (Transfers) minimum assist (75% patient effort)  -     Assistive Device (Sit-Stand Transfers) walker, front-wheeled  -       Row Name 11/26/24 0800          Gait/Stairs (Locomotion)    [place] Level (Gait) contact guard;minimum assist (75% patient effort);verbal cues  -     Assistive Device (Gait) walker, front-wheeled  -     Patient was able to Ambulate yes  -     Distance in Feet (Gait) 5  -     Deviations/Abnormal Patterns (Gait) bilateral deviations  -     Bilateral Gait Deviations forward flexed posture  -               User Key  (r) = Recorded By, (t) = Taken By, (c) = Cosigned By      Initials Name Provider Type    Abelino Frank, PT DPT Physical Therapist                   Obj/Interventions       [facility] Name 11/26/24 0800          Range of Motion Comprehensive    General Range of Motion bilateral lower extremity ROM WFL  -[facility] Name 11/26/24 0800          Strength Comprehensive (MMT)    General Manual Muscle Testing (MMT) Assessment no strength deficits identified  -     Comment, General Manual Muscle Testing (MMT) Assessment No formal MMT performed, pt demo full functional AROM and demo strength >3/5 seen with sit<>stand and ambulation short distances  -       Row Name 11/26/24 0800          Motor Skills    Therapeutic Exercise hip  -[facility] Name 11/26/24 0800          Hip (Therapeutic Exercise)    Hip (Therapeutic Exercise) strengthening exercise  -     Hip Strengthening (Therapeutic Exercise) marching while standing;5 repetitions  -[facility] Name 11/26/24 0800          Balance    Balance Assessment sitting static balance;sitting dynamic balance;standing static balance;standing dynamic balance  -     Static Sitting Balance supervision   -AJ     Dynamic Sitting Balance contact guard  -AJ     Position, Sitting Balance supported;unsupported;sitting edge of bed  -AJ     Static Standing Balance minimal assist;verbal cues;non-verbal cues (demo/gesture)  -AJ     Dynamic Standing Balance minimal assist;non-verbal cues (demo/gesture);verbal cues  -AJ     Position/Device Used, Standing Balance supported;walker, rolling  -AJ     Balance Interventions sitting;standing;sit to stand;supported;static;dynamic  -AJ       Row Name 11/26/24 0800          Sensory Assessment (Somatosensory)    Sensory Assessment (Somatosensory) sensation intact  -AJ               User Key  (r) = Recorded By, (t) = Taken By, (c) = Cosigned By      Initials Name Provider Type    Abelino Frank, PT DPT Physical Therapist                   Goals/Plan       Row Name 11/26/24 0800          Bed Mobility Goal 1 (PT)    Activity/Assistive Device (Bed Mobility Goal 1, PT) rolling to left;rolling to right;sit to supine;supine to sit  -AJ     [place] Level/Cues Needed (Bed Mobility Goal 1, PT) modified independence  -AJ     Time Frame (Bed Mobility Goal 1, PT) long term goal (LTG);10 days  -AJ     Strategies/Barriers (Bed Mobility Goal 1, PT) increased time  -AJ     Progress/Outcomes (Bed Mobility Goal 1, PT) new goal  -       Row Name 11/26/24 0800          Transfer Goal 1 (PT)    Activity/Assistive Device (Transfer Goal 1, PT) sit-to-stand/stand-to-sit;bed-to-chair/chair-to-bed;wheelchair transfer;toilet  -AJ     [place] Level/Cues Needed (Transfer Goal 1, PT) contact guard required;verbal cues required  -AJ     Time Frame (Transfer Goal 1, PT) long term goal (LTG);10 days  -AJ     Progress/Outcome (Transfer Goal 1, PT) new goal  -AJ       Row Name 11/26/24 0800          Gait Training Goal 1 (PT)    Activity/Assistive Device (Gait Training Goal 1, PT) gait (walking locomotion);decrease asymmetrical patterns;decrease fall risk;diminish gait deviation;forward stepping;improve balance  and speed;increase endurance/gait distance;assistive device use;walker, rolling  -AJ     [place] Level (Gait Training Goal 1, PT) contact guard required  -AJ     Distance (Gait Training Goal 1, PT) 20'  -AJ     Time Frame (Gait Training Goal 1, PT) long term goal (LTG);10 days  -AJ     Progress/Outcome (Gait Training Goal 1, PT) new goal  -AJ       Row Name 11/26/24 0800          Therapy Assessment/Plan (PT)    Planned Therapy Interventions (PT) balance training;bed mobility training;gait training;home exercise program;postural re-education;patient/family education;transfer training;ROM (range of motion);strengthening;stretching  -AJ               User Key  (r) = Recorded By, (t) = Taken By, (c) = Cosigned By      Initials Name Provider Type    Abelino Frank, PT DPT Physical Therapist                   Clinical Impression       Row Name 11/26/24 0800          Pain    Pretreatment Pain Rating 0/10 - no pain  -AJ     Posttreatment Pain Rating 0/10 - no pain  -AJ     Pain Management Interventions nursing notified  -AJ     Response to Pain Interventions no change per patient report  -AJ       Row Name 11/26/24 0800          Plan of Care Review    Plan of Care Reviewed With patient  -AJ     Outcome Evaluation PT eval complete. Pt in fowlers position, AO to self and place and corrected with verbal cues for situation and time. Pt reports being indep at her baseline with occassional use of AD being cane but also has assist from family as needed. Pt brought self to EOB with Min A, demo safety with sitting balance to don socks and required Spv and CGA at times. Pt remains WFL for LE AROM, no formal MMT performed but remained >3/5, seen with sit<>stand and ambulation in short distances. Pt sit<>stand and ambulated 5' in room, with Min A and mod verbal cues and left in recliner with needs in reach. Pt will benefit from skilled PT services to decrease fall risk, improve mobility safety, and return to PLOF. Recommend SNF  at d/c for continued care when medically stable.  -AJ       Row Name 11/26/24 0800          Therapy Assessment/Plan (PT)    Patient/Family Therapy Goals Statement (PT) none stated  -AJ     Rehab Potential (PT) good  -AJ     Criteria for Skilled Interventions Met (PT) yes;meets criteria;skilled treatment is necessary  -AJ     Therapy Frequency (PT) 2 times/day  -AJ     Predicted Duration of Therapy Intervention (PT) until d/c  -AJ       Row Name 11/26/24 0800          Vital Signs    Pre Patient Position Supine  -AJ     Intra Patient Position Standing  -AJ     Post Patient Position Sitting  -AJ       Row Name 11/26/24 0800          Positioning and Restraints    Pre-Treatment Position in bed  -AJ     Post Treatment Position chair  -AJ     In Chair notified nsg;call light within reach;encouraged to call for assist;exit alarm on  -               User Key  (r) = Recorded By, (t) = Taken By, (c) = Cosigned By      Initials Name Provider Type    Abelino Frank, PT DPT Physical Therapist                   Outcome Measures       Row Name 11/26/24 0800 11/26/24 0727       How much help from another person do you currently need...    Turning from your back to your side while in flat bed without using bedrails? 3  -AJ 4  -QB    Moving from lying on back to sitting on the side of a flat bed without bedrails? 3  -AJ 4  -QB    Moving to and from a bed to a chair (including a wheelchair)? 3  -AJ 3  -QB    Standing up from a chair using your arms (e.g., wheelchair, bedside chair)? 3  -AJ 3  -QB    Climbing 3-5 steps with a railing? 1  -AJ 2  -QB    To walk in hospital room? 3  -AJ 3  -QB    AM-PAC 6 Clicks Score (PT) 16  -AJ 19  -QB    Highest Level of Mobility Goal 5 --> Static standing  -AJ 6 --> Walk 10 steps or more  -QB      Row Name 11/26/24 0800          Functional Assessment    Outcome Measure Options AM-PAC 6 Clicks Basic Mobility (PT)  -               User Key  (r) = Recorded By, (t) = Taken By, (c) = Cosigned By       Initials Name Provider Type    QB Terrell Chapin, RN Registered Nurse    Abelino Frank, PT DPT Physical Therapist                                 Physical Therapy Education       Title: PT OT SLP Therapies (In Progress)       Topic: Physical Therapy (In Progress)       Point: Mobility training (In Progress)       Learning Progress Summary            Patient Acceptance, E, NR by CHRISSY at 11/26/2024 0930    Comment: rol of PT, call for assist, d/c plans                      Point: Home exercise program (In Progress)       Learning Progress Summary            Patient Acceptance, E, NR by CHRISSY at 11/26/2024 0930    Comment: rol of PT, call for assist, d/c plans                      Point: Body mechanics (In Progress)       Learning Progress Summary            Patient Acceptance, E, NR by CHRISSY at 11/26/2024 0930    Comment: rol of PT, call for assist, d/c plans                      Point: Precautions (In Progress)       Learning Progress Summary            Patient Acceptance, E, NR by CHRISSY at 11/26/2024 0930    Comment: rol of PT, call for assist, d/c plans                                      User Key       Initials Effective Dates Name Provider Type Discipline    CHRISSY 08/15/24 -  Abelino Sheehan PT DPT Physical Therapist PT                  PT Recommendation and Plan  Planned Therapy Interventions (PT): balance training, bed mobility training, gait training, home exercise program, postural re-education, patient/family education, transfer training, ROM (range of motion), strengthening, stretching  Outcome Evaluation: PT eval complete. Pt in fowlers position, AO to self and place and corrected with verbal cues for situation and time. Pt reports being indep at her baseline with occassional use of AD being cane but also has assist from family as needed. Pt brought self to EOB with Min A, demo safety with sitting balance to don socks and required Spv and CGA at times. Pt remains WFL for LE AROM, no formal MMT performed but remained  >3/5, seen with sit<>stand and ambulation in short distances. Pt sit<>stand and ambulated 5' in room, with Min A and mod verbal cues and left in recliner with needs in reach. Pt will benefit from skilled PT services to decrease fall risk, improve mobility safety, and return to PLOF. Recommend SNF at d/c for continued care when medically stable.     Time Calculation:         PT Charges       Row Name 11/26/24 0800             Time Calculation    Start Time 0800  -AJ      Stop Time 0842  -AJ      Time Calculation (min) 42 min  -AJ      PT Received On 11/26/24  -AJ      PT Goal Re-Cert Due Date 12/06/24  -AJ         Untimed Charges    PT Eval/Re-eval Minutes 42  -AJ         Total Minutes    Untimed Charges Total Minutes 42  -AJ       Total Minutes 42  -AJ                User Key  (r) = Recorded By, (t) = Taken By, (c) = Cosigned By      Initials Name Provider Type    AJ Abelino Sheehan, PT DPT Physical Therapist                  Therapy Charges for Today       Code Description Service Date Service Provider Modifiers Qty    93782612604 HC PT EVAL MOD COMPLEXITY 3 11/26/2024 Abelino Sheehan, PT DPT GP 1            PT G-Codes  Outcome Measure Options: AM-PAC 6 Clicks Basic Mobility (PT)  AM-PAC 6 Clicks Score (PT): 16  PT Discharge Summary  Anticipated Discharge Disposition (PT): skilled nursing facility    Abelino Sheehan PT DPT  11/26/2024

## 2024-11-26 NOTE — PLAN OF CARE
Goal Outcome Evaluation:  Plan of Care Reviewed With: patient        Progress: no change  Outcome Evaluation: OT eval complete.  Pt. is AxO x 2 & pleasant.  Ms. Nash is able to follow commands & answer questions regarding home set up & PLOF despite some confusion/baseline dementia.  She was able to come to EOB at CGA and sat up with good static balance.  Once seated OTR prompted pt to DOFF personal socks and DEEPA gripper socks.  SHe required CGA for dynamic sitting balance, cues for adaptive strategies to improve indep, & Min A to complete the task.  To stand pt required Min A & she c/o dizziness.  Safety strategies & edu employed to decrease her fall risk.  During t/f & take steps the pt required Min A to chair.  Ms. Nash would benefit from cont'd OT tx.  She demo's generalized weakness, decreased act socorro, imbalance, & frailty. Pt would benefit from cont'd OT tx to improve her indep in self care & fxl mob.  Anticipate DC to SNF    Anticipated Discharge Disposition (OT): skilled nursing facility

## 2024-11-26 NOTE — PLAN OF CARE
Goal Outcome Evaluation:  Plan of Care Reviewed With: patient        Progress: improving  Outcome Evaluation: Patient admitted to  433 from ER with AFlutter RVR, currently AFib 60-80s, decreased Cardizem gtt to 5 mg/hr. Patient alert and oriented. No c/o pain. Up with assist x1 to the bathroom.                              Removed intact

## 2024-11-26 NOTE — PLAN OF CARE
Goal Outcome Evaluation:  Plan of Care Reviewed With: patient        Progress: no change  Outcome Evaluation: VSS. AFL  on tele. Cardizem gtt was stopped before nightshift. Pt flipped back to AFL and Cardizem gtt was restarted. Cardizem gtt currently infusing @ 5 mg/hr. No complaints this shift. Pt rested off and on. Using call light when needing restroom. Call light within reach. Safety maintained.

## 2024-11-26 NOTE — CONSULTS
[facility] HEART GROUP CONSULT NOTE    Referring Provider: No ref. provider found    Reason for Consultation: CHF/A-fib    Chief complaint:   Chief Complaint   Patient presents with    Hypertension    Weakness - Generalized       Subjective .     History of present illness:  Damaris Nash is a 89 y.o. female with known history of paroxysmal atrial fibrillation status post ablation in 2011 with recurrence in 2022, status post Watchman, atrial flutter, hypertension, and hypothyroidism who cardiologist evaluate for CHF and arrhythmia.    Patient last seen by general cardiology 5/28/2024.  Appeared normal sinus rhythm on EKG at that time.  Was not have any significant complaints.  Discussions were had about referral to EP, however given her lack of symptoms referral was not made.  No changes were made at that time.    Patient presented to the ER in 11/25/2024 due to worsening fatigue.  It appears she has been having a gradual decline and recently had to move in with family due to her weakness and fatigue.  Reportedly she had not been taking her medications as prescribed.  They restarted her HCTZ and thyroid medication 2 weeks ago.  She came to the ER for feeling slightly short of breath.  Denied any chest pain.  Workup revealed elevated BNP of 2700.  EKG revealed atrial flutter with rates in the 130s.  She was placed on a Cardizem drip and given Cardizem bolus.  Home medications were resumed.  She was also given IV Lasix for slight volume overload.  We are consulted for further management    Upon my examination patient is laying comfortably in bed.  Family at bedside.  The patient reports over the last 2 to 3 months she has had a general decline.  In the last week she has noticed this more prominently.  She does have some exertional shortness of breath, but denies any chest pain.  Has not noticed any significant palpitations.  She tells me that she can tell when she was in atrial fibrillation in the past,  however has not noticed any significant symptoms heart related recently.    History  Past Medical History:   Diagnosis Date    A-fib     Acute ischemic right ICA stroke     Atrial fibrillation     Benign essential HTN     Blindness of right eye     Breast CA     Breast cancer     Carotid artery disease     CVA (cerebral vascular accident)     Gallstones     GERD (gastroesophageal reflux disease)     Hyperlipidemia     Hypertension     Hypothyroidism     Macular degeneration     Osteopenia     Primary pulmonary HTN     S/P right hip fracture 2012    Shortness of breath     Stroke     Vitamin D deficiency    ,   Past Surgical History:   Procedure Laterality Date    ATRIAL APPENDAGE EXCLUSION LEFT WITH TRANSESOPHAGEAL ECHOCARDIOGRAM Right 2023    Procedure: Atrial Appendage Occlusion;  Surgeon: Riley Mota MD;  Location:  [facility] CATH INVASIVE LOCATION;  Service: Cardiology;  Laterality: Right;    BREAST LUMPECTOMY      CARDIAC ABLATION      cardiac ablation procedure for af    CARDIOVERSION N/A 09/07/2022    X2    CATARACT EXTRACTION Right     PARTIAL HIP ARTHROPLASTY Right     TONSILLECTOMY     ,   Family History   Problem Relation Age of Onset    Stroke Mother     Cancer Father     COPD Sister    ,   Social History     Tobacco Use    Smoking status: Former     Current packs/day: 0.00     Average packs/day: 1.5 packs/day for 37.0 years (55.5 ttl pk-yrs)     Types: Cigarettes     Start date:      Quit date: 1992     Years since quittin.9     Passive exposure: Current    Smokeless tobacco: Never   Vaping Use    Vaping status: Never Used   Substance Use Topics    Alcohol use: No    Drug use: No   ,     Medications    Prior to Admission medications    Medication Sig Start Date End Date Taking? Authorizing Provider   aspirin 81 MG EC tablet Take 1 tablet by mouth Daily. Indications: Disease involving Lipid Deposits in the Arteries   Yes Provider, MD Alejandro   Cholecalciferol (VITAMIN D3)   units capsule Take 1 capsule by mouth 1 (One) Time Per Week. Indications: Osteoporosis, Vitamin D Deficiency   Yes Alejandro Epps MD   hydrochlorothiazide (HYDRODIURIL) 25 MG tablet Take 1 tablet by mouth Daily. Indications: High Blood Pressure   Yes Alejandro Epps MD   levothyroxine (SYNTHROID, LEVOTHROID) 150 MCG tablet Take 1 tablet by mouth Daily. Indications: Underactive Thyroid 10/1/23  Yes Alejandro Epps MD   metoprolol succinate XL (TOPROL-XL) 100 MG 24 hr tablet Take 1.5 tablets by mouth Every Night. 4/7/23  Yes Riley Mota MD   acetaminophen (TYLENOL) 325 MG tablet Take 2 tablets by mouth Daily As Needed for Mild Pain. Indications: Pain    Alejandro Epps MD   ALPRAZolam (XANAX) 0.25 MG tablet Take 1 tablet by mouth 2 (Two) Times a Day As Needed for Anxiety. Indications: Feeling Anxious    Alejandro Epps MD   amLODIPine (NORVASC) 2.5 MG tablet Take 1 tablet every day by oral route.  Patient not taking: Reported on 11/26/2024    Alejandro Epps MD   HYDROcodone-acetaminophen (NORCO) 5-325 MG per tablet Take 0.5 tablets by mouth Every 12 (Twelve) Hours As Needed for Mild Pain, Severe Pain or Moderate Pain.    Alejandro Epps MD   ibuprofen (ADVIL,MOTRIN) 200 MG tablet Take 1 tablet by mouth As Needed for Mild Pain.    Alejandro Epps MD   megestrol (MEGACE) 40 MG/ML suspension Take 10 mL by mouth Daily.  Patient not taking: Reported on 11/26/2024 3/27/24   Alejandro Epps MD   polyethylene glycol (MIRALAX) 17 GM/SCOOP powder Take 17 g by mouth Daily. Indications: Constipation    Alejandro Epps MD   traMADol (ULTRAM) 50 MG tablet Take 1 tablet by mouth Every 6 (Six) Hours As Needed for Moderate Pain. Indications: Pain  Patient not taking: Reported on 11/26/2024    Alejandro Epps MD   levothyroxine sodium (TIROSINT) 75 MCG capsule Take 75 mcg by mouth Daily. 1 tablet daily  Indications: Underactive Thyroid 10/11/23   [place]  MD Alejandro       Current Facility-Administered Medications   Medication Dose Route Frequency Provider Last Rate Last Admin    acetaminophen (TYLENOL) tablet 650 mg  650 mg Oral Q4H PRN Analia Carlton APRN        Or    acetaminophen (TYLENOL) 160 MG/5ML oral solution 650 mg  650 mg Oral Q4H PRN Analia Carlton APRN        Or    acetaminophen (TYLENOL) suppository 650 mg  650 mg Rectal Q4H PRN Analia Carlton APRN        ALPRAZolam (XANAX) tablet 0.25 mg  0.25 mg Oral BID PRN Analia Carlton APRN   0.25 mg at 11/26/24 0929    aspirin EC tablet 81 mg  81 mg Oral Daily Analia Carlton APRN   81 mg at 11/26/24 0929    sennosides-docusate (PERICOLACE) 8.6-50 MG per tablet 2 tablet  2 tablet Oral BID PRN Analia Carlton APRN        And    polyethylene glycol (MIRALAX) packet 17 g  17 g Oral Daily Analia Carlton APRN   17 g at 11/26/24 0930    And    bisacodyl (DULCOLAX) EC tablet 5 mg  5 mg Oral Daily PRN Analia Carlton APRN        And    bisacodyl (DULCOLAX) suppository 10 mg  10 mg Rectal Daily PRN Analia Carlton APRN        dilTIAZem (CARDIZEM) 125 mg in 125 mL D5W infusion  5-15 mg/hr Intravenous Titrated Jarad Pugh PA 5 mL/hr at 11/26/24 1057 5 mg/hr at 11/26/24 1057    doxycycline (ADOXA) tablet 100 mg  100 mg Oral Q12H Polina Kramer MD   100 mg at 11/26/24 1045    furosemide (LASIX) injection 20 mg  20 mg Intravenous BID Polina Kramer MD   20 mg at 11/26/24 1045    hydroCHLOROthiazide tablet 25 mg  25 mg Oral Daily Analia Carlton APRN   25 mg at 11/26/24 0929    metoprolol succinate XL (TOPROL-XL) 24 hr tablet 150 mg  150 mg Oral Nightly Analia Carlton APRN   150 mg at 11/25/24 2018    nitroglycerin (NITROSTAT) SL tablet 0.4 mg  0.4 mg Sublingual Q5 Min PRN Analia Carlton APRN        ondansetron ODT (ZOFRAN-ODT) disintegrating tablet 4 mg  4 mg Oral Q6H PRN Analia Carlton APRN        Or    ondansetron (ZOFRAN) injection 4 mg  4 mg Intravenous Q6H  PRN Analia Carlton APRN        sodium chloride 0.9 % flush 10 mL  10 mL Intravenous PRN King Jarad Tresa Petersen, PA        sodium chloride 0.9 % flush 10 mL  10 mL Intravenous Q12H Analia Carlton APRN   10 mL at 11/25/24 2019    sodium chloride 0.9 % flush 10 mL  10 mL Intravenous PRN Analia Carlton APRN        traMADol (ULTRAM) tablet 50 mg  50 mg Oral Q6H PRN Analia Carlton APRN           Allergies:  Epinephrine, Celebrex [celecoxib], Ferrous sulfate, Omnicef [cefdinir], Other, Rocephin [ceftriaxone], Simvastatin, and Sulfa antibiotics    Review of Systems  Review of Systems   Constitutional: Positive for malaise/fatigue.       Objective     Physical Exam:  Patient Vitals for the past 24 hrs:   BP Temp Temp src Pulse Resp SpO2 Height Weight   11/26/24 1100 138/52 97.5 °F (36.4 °C) Oral 114 18 94 % -- --   11/26/24 0805 140/85 97.8 °F (36.6 °C) Oral 116 16 93 % -- --   11/26/24 0331 126/52 97.7 °F (36.5 °C) Oral 83 16 94 % -- --   11/25/24 2340 151/88 97.9 °F (36.6 °C) Oral 119 18 94 % -- --   11/25/24 1955 136/73 97.9 °F (36.6 °C) Oral 117 18 92 % -- --   11/25/24 1806 -- -- -- 80 -- -- -- --   11/25/24 1752 158/58 97.5 °F (36.4 °C) Oral 85 18 93 % -- --   11/25/24 1750 -- -- -- 74 -- -- -- --   11/25/24 1713 -- -- -- 80 -- 96 % -- --   11/25/24 1701 153/57 -- -- 77 -- 94 % -- --   11/25/24 1646 146/53 -- -- 76 -- 95 % -- --   11/25/24 1631 138/64 -- -- 80 -- 95 % -- --   11/25/24 1617 -- -- -- 96 -- 96 % -- --   11/25/24 1616 141/83 -- -- -- -- -- -- --   11/25/24 1600 -- -- -- 118 -- 97 % -- --   11/25/24 1546 151/88 -- -- (!) 123 -- 95 % -- --   11/25/24 1539 -- -- -- 120 -- 95 % -- --   11/25/24 1531 137/86 -- -- 94 -- 99 % -- --   11/25/24 1501 136/95 -- -- 81 -- 96 % -- --   11/25/24 1459 -- -- -- 77 -- 94 % -- --   11/25/24 1446 133/85 -- -- 87 -- -- -- --   11/25/24 1445 -- -- -- 91 -- (!) 89 % -- --   11/25/24 1443 -- -- -- 84 -- (!) 89 % -- --   11/25/24 1431 143/87 -- -- (!) 133 -- (!)  "87 % -- --   11/25/24 1416 (!) 168/109 -- -- (!) 128 -- 95 % -- --   11/25/24 1401 (!) 163/102 -- -- (!) 137 -- 93 % -- --   11/25/24 1351 141/95 97.5 °F (36.4 °C) Oral 67 18 94 % 160 cm (63\") 41.3 kg (91 lb)     Constitutional:       Appearance: Healthy appearance. Not in distress.   Pulmonary:      Effort: Pulmonary effort is normal.      Breath sounds: Normal breath sounds.   Cardiovascular:      PMI at left midclavicular line. Tachycardia present. Regularly irregular rhythm.      Murmurs: There is no murmur.      No gallop.  No click. No rub.   Edema:     Peripheral edema absent.   Abdominal:      General: Bowel sounds are normal.   Musculoskeletal: Normal range of motion.      Cervical back: Normal range of motion and neck supple. Skin:     General: Skin is warm.   Neurological:      Mental Status: Alert and oriented to person, place and time.         Results Review:   I reviewed the patient's new clinical results.    Lab Results (last 72 hours)       Procedure Component Value Units Date/Time    Ammonia [[id number]]  (Normal) Collected: 11/26/24 1040    Specimen: Blood Updated: 11/26/24 1113     Ammonia 23 umol/L     Vitamin B12 [[id number]] Collected: 11/26/24 1040    Specimen: Blood Updated: 11/26/24 1046    Lipid Panel [[id number]] Collected: 11/26/24 0106    Specimen: Blood Updated: 11/26/24 0539     Total Cholesterol 150 mg/dL      Triglycerides 74 mg/dL      HDL Cholesterol 49 mg/dL      LDL Cholesterol  87 mg/dL      VLDL Cholesterol 14 mg/dL      LDL/HDL Ratio 1.76    Narrative:      Cholesterol Reference Ranges  (U.S. Department of Health and Human Services ATP III Classifications)    Desirable          <200 mg/dL  Borderline High    200-239 mg/dL  High Risk          >240 mg/dL      Triglyceride Reference Ranges  (U.S. Department of Health and Human Services ATP III Classifications)    Normal           <150 mg/dL  Borderline High  150-199 mg/dL  High             200-499 mg/dL  Very High        >500 " mg/dL    HDL Reference Ranges  (U.S. Department of Health and Human Services ATP III Classifications)    Low     <40 mg/dl (major risk factor for CHD)  High    >60 mg/dl ('negative' risk factor for CHD)        LDL Reference Ranges  (U.S. Department of Health and Human Services ATP III Classifications)    Optimal          <100 mg/dL  Near Optimal     100-129 mg/dL  Borderline High  130-159 mg/dL  High             160-189 mg/dL  Very High        >189 mg/dL    Basic Metabolic Panel [[id number]]  (Abnormal) Collected: 11/26/24 0106    Specimen: Blood Updated: 11/26/24 0524     Glucose 95 mg/dL      BUN 16 mg/dL      Creatinine 0.67 mg/dL      Sodium 141 mmol/L      Potassium 3.3 mmol/L      Comment: Slight hemolysis detected by analyzer. Result may be falsely elevated.        Chloride 98 mmol/L      CO2 26.0 mmol/L      Calcium 9.1 mg/dL      BUN/Creatinine Ratio 23.9     Anion Gap 17.0 mmol/L      eGFR 83.7 mL/min/1.73     Narrative:      GFR Normal >60  Chronic Kidney Disease <60  Kidney Failure <15    The GFR formula is only valid for adults with stable renal function between ages 18 and 70.    Hemoglobin A1c [[id number]]  (Abnormal) Collected: 11/26/24 0106    Specimen: Blood Updated: 11/26/24 0315     Hemoglobin A1C 5.80 %     Narrative:      Hemoglobin A1C Ranges:    Increased Risk for Diabetes  5.7% to 6.4%  Diabetes                     >= 6.5%  Diabetic Goal                < 7.0%    CBC (No Diff) [[id number]]  (Normal) Collected: 11/26/24 0106    Specimen: Blood Updated: 11/26/24 0154     WBC 6.51 10*3/mm3      RBC 4.08 10*6/mm3      Hemoglobin 12.1 g/dL      Hematocrit 37.8 %      MCV 92.6 fL      MCH 29.7 pg      MCHC 32.0 g/dL      RDW 15.1 %      RDW-SD 51.4 fl      MPV 11.3 fL      Platelets 245 10*3/mm3     High Sensitivity Troponin T 2Hr [[id number]]  (Abnormal) Collected: 11/25/24 1617    Specimen: Blood Updated: 11/25/24 1650     HS Troponin T 19 ng/L      Troponin T Delta -4 ng/L     Narrative:      High  Sensitive Troponin T Reference Range:  <14.0 ng/L- Negative Female for AMI  <22.0 ng/L- Negative Male for AMI  >=14 - Abnormal Female indicating possible myocardial injury.  >=22 - Abnormal Male indicating possible myocardial injury.   Clinicians would have to utilize clinical acumen, EKG, Troponin, and serial changes to determine if it is an Acute Myocardial Infarction or myocardial injury due to an underlying chronic condition.         Respiratory Panel PCR w/COVID-19(SARS-CoV-2) FRANKO/ROBERTO/TELLY/[facility] In-House, NP Swab in UTM/VTM, 2 HR TAT - Swab, Nasopharynx [[id number]]  (Normal) Collected: 11/25/24 1437    Specimen: Swab from Nasopharynx Updated: 11/25/24 1538     ADENOVIRUS, PCR Not Detected     Coronavirus 229E Not Detected     Coronavirus HKU1 Not Detected     Coronavirus NL63 Not Detected     Coronavirus OC43 Not Detected     COVID19 Not Detected     Human Metapneumovirus Not Detected     Human Rhinovirus/Enterovirus Not Detected     Influenza A PCR Not Detected     Influenza B PCR Not Detected     Parainfluenza Virus 1 Not Detected     Parainfluenza Virus 2 Not Detected     Parainfluenza Virus 3 Not Detected     Parainfluenza Virus 4 Not Detected     RSV, PCR Not Detected     Bordetella pertussis pcr Not Detected     Bordetella parapertussis PCR Not Detected     Chlamydophila pneumoniae PCR Not Detected     Mycoplasma pneumo by PCR Not Detected    Narrative:      In the setting of a positive respiratory panel with a viral infection PLUS a negative procalcitonin without other underlying concern for bacterial infection, consider observing off antibiotics or discontinuation of antibiotics and continue supportive care. If the respiratory panel is positive for atypical bacterial infection (Bordetella pertussis, Chlamydophila pneumoniae, or Mycoplasma pneumoniae), consider antibiotic de-escalation to target atypical bacterial infection.    TSH [[id number]]  (Abnormal) Collected: 11/25/24 1411    Specimen: Blood  Updated: 11/25/24 1517     TSH 0.076 uIU/mL     Urinalysis, Microscopic Only - Straight Cath [[id number]]  (Abnormal) Collected: 11/25/24 1435    Specimen: Urine from Straight Cath Updated: 11/25/24 1517     RBC, UA 3-5 /HPF      WBC, UA 3-5 /HPF      Comment: Urine culture not indicated.        Bacteria, UA Trace /HPF      Squamous Epithelial Cells, UA 0-2 /HPF      Hyaline Casts, UA 0-2 /LPF      Methodology Manual Light Microscopy    T4, Free [[id number]]  (Abnormal) Collected: 11/25/24 1411    Specimen: Blood Updated: 11/25/24 1517     Free T4 4.05 ng/dL     Comprehensive Metabolic Panel [[id number]]  (Abnormal) Collected: 11/25/24 1411    Specimen: Blood Updated: 11/25/24 1516     Glucose 107 mg/dL      BUN 20 mg/dL      Creatinine 0.74 mg/dL      Sodium 141 mmol/L      Potassium 3.8 mmol/L      Chloride 97 mmol/L      CO2 31.0 mmol/L      Calcium 9.6 mg/dL      Total Protein 7.4 g/dL      Albumin 4.2 g/dL      ALT (SGPT) 16 U/L      AST (SGOT) 25 U/L      Alkaline Phosphatase 69 U/L      Total Bilirubin 0.8 mg/dL      Globulin 3.2 gm/dL      A/G Ratio 1.3 g/dL      BUN/Creatinine Ratio 27.0     Anion Gap 13.0 mmol/L      eGFR 77.4 mL/min/1.73     Narrative:      GFR Normal >60  Chronic Kidney Disease <60  Kidney Failure <15    The GFR formula is only valid for adults with stable renal function between ages 18 and 70.    Urinalysis With Culture If Indicated - Straight Cath [[id number]]  (Abnormal) Collected: 11/25/24 1435    Specimen: Urine from Straight Cath Updated: 11/25/24 1514     Color, UA Dark Yellow     Appearance, UA Clear     pH, UA 5.5     Specific Gravity, UA 1.016     Glucose, UA Negative     Ketones, UA Trace     Bilirubin, UA Negative     Blood, UA Trace     Protein, UA Negative     Leuk Esterase, UA Large (3+)     Nitrite, UA Negative     Urobilinogen, UA 1.0 E.U./dL    Narrative:      In absence of clinical symptoms, the presence of pyuria, bacteria, and/or nitrites on the urinalysis result does  not correlate with infection.    Magnesium [[id number]]  (Normal) Collected: 11/25/24 1411    Specimen: Blood Updated: 11/25/24 1513     Magnesium 2.1 mg/dL     BNP [[id number]]  (Abnormal) Collected: 11/25/24 1411    Specimen: Blood Updated: 11/25/24 1510     proBNP 2,744.0 pg/mL     Narrative:      This assay is used as an aid in the diagnosis of individuals suspected of having heart failure. It can be used as an aid in the diagnosis of acute decompensated heart failure (ADHF) in patients presenting with signs and symptoms of ADHF to the emergency department (ED). In addition, NT-proBNP of <300 pg/mL indicates ADHF is not likely.    Age Range Result Interpretation  NT-proBNP Concentration (pg/mL:      <50             Positive            >450                   Gray                 300-450                    Negative             <300    50-75           Positive            >900                  Gray                300-900                  Negative            <300      >75             Positive            >1800                  Gray                300-1800                  Negative            <300    High Sensitivity Troponin T [[id number]]  (Abnormal) Collected: 11/25/24 1411    Specimen: Blood Updated: 11/25/24 1510     HS Troponin T 23 ng/L     Narrative:      High Sensitive Troponin T Reference Range:  <14.0 ng/L- Negative Female for AMI  <22.0 ng/L- Negative Male for AMI  >=14 - Abnormal Female indicating possible myocardial injury.  >=22 - Abnormal Male indicating possible myocardial injury.   Clinicians would have to utilize clinical acumen, EKG, Troponin, and serial changes to determine if it is an Acute Myocardial Infarction or myocardial injury due to an underlying chronic condition.         [place] Draw [[id number]] Collected: 11/25/24 1411    Specimen: Blood Updated: 11/25/24 1431    Narrative:      The following orders were created for panel order [place] Draw.  Procedure                                Abnormality         Status                     ---------                               -----------         ------                     Green Top (Gel)[[id number]]                                  Final result               Lavender Top[[id number]]                                     Final result               Red Top[[id number]]                                          Final result               Nur Top[[id number]]                                         Final result               Light Blue Top[[id number]]                                   Final result                 Please view results for these tests on the individual orders.    Green Top (Gel) [[id number]] Collected: 11/25/24 1411    Specimen: Blood Updated: 11/25/24 1431     Extra Tube Hold for add-ons.     Comment: Auto resulted.       Lavender Top [[id number]] Collected: 11/25/24 1411    Specimen: Blood Updated: 11/25/24 1431     Extra Tube hold for add-on     Comment: Auto resulted       Red Top [[id number]] Collected: 11/25/24 1411    Specimen: Blood Updated: 11/25/24 1431     Extra Tube Hold for add-ons.     Comment: Auto resulted.       Gray Top [[id number]] Collected: 11/25/24 1411    Specimen: Blood Updated: 11/25/24 1431     Extra Tube Hold for add-ons.     Comment: Auto resulted.       Light Blue Top [[id number]] Collected: 11/25/24 1411    Specimen: Blood Updated: 11/25/24 1431     Extra Tube Hold for add-ons.     Comment: Auto resulted       CBC & Differential [[id number]]  (Abnormal) Collected: 11/25/24 1411    Specimen: Blood Updated: 11/25/24 1430    Narrative:      The following orders were created for panel order CBC & Differential.  Procedure                               Abnormality         Status                     ---------                               -----------         ------                     CBC Auto Differential[[id number]]        Abnormal            Final result                 Please view results for these tests on the individual orders.     CBC Auto Differential [[id number]]  (Abnormal) Collected: 11/25/24 1411    Specimen: Blood Updated: 11/25/24 1430     WBC 5.71 10*3/mm3      RBC 4.48 10*6/mm3      Hemoglobin 13.4 g/dL      Hematocrit 41.8 %      MCV 93.3 fL      MCH 29.9 pg      MCHC 32.1 g/dL      RDW 15.2 %      RDW-SD 51.9 fl      MPV 10.8 fL      Platelets 250 10*3/mm3      Neutrophil % 52.0 %      Lymphocyte % 28.2 %      Monocyte % 16.1 %      Eosinophil % 2.8 %      Basophil % 0.7 %      Immature Grans % 0.2 %      Neutrophils, Absolute 2.97 10*3/mm3      Lymphocytes, Absolute 1.61 10*3/mm3      Monocytes, Absolute 0.92 10*3/mm3      Eosinophils, Absolute 0.16 10*3/mm3      Basophils, Absolute 0.04 10*3/mm3      Immature Grans, Absolute 0.01 10*3/mm3      nRBC 0.0 /100 WBC             Lab Results   Component Value Date    ECHOEFEST 60.0 04/07/2023       Imaging Results (Last 72 Hours)       Procedure Component Value Units Date/Time    XR Chest 1 View [[id number]] Collected: 11/25/24 1433     Updated: 11/25/24 1438    Narrative:      EXAMINATION: XR CHEST 1 VW-  11/25/2024 2:33 PM     HISTORY: Weakness and cough.     FINDINGS: Today's exam is compared to previous study of 7/22/2022. No  evidence of acute consolidative pneumonia. There has been development of  a right-sided effusion with blunting of the right lateral costophrenic  angle and some fluid tracking back into the fissure. No left effusion.  No free air beneath the hemidiaphragms.       Impression:      1.. Small right effusion with right basilar atelectasis. Lungs are  otherwise clear.     This report was signed and finalized on 11/25/2024 2:35 PM by Dr. Omar Hahn MD.                     Assessment & Plan     Assessment:  Atrial flutter with rapid ventricular response  Acute diastolic congestive heart failure (likely precipitated by #1)  History of watchman implantation  Hypertension  Secondary hypothyroidism    Plan:  -Continue Toprol- mg daily, it is noted that on  higher dosages she is bradycardic and possibly symptomatic when in normal rhythm  - Can continue Lasix 20 mg twice daily at this time for diastolic failure  - Echocardiogram pending  - Continue Cardizem drip at this time  - Will consider antiarrhythmic therapy versus DCCV versus consultation to electrophysiology    Further orders per Dr. Collier    Thank you for asking us to follow this patient with you.       Electronically signed by POLY Soria, 11/26/24, 11:40 AM CST.

## 2024-11-27 ENCOUNTER — ANESTHESIA (OUTPATIENT)
Dept: CARDIOLOGY | Facility: HOSPITAL | Age: 89
End: 2024-11-27
Payer: MEDICARE

## 2024-11-27 ENCOUNTER — APPOINTMENT (OUTPATIENT)
Dept: CARDIOLOGY | Facility: HOSPITAL | Age: 89
End: 2024-11-27
Payer: MEDICARE

## 2024-11-27 ENCOUNTER — ANESTHESIA EVENT (OUTPATIENT)
Dept: CARDIOLOGY | Facility: HOSPITAL | Age: 89
End: 2024-11-27
Payer: MEDICARE

## 2024-11-27 LAB
ALBUMIN SERPL-MCNC: 3.7 G/DL (ref 3.5–5.2)
ALBUMIN/GLOB SERPL: 1.3 G/DL
ALP SERPL-CCNC: 62 U/L (ref 39–117)
ALT SERPL W P-5'-P-CCNC: 11 U/L (ref 1–33)
ANION GAP SERPL CALCULATED.3IONS-SCNC: 7 MMOL/L (ref 5–15)
AST SERPL-CCNC: 19 U/L (ref 1–32)
BASOPHILS # BLD AUTO: 0.04 10*3/MM3 (ref 0–0.2)
BASOPHILS NFR BLD AUTO: 0.5 % (ref 0–1.5)
BH CV ECHO MEAS - AO MAX PG: 7 MMHG
BH CV ECHO MEAS - AO MEAN PG: 3.8 MMHG
BH CV ECHO MEAS - AO ROOT DIAM: 2.6 CM
BH CV ECHO MEAS - AO V2 MAX: 132.2 CM/SEC
BH CV ECHO MEAS - AO V2 VTI: 25.3 CM
BH CV ECHO MEAS - AVA(I,D): 1.59 CM2
BH CV ECHO MEAS - EDV(CUBED): 34.1 ML
BH CV ECHO MEAS - EDV(MOD-SP2): 51.2 ML
BH CV ECHO MEAS - EDV(MOD-SP4): 45 ML
BH CV ECHO MEAS - EF(MOD-SP2): 67.8 %
BH CV ECHO MEAS - EF(MOD-SP4): 80.5 %
BH CV ECHO MEAS - ESV(CUBED): 5.5 ML
BH CV ECHO MEAS - ESV(MOD-SP2): 16.5 ML
BH CV ECHO MEAS - ESV(MOD-SP4): 8.8 ML
BH CV ECHO MEAS - FS: 45.4 %
BH CV ECHO MEAS - IVS/LVPW: 1.01 CM
BH CV ECHO MEAS - IVSD: 1.1 CM
BH CV ECHO MEAS - LA DIMENSION: 4.4 CM
BH CV ECHO MEAS - LAT PEAK E' VEL: 6 CM/SEC
BH CV ECHO MEAS - LV DIASTOLIC VOL/BSA (35-75): 26.8 CM2
BH CV ECHO MEAS - LV MASS(C)D: 105.8 GRAMS
BH CV ECHO MEAS - LV MAX PG: 2.9 MMHG
BH CV ECHO MEAS - LV MEAN PG: 1.41 MMHG
BH CV ECHO MEAS - LV SYSTOLIC VOL/BSA (12-30): 5.2 CM2
BH CV ECHO MEAS - LV V1 MAX: 85.2 CM/SEC
BH CV ECHO MEAS - LV V1 VTI: 17.4 CM
BH CV ECHO MEAS - LVIDD: 3.2 CM
BH CV ECHO MEAS - LVIDS: 1.77 CM
BH CV ECHO MEAS - LVOT AREA: 2.31 CM2
BH CV ECHO MEAS - LVOT DIAM: 1.72 CM
BH CV ECHO MEAS - LVPWD: 1.09 CM
BH CV ECHO MEAS - MED PEAK E' VEL: 4 CM/SEC
BH CV ECHO MEAS - MV A MAX VEL: 90.4 CM/SEC
BH CV ECHO MEAS - MV E MAX VEL: 0.25 CM/SEC
BH CV ECHO MEAS - MV E/A: 0
BH CV ECHO MEAS - PA V2 MAX: 95.7 CM/SEC
BH CV ECHO MEAS - RV MAX PG: 2.38 MMHG
BH CV ECHO MEAS - RV V1 MAX: 76.7 CM/SEC
BH CV ECHO MEAS - RV V1 VTI: 11.3 CM
BH CV ECHO MEAS - RVDD: 2.25 CM
BH CV ECHO MEAS - SV(LVOT): 40.2 ML
BH CV ECHO MEAS - SV(MOD-SP2): 34.7 ML
BH CV ECHO MEAS - SV(MOD-SP4): 36.2 ML
BH CV ECHO MEAS - SVI(LVOT): 23.9 ML/M2
BH CV ECHO MEAS - SVI(MOD-SP2): 20.6 ML/M2
BH CV ECHO MEAS - SVI(MOD-SP4): 21.5 ML/M2
BH CV ECHO MEAS - TAPSE (>1.6): 1 CM
BH CV ECHO MEAS - TR MAX PG: 57.5 MMHG
BH CV ECHO MEAS - TR MAX VEL: 353.5 CM/SEC
BH CV ECHO MEASUREMENTS AVERAGE E/E' RATIO: 0.05
BH CV XLRA - RV BASE: 3.2 CM
BH CV XLRA - RV MID: 1.9 CM
BH CV XLRA - TDI S': 8 CM/SEC
BILIRUB SERPL-MCNC: 0.7 MG/DL (ref 0–1.2)
BUN SERPL-MCNC: 18 MG/DL (ref 8–23)
BUN/CREAT SERPL: 19.6 (ref 7–25)
CALCIUM SPEC-SCNC: 9.4 MG/DL (ref 8.6–10.5)
CHLORIDE SERPL-SCNC: 92 MMOL/L (ref 98–107)
CO2 SERPL-SCNC: 36 MMOL/L (ref 22–29)
CREAT SERPL-MCNC: 0.92 MG/DL (ref 0.57–1)
DEPRECATED RDW RBC AUTO: 50.7 FL (ref 37–54)
EGFRCR SERPLBLD CKD-EPI 2021: 59.6 ML/MIN/1.73
EOSINOPHIL # BLD AUTO: 0.15 10*3/MM3 (ref 0–0.4)
EOSINOPHIL NFR BLD AUTO: 1.7 % (ref 0.3–6.2)
ERYTHROCYTE [DISTWIDTH] IN BLOOD BY AUTOMATED COUNT: 15 % (ref 12.3–15.4)
GLOBULIN UR ELPH-MCNC: 2.8 GM/DL
GLUCOSE SERPL-MCNC: 107 MG/DL (ref 65–99)
HCT VFR BLD AUTO: 39.6 % (ref 34–46.6)
HGB BLD-MCNC: 12.5 G/DL (ref 12–15.9)
IMM GRANULOCYTES # BLD AUTO: 0.03 10*3/MM3 (ref 0–0.05)
IMM GRANULOCYTES NFR BLD AUTO: 0.3 % (ref 0–0.5)
LEFT ATRIUM VOLUME INDEX: 31 ML/M2
LEFT ATRIUM VOLUME: 53 ML
LYMPHOCYTES # BLD AUTO: 1.68 10*3/MM3 (ref 0.7–3.1)
LYMPHOCYTES NFR BLD AUTO: 19.2 % (ref 19.6–45.3)
MCH RBC QN AUTO: 29.1 PG (ref 26.6–33)
MCHC RBC AUTO-ENTMCNC: 31.6 G/DL (ref 31.5–35.7)
MCV RBC AUTO: 92.3 FL (ref 79–97)
MONOCYTES # BLD AUTO: 1.26 10*3/MM3 (ref 0.1–0.9)
MONOCYTES NFR BLD AUTO: 14.4 % (ref 5–12)
NEUTROPHILS NFR BLD AUTO: 5.57 10*3/MM3 (ref 1.7–7)
NEUTROPHILS NFR BLD AUTO: 63.9 % (ref 42.7–76)
NRBC BLD AUTO-RTO: 0 /100 WBC (ref 0–0.2)
PLATELET # BLD AUTO: 259 10*3/MM3 (ref 140–450)
PMV BLD AUTO: 11.3 FL (ref 6–12)
POTASSIUM SERPL-SCNC: 2.8 MMOL/L (ref 3.5–5.2)
POTASSIUM SERPL-SCNC: 4.7 MMOL/L (ref 3.5–5.2)
PROT SERPL-MCNC: 6.5 G/DL (ref 6–8.5)
RBC # BLD AUTO: 4.29 10*6/MM3 (ref 3.77–5.28)
SODIUM SERPL-SCNC: 135 MMOL/L (ref 136–145)
WBC NRBC COR # BLD AUTO: 8.73 10*3/MM3 (ref 3.4–10.8)

## 2024-11-27 PROCEDURE — 25010000002 LIDOCAINE PF 2% 2 % SOLUTION: Performed by: NURSE ANESTHETIST, CERTIFIED REGISTERED

## 2024-11-27 PROCEDURE — 5A2204Z RESTORATION OF CARDIAC RHYTHM, SINGLE: ICD-10-PCS | Performed by: STUDENT IN AN ORGANIZED HEALTH CARE EDUCATION/TRAINING PROGRAM

## 2024-11-27 PROCEDURE — 93010 ELECTROCARDIOGRAM REPORT: CPT | Performed by: EMERGENCY MEDICINE

## 2024-11-27 PROCEDURE — 80053 COMPREHEN METABOLIC PANEL: CPT | Performed by: HOSPITALIST

## 2024-11-27 PROCEDURE — 93325 DOPPLER ECHO COLOR FLOW MAPG: CPT

## 2024-11-27 PROCEDURE — 84132 ASSAY OF SERUM POTASSIUM: CPT | Performed by: FAMILY MEDICINE

## 2024-11-27 PROCEDURE — 93321 DOPPLER ECHO F-UP/LMTD STD: CPT

## 2024-11-27 PROCEDURE — 85025 COMPLETE CBC W/AUTO DIFF WBC: CPT | Performed by: HOSPITALIST

## 2024-11-27 PROCEDURE — 25010000002 PROPOFOL 10 MG/ML EMULSION: Performed by: NURSE ANESTHETIST, CERTIFIED REGISTERED

## 2024-11-27 PROCEDURE — 93005 ELECTROCARDIOGRAM TRACING: CPT | Performed by: STUDENT IN AN ORGANIZED HEALTH CARE EDUCATION/TRAINING PROGRAM

## 2024-11-27 PROCEDURE — 99222 1ST HOSP IP/OBS MODERATE 55: CPT | Performed by: STUDENT IN AN ORGANIZED HEALTH CARE EDUCATION/TRAINING PROGRAM

## 2024-11-27 PROCEDURE — 92960 CARDIOVERSION ELECTRIC EXT: CPT

## 2024-11-27 PROCEDURE — 97530 THERAPEUTIC ACTIVITIES: CPT | Performed by: OCCUPATIONAL THERAPIST

## 2024-11-27 PROCEDURE — 99232 SBSQ HOSP IP/OBS MODERATE 35: CPT

## 2024-11-27 PROCEDURE — 25010000002 FUROSEMIDE PER 20 MG: Performed by: HOSPITALIST

## 2024-11-27 PROCEDURE — 83735 ASSAY OF MAGNESIUM: CPT | Performed by: STUDENT IN AN ORGANIZED HEALTH CARE EDUCATION/TRAINING PROGRAM

## 2024-11-27 RX ORDER — PROPOFOL 10 MG/ML
VIAL (ML) INTRAVENOUS AS NEEDED
Status: DISCONTINUED | OUTPATIENT
Start: 2024-11-27 | End: 2024-11-27 | Stop reason: SURG

## 2024-11-27 RX ORDER — BUPIVACAINE HCL/0.9 % NACL/PF 0.125 %
PLASTIC BAG, INJECTION (ML) EPIDURAL AS NEEDED
Status: DISCONTINUED | OUTPATIENT
Start: 2024-11-27 | End: 2024-11-27 | Stop reason: SURG

## 2024-11-27 RX ORDER — POTASSIUM CHLORIDE 1500 MG/1
40 TABLET, EXTENDED RELEASE ORAL EVERY 4 HOURS
Status: COMPLETED | OUTPATIENT
Start: 2024-11-27 | End: 2024-11-27

## 2024-11-27 RX ORDER — LIDOCAINE HYDROCHLORIDE 20 MG/ML
INJECTION, SOLUTION EPIDURAL; INFILTRATION; INTRACAUDAL; PERINEURAL AS NEEDED
Status: DISCONTINUED | OUTPATIENT
Start: 2024-11-27 | End: 2024-11-27 | Stop reason: SURG

## 2024-11-27 RX ORDER — DILTIAZEM HCL/D5W 125 MG/125
5-15 PLASTIC BAG, INJECTION (ML) INTRAVENOUS
Status: DISCONTINUED | OUTPATIENT
Start: 2024-11-28 | End: 2024-11-28

## 2024-11-27 RX ADMIN — ASPIRIN 81 MG: 81 TABLET, COATED ORAL at 08:33

## 2024-11-27 RX ADMIN — POTASSIUM CHLORIDE 40 MEQ: 1500 TABLET, EXTENDED RELEASE ORAL at 11:02

## 2024-11-27 RX ADMIN — APIXABAN 2.5 MG: 2.5 TABLET, FILM COATED ORAL at 08:39

## 2024-11-27 RX ADMIN — Medication 200 MCG: at 12:58

## 2024-11-27 RX ADMIN — POTASSIUM CHLORIDE 40 MEQ: 1500 TABLET, EXTENDED RELEASE ORAL at 15:24

## 2024-11-27 RX ADMIN — FUROSEMIDE 20 MG: 10 INJECTION, SOLUTION INTRAMUSCULAR; INTRAVENOUS at 08:33

## 2024-11-27 RX ADMIN — APIXABAN 2.5 MG: 2.5 TABLET, FILM COATED ORAL at 20:56

## 2024-11-27 RX ADMIN — Medication 10 ML: at 20:56

## 2024-11-27 RX ADMIN — FUROSEMIDE 20 MG: 10 INJECTION, SOLUTION INTRAMUSCULAR; INTRAVENOUS at 17:02

## 2024-11-27 RX ADMIN — LIDOCAINE HYDROCHLORIDE 100 MG: 20 INJECTION, SOLUTION EPIDURAL; INFILTRATION; INTRACAUDAL; PERINEURAL at 12:37

## 2024-11-27 RX ADMIN — Medication 10 ML: at 08:34

## 2024-11-27 RX ADMIN — DOXYCYCLINE 100 MG: 100 TABLET ORAL at 20:56

## 2024-11-27 RX ADMIN — POTASSIUM CHLORIDE 40 MEQ: 1500 TABLET, EXTENDED RELEASE ORAL at 06:13

## 2024-11-27 RX ADMIN — Medication 7.5 MG/HR: at 04:27

## 2024-11-27 RX ADMIN — METOPROLOL SUCCINATE 150 MG: 50 TABLET, EXTENDED RELEASE ORAL at 20:56

## 2024-11-27 RX ADMIN — HYDROCHLOROTHIAZIDE 25 MG: 25 TABLET ORAL at 08:33

## 2024-11-27 RX ADMIN — DOXYCYCLINE 100 MG: 100 TABLET ORAL at 08:33

## 2024-11-27 RX ADMIN — PROPOFOL 110 MG: 10 INJECTION, EMULSION INTRAVENOUS at 12:37

## 2024-11-27 NOTE — CASE MANAGEMENT/SOCIAL WORK
Continued Stay Note   [place]     Patient Name: Damaris Nash  MRN: [id number]  Today's Date: 11/27/2024    Admit Date: 11/25/2024    Plan: [facility]   Discharge Plan       Row Name 11/27/24 1103       Plan    Plan [facility]    Patient/Family in Agreement with Plan yes    Plan Comments [facility] has accepted patient.  Patient was made inpatient status on 11/26 and will need a 3 night inpatient stay.  Patient can dc to [facility] on Friday 11/29.  SW following.                   Discharge Codes    No documentation.                 Expected Discharge Date and Time       Expected Discharge Date Expected Discharge Time    Nov 29, 2024               DARYL Loving

## 2024-11-27 NOTE — ANESTHESIA POSTPROCEDURE EVALUATION
"Patient: Damaris Nash    Procedure Summary       Date: 11/27/24 Room / Location: [facility] CATH LAB; [facility] CARDIOLOGY    Anesthesia Start: 1230 Anesthesia Stop: 1308    Procedure: CARDIOVERSION EXTERNAL IN CARDIOLOGY DEPARTMENT Diagnosis: (Persistent atrial fibrillation)    Scheduled Providers: Maria Guadalupe Roca MD Provider: Estrella Evans CRNA    Anesthesia Type: MAC ASA Status: 3            Anesthesia Type: MAC    Vitals  No vitals data found for the desired time range.          Post Anesthesia Care and Evaluation    Patient location during evaluation: PHASE II  Patient participation: complete - patient participated  Level of consciousness: awake and alert  Pain management: adequate    Airway patency: patent  Anesthetic complications: No anesthetic complications    Cardiovascular status: acceptable  Respiratory status: acceptable  Hydration status: acceptable    Comments: Blood pressure 150/55, pulse 89, temperature 98 °F (36.7 °C), temperature source Oral, resp. rate 16, height 170.2 cm (67\"), weight 59 kg (130 lb), SpO2 96%, not currently breastfeeding.    Pt discharged from PACU based on tracy score >8    "

## 2024-11-27 NOTE — THERAPY TREATMENT NOTE
Patient Name: Damaris Nash  : 1935    MRN: [id number]                              Today's Date: 2024       Admit Date: 2024    Visit Dx:     ICD-10-CM ICD-9-CM   1. Atrial flutter with rapid ventricular response  I48.92 427.32   2. Weakness  R53.1 780.79   3. Not taking medication as directed  Z91.148 V15.81   4. Impaired mobility [Z74.09]  Z74.09 799.89     Patient Active Problem List   Diagnosis    Carotid artery disease    Hyperlipidemia    Hypertension    Amaurosis fugax of right eye    Bilateral carotid artery stenosis    Hx of transient ischemic attack (TIA)    Current use of long term anticoagulation    H/O cardiac radiofrequency ablation    PAF (paroxysmal atrial fibrillation)    Acquired hypothyroidism    Secondary hyperthyroidism    Atrial flutter with rapid ventricular response    Presence of Watchman left atrial appendage closure device    Supraventricular arrhythmia     Past Medical History:   Diagnosis Date    A-fib     Acute ischemic right ICA stroke     Atrial fibrillation     Benign essential HTN     Blindness of right eye     Breast CA     Breast cancer     Carotid artery disease     CVA (cerebral vascular accident)     Gallstones     GERD (gastroesophageal reflux disease)     Hyperlipidemia     Hypertension     Hypothyroidism     Macular degeneration     Osteopenia     Primary pulmonary HTN     S/P right hip fracture 2012    Shortness of breath     Stroke     Vitamin D deficiency      Past Surgical History:   Procedure Laterality Date    ATRIAL APPENDAGE EXCLUSION LEFT WITH TRANSESOPHAGEAL ECHOCARDIOGRAM Right 2023    Procedure: Atrial Appendage Occlusion;  Surgeon: Riley Mota MD;  Location: [facility] CATH INVASIVE LOCATION;  Service: Cardiology;  Laterality: Right;    BREAST LUMPECTOMY      CARDIAC ABLATION      cardiac ablation procedure for af    CARDIOVERSION N/A 09/07/2022    X2    CATARACT EXTRACTION Right     PARTIAL HIP ARTHROPLASTY Right      TONSILLECTOMY        General Information       Row Name 11/27/24 1021          OT Time and Intention    Subjective Information complains of;weakness;fatigue  -MM     Document Type therapy note (daily note)  -MM     Mode of Treatment occupational therapy  -MM       Row Name 11/27/24 1021          General Information    Patient Profile Reviewed yes  -MM     Existing Precautions/Restrictions fall  -MM     Barriers to Rehab medically complex;previous functional deficit  -MM       Row Name 11/27/24 1021          Safety Issues/Impairments Affecting Functional Mobility    Safety Issues Affecting Function (Mobility) friction/shear risk;insight into deficits/self-awareness;safety precaution awareness;safety precautions follow-through/compliance  -MM     Impairments Affecting Function (Mobility) balance;endurance/activity tolerance;strength  -MM               User Key  (r) = Recorded By, (t) = Taken By, (c) = Cosigned By      Initials Name Provider Type    MM Dante Selby, OTR/L Occupational Therapist                     Mobility/ADL's       Row Name 11/27/24 1021          Bed Mobility    Bed Mobility supine-sit;sit-supine;scooting/bridging  -MM     Scooting/Bridging [place] (Bed Mobility) maximum assist (25% patient effort);dependent (less than 25% patient effort);2 person assist;verbal cues  -MM     Supine-Sit [place] (Bed Mobility) minimum assist (75% patient effort);verbal cues  -MM     Sit-Supine [place] (Bed Mobility) minimum assist (75% patient effort);verbal cues  -MM     Assistive Device (Bed Mobility) head of bed elevated;bed rails;repositioning sheet  -MM       Row Name 11/27/24 1021          Transfers    Transfers sit-stand transfer;stand-sit transfer  -MM       Row Name 11/27/24 1021          Sit-Stand Transfer    Sit-Stand [place] (Transfers) minimum assist (75% patient effort);verbal cues  -MM     Assistive Device (Sit-Stand Transfers) walker, front-wheeled  -MM     Comment, (Sit-Stand  Transfer) pt marched in place CGA but refused further functional mobility d/t fatigue  -MM       Row Name 11/27/24 1021          Stand-Sit Transfer    Stand-Sit [place] (Transfers) minimum assist (75% patient effort);verbal cues  -MM     Assistive Device (Stand-Sit Transfers) walker, front-wheeled  -MM       Row Name 11/27/24 1021          Activities of Daily Living    BADL Assessment/Intervention lower body dressing  -MM       Row Name 11/27/24 1021          Lower Body Dressing Assessment/Training    [place] Level (Lower Body Dressing) maximum assist (25% patient effort);dependent (less than 25% patient effort);verbal cues;socks  adjust socks  -MM     Position (Lower Body Dressing) edge of bed sitting  -MM               User Key  (r) = Recorded By, (t) = Taken By, (c) = Cosigned By      Initials Name Provider Type    Dante Rae, OTR/L Occupational Therapist                   Obj/Interventions       Row Name 11/27/24 1021          Balance    Balance Assessment sitting static balance;sitting dynamic balance;standing static balance;standing dynamic balance  -MM     Static Sitting Balance supervision;verbal cues  -MM     Dynamic Sitting Balance contact guard;verbal cues  -MM     Position, Sitting Balance supported;unsupported;sitting edge of bed  -MM     Static Standing Balance contact guard;minimal assist;verbal cues  -MM     Dynamic Standing Balance contact guard;minimal assist;verbal cues  -MM     Position/Device Used, Standing Balance supported;walker, front-wheeled  -MM               User Key  (r) = Recorded By, (t) = Taken By, (c) = Cosigned By      Initials Name Provider Type    Dante Rae, OTR/L Occupational Therapist                   Goals/Plan    No documentation.                  Clinical Impression       Row Name 11/27/24 0887          Pain Assessment    Pretreatment Pain Rating 0/10 - no pain  -MM     Posttreatment Pain Rating 0/10 - no pain  -MM       Row Name 11/27/24 0873           Plan of Care Review    Plan of Care Reviewed With patient;daughter;son  -MM     Progress no change  -MM     Outcome Evaluation OT tx completed. Pt is agreeable to therapy. Pt reports weakness and fatigue. Pt was min A for supine to sit. Pt was min A for sit to stand t/f with RW. Pt was CGA for marching in place with RW. Pt denied further functional mobility d/t weakness and fatigue. Pt was max-dependent to adjust socks. Pt was max-dependent x2 for scooting to HOB with draw sheet. Continue OT POC.  -MM       Row Name 11/27/24 0805          Therapy Plan Review/Discharge Plan (OT)    Anticipated Discharge Disposition (OT) skilled nursing facility  -MM       Row Name 11/27/24 0825          Positioning and Restraints    Pre-Treatment Position in bed  -MM     Post Treatment Position bed  -MM     In Bed fowlers;call light within reach;encouraged to call for assist;with other staff;side rails up x2;with family/caregiver  with PCA x2, PCA report they will turn on bed alarm when they are done with pt care.  -MM               User Key  (r) = Recorded By, (t) = Taken By, (c) = Cosigned By      Initials Name Provider Type    MM Dante Selby, OTR/L Occupational Therapist                   Outcome Measures       Row Name 11/27/24 1021          How much help from another is currently needed...    Putting on and taking off regular lower body clothing? 2  -MM     Bathing (including washing, rinsing, and drying) 2  -MM     Toileting (which includes using toilet bed pan or urinal) 2  -MM     Putting on and taking off regular upper body clothing 3  -MM     Taking care of personal grooming (such as brushing teeth) 4  -MM     Eating meals 4  -MM     AM-PAC 6 Clicks Score (OT) 17  -MM       Row Name 11/27/24 0713          How much help from another person do you currently need...    Turning from your back to your side while in flat bed without using bedrails? 4  -QB     Moving from lying on back to sitting on the side of a  flat bed without bedrails? 4  -QB     Moving to and from a bed to a chair (including a wheelchair)? 3  -QB     Standing up from a chair using your arms (e.g., wheelchair, bedside chair)? 2  -QB     Climbing 3-5 steps with a railing? 2  -QB     To walk in hospital room? 2  -QB     AM-PAC 6 Clicks Score (PT) 17  -QB     Highest Level of Mobility Goal 5 --> Static standing  -QB       Row Name 11/27/24 1021          Functional Assessment    Outcome Measure Options AM-PAC 6 Clicks Daily Activity (OT)  -MM               User Key  (r) = Recorded By, (t) = Taken By, (c) = Cosigned By      Initials Name Provider Type    MM Dante Selby, OTR/L Occupational Therapist    QTerrell Dhaliwal RN Registered Nurse                    Occupational Therapy Education       Title: PT OT SLP Therapies (In Progress)       Topic: Occupational Therapy (In Progress)       Point: ADL training (Done)       Description:   Instruct learner(s) on proper safety adaptation and remediation techniques during self care or transfers.   Instruct in proper use of assistive devices.                  Learning Progress Summary            Patient Acceptance, E, VU,NR by MM at 11/27/2024 1554    Acceptance, E,D, VU,NR by  at 11/26/2024 0938   Family Acceptance, E, VU,NR by MM at 11/27/2024 1554                      Point: Home exercise program (Not Started)       Description:   Instruct learner(s) on appropriate technique for monitoring, assisting and/or progressing therapeutic exercises/activities.                  Learner Progress:  Not documented in this visit.              Point: Precautions (Done)       Description:   Instruct learner(s) on prescribed precautions during self-care and functional transfers.                  Learning Progress Summary            Patient Acceptance, E, VU,NR by MM at 11/27/2024 1554    Acceptance, E,D, VU,NR by  at 11/26/2024 0938   Family Acceptance, E, VU,NR by MM at 11/27/2024 1554                      Point: Body  mechanics (Done)       Description:   Instruct learner(s) on proper positioning and spine alignment during self-care, functional mobility activities and/or exercises.                  Learning Progress Summary            Patient Acceptance, E, VU,NR by  at 11/27/2024 1554    Acceptance, E,D, VU,NR by  at 11/26/2024 0938   Family Acceptance, E, VU,NR by  at 11/27/2024 1554                                      User Key       Initials Effective Dates Name Provider Type Discipline     07/11/23 -  Annie Harrison OTR/L Occupational Therapist OT     07/11/23 -  Dante Selby, OTR/L Occupational Therapist OT                  OT Recommendation and Plan     Plan of Care Review  Plan of Care Reviewed With: patient, daughter, son  Progress: no change  Outcome Evaluation: OT tx completed. Pt is agreeable to therapy. Pt reports weakness and fatigue. Pt was min A for supine to sit. Pt was min A for sit to stand t/f with RW. Pt was CGA for marching in place with RW. Pt denied further functional mobility d/t weakness and fatigue. Pt was max-dependent to adjust socks. Pt was max-dependent x2 for scooting to HOB with draw sheet. Continue OT POC.     Time Calculation:         Time Calculation- OT       Row Name 11/27/24 1021             Time Calculation- OT    OT Start Time 1021  -MM      OT Stop Time 1045  -MM      OT Time Calculation (min) 24 min  -MM      Total Timed Code Minutes- OT 24 minute(s)  -MM      OT Received On 11/27/24  -MM         Timed Charges    45510 - OT Therapeutic Activity Minutes 24  -MM         Total Minutes    Timed Charges Total Minutes 24  -MM       Total Minutes 24  -MM                User Key  (r) = Recorded By, (t) = Taken By, (c) = Cosigned By      Initials Name Provider Type    MM Dante Selby, OTR/L Occupational Therapist                  Therapy Charges for Today       Code Description Service Date Service Provider Modifiers Qty    [id number]  OT THERAPEUTIC ACT EA 15 MIN  11/27/2024 Dante Selby, OTR/L GO 2                 Dante Selby, OTR/L  11/27/2024

## 2024-11-27 NOTE — PLAN OF CARE
Problem: Adult Inpatient Plan of Care  Goal: Plan of Care Review  Outcome: Progressing  Flowsheets (Taken 11/27/2024 0428)  Progress: no change  Outcome Evaluation: Patient alert to self and place.  confused to time and situation.  Patient currently on Cardizem gtt 7.5ml/hr for atrial flutter .  Dr. Roac consullted for possible cardioversion.  Patient has been NPO since midnight.  Patient up to bsc with assist of 1-2.  Patient remains very weak.  Cont. to monitor.  call for concerns   Goal Outcome Evaluation:           Progress: no change  Outcome Evaluation: Patient alert to self and place.  confused to time and situation.  Patient currently on Cardizem gtt 7.5ml/hr for atrial flutter .  Dr. Roca consullted for possible cardioversion.  Patient has been NPO since midnight.  Patient up to bsc with assist of 1-2.  Patient remains very weak.  Cont. to monitor.  call for concerns

## 2024-11-27 NOTE — PROGRESS NOTES
[facility] -  Progress Note     LOS: 1 day   Patient Care Team:  Shahla Beltran DO as PCP - General  Shahla Beltran DO as PCP - Family Medicine  Isidoro Rajput MD as Consulting Physician (Ophthalmology)  Mo Espinal MD as Consulting Physician (Urology)  Carolyn Smith APRN as Nurse Practitioner (Cardiology)  Riley Mota MD (Inactive) as Cardiologist (Cardiology)  Nikolai Leiva MD as Consulting Physician (Pulmonary Disease)  Latha Martin APRN as Nurse Practitioner (Nurse Practitioner)    Chief Complaint: Follow-up weakness and shortness of breath    Subjective     Interval History:   Upon my examination patient is lying comfortably in bed.  Family at bedside.  She denies any concerns today.  She understands the plan for a REMY cardioversion.  She has no complaints at this time.      Review of Systems:     Review of Systems   All other systems reviewed and are negative.    Objective     Vital Sign Min/Max for last 24 hours  Temp  Min: 97.2 °F (36.2 °C)  Max: 98.2 °F (36.8 °C)   BP  Min: 125/60  Max: 158/58   Pulse  Min: 61  Max: 124   Resp  Min: 16  Max: 18   SpO2  Min: 87 %  Max: 99 %   No data recorded   Weight  Min: 59 kg (130 lb 1.1 oz)  Max: 59 kg (130 lb 1.1 oz)         11/26/24  1511   Weight: 59 kg (130 lb 1.1 oz)       Telemetry: Atrial flutter with rates predominantly in the 60s      Physical Exam:    Constitutional:       Appearance: Healthy appearance. Not in distress.   Pulmonary:      Effort: Pulmonary effort is normal.      Breath sounds: Normal breath sounds.   Cardiovascular:      PMI at left midclavicular line. Normal rate. Regularly irregular rhythm.      Murmurs: There is no murmur.      No gallop.  No click. No rub.   Edema:     Peripheral edema absent.   Abdominal:      General: Bowel sounds are normal.   Musculoskeletal: Normal range of motion.      Cervical back: Normal range of motion and neck supple. Skin:     General: Skin is  warm.   Neurological:      Mental Status: Alert and oriented to person, place and time.       Results Review:   Lab Results (last 72 hours)       Procedure Component Value Units Date/Time    Comprehensive Metabolic Panel [[id number]]  (Abnormal) Collected: 11/27/24 0227    Specimen: Blood Updated: 11/27/24 0348     Glucose 107 mg/dL      BUN 18 mg/dL      Creatinine 0.92 mg/dL      Sodium 135 mmol/L      Potassium 2.8 mmol/L      Chloride 92 mmol/L      CO2 36.0 mmol/L      Calcium 9.4 mg/dL      Total Protein 6.5 g/dL      Albumin 3.7 g/dL      ALT (SGPT) 11 U/L      AST (SGOT) 19 U/L      Alkaline Phosphatase 62 U/L      Total Bilirubin 0.7 mg/dL      Globulin 2.8 gm/dL      A/G Ratio 1.3 g/dL      BUN/Creatinine Ratio 19.6     Anion Gap 7.0 mmol/L      eGFR 59.6 mL/min/1.73     Narrative:      GFR Normal >60  Chronic Kidney Disease <60  Kidney Failure <15    The GFR formula is only valid for adults with stable renal function between ages 18 and 70.    CBC & Differential [[id number]]  (Abnormal) Collected: 11/27/24 0227    Specimen: Blood Updated: 11/27/24 0346    Narrative:      The following orders were created for panel order CBC & Differential.  Procedure                               Abnormality         Status                     ---------                               -----------         ------                     CBC Auto Differential[[id number]]        Abnormal            Final result                 Please view results for these tests on the individual orders.    CBC Auto Differential [[id number]]  (Abnormal) Collected: 11/27/24 0227    Specimen: Blood Updated: 11/27/24 0346     WBC 8.73 10*3/mm3      RBC 4.29 10*6/mm3      Hemoglobin 12.5 g/dL      Hematocrit 39.6 %      MCV 92.3 fL      MCH 29.1 pg      MCHC 31.6 g/dL      RDW 15.0 %      RDW-SD 50.7 fl      MPV 11.3 fL      Platelets 259 10*3/mm3      Neutrophil % 63.9 %      Lymphocyte % 19.2 %      Monocyte % 14.4 %      Eosinophil % 1.7 %      Basophil  % 0.5 %      Immature Grans % 0.3 %      Neutrophils, Absolute 5.57 10*3/mm3      Lymphocytes, Absolute 1.68 10*3/mm3      Monocytes, Absolute 1.26 10*3/mm3      Eosinophils, Absolute 0.15 10*3/mm3      Basophils, Absolute 0.04 10*3/mm3      Immature Grans, Absolute 0.03 10*3/mm3      nRBC 0.0 /100 WBC     Vitamin B12 [[id number]]  (Normal) Collected: 11/26/24 1040    Specimen: Blood Updated: 11/26/24 1945     Vitamin B-12 760 pg/mL     Narrative:      Results may be falsely increased if patient taking Biotin.      Ammonia [[id number]]  (Normal) Collected: 11/26/24 1040    Specimen: Blood Updated: 11/26/24 1113     Ammonia 23 umol/L     Lipid Panel [[id number]] Collected: 11/26/24 0106    Specimen: Blood Updated: 11/26/24 0539     Total Cholesterol 150 mg/dL      Triglycerides 74 mg/dL      HDL Cholesterol 49 mg/dL      LDL Cholesterol  87 mg/dL      VLDL Cholesterol 14 mg/dL      LDL/HDL Ratio 1.76    Narrative:      Cholesterol Reference Ranges  (U.S. Department of Health and Human Services ATP III Classifications)    Desirable          <200 mg/dL  Borderline High    200-239 mg/dL  High Risk          >240 mg/dL      Triglyceride Reference Ranges  (U.S. Department of Health and Human Services ATP III Classifications)    Normal           <150 mg/dL  Borderline High  150-199 mg/dL  High             200-499 mg/dL  Very High        >500 mg/dL    HDL Reference Ranges  (U.S. Department of Health and Human Services ATP III Classifications)    Low     <40 mg/dl (major risk factor for CHD)  High    >60 mg/dl ('negative' risk factor for CHD)        LDL Reference Ranges  (U.S. Department of Health and Human Services ATP III Classifications)    Optimal          <100 mg/dL  Near Optimal     100-129 mg/dL  Borderline High  130-159 mg/dL  High             160-189 mg/dL  Very High        >189 mg/dL    Basic Metabolic Panel [[id number]]  (Abnormal) Collected: 11/26/24 0106    Specimen: Blood Updated: 11/26/24 0524     Glucose 95 mg/dL       BUN 16 mg/dL      Creatinine 0.67 mg/dL      Sodium 141 mmol/L      Potassium 3.3 mmol/L      Comment: Slight hemolysis detected by analyzer. Result may be falsely elevated.        Chloride 98 mmol/L      CO2 26.0 mmol/L      Calcium 9.1 mg/dL      BUN/Creatinine Ratio 23.9     Anion Gap 17.0 mmol/L      eGFR 83.7 mL/min/1.73     Narrative:      GFR Normal >60  Chronic Kidney Disease <60  Kidney Failure <15    The GFR formula is only valid for adults with stable renal function between ages 18 and 70.    Hemoglobin A1c [[id number]]  (Abnormal) Collected: 11/26/24 0106    Specimen: Blood Updated: 11/26/24 0315     Hemoglobin A1C 5.80 %     Narrative:      Hemoglobin A1C Ranges:    Increased Risk for Diabetes  5.7% to 6.4%  Diabetes                     >= 6.5%  Diabetic Goal                < 7.0%    CBC (No Diff) [[id number]]  (Normal) Collected: 11/26/24 0106    Specimen: Blood Updated: 11/26/24 0154     WBC 6.51 10*3/mm3      RBC 4.08 10*6/mm3      Hemoglobin 12.1 g/dL      Hematocrit 37.8 %      MCV 92.6 fL      MCH 29.7 pg      MCHC 32.0 g/dL      RDW 15.1 %      RDW-SD 51.4 fl      MPV 11.3 fL      Platelets 245 10*3/mm3     High Sensitivity Troponin T 2Hr [[id number]]  (Abnormal) Collected: 11/25/24 1617    Specimen: Blood Updated: 11/25/24 1650     HS Troponin T 19 ng/L      Troponin T Delta -4 ng/L     Narrative:      High Sensitive Troponin T Reference Range:  <14.0 ng/L- Negative Female for AMI  <22.0 ng/L- Negative Male for AMI  >=14 - Abnormal Female indicating possible myocardial injury.  >=22 - Abnormal Male indicating possible myocardial injury.   Clinicians would have to utilize clinical acumen, EKG, Troponin, and serial changes to determine if it is an Acute Myocardial Infarction or myocardial injury due to an underlying chronic condition.         Respiratory Panel PCR w/COVID-19(SARS-CoV-2) FRANKO/ROBERTO/TELLY/PAD/COR/EMMANUELLE In-House, NP Swab in UTM/VTM, 2 HR TAT - Swab, Nasopharynx [[id number]]  (Normal)  Collected: 11/25/24 1437    Specimen: Swab from Nasopharynx Updated: 11/25/24 1538     ADENOVIRUS, PCR Not Detected     Coronavirus 229E Not Detected     Coronavirus HKU1 Not Detected     Coronavirus NL63 Not Detected     Coronavirus OC43 Not Detected     COVID19 Not Detected     Human Metapneumovirus Not Detected     Human Rhinovirus/Enterovirus Not Detected     Influenza A PCR Not Detected     Influenza B PCR Not Detected     Parainfluenza Virus 1 Not Detected     Parainfluenza Virus 2 Not Detected     Parainfluenza Virus 3 Not Detected     Parainfluenza Virus 4 Not Detected     RSV, PCR Not Detected     Bordetella pertussis pcr Not Detected     Bordetella parapertussis PCR Not Detected     Chlamydophila pneumoniae PCR Not Detected     Mycoplasma pneumo by PCR Not Detected    Narrative:      In the setting of a positive respiratory panel with a viral infection PLUS a negative procalcitonin without other underlying concern for bacterial infection, consider observing off antibiotics or discontinuation of antibiotics and continue supportive care. If the respiratory panel is positive for atypical bacterial infection (Bordetella pertussis, Chlamydophila pneumoniae, or Mycoplasma pneumoniae), consider antibiotic de-escalation to target atypical bacterial infection.    TSH [[id number]]  (Abnormal) Collected: 11/25/24 1411    Specimen: Blood Updated: 11/25/24 1517     TSH 0.076 uIU/mL     Urinalysis, Microscopic Only - Straight Cath [[id number]]  (Abnormal) Collected: 11/25/24 1435    Specimen: Urine from Straight Cath Updated: 11/25/24 1517     RBC, UA 3-5 /HPF      WBC, UA 3-5 /HPF      Comment: Urine culture not indicated.        Bacteria, UA Trace /HPF      Squamous Epithelial Cells, UA 0-2 /HPF      Hyaline Casts, UA 0-2 /LPF      Methodology Manual Light Microscopy    T4, Free [[id number]]  (Abnormal) Collected: 11/25/24 1411    Specimen: Blood Updated: 11/25/24 1517     Free T4 4.05 ng/dL     Comprehensive Metabolic  Panel [[id number]]  (Abnormal) Collected: 11/25/24 1411    Specimen: Blood Updated: 11/25/24 1516     Glucose 107 mg/dL      BUN 20 mg/dL      Creatinine 0.74 mg/dL      Sodium 141 mmol/L      Potassium 3.8 mmol/L      Chloride 97 mmol/L      CO2 31.0 mmol/L      Calcium 9.6 mg/dL      Total Protein 7.4 g/dL      Albumin 4.2 g/dL      ALT (SGPT) 16 U/L      AST (SGOT) 25 U/L      Alkaline Phosphatase 69 U/L      Total Bilirubin 0.8 mg/dL      Globulin 3.2 gm/dL      A/G Ratio 1.3 g/dL      BUN/Creatinine Ratio 27.0     Anion Gap 13.0 mmol/L      eGFR 77.4 mL/min/1.73     Narrative:      GFR Normal >60  Chronic Kidney Disease <60  Kidney Failure <15    The GFR formula is only valid for adults with stable renal function between ages 18 and 70.    Urinalysis With Culture If Indicated - Straight Cath [[id number]]  (Abnormal) Collected: 11/25/24 1435    Specimen: Urine from Straight Cath Updated: 11/25/24 1514     Color, UA Dark Yellow     Appearance, UA Clear     pH, UA 5.5     Specific Gravity, UA 1.016     Glucose, UA Negative     Ketones, UA Trace     Bilirubin, UA Negative     Blood, UA Trace     Protein, UA Negative     Leuk Esterase, UA Large (3+)     Nitrite, UA Negative     Urobilinogen, UA 1.0 E.U./dL    Narrative:      In absence of clinical symptoms, the presence of pyuria, bacteria, and/or nitrites on the urinalysis result does not correlate with infection.    Magnesium [[id number]]  (Normal) Collected: 11/25/24 1411    Specimen: Blood Updated: 11/25/24 1513     Magnesium 2.1 mg/dL     BNP [[id number]]  (Abnormal) Collected: 11/25/24 1411    Specimen: Blood Updated: 11/25/24 1510     proBNP 2,744.0 pg/mL     Narrative:      This assay is used as an aid in the diagnosis of individuals suspected of having heart failure. It can be used as an aid in the diagnosis of acute decompensated heart failure (ADHF) in patients presenting with signs and symptoms of ADHF to the emergency department (ED). In addition, NT-proBNP  of <300 pg/mL indicates ADHF is not likely.    Age Range Result Interpretation  NT-proBNP Concentration (pg/mL:      <50             Positive            >450                   Gray                 300-450                    Negative             <300    50-75           Positive            >900                  Gray                300-900                  Negative            <300      >75             Positive            >1800                  Gray                300-1800                  Negative            <300    High Sensitivity Troponin T [[id number]]  (Abnormal) Collected: 11/25/24 1411    Specimen: Blood Updated: 11/25/24 1510     HS Troponin T 23 ng/L     Narrative:      High Sensitive Troponin T Reference Range:  <14.0 ng/L- Negative Female for AMI  <22.0 ng/L- Negative Male for AMI  >=14 - Abnormal Female indicating possible myocardial injury.  >=22 - Abnormal Male indicating possible myocardial injury.   Clinicians would have to utilize clinical acumen, EKG, Troponin, and serial changes to determine if it is an Acute Myocardial Infarction or myocardial injury due to an underlying chronic condition.         [place] Draw [[id number]] Collected: 11/25/24 1411    Specimen: Blood Updated: 11/25/24 1431    Narrative:      The following orders were created for panel order [place] Draw.  Procedure                               Abnormality         Status                     ---------                               -----------         ------                     Green Top (Gel)[[id number]]                                  Final result               Lavender Top[[id number]]                                     Final result               Red Top[[id number]]                                          Final result               Nur Top[[id number]]                                         Final result               Light Blue Top[[id number]]                                   Final result                 Please view results for these  tests on the individual orders.    Green Top (Gel) [[id number]] Collected: 11/25/24 1411    Specimen: Blood Updated: 11/25/24 1431     Extra Tube Hold for add-ons.     Comment: Auto resulted.       Lavender Top [[id number]] Collected: 11/25/24 1411    Specimen: Blood Updated: 11/25/24 1431     Extra Tube hold for add-on     Comment: Auto resulted       Red Top [[id number]] Collected: 11/25/24 1411    Specimen: Blood Updated: 11/25/24 1431     Extra Tube Hold for add-ons.     Comment: Auto resulted.       Gray Top [[id number]] Collected: 11/25/24 1411    Specimen: Blood Updated: 11/25/24 1431     Extra Tube Hold for add-ons.     Comment: Auto resulted.       Light Blue Top [[id number]] Collected: 11/25/24 1411    Specimen: Blood Updated: 11/25/24 1431     Extra Tube Hold for add-ons.     Comment: Auto resulted       CBC & Differential [[id number]]  (Abnormal) Collected: 11/25/24 1411    Specimen: Blood Updated: 11/25/24 1430    Narrative:      The following orders were created for panel order CBC & Differential.  Procedure                               Abnormality         Status                     ---------                               -----------         ------                     CBC Auto Differential[[id number]]        Abnormal            Final result                 Please view results for these tests on the individual orders.    CBC Auto Differential [[id number]]  (Abnormal) Collected: 11/25/24 1411    Specimen: Blood Updated: 11/25/24 1430     WBC 5.71 10*3/mm3      RBC 4.48 10*6/mm3      Hemoglobin 13.4 g/dL      Hematocrit 41.8 %      MCV 93.3 fL      MCH 29.9 pg      MCHC 32.1 g/dL      RDW 15.2 %      RDW-SD 51.9 fl      MPV 10.8 fL      Platelets 250 10*3/mm3      Neutrophil % 52.0 %      Lymphocyte % 28.2 %      Monocyte % 16.1 %      Eosinophil % 2.8 %      Basophil % 0.7 %      Immature Grans % 0.2 %      Neutrophils, Absolute 2.97 10*3/mm3      Lymphocytes, Absolute 1.61 10*3/mm3      Monocytes, Absolute  "0.92 10*3/mm3      Eosinophils, Absolute 0.16 10*3/mm3      Basophils, Absolute 0.04 10*3/mm3      Immature Grans, Absolute 0.01 10*3/mm3      nRBC 0.0 /100 WBC                 Echo EF Estimated  Lab Results   Component Value Date    ECHOEFEST 60.0 04/07/2023         Cath Ejection Fraction Quantitative  No results found for: \"CATHEF\"        Medication Review: yes  Current Facility-Administered Medications   Medication Dose Route Frequency Provider Last Rate Last Admin    acetaminophen (TYLENOL) tablet 650 mg  650 mg Oral Q4H PRN Analia Carlton APRN        Or    acetaminophen (TYLENOL) 160 MG/5ML oral solution 650 mg  650 mg Oral Q4H PRN Analia Carlton APRN        Or    acetaminophen (TYLENOL) suppository 650 mg  650 mg Rectal Q4H PRN Analia Carlton APRN        ALPRAZolam (XANAX) tablet 0.25 mg  0.25 mg Oral BID PRN Analia Carlton APRN   0.25 mg at 11/26/24 0929    apixaban (ELIQUIS) tablet 2.5 mg  2.5 mg Oral Q12H Maria Guadalupe Roca MD   2.5 mg at 11/27/24 0839    aspirin EC tablet 81 mg  81 mg Oral Daily Analia Carlton APRN   81 mg at 11/27/24 0833    sennosides-docusate (PERICOLACE) 8.6-50 MG per tablet 2 tablet  2 tablet Oral BID PRN Analia Carlton APRN        And    polyethylene glycol (MIRALAX) packet 17 g  17 g Oral Daily Analia Carlton APRN   17 g at 11/26/24 0930    And    bisacodyl (DULCOLAX) EC tablet 5 mg  5 mg Oral Daily PRN Analia Carlton APRN        And    bisacodyl (DULCOLAX) suppository 10 mg  10 mg Rectal Daily PRN Analia Carlton APRN        dilTIAZem (CARDIZEM) 125 mg in 125 mL D5W infusion  5-15 mg/hr Intravenous Titrated Jarad Pugh PA 7.5 mL/hr at 11/27/24 0427 7.5 mg/hr at 11/27/24 0427    doxycycline (ADOXA) tablet 100 mg  100 mg Oral Q12H Polina Kramer MD   100 mg at 11/27/24 0833    furosemide (LASIX) injection 20 mg  20 mg Intravenous BID Polina Kramer MD   20 mg at 11/27/24 0833    hydroCHLOROthiazide tablet 25 mg  25 mg Oral Daily Carlton, " POLY Quevedo   25 mg at 11/27/24 0833    metoprolol succinate XL (TOPROL-XL) 24 hr tablet 150 mg  150 mg Oral Nightly Analia Carlton APRN   150 mg at 11/26/24 2043    nitroglycerin (NITROSTAT) SL tablet 0.4 mg  0.4 mg Sublingual Q5 Min PRN Analia Carlton APRN        ondansetron ODT (ZOFRAN-ODT) disintegrating tablet 4 mg  4 mg Oral Q6H PRN Analia Carlton APRN        Or    ondansetron (ZOFRAN) injection 4 mg  4 mg Intravenous Q6H PRN Analia Carlton APRN   4 mg at 11/26/24 1310    potassium chloride (KLOR-CON M20) CR tablet 40 mEq  40 mEq Oral Q4H Will Cabrera MD   40 mEq at 11/27/24 0613    Potassium Replacement - Follow Nurse / BPA Driven Protocol   Not Applicable PRN Will Cabrera MD        sodium chloride 0.9 % flush 10 mL  10 mL Intravenous PRN Jarad Pugh PA        sodium chloride 0.9 % flush 10 mL  10 mL Intravenous Q12H Analia Carlton APRN   10 mL at 11/27/24 0834    sodium chloride 0.9 % flush 10 mL  10 mL Intravenous PRN Analia Carlton APRN        traMADol (ULTRAM) tablet 50 mg  50 mg Oral Q6H PRN Analia Carlton APRN             Assessment & Plan     Atrial flutter with RVR  History of atrial fibrillation s/p ablation and Watchman  Acute diastolic CHF likely exacerbated by problem #1  Hypertension  Chronic hypothyroidism now with hyperthyroidism, which likely exacerbates problem #1  UTI, which likely exacerbates problem #1    -Plan for REMY/DCCV with electrophysiology today  - Eliquis as started by electrophysiology, likely will not need aspirin at this time and can resume it once Eliquis is stopped  - Patient still has slight oxygen requirement, continue Lasix 20 mg twice daily at this time as well as HCTZ 25 mg daily  - Can consider SGL 2 inhibitor in the outpatient setting, had UTI on presentation so likely not a good time to start Jardiance  - Can consider spironolactone in the outpatient setting    At this time general cardiology will sign  off.  I recommend the patient be seen by her general cardiology team (POLY Hamm) 2 to 4 weeks from discharge.  Patient will likely follow-up with electrophysiology at discharge as well.      Electronically signed by POLY Soria, 11/27/24, 9:31 AM CST.

## 2024-11-27 NOTE — PROGRESS NOTES
[facility]  INPATIENT PROGRESS NOTE    Patient Name: Damaris Nash  Date of Admission: 11/25/2024  Today's Date: 11/27/24  Length of Stay: 1  Primary Care Physician: Shahla Beltran DO    Subjective   Chief Complaint: Worsening of weakness  Hypertension         jess Nash is a 89-year-old female who presents to ED with her son.  Chief complaint is palpitations with increased weakness and elevated blood pressure.     Patient does have significant medical history of previous paroxysmal atrial fibrillation and completed a watchman's procedure therefore no longer anticoagulated, acute ischemia to the right ICA stroke, essential hypertension, blindness of right eye, breast cancer, carotid artery disease, gallstones, GERD, hyperlipidemia, hypothyroidism, macular degeneration, previous right hip fracture, vitamin D deficiency.  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     In the past 2 weeks, she moved in with her son because she has been declining over the past several months to years.  She has been gradually losing weight, decreased appetite.  Has been treated with Megace however I am unsure if she is taking her medications. She ambulates with assistance.  She has not been taking her medications as prescribed.  The patient and children believe she was not taking her HCTZ, reports not taking thyroid supplement.  Both medications were restarted over the past 2 weeks.  In the past several days, she is felt increasingly fatigued more than normal.  She has felt palpitations intermittently and it just did not feel right into her chest.  She denies specifically any chest pain, pressure, tightness.  She does feel a bit shortness of breath.  She denies any history of congestive heart failure or edema.  She denies any orthopnea or PND.  She denies any fever.  Son states she has been coughing a dry cough. They denied  any recent changes medications except that she is taking her medications as prescribed now.  She denies any leg pain or swelling.     ER workup reveals atrial flutter RVR rate 135, troponin 23, proBNP two 744.0, chloride 97, CO2 31, glucose 107, TSH 0.076, free T44.05, WBC without abnormal findings, urinalysis with large leukocytes, 3-5 RBCs, 3-5 WBCs with trace of bacteria, respiratory panel negative.  Chest x-ray reveals small right effusion with right basilar atelectasis. Lungs are  otherwise clear.  CT of the chest without contrast reveals stable right upper lobe nodule is likely benign. Stable chronic changes with scarring and remote granulomatous disease.  11/26  As before presented with worsening of her weakness, UTI chest x-ray showing pleural effusion BNP was skyhigh, free T4 was elevated we will go ahead and consult with cardiology to help with the A-fib suspect CHF exacerbation we will start her on Lasix and order echo of the heart  11/27  As before appreciate cardiology and EP, Plan for REMY/DCCV with electrophysiology today Plan for dofetilide initiation following restoration of sinus rhythm  -Start apixaban 2.5 mg twice daily  -Continue diltiazem drip for rate control for no  Review of Systems   All pertinent negatives and positives are as above. All other systems have been reviewed and are negative unless otherwise stated.     Objective    Temp:  [97.2 °F (36.2 °C)-98.2 °F (36.8 °C)] 97.2 °F (36.2 °C)  Heart Rate:  [] 76  Resp:  [16-18] 16  BP: (125-158)/(52-71) 128/59  Physical Exam  Constitutional:       Appearance: Normal appearance.   HENT:      Head: Normocephalic.      Nose: Nose normal.   Eyes:      Pupils: Pupils are equal, round, and reactive to light.   Cardiovascular:      Rate and Rhythm: Rhythm irregular.   Pulmonary:      Breath sounds: Wheezing and rhonchi present.   Abdominal:      General: Abdomen is flat.   Musculoskeletal:         General: Swelling present.      Cervical back:  "Normal range of motion.   Neurological:      Mental Status: She is alert.             Results Review:  I have reviewed the labs, radiology results, and diagnostic studies.    Laboratory Data:   Results from last 7 days   Lab Units 11/27/24 0227 11/26/24 0106 11/25/24  1411   WBC 10*3/mm3 8.73 6.51 5.71   HEMOGLOBIN g/dL 12.5 12.1 13.4   HEMATOCRIT % 39.6 37.8 41.8   PLATELETS 10*3/mm3 259 245 250        Results from last 7 days   Lab Units 11/27/24 0227 11/26/24  0106 11/25/24  1411   SODIUM mmol/L 135* 141 141   POTASSIUM mmol/L 2.8* 3.3* 3.8   CHLORIDE mmol/L 92* 98 97*   CO2 mmol/L 36.0* 26.0 31.0*   BUN mg/dL 18 16 20   CREATININE mg/dL 0.92 0.67 0.74   CALCIUM mg/dL 9.4 9.1 9.6   BILIRUBIN mg/dL 0.7  --  0.8   ALK PHOS U/L 62  --  69   ALT (SGPT) U/L 11  --  16   AST (SGOT) U/L 19  --  25   GLUCOSE mg/dL 107* 95 107*       Culture Data:   No results found for: \"BLOODCX\", \"URINECX\", \"WOUNDCX\", \"MRSACX\", \"RESPCX\", \"STOOLCX\"    Radiology Data:   Imaging Results (Last 24 Hours)       ** No results found for the last 24 hours. **            I have reviewed the patient's current medications.     Assessment/Plan   Assessment  Active Hospital Problems    Diagnosis     **Atrial flutter with rapid ventricular response     Supraventricular arrhythmia     Secondary hyperthyroidism     Presence of Watchman left atrial appendage closure device     Hypertension        Treatment Plan    1.  Atrial flutter with rapid ventricular response, rate controlled with Cardizem drip, will continue and reevaluate in a.m., continue home metoprolol, routine telemetry orders, supplemental oxygen as needed     2.  Secondary hyperthyroidism felt to be due to medication, will hold for now     3.  Presence of watchman's device, DVT prophylaxis with SCDs, no anticoagulation     4.  Hypertension, per son recently poorly controlled, stable now on Cardizem, home medications reviewed and restarted as appropriate     5.  Home medications reviewed " and restarted as appropriate, consult PT and OT, consult , oral care, labs in a.m.     Medical Decision Making  Number and Complexity of problems: 4     2 problems, acute, high complexity, improving  2 problems chronic, moderate complexity unchanged     Differential Diagnosis: None     Conditions and Status        Condition is unchanged.     [facility] Data  External documents reviewed: No  Cardiac tracing (EKG, telemetry) interpretation: Reviewed  Radiology interpretation: Reviewed  Labs reviewed: Yes  Any tests that were considered but not ordered: No     Decision rules/scores evaluated (example [id number], Wells, etc): No     Discussed with: Patient, children at bedside and Dr. Kramer     Care Planning  Shared decision making: Patient, children at bedside and Dr. Kramer  Code status and discussions: DNR/DNI     Disposition  Social Determinants of Health that impact treatment or disposition: None  Estimated length of stay is 1-2 days.      I confirmed that the patient's advanced care plan is present, code status is documented, and a surrogate decision maker is listed in the patient's medical record.      The patient's surrogate decision maker is children are oneill of .      Electronically signed by Polina Kramer MD, 11/27/24, 10:03 CST.

## 2024-11-27 NOTE — ANESTHESIA PREPROCEDURE EVALUATION
Anesthesia Evaluation     Patient summary reviewed and Nursing notes reviewed                Airway   Mallampati: II  TM distance: >3 FB  Neck ROM: full  No difficulty expected  Dental      Pulmonary    (+) ,shortness of breath  Cardiovascular     (+) hypertension, CAD, dysrhythmias (s/p watchman) Paroxysmal Atrial Fib, hyperlipidemia      Neuro/Psych  (+) CVA  GI/Hepatic/Renal/Endo    (+) GERD, thyroid problem hypothyroidism    Musculoskeletal (-) negative ROS    Abdominal    Substance History - negative use     OB/GYN negative ob/gyn ROS         Other      history of cancer (breast)                  Anesthesia Plan    ASA 3     MAC     intravenous induction     Anesthetic plan, risks, benefits, and alternatives have been provided, discussed and informed consent has been obtained with: patient.    CODE STATUS:    Medical Intervention Limits: No intubation (DNI); No cardioversion  Level Of Support Discussed With: Patient  Code Status (Patient has no pulse and is not breathing): No CPR (Do Not Attempt to Resuscitate)  Medical Interventions (Patient has pulse or is breathing): Limited Support

## 2024-11-27 NOTE — PLAN OF CARE
Problem: Adult Inpatient Plan of Care  Goal: Plan of Care Review  Recent Flowsheet Documentation  Taken 11/27/2024 0825 by Dante Selby, OTR/L  Progress: no change  Outcome Evaluation: OT tx completed. Pt is agreeable to therapy. Pt reports weakness and fatigue. Pt was min A for supine to sit. Pt was min A for sit to stand t/f with RW. Pt was CGA for marching in place with RW. Pt denied further functional mobility d/t weakness and fatigue. Pt was max-dependent to adjust socks. Pt was max-dependent x2 for scooting to HOB with draw sheet. Continue OT POC.

## 2024-11-27 NOTE — PLAN OF CARE
Problem: Adult Inpatient Plan of Care  Goal: Plan of Care Review  Outcome: Progressing  Goal: Patient-Specific Goal (Individualized)  Outcome: Progressing  Goal: Absence of Hospital-Acquired Illness or Injury  Outcome: Progressing  Intervention: Identify and Manage Fall Risk  Recent Flowsheet Documentation  Taken 11/27/2024 1715 by Terrell Chapin RN  Safety Promotion/Fall Prevention:   fall prevention program maintained   safety round/check completed  Taken 11/27/2024 1600 by Terrell Chapin RN  Safety Promotion/Fall Prevention:   fall prevention program maintained   safety round/check completed  Taken 11/27/2024 1517 by Terrell Chapin RN  Safety Promotion/Fall Prevention:   fall prevention program maintained   safety round/check completed  Taken 11/27/2024 1420 by Terrell Chapin RN  Safety Promotion/Fall Prevention:   safety round/check completed   fall prevention program maintained  Taken 11/27/2024 1302 by Terrell Chapin RN  Safety Promotion/Fall Prevention: patient off unit  Taken 11/27/2024 1200 by Terrell Chapin RN  Safety Promotion/Fall Prevention: patient off unit  Taken 11/27/2024 1110 by Terrell Chapin RN  Safety Promotion/Fall Prevention:   fall prevention program maintained   safety round/check completed  Taken 11/27/2024 1004 by Terrell Chapin RN  Safety Promotion/Fall Prevention:   fall prevention program maintained   safety round/check completed  Taken 11/27/2024 0918 by Terrell Chapin RN  Safety Promotion/Fall Prevention:   fall prevention program maintained   safety round/check completed  Taken 11/27/2024 0839 by Terrell Chapin RN  Safety Promotion/Fall Prevention:   fall prevention program maintained   safety round/check completed  Taken 11/27/2024 0713 by Terrell Chapin RN  Safety Promotion/Fall Prevention:   fall prevention program maintained   safety round/check completed  Intervention: Prevent Skin Injury  Recent Flowsheet Documentation  Taken 11/27/2024 1715 by Terrell Chapin RN  Body Position: position  changed independently  Taken 11/27/2024 1600 by Terrell Chapin RN  Body Position: position changed independently  Taken 11/27/2024 1517 by Terrell Chapin RN  Body Position: position changed independently  Taken 11/27/2024 1420 by Terrell Chapin RN  Body Position: position changed independently  Taken 11/27/2024 1110 by Terrell Chapin RN  Body Position: position changed independently  Taken 11/27/2024 1004 by Terrell Chapin RN  Body Position: position changed independently  Taken 11/27/2024 0918 by Terrell Chapin RN  Body Position: position changed independently  Taken 11/27/2024 0839 by Terrell Chapin RN  Body Position: position changed independently  Taken 11/27/2024 0713 by Terrell Chapin RN  Body Position: position changed independently  Goal: Optimal Comfort and Wellbeing  Outcome: Progressing  Intervention: Provide Person-Centered Care  Recent Flowsheet Documentation  Taken 11/27/2024 0713 by Terrell Chapin RN  Trust Relationship/Rapport: care explained  Goal: Readiness for Transition of Care  Outcome: Progressing   Goal Outcome Evaluation:

## 2024-11-27 NOTE — CONSULTS
"EP CONSULT NOTE    Subjective        History of Present Illness    EP Problems:  Atypical atrial flutter  Paroxysmal atrial fibrillation  -2012: [facility]win  Presence of a Watchman device    Cardiology Problems:  1.  Hypertension  2.  CVA    Medical Problems:  1.  Hypothyroidism  2.  Blindness status post optic nerve injury  3.  Falls    Damaris Nash is a 89 y.o. female with problem list as above for whom EP is consulted regarding atypical atrial flutter.  She had prior ablation with Dr. Rajput approximately 12 years ago.  She has had numerous episodes of atypical atrial flutter since that time in his record cardioversion on 2 previous occurrences.  She presented to the hospital at this time with weakness and was found to be back in atypical atrial flutter with RVR.  She was started on a diltiazem drip and is currently rate controlled.  Given these findings, EP was consulted for further evaluation.    Objective   Vital Signs:  /70 (BP Location: Left arm, Patient Position: Lying)   Pulse 65   Temp 97.8 °F (36.6 °C) (Oral)   Resp 16   Ht 170 cm (66.93\")   Wt 59 kg (130 lb 1.1 oz)   SpO2 99%   BMI 20.42 kg/m²   Estimated body mass index is 20.42 kg/m² as calculated from the following:    Height as of this encounter: 170 cm (66.93\").    Weight as of this encounter: 59 kg (130 lb 1.1 oz).      Physical Exam  Vitals reviewed.   Constitutional:       Comments: Frail and chronically ill-appearing   Cardiovascular:      Rate and Rhythm: Normal rate. Rhythm irregular.      Pulses: Normal pulses.      Heart sounds: Normal heart sounds. No murmur heard.  Pulmonary:      Effort: Pulmonary effort is normal. No respiratory distress.      Comments: Decreased bibasilar breath sounds  Skin:     General: Skin is warm and dry.   Neurological:      General: No focal deficit present.      Mental Status: She is alert and oriented to person, place, and time.   Psychiatric:         Mood and Affect: Mood normal.         " Judgment: Judgment normal.          Result Review :  The following data was reviewed by: Maria Guadalupe Roca MD on 11/25/2024:  CMP          11/25/2024    14:11 11/26/2024    01:06 11/27/2024    02:27   CMP   Glucose 107  95  107    BUN 20  16  18    Creatinine 0.74  0.67  0.92    EGFR 77.4  83.7  59.6    Sodium 141  141  135    Potassium 3.8  3.3  2.8    Chloride 97  98  92    Calcium 9.6  9.1  9.4    Total Protein 7.4   6.5    Albumin 4.2   3.7    Globulin 3.2   2.8    Total Bilirubin 0.8   0.7    Alkaline Phosphatase 69   62    AST (SGOT) 25   19    ALT (SGPT) 16   11    Albumin/Globulin Ratio 1.3   1.3    BUN/Creatinine Ratio 27.0  23.9  19.6    Anion Gap 13.0  17.0  7.0      CBC          11/25/2024    14:11 11/26/2024    01:06 11/27/2024    02:27   CBC   WBC 5.71  6.51  8.73    RBC 4.48  4.08  4.29    Hemoglobin 13.4  12.1  12.5    Hematocrit 41.8  37.8  39.6    MCV 93.3  92.6  92.3    MCH 29.9  29.7  29.1    MCHC 32.1  32.0  31.6    RDW 15.2  15.1  15.0    Platelets 250  245  259      TSH          11/25/2024    14:11   TSH   TSH 0.076        [id number] SCORE   [id number] Score: 6 (11/27/2024  7:38 AM)           Assessment and Plan   Problems:  Atypical atrial flutter with acute exacerbation  Paroxysmal atrial fibrillation    Damaris Nash is a 89 y.o. female with problem list as above for whom EP is consulted regarding atypical atrial flutter, paroxysmal atrial fibrillation.  Unfortunately has required 2 cardioversions in the past 2 years.  Likely that her symptoms of weakness and fatigue are related to atrial flutter.  She has not previously been on antiarrhythmic drug therapy.  Will plan today for REMY and cardioversion with likely dofetilide initiation to follow (presuming QTc is within acceptable limits).    Plan:  -N.p.o. for REMY and possible cardioversion  -Plan for dofetilide initiation following restoration of sinus rhythm  -Start apixaban 2.5 mg twice daily  -Continue diltiazem drip for rate control for  now               Part of this note may be an electronic transcription/translation of spoken language to printed text using the Dragon Dictation System.

## 2024-11-28 LAB — MAGNESIUM SERPL-MCNC: 1.9 MG/DL (ref 1.6–2.4)

## 2024-11-28 PROCEDURE — 97116 GAIT TRAINING THERAPY: CPT

## 2024-11-28 PROCEDURE — 93005 ELECTROCARDIOGRAM TRACING: CPT | Performed by: STUDENT IN AN ORGANIZED HEALTH CARE EDUCATION/TRAINING PROGRAM

## 2024-11-28 PROCEDURE — 93010 ELECTROCARDIOGRAM REPORT: CPT | Performed by: EMERGENCY MEDICINE

## 2024-11-28 PROCEDURE — 99232 SBSQ HOSP IP/OBS MODERATE 35: CPT | Performed by: STUDENT IN AN ORGANIZED HEALTH CARE EDUCATION/TRAINING PROGRAM

## 2024-11-28 PROCEDURE — 25010000002 FUROSEMIDE PER 20 MG: Performed by: HOSPITALIST

## 2024-11-28 RX ORDER — AMIODARONE HYDROCHLORIDE 200 MG/1
400 TABLET ORAL EVERY 12 HOURS SCHEDULED
Status: DISCONTINUED | OUTPATIENT
Start: 2024-11-28 | End: 2024-11-28

## 2024-11-28 RX ORDER — METOPROLOL SUCCINATE 50 MG/1
50 TABLET, EXTENDED RELEASE ORAL NIGHTLY
Status: DISCONTINUED | OUTPATIENT
Start: 2024-11-28 | End: 2024-11-28

## 2024-11-28 RX ADMIN — ALPRAZOLAM 0.25 MG: 0.5 TABLET ORAL at 20:55

## 2024-11-28 RX ADMIN — APIXABAN 2.5 MG: 2.5 TABLET, FILM COATED ORAL at 20:54

## 2024-11-28 RX ADMIN — ASPIRIN 81 MG: 81 TABLET, COATED ORAL at 08:37

## 2024-11-28 RX ADMIN — APIXABAN 2.5 MG: 2.5 TABLET, FILM COATED ORAL at 08:37

## 2024-11-28 RX ADMIN — FUROSEMIDE 20 MG: 10 INJECTION, SOLUTION INTRAMUSCULAR; INTRAVENOUS at 08:37

## 2024-11-28 RX ADMIN — Medication 10 ML: at 08:37

## 2024-11-28 RX ADMIN — Medication 5 MG/HR: at 01:17

## 2024-11-28 RX ADMIN — DRONEDARONE 400 MG: 400 TABLET, FILM COATED ORAL at 20:53

## 2024-11-28 RX ADMIN — DRONEDARONE 400 MG: 400 TABLET, FILM COATED ORAL at 08:40

## 2024-11-28 RX ADMIN — HYDROCHLOROTHIAZIDE 25 MG: 25 TABLET ORAL at 08:37

## 2024-11-28 RX ADMIN — FUROSEMIDE 20 MG: 10 INJECTION, SOLUTION INTRAMUSCULAR; INTRAVENOUS at 17:36

## 2024-11-28 RX ADMIN — POLYETHYLENE GLYCOL 3350 17 G: 17 POWDER, FOR SOLUTION ORAL at 08:37

## 2024-11-28 RX ADMIN — DOXYCYCLINE 100 MG: 100 TABLET ORAL at 08:37

## 2024-11-28 RX ADMIN — METOPROLOL SUCCINATE 150 MG: 50 TABLET, FILM COATED, EXTENDED RELEASE ORAL at 20:54

## 2024-11-28 RX ADMIN — DOXYCYCLINE 100 MG: 100 TABLET ORAL at 20:54

## 2024-11-28 NOTE — PLAN OF CARE
Goal Outcome Evaluation:           Progress: no change  Outcome Evaluation: Disoriented to place, reorients easily. No c/o pain. Purewick in place. S/ST/AFl , HR got up to 150 on tele. Pt was NSR until about 2310, then she started flipping between NSR and AFl. Her BP was 186/77. I messaged Dr. Cabrera, and he ordered to restart the Cardizem gtt. Cardizem is infusing now. Bed alarm set. Safety maintained.

## 2024-11-28 NOTE — THERAPY TREATMENT NOTE
Acute Care - Physical Therapy Treatment Note  [facility]     Patient Name: Damaris Nash  : 1935  MRN: [id number]  Today's Date: 2024      Visit Dx:     ICD-10-CM ICD-9-CM   1. Atrial flutter with rapid ventricular response  I48.92 427.32   2. Weakness  R53.1 780.79   3. Not taking medication as directed  Z91.148 V15.81   4. Impaired mobility [Z74.09]  Z74.09 799.89     Patient Active Problem List   Diagnosis    Carotid artery disease    Hyperlipidemia    Hypertension    Amaurosis fugax of right eye    Bilateral carotid artery stenosis    Hx of transient ischemic attack (TIA)    Current use of long term anticoagulation    H/O cardiac radiofrequency ablation    PAF (paroxysmal atrial fibrillation)    Acquired hypothyroidism    Secondary hyperthyroidism    Atrial flutter with rapid ventricular response    Presence of Watchman left atrial appendage closure device    Supraventricular arrhythmia     Past Medical History:   Diagnosis Date    A-fib     Acute ischemic right ICA stroke     Atrial fibrillation     Benign essential HTN     Blindness of right eye     Breast CA     Breast cancer     Carotid artery disease     CVA (cerebral vascular accident)     Gallstones     GERD (gastroesophageal reflux disease)     Hyperlipidemia     Hypertension     Hypothyroidism     Macular degeneration     Osteopenia     Primary pulmonary HTN     S/P right hip fracture 2012    Shortness of breath     Stroke     Vitamin D deficiency      Past Surgical History:   Procedure Laterality Date    ATRIAL APPENDAGE EXCLUSION LEFT WITH TRANSESOPHAGEAL ECHOCARDIOGRAM Right 2023    Procedure: Atrial Appendage Occlusion;  Surgeon: Riley Mota MD;  Location: [facility] INVAS[facility] LOC[facility];  Service: Cardiology;  Laterality: Right;    BREAST LUMPECTOMY      CARDIAC ABLATION      cardiac ablation procedure for af    CARDIOVERSION N/A 09/07/2022    X2    CATARACT EXTRACTION Right     PARTIAL HIP ARTHROPLASTY Right      TONSILLECTOMY       PT Assessment (Last 12 Hours)       PT Evaluation and Treatment       Row Name 11/28/24 1002          Physical Therapy Time and Intention    Subjective Information complains of;weakness;fatigue  -LY     Document Type therapy note (daily note)  -LY     Mode of Treatment physical therapy  -LY       Row Name 11/28/24 1002          General Information    Existing Precautions/Restrictions fall  -LY       Row Name 11/28/24 1002          Pain    Pretreatment Pain Rating 0/10 - no pain  -LY     Posttreatment Pain Rating 0/10 - no pain  -LY       Row Name 11/28/24 1002          Bed Mobility    Supine-Sit [place] (Bed Mobility) minimum assist (75% patient effort);verbal cues  -LY     Sit-Supine [place] (Bed Mobility) minimum assist (75% patient effort);verbal cues  -LY       Row Name 11/28/24 1002          Transfers    Transfers toilet transfer  -LY       Row Name 11/28/24 1002          Sit-Stand Transfer    Sit-Stand [place] (Transfers) minimum assist (75% patient effort);verbal cues  -LY     Assistive Device (Sit-Stand Transfers) walker, front-wheeled  -LY       Row Name 11/28/24 1002          Stand-Sit Transfer    Stand-Sit [place] (Transfers) minimum assist (75% patient effort);verbal cues  -LY     Assistive Device (Stand-Sit Transfers) walker, front-wheeled  -LY       Row Name 11/28/24 1002          Toilet Transfer    Type (Toilet Transfer) sit-stand;stand-sit  -LY     [place] Level (Toilet Transfer) verbal cues;minimum assist (75% patient effort)  -LY     Assistive Device (Toilet Transfer) commode;grab bars/safety frame;walker, front-wheeled  -LY     Comment, (Toilet Transfer) assisted with brief change  -LY       Row Name 11/28/24 1002          Gait/Stairs (Locomotion)    [place] Level (Gait) contact guard;minimum assist (75% patient effort);verbal cues  -LY     Assistive Device (Gait) walker, front-wheeled  -LY     Distance in Feet (Gait) 15  x2 with seated rest break,  fatigues quickly  -LY     Deviations/Abnormal Patterns (Gait) base of support, narrow;gait speed decreased;stride length decreased;festinating/shuffling  -LY     Bilateral Gait Deviations forward flexed posture;heel strike decreased  -LY     Comment, (Gait/Stairs) assist provided with RWX management at times  -LY       Row Name 11/28/24 1002          Motor Skills    Comments, Therapeutic Exercise BLE AROM x10 reps seated  -LY     Additional Documentation Comments, Therapeutic Exercise (Row)  -LY       Row Name 11/28/24 1002          Positioning and Restraints    Pre-Treatment Position in bed  -LY     Post Treatment Position bed  -LY     In Bed fowlers;call light within reach;encouraged to call for assist;exit alarm on;with family/caregiver  -LY               User Key  (r) = Recorded By, (t) = Taken By, (c) = Cosigned By      Initials Name Provider Type    LY Nataliia Keating, PTA Physical Therapist Assistant                    Physical Therapy Education       Title: PT OT SLP Therapies (In Progress)       Topic: Physical Therapy (In Progress)       Point: Mobility training (In Progress)       Learning Progress Summary            Patient Acceptance, E, NR by AJ at 11/26/2024 0930    Comment: rol of PT, call for assist, d/c plans                      Point: Home exercise program (In Progress)       Learning Progress Summary            Patient Acceptance, E, NR by AJ at 11/26/2024 0930    Comment: rol of PT, call for assist, d/c plans                      Point: Body mechanics (In Progress)       Learning Progress Summary            Patient Acceptance, E, NR by AJ at 11/26/2024 0930    Comment: rol of PT, call for assist, d/c plans                      Point: Precautions (In Progress)       Learning Progress Summary            Patient Acceptance, E, NR by AJ at 11/26/2024 0930    Comment: rol of PT, call for assist, d/c plans                                      User Key       Initials Effective Dates Name Provider Type  Discipline    AJ 08/15/24 -  Abelino Sheehan, PT DPT Physical Therapist PT                  PT Recommendation and Plan             Time Calculation:    PT Charges       Row Name 11/28/24 1031             Time Calculation    Start Time 0802  -LY      Stop Time 0829  -LY      Time Calculation (min) 27 min  -LY      PT Received On 11/28/24  -LY         Time Calculation- PT    Total Timed Code Minutes- PT 27 minute(s)  -LY         Timed Charges    72882 - Gait Training Minutes  27  -LY         Total Minutes    Timed Charges Total Minutes 27  -LY       Total Minutes 27  -LY                User Key  (r) = Recorded By, (t) = Taken By, (c) = Cosigned By      Initials Name Provider Type    LY Nataliia Keating PTA Physical Therapist Assistant                  Therapy Charges for Today       Code Description Service Date Service Provider Modifiers Qty    [id number] HC GAIT TRAINING EA 15 MIN 11/28/2024 Nataliia Keating PTA GP 2            PT G-Codes  Outcome Measure Options: AM-PAC 6 Clicks Daily Activity (OT)  AM-PAC 6 Clicks Score (PT): 15  AM-PAC 6 Clicks Score (OT): 17    Nataliia Keating PTA  11/28/2024

## 2024-11-28 NOTE — CASE MANAGEMENT/SOCIAL WORK
Continued Stay Note   [place]     Patient Name: Damaris Nash  MRN: [id number]  Today's Date: 11/28/2024    Admit Date: 11/25/2024    Plan: [facility]   Discharge Plan       Row Name 11/28/24 1007       Plan    Plan [facility]    Patient/Family in Agreement with Plan yes    Plan Comments Pt will have 3 night inpt stay tomorrow 11/29 and can admit to [facility].  Phone:  [phone]  Fax: [phone].    Final Discharge Disposition Code 03 - skilled nursing facility (SNF)                   Discharge Codes    No documentation.                 Expected Discharge Date and Time       Expected Discharge Date Expected Discharge Time    Nov 29, 2024               DARYL Johnston

## 2024-11-28 NOTE — PROGRESS NOTES
"EP Problems:  Atypical atrial flutter  Paroxysmal atrial fibrillation  -2012: Regulo Erwin  Presence of a Watchman device  -11/27/2024: REMY revealing watchman leak; up to 7mm     Cardiology Problems:  1.  Hypertension  2.  CVA     Medical Problems:  1.  Hypothyroidism  2.  Blindness status post optic nerve injury  3.  Falls    Patient ID:  Damaris Nash is a 89 y.o. female with problem list as above as above who EP is following for atypical atrial flutter.    Subjective:  Underwent direct-current cardioversion yesterday.  Maintained sinus rhythm for a period of time however had recurrence of atypical flutter overnight.    Objective:  /66 (BP Location: Right arm, Patient Position: Lying)   Pulse 101   Temp 98.4 °F (36.9 °C) (Oral)   Resp 18   Ht 170.2 cm (67\")   Wt 59 kg (130 lb)   SpO2 91%   BMI 20.36 kg/m²     Chronically ill-appearing, no acute distress  Clear to auscultation bilaterally  Regular rate and rhythm  Warm, well-perfused    Assessment:  Atypical atrial flutter with acute exacerbation  Watchman device leak    Unfortunately has had early recurrence of atypical atrial flutter.  QT was too long yesterday for dofetilide initiation.  Will plan to start Multaq today if QTc less than 500 with plans for repeat cardioversion tomorrow.  If QTc is too prolonged for Multaq, we will need to reconsider available treatment options and permanent pacemaker implant/AV node ablation may become necessary.    Plan:  -Continue apixaban 2.5 mg twice daily  -Continue metoprolol for rate control  -Repeat ECG this morning and if QTc less than 500, start Multaq  -N.p.o. at midnight for repeat cardioversion tomorrow    Part of this note may be an electronic transcription/translation of spoken language to printed text using the Dragon Dictation System.    "

## 2024-11-28 NOTE — PROGRESS NOTES
[facility]  INPATIENT PROGRESS NOTE    Patient Name: Damaris Nash  Date of Admission: 11/25/2024  Today's Date: 11/28/24  Length of Stay: 2  Primary Care Physician: Shahla Beltran DO    Subjective   Chief Complaint: Worsening of weakness  Hypertension         jess Nash is a 89-year-old female who presents to ED with her son.  Chief complaint is palpitations with increased weakness and elevated blood pressure.     Patient does have significant medical history of previous paroxysmal atrial fibrillation and completed a watchman's procedure therefore no longer anticoagulated, acute ischemia to the right ICA stroke, essential hypertension, blindness of right eye, breast cancer, carotid artery disease, gallstones, GERD, hyperlipidemia, hypothyroidism, macular degeneration, previous right hip fracture, vitamin D deficiency.  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     In the past 2 weeks, she moved in with her son because she has been declining over the past several months to years.  She has been gradually losing weight, decreased appetite.  Has been treated with Megace however I am unsure if she is taking her medications. She ambulates with assistance.  She has not been taking her medications as prescribed.  The patient and children believe she was not taking her HCTZ, reports not taking thyroid supplement.  Both medications were restarted over the past 2 weeks.  In the past several days, she is felt increasingly fatigued more than normal.  She has felt palpitations intermittently and it just did not feel right into her chest.  She denies specifically any chest pain, pressure, tightness.  She does feel a bit shortness of breath.  She denies any history of congestive heart failure or edema.  She denies any orthopnea or PND.  She denies any fever.  Son states she has been coughing a dry cough. They denied  any recent changes medications except that she is taking her medications as prescribed now.  She denies any leg pain or swelling.     ER workup reveals atrial flutter RVR rate 135, troponin 23, proBNP two 744.0, chloride 97, CO2 31, glucose 107, TSH 0.076, free T44.05, WBC without abnormal findings, urinalysis with large leukocytes, 3-5 RBCs, 3-5 WBCs with trace of bacteria, respiratory panel negative.  Chest x-ray reveals small right effusion with right basilar atelectasis. Lungs are  otherwise clear.  CT of the chest without contrast reveals stable right upper lobe nodule is likely benign. Stable chronic changes with scarring and remote granulomatous disease.  11/26  As before presented with worsening of her weakness, UTI chest x-ray showing pleural effusion BNP was skyhigh, free T4 was elevated we will go ahead and consult with cardiology to help with the A-fib suspect CHF exacerbation we will start her on Lasix and order echo of the heart  11/27  As before appreciate cardiology and EP, Plan for REMY/DCCV with electrophysiology today Plan for dofetilide initiation following restoration of sinus rhythm  -Start apixaban 2.5 mg twice daily  -Continue diltiazem drip for rate control for no  11/28  As before appreciate EP, atypical a flutter with exacerbation with Watchman device leak REMY cardioversion tomorrow continue Eliquis and metoprolol  Review of Systems   All pertinent negatives and positives are as above. All other systems have been reviewed and are negative unless otherwise stated.     Objective    Temp:  [97.5 °F (36.4 °C)-98.4 °F (36.9 °C)] 98.2 °F (36.8 °C)  Heart Rate:  [] 105  Resp:  [16-22] 18  BP: ()/(49-98) 141/72  Physical Exam  Constitutional:       Appearance: Normal appearance.   HENT:      Head: Normocephalic.      Nose: Nose normal.   Eyes:      Pupils: Pupils are equal, round, and reactive to light.   Cardiovascular:      Rate and Rhythm: Rhythm irregular.   Pulmonary:      Breath  "sounds: Wheezing and rhonchi present.   Abdominal:      General: Abdomen is flat.   Musculoskeletal:         General: Swelling present.      Cervical back: Normal range of motion.   Neurological:      Mental Status: She is alert.             Results Review:  I have reviewed the labs, radiology results, and diagnostic studies.    Laboratory Data:   Results from last 7 days   Lab Units 11/27/24  0227 11/26/24 0106 11/25/24  1411   WBC 10*3/mm3 8.73 6.51 5.71   HEMOGLOBIN g/dL 12.5 12.1 13.4   HEMATOCRIT % 39.6 37.8 41.8   PLATELETS 10*3/mm3 259 245 250        Results from last 7 days   Lab Units 11/27/24  1837 11/27/24 0227 11/26/24 0106 11/25/24  1411   SODIUM mmol/L  --  135* 141 141   POTASSIUM mmol/L 4.7 2.8* 3.3* 3.8   CHLORIDE mmol/L  --  92* 98 97*   CO2 mmol/L  --  36.0* 26.0 31.0*   BUN mg/dL  --  18 16 20   CREATININE mg/dL  --  0.92 0.67 0.74   CALCIUM mg/dL  --  9.4 9.1 9.6   BILIRUBIN mg/dL  --  0.7  --  0.8   ALK PHOS U/L  --  62  --  69   ALT (SGPT) U/L  --  11  --  16   AST (SGOT) U/L  --  19  --  25   GLUCOSE mg/dL  --  107* 95 107*       Culture Data:   No results found for: \"BLOODCX\", \"URINECX\", \"WOUNDCX\", \"MRSACX\", \"RESPCX\", \"STOOLCX\"    Radiology Data:   Imaging Results (Last 24 Hours)       ** No results found for the last 24 hours. **            I have reviewed the patient's current medications.     Assessment/Plan   Assessment  Active Hospital Problems    Diagnosis     **Atrial flutter with rapid ventricular response     Supraventricular arrhythmia     Secondary hyperthyroidism     Presence of Watchman left atrial appendage closure device     Hypertension        Treatment Plan    1.  Atrial flutter with rapid ventricular response, rate controlled with Cardizem drip, will continue and reevaluate in a.m., continue home metoprolol, routine telemetry orders, supplemental oxygen as needed     2.  Secondary hyperthyroidism felt to be due to medication, will hold for now     3.  Presence of " watchman's device, DVT prophylaxis with SCDs, no anticoagulation     4.  Hypertension, per son recently poorly controlled, stable now on Cardizem, home medications reviewed and restarted as appropriate     5.  Home medications reviewed and restarted as appropriate, consult PT and OT, consult , oral care, labs in a.m.     Medical Decision Making  Number and Complexity of problems: 4     2 problems, acute, high complexity, improving  2 problems chronic, moderate complexity unchanged     Differential Diagnosis: None     Conditions and Status        Condition is unchanged.     [facility] Data  External documents reviewed: No  Cardiac tracing (EKG, telemetry) interpretation: Reviewed  Radiology interpretation: Reviewed  Labs reviewed: Yes  Any tests that were considered but not ordered: No     Decision rules/scores evaluated (example [id number], [place], etc): No     Discussed with: Patient, children at bedside and Dr. Kramer     Care Planning  Shared decision making: Patient, children at bedside and Dr. Kramer  Code status and discussions: DNR/DNI     Disposition  Social Determinants of Health that impact treatment or disposition: None  Estimated length of stay is 1-2 days.      I confirmed that the patient's advanced care plan is present, code status is documented, and a surrogate decision maker is listed in the patient's medical record.      The patient's surrogate decision maker is children are oneill of .      Electronically signed by Polina Kramer MD, 11/28/24, 10:37 CST.

## 2024-11-29 LAB
ALBUMIN SERPL-MCNC: 3.8 G/DL (ref 3.5–5.2)
ALBUMIN/GLOB SERPL: 1.2 G/DL
ALP SERPL-CCNC: 62 U/L (ref 39–117)
ALT SERPL W P-5'-P-CCNC: 9 U/L (ref 1–33)
ANION GAP SERPL CALCULATED.3IONS-SCNC: 13 MMOL/L (ref 5–15)
ANION GAP SERPL CALCULATED.3IONS-SCNC: 13 MMOL/L (ref 5–15)
AST SERPL-CCNC: 19 U/L (ref 1–32)
BILIRUB SERPL-MCNC: 1 MG/DL (ref 0–1.2)
BUN SERPL-MCNC: 34 MG/DL (ref 8–23)
BUN SERPL-MCNC: 34 MG/DL (ref 8–23)
BUN/CREAT SERPL: 32.7 (ref 7–25)
BUN/CREAT SERPL: 32.7 (ref 7–25)
CALCIUM SPEC-SCNC: 9.3 MG/DL (ref 8.6–10.5)
CALCIUM SPEC-SCNC: 9.3 MG/DL (ref 8.6–10.5)
CHLORIDE SERPL-SCNC: 95 MMOL/L (ref 98–107)
CHLORIDE SERPL-SCNC: 95 MMOL/L (ref 98–107)
CO2 SERPL-SCNC: 33 MMOL/L (ref 22–29)
CO2 SERPL-SCNC: 33 MMOL/L (ref 22–29)
CREAT SERPL-MCNC: 1.04 MG/DL (ref 0.57–1)
CREAT SERPL-MCNC: 1.04 MG/DL (ref 0.57–1)
EGFRCR SERPLBLD CKD-EPI 2021: 51.5 ML/MIN/1.73
EGFRCR SERPLBLD CKD-EPI 2021: 51.5 ML/MIN/1.73
GLOBULIN UR ELPH-MCNC: 3.1 GM/DL
GLUCOSE SERPL-MCNC: 120 MG/DL (ref 65–99)
GLUCOSE SERPL-MCNC: 120 MG/DL (ref 65–99)
MAGNESIUM SERPL-MCNC: 1.9 MG/DL (ref 1.6–2.4)
POTASSIUM SERPL-SCNC: 3.3 MMOL/L (ref 3.5–5.2)
POTASSIUM SERPL-SCNC: 3.3 MMOL/L (ref 3.5–5.2)
POTASSIUM SERPL-SCNC: 4.9 MMOL/L (ref 3.5–5.2)
PROT SERPL-MCNC: 6.9 G/DL (ref 6–8.5)
SODIUM SERPL-SCNC: 141 MMOL/L (ref 136–145)
SODIUM SERPL-SCNC: 141 MMOL/L (ref 136–145)

## 2024-11-29 PROCEDURE — 97110 THERAPEUTIC EXERCISES: CPT

## 2024-11-29 PROCEDURE — 97535 SELF CARE MNGMENT TRAINING: CPT | Performed by: OCCUPATIONAL THERAPIST

## 2024-11-29 PROCEDURE — 83735 ASSAY OF MAGNESIUM: CPT | Performed by: STUDENT IN AN ORGANIZED HEALTH CARE EDUCATION/TRAINING PROGRAM

## 2024-11-29 PROCEDURE — 99232 SBSQ HOSP IP/OBS MODERATE 35: CPT | Performed by: STUDENT IN AN ORGANIZED HEALTH CARE EDUCATION/TRAINING PROGRAM

## 2024-11-29 PROCEDURE — 97116 GAIT TRAINING THERAPY: CPT

## 2024-11-29 PROCEDURE — 84132 ASSAY OF SERUM POTASSIUM: CPT | Performed by: HOSPITALIST

## 2024-11-29 PROCEDURE — 80053 COMPREHEN METABOLIC PANEL: CPT | Performed by: HOSPITALIST

## 2024-11-29 RX ORDER — POTASSIUM CHLORIDE 750 MG/1
10 CAPSULE, EXTENDED RELEASE ORAL DAILY
Qty: 90 CAPSULE | Refills: 3 | Status: SHIPPED | OUTPATIENT
Start: 2024-11-29 | End: 2024-11-30

## 2024-11-29 RX ORDER — POTASSIUM CHLORIDE 1.5 G/1.58G
40 POWDER, FOR SOLUTION ORAL EVERY 4 HOURS
Status: COMPLETED | OUTPATIENT
Start: 2024-11-29 | End: 2024-11-29

## 2024-11-29 RX ORDER — AMIODARONE HYDROCHLORIDE 200 MG/1
TABLET ORAL
Qty: 120 TABLET | Refills: 3 | Status: SHIPPED | OUTPATIENT
Start: 2024-11-29 | End: 2024-11-30

## 2024-11-29 RX ORDER — POTASSIUM CHLORIDE 750 MG/1
10 CAPSULE, EXTENDED RELEASE ORAL DAILY
Status: DISCONTINUED | OUTPATIENT
Start: 2024-11-29 | End: 2024-11-29 | Stop reason: ALTCHOICE

## 2024-11-29 RX ORDER — POTASSIUM CHLORIDE 20MEQ/15ML
10 LIQUID (ML) ORAL DAILY
Status: DISCONTINUED | OUTPATIENT
Start: 2024-11-29 | End: 2024-11-30 | Stop reason: HOSPADM

## 2024-11-29 RX ORDER — AMIODARONE HYDROCHLORIDE 200 MG/1
400 TABLET ORAL EVERY 12 HOURS SCHEDULED
Status: DISCONTINUED | OUTPATIENT
Start: 2024-11-29 | End: 2024-11-30 | Stop reason: HOSPADM

## 2024-11-29 RX ADMIN — AMIODARONE HYDROCHLORIDE 400 MG: 200 TABLET ORAL at 09:46

## 2024-11-29 RX ADMIN — APIXABAN 2.5 MG: 2.5 TABLET, FILM COATED ORAL at 20:34

## 2024-11-29 RX ADMIN — METOPROLOL SUCCINATE 150 MG: 50 TABLET, FILM COATED, EXTENDED RELEASE ORAL at 20:34

## 2024-11-29 RX ADMIN — POTASSIUM CHLORIDE 40 MEQ: 1.5 POWDER, FOR SOLUTION ORAL at 12:12

## 2024-11-29 RX ADMIN — Medication 10 ML: at 09:47

## 2024-11-29 RX ADMIN — POTASSIUM CHLORIDE 10 MEQ: 20 SOLUTION ORAL at 10:53

## 2024-11-29 RX ADMIN — Medication 10 ML: at 20:41

## 2024-11-29 RX ADMIN — HYDROCHLOROTHIAZIDE 25 MG: 25 TABLET ORAL at 09:47

## 2024-11-29 RX ADMIN — ASPIRIN 81 MG: 81 TABLET, COATED ORAL at 09:47

## 2024-11-29 RX ADMIN — AMIODARONE HYDROCHLORIDE 400 MG: 200 TABLET ORAL at 20:34

## 2024-11-29 RX ADMIN — APIXABAN 2.5 MG: 2.5 TABLET, FILM COATED ORAL at 09:47

## 2024-11-29 RX ADMIN — POTASSIUM CHLORIDE 40 MEQ: 1.5 POWDER, FOR SOLUTION ORAL at 17:26

## 2024-11-29 NOTE — PLAN OF CARE
Goal Outcome Evaluation:      Patient performed supine to sit with CGA and HOB elevated. Patient sat EOB and worked thru LE exercises and attempted core strengthening. Patient was able to reach in all directions but with very little movement. Patient stood with Min assist. Patient has a posterior lean which patient stated she couldn't feel that she was leaning back. Worked on finding and maintaining neutral. Patient walked 25' with Rwx and MIN assist. Patient required hand on back to prevent increased posterior lean. Patient sat with cues and CGA. Patient is very weak and will continue to benefit from strengthening activities.

## 2024-11-29 NOTE — PLAN OF CARE
Goal Outcome Evaluation:  Plan of Care Reviewed With: patient        Progress: no change  Outcome Evaluation: OT tx complete.  Pt sitting in chair.  Agreeable for standing but reported dizziness that would not subside.  Assisted pt back to chair for full grooming routine.  Pt. able to complete with vc's 2' decreased vision.  Noted minor SOA during activity.  Cont OT tx    Anticipated Discharge Disposition (OT): skilled nursing facility

## 2024-11-29 NOTE — PLAN OF CARE
Goal Outcome Evaluation:           Progress: improving  Outcome Evaluation: no c/o pain from patient. generalized weakness noted. VSS. intermittent Af/S--npo midnight for cardioversion tomorrow. Safety maintained.

## 2024-11-29 NOTE — THERAPY TREATMENT NOTE
Patient Name: Damaris Nash  : 1935    MRN: [id number]                              Today's Date: 2024       Admit Date: 2024    Visit Dx:     ICD-10-CM ICD-9-CM   1. Atrial flutter with rapid ventricular response  I48.92 427.32   2. Weakness  R53.1 780.79   3. Not taking medication as directed  Z91.148 V15.81   4. Impaired mobility [Z74.09]  Z74.09 799.89     Patient Active Problem List   Diagnosis    Carotid artery disease    Hyperlipidemia    Hypertension    Amaurosis fugax of right eye    Bilateral carotid artery stenosis    Hx of transient ischemic attack (TIA)    Current use of long term anticoagulation    H/O cardiac radiofrequency ablation    PAF (paroxysmal atrial fibrillation)    Acquired hypothyroidism    Secondary hyperthyroidism    Atrial flutter with rapid ventricular response    Presence of Watchman left atrial appendage closure device    Supraventricular arrhythmia     Past Medical History:   Diagnosis Date    A-fib     Acute ischemic right ICA stroke     Atrial fibrillation     Benign essential HTN     Blindness of right eye     Breast CA     Breast cancer     Carotid artery disease     CVA (cerebral vascular accident)     Gallstones     GERD (gastroesophageal reflux disease)     Hyperlipidemia     Hypertension     Hypothyroidism     Macular degeneration     Osteopenia     Primary pulmonary HTN     S/P right hip fracture 2012    Shortness of breath     Stroke     Vitamin D deficiency      Past Surgical History:   Procedure Laterality Date    ATRIAL APPENDAGE EXCLUSION LEFT WITH TRANSESOPHAGEAL ECHOCARDIOGRAM Right 2023    Procedure: Atrial Appendage Occlusion;  Surgeon: Riley Mota MD;  Location: [facility] CATH INVASIVE LOCATION;  Service: Cardiology;  Laterality: Right;    BREAST LUMPECTOMY      CARDIAC ABLATION      cardiac ablation procedure for af    CARDIOVERSION N/A 09/07/2022    X2    CATARACT EXTRACTION Right     PARTIAL HIP ARTHROPLASTY Right      TONSILLECTOMY        General Information       [facility] Name 11/29/24 1137          OT Time and Intention    Subjective Information complains of;weakness;fatigue;dizziness  -     Document Type therapy note (daily note)  -     Mode of Treatment occupational therapy  -     Patient Effort good  -     Symptoms Noted During/After Treatment dizziness;fatigue  -[facility] Name 11/29/24 1137          General Information    Patient Profile Reviewed yes  -     Existing Precautions/Restrictions fall  -       Row Name 11/29/24 1137          Cognition    Orientation Status (Cognition) oriented to;person;place  -[facility] Name 11/29/24 1137          Safety Issues/Impairments Affecting Functional Mobility    Safety Issues Affecting Function (Mobility) friction/shear risk;insight into deficits/self-awareness;judgment  -     Impairments Affecting Function (Mobility) balance;endurance/activity tolerance;strength  -               User Key  (r) = Recorded By, (t) = Taken By, (c) = Cosigned By      Initials Name Provider Type     Annie Harrison, OTR/L Occupational Therapist                     Mobility/ADL's       [facility] Name 11/29/24 1137          Transfers    Transfers sit-stand transfer;stand-sit transfer  -[facility] Name 11/29/24 1137          Sit-Stand Transfer    Sit-Stand [place] (Transfers) contact guard;verbal cues  -     Assistive Device (Sit-Stand Transfers) walker, front-wheeled  -[facility] Name 11/29/24 1137          Stand-Sit Transfer    Assistive Device (Stand-Sit Transfers) walker, front-wheeled  -     Comment, (Stand-Sit Transfer) pt reports dizziness upon standing  -[facility] Name 11/29/24 1137          Activities of Daily Living    BADL Assessment/Intervention grooming  -[facility] Name 11/29/24 1137          Grooming Assessment/Training    [place] Level (Grooming) set up;oral care regimen;wash face, hands;hair care, combing/brushing  -     Position (Grooming) supported  sitting  -     Comment, (Grooming) applied deodorant & lotion  -               User Key  (r) = Recorded By, (t) = Taken By, (c) = Cosigned By      Initials Name Provider Type    Annie Manning, OTR/L Occupational Therapist                   Obj/Interventions       Row Name 11/29/24 1137          Balance    Balance Interventions sitting;standing;sit to stand;supported;occupation based/functional task  -               User Key  (r) = Recorded By, (t) = Taken By, (c) = Cosigned By      Initials Name Provider Type    Annie Manning, OTR/L Occupational Therapist                   Goals/Plan    No documentation.                  Clinical Impression       Row Name 11/29/24 1137          Pain Assessment    Pretreatment Pain Rating 0/10 - no pain  -     Posttreatment Pain Rating 0/10 - no pain  -       Row Name 11/29/24 1137          Plan of Care Review    Plan of Care Reviewed With patient  -CH     Progress no change  -     Outcome Evaluation OT tx complete.  Pt sitting in chair.  Agreeable for standing but reported dizziness that would not subside.  Assisted pt back to chair for full grooming routine.  Pt. able to complete with vc's 2' decreased vision.  Noted minor SOA during activity.  Cont OT tx  -       Row Name 11/29/24 1137          Therapy Assessment/Plan (OT)    Rehab Potential (OT) fair  -       Row Name 11/29/24 1137          Therapy Plan Review/Discharge Plan (OT)    Anticipated Discharge Disposition (OT) skilled nursing facility  -       Row Name 11/29/24 1137          Positioning and Restraints    Pre-Treatment Position sitting in chair/recliner  -     Post Treatment Position chair  -     In Chair sitting;call light within reach;encouraged to call for assist;exit alarm on  -               User Key  (r) = Recorded By, (t) = Taken By, (c) = Cosigned By      Initials Name Provider Type    Annie Manning, OTR/L Occupational Therapist                   Outcome Measures       Row  Name 11/29/24 1137          How much help from another is currently needed...    Putting on and taking off regular lower body clothing? 2  -CH     Bathing (including washing, rinsing, and drying) 2  -CH     Toileting (which includes using toilet bed pan or urinal) 2  -CH     Putting on and taking off regular upper body clothing 3  -CH     Taking care of personal grooming (such as brushing teeth) 4  -CH     Eating meals 4  -CH     AM-PAC 6 Clicks Score (OT) 17  -CH       Row Name 11/29/24 1200          How much help from another person do you currently need...    Turning from your back to your side while in flat bed without using bedrails? 3  -YESY     Moving from lying on back to sitting on the side of a flat bed without bedrails? 3  -YESY     Moving to and from a bed to a chair (including a wheelchair)? 3  -YESY     Standing up from a chair using your arms (e.g., wheelchair, bedside chair)? 3  -YESY     Climbing 3-5 steps with a railing? 2  -YESY     To walk in hospital room? 3  -YESY     AM-PAC 6 Clicks Score (PT) 17  -YESY     Highest Level of Mobility Goal 5 --> Static standing  -YESY       Row Name 11/29/24 1137          Functional Assessment    Outcome Measure Options AM-PAC 6 Clicks Daily Activity (OT)  -               User Key  (r) = Recorded By, (t) = Taken By, (c) = Cosigned By      Initials Name Provider Type     Annie Harrison, OTR/L Occupational Therapist    Manuela Roth, LEXIE Physical Therapist Assistant                    Occupational Therapy Education       Title: PT OT SLP Therapies (In Progress)       Topic: Occupational Therapy (In Progress)       Point: ADL training (Done)       Description:   Instruct learner(s) on proper safety adaptation and remediation techniques during self care or transfers.   Instruct in proper use of assistive devices.                  Learning Progress Summary            Patient Acceptance, E, VU by CH at 11/29/2024 3849    Acceptance, E, VU,NR by MM at 11/27/2024 5218     Acceptance, E,D, VU,NR by  at 11/26/2024 0938   Family Acceptance, E, VU,NR by  at 11/27/2024 1554                      Point: Home exercise program (Not Started)       Description:   Instruct learner(s) on appropriate technique for monitoring, assisting and/or progressing therapeutic exercises/activities.                  Learner Progress:  Not documented in this visit.              Point: Precautions (Done)       Description:   Instruct learner(s) on prescribed precautions during self-care and functional transfers.                  Learning Progress Summary            Patient Acceptance, E, VU,NR by  at 11/27/2024 1554    Acceptance, E,D, VU,NR by  at 11/26/2024 0938   Family Acceptance, E, VU,NR by  at 11/27/2024 1554                      Point: Body mechanics (Done)       Description:   Instruct learner(s) on proper positioning and spine alignment during self-care, functional mobility activities and/or exercises.                  Learning Progress Summary            Patient Acceptance, E, VU by  at 11/29/2024 1425    Acceptance, E, VU,NR by  at 11/27/2024 1554    Acceptance, E,D, VU,NR by  at 11/26/2024 0938   Family Acceptance, E, VU,NR by  at 11/27/2024 1554                                      User Key       Initials Effective Dates Name Provider Type Discipline     07/11/23 -  Annie Harrison, OTR/L Occupational Therapist OT     07/11/23 -  Dante Selby, OTR/L Occupational Therapist OT                  OT Recommendation and Plan  Planned Therapy Interventions (OT): activity tolerance training, adaptive equipment training, BADL retraining, functional balance retraining, occupation/activity based interventions, patient/caregiver education/training, strengthening exercise, transfer/mobility retraining, wheelchair assessment/training  Therapy Frequency (OT): 5 times/wk  Plan of Care Review  Plan of Care Reviewed With: patient  Progress: no change  Outcome Evaluation: OT tx complete.  Pt  sitting in chair.  Agreeable for standing but reported dizziness that would not subside.  Assisted pt back to chair for full grooming routine.  Pt. able to complete with vc's 2' decreased vision.  Noted minor SOA during activity.  Cont OT tx     Time Calculation:         Time Calculation- OT       Row Name 11/29/24 1301 11/29/24 1137          Time Calculation- OT    OT Start Time -- 1137  -CH     OT Stop Time -- 1201  -CH     OT Time Calculation (min) -- 24 min  -CH     Total Timed Code Minutes- OT -- 24 minute(s)  -CH     OT Received On -- 11/29/24  -        Timed Charges    28988 - Gait Training Minutes  8  -YESY --     84438 - OT Self Care/Mgmt Minutes -- 24  -CH        Total Minutes    Timed Charges Total Minutes 8  -YESY 24  -CH      Total Minutes 8  -YESY 24  -CH               User Key  (r) = Recorded By, (t) = Taken By, (c) = Cosigned By      Initials Name Provider Type    CH Annie Harrison, OTR/L Occupational Therapist    Manuela Roth, PTA Physical Therapist Assistant                  Therapy Charges for Today       Code Description Service Date Service Provider Modifiers Qty    34946823782 HC OT SELF CARE/MGMT/TRAIN EA 15 MIN 11/29/2024 Annie Harrison OTR/L GO 2                 CRYSTAL Baker/DANIE  11/29/2024

## 2024-11-29 NOTE — PLAN OF CARE
Goal Outcome Evaluation:              Outcome Evaluation: Nutrition follow up complete. Pt set up for lunch. Average PO 50% of meals x 72 hr chart review, 393mL oral fluid/d. Feeds self. ONS provided per order. BM yesterday. Pt asked about discharging, expects to go to SNF. Last two weights 130lb with unidentified method; suspect scale error. Will continue heart healthy diet and Boost Original twice daily. Follow per protocol.

## 2024-11-29 NOTE — THERAPY TREATMENT NOTE
Acute Care - Physical Therapy Treatment Note  [facility]     Patient Name: Damaris Nash  : 1935  MRN: [id number]  Today's Date: 2024      Visit Dx:     ICD-10-CM ICD-9-CM   1. Atrial flutter with rapid ventricular response  I48.92 427.32   2. Weakness  R53.1 780.79   3. Not taking medication as directed  Z91.148 V15.81   4. Impaired mobility [Z74.09]  Z74.09 799.89     Patient Active Problem List   Diagnosis    Carotid artery disease    Hyperlipidemia    Hypertension    Amaurosis fugax of right eye    Bilateral carotid artery stenosis    Hx of transient ischemic attack (TIA)    Current use of long term anticoagulation    H/O cardiac radiofrequency ablation    PAF (paroxysmal atrial fibrillation)    Acquired hypothyroidism    Secondary hyperthyroidism    Atrial flutter with rapid ventricular response    Presence of Watchman left atrial appendage closure device    Supraventricular arrhythmia     Past Medical History:   Diagnosis Date    A-fib     Acute ischemic right ICA stroke     Atrial fibrillation     Benign essential HTN     Blindness of right eye     Breast CA     Breast cancer     Carotid artery disease     CVA (cerebral vascular accident)     Gallstones     GERD (gastroesophageal reflux disease)     Hyperlipidemia     Hypertension     Hypothyroidism     Macular degeneration     Osteopenia     Primary pulmonary HTN     S/P right hip fracture 2012    Shortness of breath     Stroke     Vitamin D deficiency      Past Surgical History:   Procedure Laterality Date    ATRIAL APPENDAGE EXCLUSION LEFT WITH TRANSESOPHAGEAL ECHOCARDIOGRAM Right 2023    Procedure: Atrial Appendage Occlusion;  Surgeon: Riley Mota MD;  Location: [facility] INVASIVE LOCATION;  Service: Cardiology;  Laterality: Right;    BREAST LUMPECTOMY      CARDIAC ABLATION      cardiac ablation procedure for af    CARDIOVERSION N/A 09/07/2022    X2    CATARACT EXTRACTION Right     PARTIAL HIP ARTHROPLASTY Right      TONSILLECTOMY       PT Assessment (Last 12 Hours)       PT Evaluation and Treatment       Row Name 11/29/24 1056          Physical Therapy Time and Intention    Subjective Information complains of;weakness;fatigue;dizziness  -     Document Type therapy note (daily note)  -     Mode of Treatment physical therapy  -       Row Name 11/29/24 1056          General Information    Existing Precautions/Restrictions fall  -     Limitations/Impairments hearing  -       Row Name 11/29/24 1056          Pain    Pretreatment Pain Rating 0/10 - no pain  -YESY     Posttreatment Pain Rating 0/10 - no pain  -       Row Name 11/29/24 1056          Bed Mobility    Supine-Sit [place] (Bed Mobility) verbal cues;contact guard  -     Assistive Device (Bed Mobility) head of bed elevated;bed rails  -       Row Name 11/29/24 1056          Sit-Stand Transfer    Sit-Stand [place] (Transfers) verbal cues;minimum assist (75% patient effort)  -     Comment, (Sit-Stand Transfer) stood x3, posterior lean, woeked on spreading weight on entire foot and slightly lean forward.  -       Row Name 11/29/24 1056          Stand-Sit Transfer    Stand-Sit [place] (Transfers) verbal cues;contact guard  -       Row Name 11/29/24 1056          Gait/Stairs (Locomotion)    [place] Level (Gait) minimum assist (75% patient effort)  -     Assistive Device (Gait) walker, front-wheeled  -     Distance in Feet (Gait) 25  -YESY     Deviations/Abnormal Patterns (Gait) base of support, narrow;gait speed decreased;stride length decreased  -     Comment, (Gait/Stairs) posterior lean entire 25'. Continued to have patient be aware and slghtly shift weight forward. Patient states she does not feel that she is leaning back.  -       Row Name 11/29/24 1056          Balance    Static Sitting Balance supervision  -     Dynamic Sitting Balance contact guard  -     Position, Sitting Balance unsupported;supported;sitting edge of bed   -     Sit to Stand Dynamic Balance minimal assist  -     Static Standing Balance minimal assist  posterior lean  -YESY     Balance Interventions sitting;dynamic reaching  attempted trunk stabs, not able. Very ltd. move,ent with reaching.  -       Row Name 11/29/24 1056          Motor Skills    Comments, Therapeutic Exercise AROM B LE x15  -       Row Name 11/29/24 1056          Positioning and Restraints    Pre-Treatment Position in bed  -     Post Treatment Position chair  -     In Chair reclined;call light within reach;notified nsg;encouraged to call for assist;exit alarm on  -               User Key  (r) = Recorded By, (t) = Taken By, (c) = Cosigned By      Initials Name Provider Type     Manuela Bergeron, LEXIE Physical Therapist Assistant                    Physical Therapy Education       Title: PT OT SLP Therapies (In Progress)       Topic: Physical Therapy (In Progress)       Point: Mobility training (In Progress)       Learning Progress Summary            Patient Acceptance, E,D, NR by YESY at 11/29/2024 1256    Comment: gait training    Acceptance, E, NR by CHRISSY at 11/26/2024 0930    Comment: rol of PT, call for assist, d/c plans                      Point: Home exercise program (In Progress)       Learning Progress Summary            Patient Acceptance, E, NR by CHRISSY at 11/26/2024 0930    Comment: rol of PT, call for assist, d/c plans                      Point: Body mechanics (In Progress)       Learning Progress Summary            Patient Acceptance, E, NR by  at 11/26/2024 0930    Comment: rol of PT, call for assist, d/c plans                      Point: Precautions (In Progress)       Learning Progress Summary            Patient Acceptance, E, NR by CHRISSY at 11/26/2024 0930    Comment: rol of PT, call for assist, d/c plans                                      User Key       Initials Effective Dates Name Provider Type [facility] 02/03/23 -  Manuela Bergeron PTA Physical Therapist  Assistant PT    CHRISSY 08/15/24 -  Abelino Sheehan, PT DPT Physical Therapist PT                  PT Recommendation and Plan         Outcome Measures       Row Name 11/29/24 1200             How much help from another person do you currently need...    Turning from your back to your side while in flat bed without using bedrails? 3  -YESY      Moving from lying on back to sitting on the side of a flat bed without bedrails? 3  -YESY      Moving to and from a bed to a chair (including a wheelchair)? 3  -YESY      Standing up from a chair using your arms (e.g., wheelchair, bedside chair)? 3  -YESY      Climbing 3-5 steps with a railing? 2  -YESY      To walk in hospital room? 3  -YESY      AM-PAC 6 Clicks Score (PT) 17  -YESY                User Key  (r) = Recorded By, (t) = Taken By, (c) = Cosigned By      Initials Name Provider Type    Manuela Roth PTA Physical Therapist Assistant                     Time Calculation:    PT Charges       Row Name 11/29/24 1301             Time Calculation    Start Time 1056  -YESY      Stop Time 1121  -YESY      Time Calculation (min) 25 min  -YESY         Timed Charges    31655 - PT Therapeutic Exercise Minutes 17  -YESY      54941 - Gait Training Minutes  8  -YESY         Total Minutes    Timed Charges Total Minutes 25  -YESY       Total Minutes 25  -YESY                User Key  (r) = Recorded By, (t) = Taken By, (c) = Cosigned By      Initials Name Provider Type    Manuela Roth PTA Physical Therapist Assistant                  Therapy Charges for Today       Code Description Service Date Service Provider Modifiers Qty    61133126183 HC PT THER PROC EA 15 MIN 11/29/2024 Manuela Bergeron PTA GP 1    66352822206 HC GAIT TRAINING EA 15 MIN 11/29/2024 Manuela Bergeron PTA GP 1            PT G-Codes  Outcome Measure Options: AM-PAC 6 Clicks Daily Activity (OT)  AM-PAC 6 Clicks Score (PT): 17  AM-PAC 6 Clicks Score (OT): Nita Bergeron PTA  11/29/2024

## 2024-11-29 NOTE — PROGRESS NOTES
"EP Problems:  Atypical atrial flutter  Paroxysmal atrial fibrillation  -2012: Regulo Erwin  Presence of a Watchman device  -11/27/2024: REMY revealing watchman leak; up to 7mm     Cardiology Problems:  1.  Hypertension  2.  CVA     Medical Problems:  1.  Hypothyroidism  2.  Blindness status post optic nerve injury  3.  Falls    Patient ID:  Damaris Nash is a 89 y.o. female with problem list as above as above who EP is following for atypical atrial flutter.    Subjective:  Continues to have paroxysmal atypical atrial flutter.  Rates fluctuating from 70s in sinus to low 100s when in AFL.    Objective:  /70 (BP Location: Right arm, Patient Position: Lying)   Pulse 103   Temp 98.1 °F (36.7 °C) (Oral)   Resp 16   Ht 170.2 cm (67\")   Wt 59 kg (130 lb)   SpO2 92%   BMI 20.36 kg/m²     Chronically ill-appearing, no acute distress  Clear to auscultation bilaterally  Regular rate and rhythm  Warm, well-perfused    Assessment:  Atypical atrial flutter with acute exacerbation  Watchman device leak  3.   Hypokalemia      Plan:  -Continue apixaban 2.5 mg twice daily  -Continue metoprolol for rate control  -Start potassium 10meq daily  -Stop further furosemide  -Labs today  -Start amiodarone given multaq ineffectiveness (400mg BID x10day until 12/9/24; then 200mg daily thereafter)  -If labs are stable, ok for discharge from EP standpoint; will arrange outpatient follow up    Part of this note may be an electronic transcription/translation of spoken language to printed text using the Dragon Dictation System.    "

## 2024-11-29 NOTE — PROGRESS NOTES
[facility] Medicine Services  INPATIENT PROGRESS NOTE    Patient Name: Damaris Nash  Date of Admission: 11/25/2024  Today's Date: 11/29/24  Length of Stay: 3  Primary Care Physician: Shahla Beltran DO    Subjective   Chief Complaint: Worsening of weakness  Hypertension         jess Nash is a 89-year-old female who presents to ED with her son.  Chief complaint is palpitations with increased weakness and elevated blood pressure.     Patient does have significant medical history of previous paroxysmal atrial fibrillation and completed a watchman's procedure therefore no longer anticoagulated, acute ischemia to the right ICA stroke, essential hypertension, blindness of right eye, breast cancer, carotid artery disease, gallstones, GERD, hyperlipidemia, hypothyroidism, macular degeneration, previous right hip fracture, vitamin D deficiency.  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     In the past 2 weeks, she moved in with her son because she has been declining over the past several months to years.  She has been gradually losing weight, decreased appetite.  Has been treated with Megace however I am unsure if she is taking her medications. She ambulates with assistance.  She has not been taking her medications as prescribed.  The patient and children believe she was not taking her HCTZ, reports not taking thyroid supplement.  Both medications were restarted over the past 2 weeks.  In the past several days, she is felt increasingly fatigued more than normal.  She has felt palpitations intermittently and it just did not feel right into her chest.  She denies specifically any chest pain, pressure, tightness.  She does feel a bit shortness of breath.  She denies any history of congestive heart failure or edema.  She denies any orthopnea or PND.  She denies any fever.  Son states she has been coughing a dry cough. They denied  any recent changes medications except that she is taking her medications as prescribed now.  She denies any leg pain or swelling.     ER workup reveals atrial flutter RVR rate 135, troponin 23, proBNP two 744.0, chloride 97, CO2 31, glucose 107, TSH 0.076, free T44.05, WBC without abnormal findings, urinalysis with large leukocytes, 3-5 RBCs, 3-5 WBCs with trace of bacteria, respiratory panel negative.  Chest x-ray reveals small right effusion with right basilar atelectasis. Lungs are  otherwise clear.  CT of the chest without contrast reveals stable right upper lobe nodule is likely benign. Stable chronic changes with scarring and remote granulomatous disease.  11/26  As before presented with worsening of her weakness, UTI chest x-ray showing pleural effusion BNP was skyhigh, free T4 was elevated we will go ahead and consult with cardiology to help with the A-fib suspect CHF exacerbation we will start her on Lasix and order echo of the heart  11/27  As before appreciate cardiology and EP, Plan for REMY/DCCV with electrophysiology today Plan for dofetilide initiation following restoration of sinus rhythm  -Start apixaban 2.5 mg twice daily  -Continue diltiazem drip for rate control for no  11/28  As before appreciate EP, atypical a flutter with exacerbation with Watchman device leak REMY cardioversion tomorrow continue Eliquis and metoprolol  11/29  Per EP she is not going for cardioversion today was started on amiodarone  Review of Systems   All pertinent negatives and positives are as above. All other systems have been reviewed and are negative unless otherwise stated.     Objective    Temp:  [97.6 °F (36.4 °C)-98.3 °F (36.8 °C)] 98.1 °F (36.7 °C)  Heart Rate:  [] 103  Resp:  [16-18] 16  BP: (116-136)/(62-77) 132/70  Physical Exam  Constitutional:       Appearance: Normal appearance.   HENT:      Head: Normocephalic.      Nose: Nose normal.   Eyes:      Pupils: Pupils are equal, round, and reactive to light.  "  Cardiovascular:      Rate and Rhythm: Rhythm irregular.   Pulmonary:      Breath sounds: Wheezing and rhonchi present.   Abdominal:      General: Abdomen is flat.   Musculoskeletal:         General: Swelling present.      Cervical back: Normal range of motion.   Neurological:      Mental Status: She is alert.             Results Review:  I have reviewed the labs, radiology results, and diagnostic studies.    Laboratory Data:   Results from last 7 days   Lab Units 11/27/24 0227 11/26/24 0106 11/25/24  1411   WBC 10*3/mm3 8.73 6.51 5.71   HEMOGLOBIN g/dL 12.5 12.1 13.4   HEMATOCRIT % 39.6 37.8 41.8   PLATELETS 10*3/mm3 259 245 250        Results from last 7 days   Lab Units 11/27/24  1837 11/27/24 0227 11/26/24 0106 11/25/24  1411   SODIUM mmol/L  --  135* 141 141   POTASSIUM mmol/L 4.7 2.8* 3.3* 3.8   CHLORIDE mmol/L  --  92* 98 97*   CO2 mmol/L  --  36.0* 26.0 31.0*   BUN mg/dL  --  18 16 20   CREATININE mg/dL  --  0.92 0.67 0.74   CALCIUM mg/dL  --  9.4 9.1 9.6   BILIRUBIN mg/dL  --  0.7  --  0.8   ALK PHOS U/L  --  62  --  69   ALT (SGPT) U/L  --  11  --  16   AST (SGOT) U/L  --  19  --  25   GLUCOSE mg/dL  --  107* 95 107*       Culture Data:   No results found for: \"BLOODCX\", \"URINECX\", \"WOUNDCX\", \"MRSACX\", \"RESPCX\", \"STOOLCX\"    Radiology Data:   Imaging Results (Last 24 Hours)       ** No results found for the last 24 hours. **            I have reviewed the patient's current medications.     Assessment/Plan   Assessment  Active Hospital Problems    Diagnosis     **Atrial flutter with rapid ventricular response     Supraventricular arrhythmia     Secondary hyperthyroidism     Presence of Watchman left atrial appendage closure device     Hypertension        Treatment Plan    1.  Atrial flutter with rapid ventricular response, rate controlled with Cardizem drip, will continue and reevaluate in a.m., continue home metoprolol, routine telemetry orders, supplemental oxygen as needed     2.  Secondary " hyperthyroidism felt to be due to medication, will hold for now     3.  Presence of watchman's device, DVT prophylaxis with SCDs, no anticoagulation     4.  Hypertension, per son recently poorly controlled, stable now on Cardizem, home medications reviewed and restarted as appropriate     5.  Home medications reviewed and restarted as appropriate, consult PT and OT, consult , oral care, labs in a.m.     Medical Decision Making  Number and Complexity of problems: 4     2 problems, acute, high complexity, improving  2 problems chronic, moderate complexity unchanged     Differential Diagnosis: None     Conditions and Status        Condition is unchanged.     [facility] Data  External documents reviewed: No  Cardiac tracing (EKG, telemetry) interpretation: Reviewed  Radiology interpretation: Reviewed  Labs reviewed: Yes  Any tests that were considered but not ordered: No     Decision rules/scores evaluated (example [id number], [place], etc): No     Discussed with: Patient, children at bedside and Dr. Kramer     Care Planning  Shared decision making: Patient, children at bedside and Dr. Kramer  Code status and discussions: DNR/DNI     Disposition  Social Determinants of Health that impact treatment or disposition: None  Estimated length of stay is 1-2 days.      I confirmed that the patient's advanced care plan is present, code status is documented, and a surrogate decision maker is listed in the patient's medical record.      The patient's surrogate decision maker is children are oneill of .      Electronically signed by Polina Kramre MD, 11/29/24, 09:20 CST.

## 2024-11-30 VITALS
OXYGEN SATURATION: 95 % | HEIGHT: 67 IN | RESPIRATION RATE: 16 BRPM | WEIGHT: 130 LBS | HEART RATE: 88 BPM | DIASTOLIC BLOOD PRESSURE: 81 MMHG | SYSTOLIC BLOOD PRESSURE: 149 MMHG | TEMPERATURE: 97.2 F | BODY MASS INDEX: 20.4 KG/M2

## 2024-11-30 LAB
ANION GAP SERPL CALCULATED.3IONS-SCNC: 8 MMOL/L (ref 5–15)
BUN SERPL-MCNC: 41 MG/DL (ref 8–23)
BUN/CREAT SERPL: 35 (ref 7–25)
CALCIUM SPEC-SCNC: 9.4 MG/DL (ref 8.6–10.5)
CHLORIDE SERPL-SCNC: 98 MMOL/L (ref 98–107)
CO2 SERPL-SCNC: 33 MMOL/L (ref 22–29)
CREAT SERPL-MCNC: 1.17 MG/DL (ref 0.57–1)
EGFRCR SERPLBLD CKD-EPI 2021: 44.7 ML/MIN/1.73
GLUCOSE SERPL-MCNC: 105 MG/DL (ref 65–99)
MAGNESIUM SERPL-MCNC: 2.1 MG/DL (ref 1.6–2.4)
POTASSIUM SERPL-SCNC: 4.1 MMOL/L (ref 3.5–5.2)
SODIUM SERPL-SCNC: 139 MMOL/L (ref 136–145)

## 2024-11-30 PROCEDURE — 80048 BASIC METABOLIC PNL TOTAL CA: CPT | Performed by: STUDENT IN AN ORGANIZED HEALTH CARE EDUCATION/TRAINING PROGRAM

## 2024-11-30 PROCEDURE — 97110 THERAPEUTIC EXERCISES: CPT

## 2024-11-30 PROCEDURE — 97116 GAIT TRAINING THERAPY: CPT

## 2024-11-30 PROCEDURE — 83735 ASSAY OF MAGNESIUM: CPT | Performed by: STUDENT IN AN ORGANIZED HEALTH CARE EDUCATION/TRAINING PROGRAM

## 2024-11-30 RX ORDER — AMIODARONE HYDROCHLORIDE 200 MG/1
TABLET ORAL
Qty: 120 TABLET | Refills: 3 | Status: SHIPPED | OUTPATIENT
Start: 2024-11-30 | End: 2025-02-28

## 2024-11-30 RX ORDER — POTASSIUM CHLORIDE 750 MG/1
10 CAPSULE, EXTENDED RELEASE ORAL DAILY
Qty: 90 CAPSULE | Refills: 3 | Status: SHIPPED | OUTPATIENT
Start: 2024-11-30

## 2024-11-30 RX ORDER — POTASSIUM CHLORIDE 750 MG/1
10 CAPSULE, EXTENDED RELEASE ORAL DAILY
Qty: 90 CAPSULE | Refills: 3 | Status: SHIPPED | OUTPATIENT
Start: 2024-11-30 | End: 2024-11-30

## 2024-11-30 RX ORDER — TRAMADOL HYDROCHLORIDE 50 MG/1
50 TABLET ORAL EVERY 6 HOURS PRN
Qty: 15 TABLET | Refills: 0 | Status: SHIPPED | OUTPATIENT
Start: 2024-11-30 | End: 2024-11-30 | Stop reason: HOSPADM

## 2024-11-30 RX ORDER — AMIODARONE HYDROCHLORIDE 200 MG/1
TABLET ORAL
Qty: 120 TABLET | Refills: 3 | Status: SHIPPED | OUTPATIENT
Start: 2024-11-30 | End: 2024-11-30

## 2024-11-30 RX ORDER — ALPRAZOLAM 0.25 MG/1
0.25 TABLET ORAL 2 TIMES DAILY PRN
Qty: 14 TABLET | Refills: 0 | Status: SHIPPED | OUTPATIENT
Start: 2024-11-30

## 2024-11-30 RX ORDER — METOPROLOL SUCCINATE 50 MG/1
150 TABLET, EXTENDED RELEASE ORAL NIGHTLY
Qty: 30 TABLET | Refills: 0 | Status: SHIPPED | OUTPATIENT
Start: 2024-11-30

## 2024-11-30 RX ADMIN — Medication 10 ML: at 08:22

## 2024-11-30 RX ADMIN — POTASSIUM CHLORIDE 10 MEQ: 20 SOLUTION ORAL at 09:27

## 2024-11-30 RX ADMIN — HYDROCHLOROTHIAZIDE 25 MG: 25 TABLET ORAL at 08:21

## 2024-11-30 RX ADMIN — ALPRAZOLAM 0.25 MG: 0.5 TABLET ORAL at 10:09

## 2024-11-30 RX ADMIN — AMIODARONE HYDROCHLORIDE 400 MG: 200 TABLET ORAL at 08:22

## 2024-11-30 RX ADMIN — APIXABAN 2.5 MG: 2.5 TABLET, FILM COATED ORAL at 08:21

## 2024-11-30 RX ADMIN — ASPIRIN 81 MG: 81 TABLET, COATED ORAL at 08:22

## 2024-11-30 NOTE — PROGRESS NOTES
[facility] Medicine Services  INPATIENT PROGRESS NOTE    Patient Name: Damaris Nash  Date of Admission: 11/25/2024  Today's Date: 11/30/24  Length of Stay: 4  Primary Care Physician: Shahla Beltran DO    Subjective   Chief Complaint: Worsening of weakness  Hypertension         jess Nash is a 89-year-old female who presents to ED with her son.  Chief complaint is palpitations with increased weakness and elevated blood pressure.     Patient does have significant medical history of previous paroxysmal atrial fibrillation and completed a watchman's procedure therefore no longer anticoagulated, acute ischemia to the right ICA stroke, essential hypertension, blindness of right eye, breast cancer, carotid artery disease, gallstones, GERD, hyperlipidemia, hypothyroidism, macular degeneration, previous right hip fracture, vitamin D deficiency.  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     In the past 2 weeks, she moved in with her son because she has been declining over the past several months to years.  She has been gradually losing weight, decreased appetite.  Has been treated with Megace however I am unsure if she is taking her medications. She ambulates with assistance.  She has not been taking her medications as prescribed.  The patient and children believe she was not taking her HCTZ, reports not taking thyroid supplement.  Both medications were restarted over the past 2 weeks.  In the past several days, she is felt increasingly fatigued more than normal.  She has felt palpitations intermittently and it just did not feel right into her chest.  She denies specifically any chest pain, pressure, tightness.  She does feel a bit shortness of breath.  She denies any history of congestive heart failure or edema.  She denies any orthopnea or PND.  She denies any fever.  Son states she has been coughing a dry cough. They denied  any recent changes medications except that she is taking her medications as prescribed now.  She denies any leg pain or swelling.     ER workup reveals atrial flutter RVR rate 135, troponin 23, proBNP two 744.0, chloride 97, CO2 31, glucose 107, TSH 0.076, free T44.05, WBC without abnormal findings, urinalysis with large leukocytes, 3-5 RBCs, 3-5 WBCs with trace of bacteria, respiratory panel negative.  Chest x-ray reveals small right effusion with right basilar atelectasis. Lungs are  otherwise clear.  CT of the chest without contrast reveals stable right upper lobe nodule is likely benign. Stable chronic changes with scarring and remote granulomatous disease.  11/26  As before presented with worsening of her weakness, UTI chest x-ray showing pleural effusion BNP was skyhigh, free T4 was elevated we will go ahead and consult with cardiology to help with the A-fib suspect CHF exacerbation we will start her on Lasix and order echo of the heart  11/27  As before appreciate cardiology and EP, Plan for REMY/DCCV with electrophysiology today Plan for dofetilide initiation following restoration of sinus rhythm  -Start apixaban 2.5 mg twice daily  -Continue diltiazem drip for rate control for no  11/28  As before appreciate EP, atypical a flutter with exacerbation with Watchman device leak REMY cardioversion tomorrow continue Eliquis and metoprolol  11/29  Per EP she is not going for cardioversion today was started on amiodarone  11/30  -Continue apixaban 2.5 mg twice daily  -Continue metoprolol for rate control  -Start potassium 10meq daily  -Stop further furosemide  -Labs today  -Start amiodarone given multaq ineffectiveness (400mg BID x10day until 12/9/24; then 200mg daily thereafter)  -If labs are stable, ok for discharge from EP standpoint; will arrange outpatient follow up  Review of Systems   All pertinent negatives and positives are as above. All other systems have been reviewed and are negative unless otherwise  "stated.     Objective    Temp:  [97.7 °F (36.5 °C)-98.2 °F (36.8 °C)] 97.9 °F (36.6 °C)  Heart Rate:  [] 94  Resp:  [14-16] 16  BP: (128-149)/(63-87) 130/83  Physical Exam  Constitutional:       Appearance: Normal appearance.   HENT:      Head: Normocephalic.      Nose: Nose normal.   Eyes:      Pupils: Pupils are equal, round, and reactive to light.   Cardiovascular:      Rate and Rhythm: Rhythm irregular.   Pulmonary:      Breath sounds: Wheezing and rhonchi present.   Abdominal:      General: Abdomen is flat.   Musculoskeletal:         General: Swelling present.      Cervical back: Normal range of motion.   Neurological:      Mental Status: She is alert.             Results Review:  I have reviewed the labs, radiology results, and diagnostic studies.    Laboratory Data:   Results from last 7 days   Lab Units 11/27/24  0227 11/26/24  0106 11/25/24  1411   WBC 10*3/mm3 8.73 6.51 5.71   HEMOGLOBIN g/dL 12.5 12.1 13.4   HEMATOCRIT % 39.6 37.8 41.8   PLATELETS 10*3/mm3 259 245 250        Results from last 7 days   Lab Units 11/30/24  0359 11/29/24  2020 11/29/24  1005 11/27/24  1837 11/27/24 0227 11/26/24  0106 11/25/24  1411   SODIUM mmol/L 139  --  141  141  --  135*   < > 141   POTASSIUM mmol/L 4.1 4.9 3.3*  3.3*   < > 2.8*   < > 3.8   CHLORIDE mmol/L 98  --  95*  95*  --  92*   < > 97*   CO2 mmol/L 33.0*  --  33.0*  33.0*  --  36.0*   < > 31.0*   BUN mg/dL 41*  --  34*  34*  --  18   < > 20   CREATININE mg/dL 1.17*  --  1.04*  1.04*  --  0.92   < > 0.74   CALCIUM mg/dL 9.4  --  9.3  9.3  --  9.4   < > 9.6   BILIRUBIN mg/dL  --   --  1.0  --  0.7  --  0.8   ALK PHOS U/L  --   --  62  --  62  --  69   ALT (SGPT) U/L  --   --  9  --  11  --  16   AST (SGOT) U/L  --   --  19  --  19  --  25   GLUCOSE mg/dL 105*  --  120*  120*  --  107*   < > 107*    < > = values in this interval not displayed.       Culture Data:   No results found for: \"BLOODCX\", \"URINECX\", \"WOUNDCX\", \"MRSACX\", \"RESPCX\", " "\"STOOLCX\"    Radiology Data:   Imaging Results (Last 24 Hours)       ** No results found for the last 24 hours. **            I have reviewed the patient's current medications.     Assessment/Plan   Assessment  Active Hospital Problems    Diagnosis     **Atrial flutter with rapid ventricular response     Supraventricular arrhythmia     Secondary hyperthyroidism     Presence of Watchman left atrial appendage closure device     Hypertension        Treatment Plan    1.  Atrial flutter with rapid ventricular response, rate controlled with Cardizem drip, will continue and reevaluate in a.m., continue home metoprolol, routine telemetry orders, supplemental oxygen as needed     2.  Secondary hyperthyroidism felt to be due to medication, will hold for now     3.  Presence of watchman's device, DVT prophylaxis with SCDs, no anticoagulation     4.  Hypertension, per son recently poorly controlled, stable now on Cardizem, home medications reviewed and restarted as appropriate     5.  Home medications reviewed and restarted as appropriate, consult PT and OT, consult , oral care, labs in a.m.     Medical Decision Making  Number and Complexity of problems: 4     2 problems, acute, high complexity, improving  2 problems chronic, moderate complexity unchanged     Differential Diagnosis: None     Conditions and Status        Condition is unchanged.     MDM Data  External documents reviewed: No  Cardiac tracing (EKG, telemetry) interpretation: Reviewed  Radiology interpretation: Reviewed  Labs reviewed: Yes  Any tests that were considered but not ordered: No     Decision rules/scores evaluated (example [id number], Wells, etc): No     Discussed with: Patient, children at bedside and Dr. Kramer     Care Planning  Shared decision making: Patient, children at bedside and Dr. Kramer  Code status and discussions: DNR/DNI     Disposition  Social Determinants of Health that impact treatment or disposition: None  Estimated " length of stay is 1-2 days.      I confirmed that the patient's advanced care plan is present, code status is documented, and a surrogate decision maker is listed in the patient's medical record.      The patient's surrogate decision maker is children are oneill of .      Electronically signed by Polina Kramer MD, 11/30/24, 10:20 CST.

## 2024-11-30 NOTE — PLAN OF CARE
Goal Outcome Evaluation:  Plan of Care Reviewed With: patient        Progress: no change  Outcome Evaluation: AOx4, illogical at times. VSS. Voiding per purewick. Attempted BM, no success. Fluids and nutrition supplement encouraged, poor po intake overnight. Poor rest noted.

## 2024-11-30 NOTE — THERAPY TREATMENT NOTE
Acute Care - Physical Therapy Treatment Note  [facility]     Patient Name: Damaris Nash  : 1935  MRN: [id number]  Today's Date: 2024      Visit Dx:     ICD-10-CM ICD-9-CM   1. Atrial flutter with rapid ventricular response  I48.92 427.32   2. Weakness  R53.1 780.79   3. Not taking medication as directed  Z91.148 V15.81   4. Impaired mobility [Z74.09]  Z74.09 799.89     Patient Active Problem List   Diagnosis    Carotid artery disease    Hyperlipidemia    Hypertension    Amaurosis fugax of right eye    Bilateral carotid artery stenosis    Hx of transient ischemic attack (TIA)    Current use of long term anticoagulation    H/O cardiac radiofrequency ablation    PAF (paroxysmal atrial fibrillation)    Acquired hypothyroidism    Secondary hyperthyroidism    Atrial flutter with rapid ventricular response    Presence of Watchman left atrial appendage closure device    Supraventricular arrhythmia     Past Medical History:   Diagnosis Date    A-fib     Acute ischemic right ICA stroke     Atrial fibrillation     Benign essential HTN     Blindness of right eye     Breast CA     Breast cancer     Carotid artery disease     CVA (cerebral vascular accident)     Gallstones     GERD (gastroesophageal reflux disease)     Hyperlipidemia     Hypertension     Hypothyroidism     Macular degeneration     Osteopenia     Primary pulmonary HTN     S/P right hip fracture 2012    Shortness of breath     Stroke     Vitamin D deficiency      Past Surgical History:   Procedure Laterality Date    ATRIAL APPENDAGE EXCLUSION LEFT WITH TRANSESOPHAGEAL ECHOCARDIOGRAM Right 2023    Procedure: Atrial Appendage Occlusion;  Surgeon: Riely Mota MD;  Location: [facility] INVASIVE LOCATION;  Service: Cardiology;  Laterality: Right;    BREAST LUMPECTOMY      CARDIAC ABLATION      cardiac ablation procedure for af    CARDIOVERSION N/A 09/07/2022    X2    CATARACT EXTRACTION Right     PARTIAL HIP ARTHROPLASTY Right      TONSILLECTOMY       PT Assessment (Last 12 Hours)       PT Evaluation and Treatment       [facility] Name 11/30/24 1048          Physical Therapy Time and Intention    Subjective Information complains of;weakness;fatigue  -     Document Type therapy note (daily note)  -     Mode of Treatment physical therapy  -AH       Row Name 11/30/24 1048          General Information    Existing Precautions/Restrictions fall  -     Limitations/Impairments hearing  -AH       Row Name 11/30/24 1048          Pain    Pretreatment Pain Rating 0/10 - no pain  -     Posttreatment Pain Rating 0/10 - no pain  -AH       Row Name 11/30/24 1048          Bed Mobility    Supine-Sit Trigg (Bed Mobility) --  chair  -     Sit-Supine Trigg (Bed Mobility) contact guard;verbal cues  -     Assistive Device (Bed Mobility) head of bed elevated;bed rails  -AH       Row Name 11/30/24 1048          Sit-Stand Transfer    Sit-Stand Trigg (Transfers) verbal cues;contact guard  -AH       Row Name 11/30/24 1048          Stand-Sit Transfer    Stand-Sit Trigg (Transfers) verbal cues;contact guard  -AH       Row Name 11/30/24 1048          Gait/Stairs (Locomotion)    Trigg Level (Gait) minimum assist (75% patient effort);contact guard;verbal cues  -     Assistive Device (Gait) walker, front-wheeled  -     Distance in Feet (Gait) 20  20 x 2  [facility]     Deviations/Abnormal Patterns (Gait) base of support, narrow;gait speed decreased;stride length decreased  -AH       Row Name 11/30/24 1048          Motor Skills    Comments, Therapeutic Exercise BLE AROM 10 x 2 reps  -AH       Row Name 11/30/24 1048          Positioning and Restraints    Pre-Treatment Position sitting in chair/recliner  -     Post Treatment Position bed  -     In Bed notified nsg;fowlers;call light within reach;encouraged to call for assist;exit alarm on  -               User Key  (r) = Recorded By, (t) = Taken By, (c) = Cosigned By      Initials Name  Provider Type     Ramonita Olivas PTA Physical Therapist Assistant                    Physical Therapy Education       Title: PT OT SLP Therapies (In Progress)       Topic: Physical Therapy (In Progress)       Point: Mobility training (In Progress)       Learning Progress Summary            Patient Acceptance, E,D, NR by YESY at 11/29/2024 1256    Comment: gait training    Acceptance, E, NR by CHRISSY at 11/26/2024 0930    Comment: rol of PT, call for assist, d/c plans                      Point: Home exercise program (In Progress)       Learning Progress Summary            Patient Acceptance, E, NR by  at 11/26/2024 0930    Comment: rol of PT, call for assist, d/c plans                      Point: Body mechanics (In Progress)       Learning Progress Summary            Patient Acceptance, E, NR by  at 11/26/2024 0930    Comment: rol of PT, call for assist, d/c plans                      Point: Precautions (In Progress)       Learning Progress Summary            Patient Acceptance, E, NR by  at 11/26/2024 0930    Comment: rol of PT, call for assist, d/c plans                                      User Key       Initials Effective Dates Name Provider Type Discipline     02/03/23 -  Manuela Bergeron PTA Physical Therapist Assistant PT     08/15/24 -  Abelino Sheehan, DESTINEY DPT Physical Therapist PT                  PT Recommendation and Plan         Outcome Measures       Row Name 11/30/24 1100 11/29/24 1200          How much help from another person do you currently need...    Turning from your back to your side while in flat bed without using bedrails? 3  -AH 3  -YESY     Moving from lying on back to sitting on the side of a flat bed without bedrails? 3  - 3  -YESY     Moving to and from a bed to a chair (including a wheelchair)? 3  -AH 3  -YESY     Standing up from a chair using your arms (e.g., wheelchair, bedside chair)? 3  -AH 3  -YESY     Climbing 3-5 steps with a railing? 2  -AH 2  -YESY     To walk in hospital room?  3  - 3  -YESY     AM-PAC 6 Clicks Score (PT) 17  -AH 17  -YESY        Functional Assessment    Outcome Measure Options AM-PAC 6 Clicks Basic Mobility (PT)  - --               User Key  (r) = Recorded By, (t) = Taken By, (c) = Cosigned By      Initials Name Provider Type    Ramonita Rangel PTA Physical Therapist Assistant    Manuela Roth PTA Physical Therapist Assistant                     Time Calculation:    PT Charges       Row Name 11/30/24 1119             Time Calculation    Start Time 1048  -      Stop Time 1118  -      Time Calculation (min) 30 min  -      PT Received On 11/30/24  -         Time Calculation- PT    Total Timed Code Minutes- PT 30 minute(s)  -         Timed Charges    87234 - PT Therapeutic Exercise Minutes 15  -      40509 - Gait Training Minutes  15  -         Total Minutes    Timed Charges Total Minutes 30  -       Total Minutes 30  -AH                User Key  (r) = Recorded By, (t) = Taken By, (c) = Cosigned By      Initials Name Provider Type     Ramonita Olivas PTA Physical Therapist Assistant                  Therapy Charges for Today       Code Description Service Date Service Provider Modifiers Qty    96661459445 HC PT THER PROC EA 15 MIN 11/30/2024 Ramonita Olivas, PTA GP 1    87367325203 HC GAIT TRAINING EA 15 MIN 11/30/2024 Ramonita Olivas, LEXIE GP 1            PT G-Codes  Outcome Measure Options: AM-PAC 6 Clicks Basic Mobility (PT)  AM-PAC 6 Clicks Score (PT): 17  AM-PAC 6 Clicks Score (OT): Nita Olivas PTA  11/30/2024

## 2024-11-30 NOTE — DISCHARGE SUMMARY
[facility] Medicine Services  DISCHARGE SUMMARY       Date of Admission: 11/25/2024  Date of Discharge:  11/30/2024  Primary Care Physician: Shahla Beltran DO    Presenting Problem/History of Present Illness:  jess Nash is a 89-year-old female who presents to ED with her son.  Chief complaint is palpitations with increased weakness and elevated blood pressure.     Patient does have significant medical history of previous paroxysmal atrial fibrillation and completed a watchman's procedure therefore no longer anticoagulated, acute ischemia to the right ICA stroke, essential hypertension, blindness of right eye, breast cancer, carotid artery disease, gallstones, GERD, hyperlipidemia, hypothyroidism, macular degeneration, previous right hip fracture, vitamin D deficiency.  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     In the past 2 weeks, she moved in with her son because she has been declining over the past several months to years.  She has been gradually losing weight, decreased appetite.  Has been treated with Megace however I am unsure if she is taking her medications. She ambulates with assistance.  She has not been taking her medications as prescribed.  The patient and children believe she was not taking her HCTZ, reports not taking thyroid supplement.  Both medications were restarted over the past 2 weeks.  In the past several days, she is felt increasingly fatigued more than normal.  She has felt palpitations intermittently and it just did not feel right into her chest.  She denies specifically any chest pain, pressure, tightness.  She does feel a bit shortness of breath.  She denies any history of congestive heart failure or edema.  She denies any orthopnea or PND.  She denies any fever.  Son states she has been coughing a dry cough. They denied any recent changes medications except that she is taking her  medications as prescribed now.  She denies any leg pain or swelling.     ER workup reveals atrial flutter RVR rate 135, troponin 23, proBNP two 744.0, chloride 97, CO2 31, glucose 107, TSH 0.076, free T44.05, WBC without abnormal findings, urinalysis with large leukocytes, 3-5 RBCs, 3-5 WBCs with trace of bacteria, respiratory panel negative.  Chest x-ray reveals small right effusion with right basilar atelectasis. Lungs are  otherwise clear.  CT of the chest without contrast reveals stable right upper lobe nodule is likely benign. Stable chronic changes with scarring and remote granulomatous disease.  11/26  As before presented with worsening of her weakness, UTI chest x-ray showing pleural effusion BNP was skyhigh, free T4 was elevated we will go ahead and consult with cardiology to help with the A-fib suspect CHF exacerbation we will start her on Lasix and order echo of the heart  11/27  As before appreciate cardiology and EP, Plan for REMY/DCCV with electrophysiology today Plan for dofetilide initiation following restoration of sinus rhythm  -Start apixaban 2.5 mg twice daily  -Continue diltiazem drip for rate control for no  11/28  As before appreciate EP, atypical a flutter with exacerbation with Watchman device leak REMY cardioversion tomorrow continue Eliquis and metoprolol  11/29  Per EP she is not going for cardioversion today was started on amiodarone  11/30  -Continue apixaban 2.5 mg twice daily  -Continue metoprolol for rate control  -Start potassium 10meq daily  -Stop further furosemide  -Labs today  -Start amiodarone given multaq ineffectiveness (400mg BID x10day until 12/9/24; then 200mg daily thereafter)  -If labs are stable, ok for discharge from EP standpoint; will arrange outpatient follow up    Final Discharge Diagnoses:  Active Hospital Problems    Diagnosis     **Atrial flutter with rapid ventricular response     Supraventricular arrhythmia     Secondary hyperthyroidism     Presence of Watchman  left atrial appendage closure device     Hypertension        Consults: EP     Procedures Performed: none     Pertinent Test Results:   Results for orders placed during the hospital encounter of 11/25/24    Adult Transthoracic Echo Complete W/ Cont if Necessary Per Protocol    Interpretation Summary    Left ventricular systolic function is hyperdynamic (EF > 70%).    The left ventricular cavity is small in size.  Left ventricular wall thickness is mildly increased, and mass is consistent with concentric remodeling.    Left ventricular diastolic dysfunction is noted.    The right ventricular cavity is borderline dilated with normal systolic function.    Left atrium is quantitatively mildly dilated although appears qualitatively severely dilated.    The right atrial cavity is dilated.    Right ventricular systolic pressure is markedly elevated to 57.5 mmHg plus right atrial pressure.    There is a small (<1cm) pericardial effusion. There is no evidence of cardiac tamponade.      Imaging Results (All)       Procedure Component Value Units Date/Time    XR Chest 1 View [[id number]] Collected: 11/25/24 1433     Updated: 11/25/24 1438    Narrative:      EXAMINATION: XR CHEST 1 VW-  11/25/2024 2:33 PM     HISTORY: Weakness and cough.     FINDINGS: Today's exam is compared to previous study of 7/22/2022. No  evidence of acute consolidative pneumonia. There has been development of  a right-sided effusion with blunting of the right lateral costophrenic  angle and some fluid tracking back into the fissure. No left effusion.  No free air beneath the hemidiaphragms.       Impression:      1.. Small right effusion with right basilar atelectasis. Lungs are  otherwise clear.     This report was signed and finalized on 11/25/2024 2:35 PM by Dr. Omar Hahn MD.             LAB RESULTS:      Lab 11/27/24  0227 11/26/24  0106 11/25/24  1411   WBC 8.73 6.51 5.71   HEMOGLOBIN 12.5 12.1 13.4   HEMATOCRIT 39.6 37.8 41.8   PLATELETS 259  245 250   NEUTROS ABS 5.57  --  2.97   IMMATURE GRANS (ABS) 0.03  --  0.01   LYMPHS ABS 1.68  --  1.61   MONOS ABS 1.26*  --  0.92*   EOS ABS 0.15  --  0.16   MCV 92.3 92.6 93.3         Lab 11/30/24  0359 11/29/24 2020 11/29/24  1005 11/27/24  1837 11/27/24 0227 11/26/24  0106 11/25/24  1411   SODIUM 139  --  141  141  --  135* 141 141   POTASSIUM 4.1 4.9 3.3*  3.3* 4.7 2.8* 3.3* 3.8   CHLORIDE 98  --  95*  95*  --  92* 98 97*   CO2 33.0*  --  33.0*  33.0*  --  36.0* 26.0 31.0*   ANION GAP 8.0  --  13.0  13.0  --  7.0 17.0* 13.0   BUN 41*  --  34*  34*  --  18 16 20   CREATININE 1.17*  --  1.04*  1.04*  --  0.92 0.67 0.74   EGFR 44.7*  --  51.5*  51.5*  --  59.6* 83.7 77.4   GLUCOSE 105*  --  120*  120*  --  107* 95 107*   CALCIUM 9.4  --  9.3  9.3  --  9.4 9.1 9.6   MAGNESIUM 2.1  --  1.9 1.9  --   --  2.1   HEMOGLOBIN A1C  --   --   --   --   --  5.80*  --    TSH  --   --   --   --   --   --  0.076*         Lab 11/29/24  1005 11/27/24  0227 11/25/24  1411   TOTAL PROTEIN 6.9 6.5 7.4   ALBUMIN 3.8 3.7 4.2   GLOBULIN 3.1 2.8 3.2   ALT (SGPT) 9 11 16   AST (SGOT) 19 19 25   BILIRUBIN 1.0 0.7 0.8   ALK PHOS 62 62 69         Lab 11/25/24  1617 11/25/24  1411   PROBNP  --  2,744.0*   HSTROP T 19* 23*         Lab 11/26/24  0106   CHOLESTEROL 150   LDL CHOL 87   HDL CHOL 49   TRIGLYCERIDES 74         Lab 11/26/24  1040   VITAMIN B 12 760         Brief Urine Lab Results  (Last result in the past 365 days)        Color   Clarity   Blood   Leuk Est   Nitrite   Protein   CREAT   Urine HCG        11/25/24 1435 Dark Yellow   Clear   Trace   Large (3+)   Negative   Negative                 Microbiology Results (last 10 days)       Procedure Component Value - Date/Time    Respiratory Panel PCR w/COVID-19(SARS-CoV-2) FRANKO/ROBERTO/TELLY/PAD/COR/EMMANUELLE In-House, NP Swab in UTM/VTM, 2 HR TAT - Swab, Nasopharynx [[id number]]  (Normal) Collected: 11/25/24 1437    Lab Status: Final result Specimen: Swab from Nasopharynx Updated:  11/25/24 1538     ADENOVIRUS, PCR Not Detected     Coronavirus 229E Not Detected     Coronavirus HKU1 Not Detected     Coronavirus NL63 Not Detected     Coronavirus OC43 Not Detected     COVID19 Not Detected     Human Metapneumovirus Not Detected     Human Rhinovirus/Enterovirus Not Detected     Influenza A PCR Not Detected     Influenza B PCR Not Detected     Parainfluenza Virus 1 Not Detected     Parainfluenza Virus 2 Not Detected     Parainfluenza Virus 3 Not Detected     Parainfluenza Virus 4 Not Detected     RSV, PCR Not Detected     Bordetella pertussis pcr Not Detected     Bordetella parapertussis PCR Not Detected     Chlamydophila pneumoniae PCR Not Detected     Mycoplasma pneumo by PCR Not Detected    Narrative:      In the setting of a positive respiratory panel with a viral infection PLUS a negative procalcitonin without other underlying concern for bacterial infection, consider observing off antibiotics or discontinuation of antibiotics and continue supportive care. If the respiratory panel is positive for atypical bacterial infection (Bordetella pertussis, Chlamydophila pneumoniae, or Mycoplasma pneumoniae), consider antibiotic de-escalation to target atypical bacterial infection.            Hospital Course:   jess Nash is a 89-year-old female who presents to ED with her son.  Chief complaint is palpitations with increased weakness and elevated blood pressure.     Patient does have significant medical history of previous paroxysmal atrial fibrillation and completed a watchman's procedure therefore no longer anticoagulated, acute ischemia to the right ICA stroke, essential hypertension, blindness of right eye, breast cancer, carotid artery disease, gallstones, GERD, hyperlipidemia, hypothyroidism, macular degeneration, previous right hip fracture, vitamin D deficiency.  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     In the past 2 weeks, she moved in with  her son because she has been declining over the past several months to years.  She has been gradually losing weight, decreased appetite.  Has been treated with Megace however I am unsure if she is taking her medications. She ambulates with assistance.  She has not been taking her medications as prescribed.  The patient and children believe she was not taking her HCTZ, reports not taking thyroid supplement.  Both medications were restarted over the past 2 weeks.  In the past several days, she is felt increasingly fatigued more than normal.  She has felt palpitations intermittently and it just did not feel right into her chest.  She denies specifically any chest pain, pressure, tightness.  She does feel a bit shortness of breath.  She denies any history of congestive heart failure or edema.  She denies any orthopnea or PND.  She denies any fever.  Son states she has been coughing a dry cough. They denied any recent changes medications except that she is taking her medications as prescribed now.  She denies any leg pain or swelling.     ER workup reveals atrial flutter RVR rate 135, troponin 23, proBNP two 744.0, chloride 97, CO2 31, glucose 107, TSH 0.076, free T44.05, WBC without abnormal findings, urinalysis with large leukocytes, 3-5 RBCs, 3-5 WBCs with trace of bacteria, respiratory panel negative.  Chest x-ray reveals small right effusion with right basilar atelectasis. Lungs are  otherwise clear.  CT of the chest without contrast reveals stable right upper lobe nodule is likely benign. Stable chronic changes with scarring and remote granulomatous disease.  11/26  As before presented with worsening of her weakness, UTI chest x-ray showing pleural effusion BNP was skyhigh, free T4 was elevated we will go ahead and consult with cardiology to help with the A-fib suspect CHF exacerbation we will start her on Lasix and order echo of the heart  11/27  As before appreciate cardiology and EP, Plan for REMY/DCCV with  "electrophysiology today Plan for dofetilide initiation following restoration of sinus rhythm  -Start apixaban 2.5 mg twice daily  -Continue diltiazem drip for rate control for no  11/28  As before appreciate EP, atypical a flutter with exacerbation with Watchman device leak REMY cardioversion tomorrow continue Eliquis and metoprolol  11/29  Per EP she is not going for cardioversion today was started on amiodarone  11/30  -Continue apixaban 2.5 mg twice daily  -Continue metoprolol for rate control  -Start potassium 10meq daily  -Stop further furosemide  -Labs today  -Start amiodarone given multaq ineffectiveness (400mg BID x10day until 12/9/24; then 200mg daily thereafter)  -If labs are stable, ok for discharge from EP standpoint; will arrange outpatient follow up    Physical Exam on Discharge:  /81 (BP Location: Right arm, Patient Position: Lying)   Pulse 88   Temp 97.2 °F (36.2 °C) (Oral)   Resp 16   Ht 170.2 cm (67\")   Wt 59 kg (130 lb)   SpO2 95%   BMI 20.36 kg/m²   Physical Exam  HENT:      Head: Normocephalic.      Nose: Nose normal.   Eyes:      Pupils: Pupils are equal, round, and reactive to light.   Cardiovascular:      Rate and Rhythm: Rhythm irregular.   Pulmonary:      Effort: Pulmonary effort is normal.   Abdominal:      General: Abdomen is flat.   Neurological:      Mental Status: She is alert.           Condition on Discharge: stable     Discharge Disposition:  Skilled Nursing Facility (DC - External)    Discharge Medications:     Discharge Medications        New Medications        Instructions Start Date   amiodarone 200 MG tablet  Commonly known as: PACERONE   Take 2 tablets by mouth 2 (Two) Times a Day for 10 days, THEN 1 tablet Daily for 80 days.   Start Date: November 29, 2024     apixaban 2.5 MG tablet tablet  Commonly known as: ELIQUIS   2.5 mg, Oral, Every 12 Hours Scheduled      potassium chloride 10 MEQ CR capsule  Commonly known as: MICRO-K   10 mEq, Oral, Daily         "     Changes to Medications        Instructions Start Date   metoprolol succinate XL 50 MG 24 hr tablet  Commonly known as: TOPROL-XL  What changed: medication strength   150 mg, Oral, Nightly             Continue These Medications        Instructions Start Date   acetaminophen 325 MG tablet  Commonly known as: TYLENOL   2 tablets, Oral, Daily PRN      ALPRAZolam 0.25 MG tablet  Commonly known as: XANAX   0.25 mg, Oral, 2 Times Daily PRN      HYDROcodone-acetaminophen 5-325 MG per tablet  Commonly known as: NORCO   0.5 tablets, Oral, Every 12 Hours PRN      polyethylene glycol 17 GM/SCOOP powder  Commonly known as: MIRALAX   17 g, Oral, Daily      traMADol 50 MG tablet  Commonly known as: ULTRAM   50 mg, Oral, Every 6 Hours PRN      Vitamin D3 50 MCG (2000 UT) capsule   1 capsule, Oral, Weekly             Stop These Medications      aspirin 81 MG EC tablet     hydroCHLOROthiazide 25 MG tablet     ibuprofen 200 MG tablet  Commonly known as: ADVIL,MOTRIN     levothyroxine 150 MCG tablet  Commonly known as: SYNTHROID, LEVOTHROID                Activity at Discharge:     Follow-up Appointments:   Future Appointments   Date Time Provider Department Center   5/28/2025 11:00 AM Carolyn Smith APRN [facility]       Test Results Pending at Discharge: no    Electronically signed by Polina Kramer MD, 11/30/24, 11:40 CST.    Time: 45 minutes.

## 2024-12-01 LAB
QT INTERVAL: 374 MS
QT INTERVAL: 464 MS
QTC INTERVAL: 489 MS
QTC INTERVAL: 493 MS

## 2024-12-01 NOTE — THERAPY DISCHARGE NOTE
Acute Care - Occupational Therapy Discharge Summary  [facility]     Patient Name: Damaris Nash  : 1935  MRN: [id number]    Today's Date: 2024                 Admit Date: 2024        OT Recommendation and Plan    Visit Dx:    ICD-10-CM ICD-9-CM   1. Atrial flutter with rapid ventricular response  I48.92 427.32   2. Weakness  R53.1 780.79   3. Not taking medication as directed  Z91.148 V15.81   4. Impaired mobility [Z74.09]  Z74.09 799.89                OT Rehab Goals       Row Name 24 1604             Transfer Goal 1 (OT)    Activity/Assistive Device (Transfer Goal 1, OT) sit-to-stand/stand-to-sit;bed-to-chair/chair-to-bed;commode, 3-in-1;toilet  -AC      [place] Level/Cues Needed (Transfer Goal 1, OT) contact guard required  -AC      Time Frame (Transfer Goal 1, OT) long term goal (LTG);by discharge  -AC      Progress/Outcome (Transfer Goal 1, OT) goal not met  -AC         Dressing Goal 1 (OT)    Activity/Device (Dressing Goal 1, OT) lower body dressing  -AC      [place]/Cues Needed (Dressing Goal 1, OT) minimum assist (75% or more patient effort)  -AC      Time Frame (Dressing Goal 1, OT) long term goal (LTG);by discharge  -AC      Progress/Outcome (Dressing Goal 1, OT) goal not met  -AC         Toileting Goal 1 (OT)    Activity/Device (Toileting Goal 1, OT) toileting skills, all;adjust/manage clothing;perform perineal hygiene;commode, 3-in-1  -AC      [place] Level/Cues Needed (Toileting Goal 1, OT) moderate assist (50-74% patient effort)  -AC      Time Frame (Toileting Goal 1, OT) long term goal (LTG);by discharge  -AC      Progress/Outcome (Toileting Goal 1, OT) goal not met  -AC                User Key  (r) = Recorded By, (t) = Taken By, (c) = Cosigned By      Initials Name Provider Type Discipline    AC Sae Rivera, OTR/L, CNT Occupational Therapist OT                     Outcome Measures       Row Name 24 1100 24 1200          How much help from  another person do you currently need...    Turning from your back to your side while in flat bed without using bedrails? 3  - 3  -YESY     Moving from lying on back to sitting on the side of a flat bed without bedrails? 3  - 3  -YESY     Moving to and from a bed to a chair (including a wheelchair)? 3  - 3  -YESY     Standing up from a chair using your arms (e.g., wheelchair, bedside chair)? 3  - 3  -YESY     Climbing 3-5 steps with a railing? 2  - 2  -YESY     To walk in hospital room? 3  -AH 3  -YESY     AM-PAC 6 Clicks Score (PT) 17  - 17  -YESY        Functional Assessment    Outcome Measure Options AM-PAC 6 Clicks Basic Mobility (PT)  - --               User Key  (r) = Recorded By, (t) = Taken By, (c) = Cosigned By      Initials Name Provider Type     Ramonita Olivas, LEXIE Physical Therapist Assistant    YESY Manuela Bergeron PTA Physical Therapist Assistant                    Timed Therapy Charges  Total Units: 2      Suggested Charges  Total Units: 2      Procedure Name Documented Minutes Units Code    HC OT SELF CARE/MGMT/TRAIN EA 15 MIN 24 2   80043 (CPT®)                 Documented Minutes  Total Minutes: 24      Therapy Provided Minutes    15778 - OT Self Care/Mgmt Minutes 24                        OT Discharge Summary  Anticipated Discharge Disposition (OT): skilled nursing facility  Reason for Discharge: Discharge from facility  Outcomes Achieved: Refer to plan of care for updates on goals achieved  Discharge Destination: SNF      Sae Rivera, CRYSTAL/L, ELIA  12/1/2024

## 2024-12-01 NOTE — THERAPY DISCHARGE NOTE
Acute Care - Physical Therapy Discharge Summary  [facility]       Patient Name: Damaris Nash  : 1935  MRN: [id number]    Today's Date: 2024                 Admit Date: 2024      PT Recommendation and Plan    Visit Dx:    ICD-10-CM ICD-9-CM   1. Atrial flutter with rapid ventricular response  I48.92 427.32   2. Weakness  R53.1 780.79   3. Not taking medication as directed  Z91.148 V15.81   4. Impaired mobility [Z74.09]  Z74.09 799.89        Outcome Measures       Row Name 24 1100 24 1200          How much help from another person do you currently need...    Turning from your back to your side while in flat bed without using bedrails? 3  - 3  -YESY     Moving from lying on back to sitting on the side of a flat bed without bedrails? 3  - 3  -YESY     Moving to and from a bed to a chair (including a wheelchair)? 3  - 3  -YESY     Standing up from a chair using your arms (e.g., wheelchair, bedside chair)? 3  - 3  -YESY     Climbing 3-5 steps with a railing? 2  - 2  -YSEY     To walk in hospital room? 3  - 3  -YESY     AM-PAC 6 Clicks Score (PT) 17  - 17  -YESY        Functional Assessment    Outcome Measure Options AM-PAC 6 Clicks Basic Mobility (PT)  - --               User Key  (r) = Recorded By, (t) = Taken By, (c) = Cosigned By      Initials Name Provider Type     Ramonita Olivas PTA Physical Therapist Assistant    Manuela Roth PTA Physical Therapist Assistant                         PT Rehab Goals       Row Name 24 1555             Bed Mobility Goal 1 (PT)    Activity/Assistive Device (Bed Mobility Goal 1, PT) rolling to left;rolling to right;sit to supine;supine to sit  -KJ      [place] Level/Cues Needed (Bed Mobility Goal 1, PT) modified independence  -KJ      Time Frame (Bed Mobility Goal 1, PT) long term goal (LTG);10 days  -KJ      Strategies/Barriers (Bed Mobility Goal 1, PT) increased time  -KJ      Progress/Outcomes (Bed Mobility Goal 1, PT) goal not  met  -KJ         Transfer Goal 1 (PT)    Activity/Assistive Device (Transfer Goal 1, PT) sit-to-stand/stand-to-sit;bed-to-chair/chair-to-bed;wheelchair transfer;toilet  -KJ      [place] Level/Cues Needed (Transfer Goal 1, PT) contact guard required;verbal cues required  -KJ      Time Frame (Transfer Goal 1, PT) long term goal (LTG);10 days  -KJ      Progress/Outcome (Transfer Goal 1, PT) goal met  -KJ         Gait Training Goal 1 (PT)    Activity/Assistive Device (Gait Training Goal 1, PT) gait (walking locomotion);decrease asymmetrical patterns;decrease fall risk;diminish gait deviation;forward stepping;improve balance and speed;increase endurance/gait distance;assistive device use;walker, rolling  -KJ      [place] Level (Gait Training Goal 1, PT) contact guard required  -KJ      Distance (Gait Training Goal 1, PT) 20'  -KJ      Time Frame (Gait Training Goal 1, PT) long term goal (LTG);10 days  -KJ      Progress/Outcome (Gait Training Goal 1, PT) goal met  -KJ                User Key  (r) = Recorded By, (t) = Taken By, (c) = Cosigned By      Initials Name Provider Type Discipline    Abbie Thomas, PTA Physical Therapist Assistant PT                        PT Discharge Summary  Anticipated Discharge Disposition (PT): home  Reason for Discharge: Discharge from facility  Outcomes Achieved: Refer to plan of care for updates on goals achieved  Discharge Destination: Home      Abbie Rose PTA   12/1/2024

## 2024-12-02 ENCOUNTER — LAB REQUISITION (OUTPATIENT)
Dept: LAB | Facility: HOSPITAL | Age: 89
End: 2024-12-02
Payer: MEDICARE

## 2024-12-02 DIAGNOSIS — I48.92 UNSPECIFIED ATRIAL FLUTTER: ICD-10-CM

## 2024-12-02 DIAGNOSIS — E05.00 THYROTOXICOSIS WITH DIFFUSE GOITER WITHOUT THYROTOXIC CRISIS OR STORM: ICD-10-CM

## 2024-12-02 LAB
ALBUMIN SERPL-MCNC: 3.7 G/DL (ref 3.5–5.2)
ALBUMIN/GLOB SERPL: 1.3 G/DL
ALP SERPL-CCNC: 56 U/L (ref 39–117)
ALT SERPL W P-5'-P-CCNC: 8 U/L (ref 1–33)
ANION GAP SERPL CALCULATED.3IONS-SCNC: 9 MMOL/L (ref 5–15)
AST SERPL-CCNC: 16 U/L (ref 1–32)
BASOPHILS # BLD AUTO: 0.07 10*3/MM3 (ref 0–0.2)
BASOPHILS NFR BLD AUTO: 1 % (ref 0–1.5)
BILIRUB SERPL-MCNC: 0.5 MG/DL (ref 0–1.2)
BUN SERPL-MCNC: 39 MG/DL (ref 8–23)
BUN/CREAT SERPL: 42.9 (ref 7–25)
CALCIUM SPEC-SCNC: 9.1 MG/DL (ref 8.6–10.5)
CHLORIDE SERPL-SCNC: 101 MMOL/L (ref 98–107)
CO2 SERPL-SCNC: 32 MMOL/L (ref 22–29)
CREAT SERPL-MCNC: 0.91 MG/DL (ref 0.57–1)
DEPRECATED RDW RBC AUTO: 49.4 FL (ref 37–54)
EGFRCR SERPLBLD CKD-EPI 2021: 60.4 ML/MIN/1.73
EOSINOPHIL # BLD AUTO: 0.3 10*3/MM3 (ref 0–0.4)
EOSINOPHIL NFR BLD AUTO: 4.5 % (ref 0.3–6.2)
ERYTHROCYTE [DISTWIDTH] IN BLOOD BY AUTOMATED COUNT: 14.4 % (ref 12.3–15.4)
GLOBULIN UR ELPH-MCNC: 2.8 GM/DL
GLUCOSE SERPL-MCNC: 85 MG/DL (ref 65–99)
HCT VFR BLD AUTO: 39.3 % (ref 34–46.6)
HGB BLD-MCNC: 12.2 G/DL (ref 12–15.9)
IMM GRANULOCYTES # BLD AUTO: 0.03 10*3/MM3 (ref 0–0.05)
IMM GRANULOCYTES NFR BLD AUTO: 0.4 % (ref 0–0.5)
LYMPHOCYTES # BLD AUTO: 1.74 10*3/MM3 (ref 0.7–3.1)
LYMPHOCYTES NFR BLD AUTO: 25.9 % (ref 19.6–45.3)
MCH RBC QN AUTO: 29.1 PG (ref 26.6–33)
MCHC RBC AUTO-ENTMCNC: 31 G/DL (ref 31.5–35.7)
MCV RBC AUTO: 93.8 FL (ref 79–97)
MONOCYTES # BLD AUTO: 0.92 10*3/MM3 (ref 0.1–0.9)
MONOCYTES NFR BLD AUTO: 13.7 % (ref 5–12)
NEUTROPHILS NFR BLD AUTO: 3.65 10*3/MM3 (ref 1.7–7)
NEUTROPHILS NFR BLD AUTO: 54.5 % (ref 42.7–76)
NRBC BLD AUTO-RTO: 0 /100 WBC (ref 0–0.2)
PLATELET # BLD AUTO: 286 10*3/MM3 (ref 140–450)
PMV BLD AUTO: 11.7 FL (ref 6–12)
POTASSIUM SERPL-SCNC: 4.3 MMOL/L (ref 3.5–5.2)
PROT SERPL-MCNC: 6.5 G/DL (ref 6–8.5)
RBC # BLD AUTO: 4.19 10*6/MM3 (ref 3.77–5.28)
SODIUM SERPL-SCNC: 142 MMOL/L (ref 136–145)
WBC NRBC COR # BLD AUTO: 6.71 10*3/MM3 (ref 3.4–10.8)

## 2024-12-02 PROCEDURE — 36415 COLL VENOUS BLD VENIPUNCTURE: CPT | Performed by: INTERNAL MEDICINE

## 2024-12-02 PROCEDURE — 80053 COMPREHEN METABOLIC PANEL: CPT | Performed by: INTERNAL MEDICINE

## 2024-12-02 PROCEDURE — 85025 COMPLETE CBC W/AUTO DIFF WBC: CPT | Performed by: INTERNAL MEDICINE

## 2025-01-08 ENCOUNTER — OFFICE VISIT (OUTPATIENT)
Dept: CARDIOLOGY | Facility: CLINIC | Age: OVER 89
End: 2025-01-08
Payer: MEDICARE

## 2025-01-08 VITALS
HEIGHT: 67 IN | HEART RATE: 61 BPM | BODY MASS INDEX: 20.36 KG/M2 | SYSTOLIC BLOOD PRESSURE: 160 MMHG | DIASTOLIC BLOOD PRESSURE: 80 MMHG

## 2025-01-08 DIAGNOSIS — I48.0 PAF (PAROXYSMAL ATRIAL FIBRILLATION): ICD-10-CM

## 2025-01-08 DIAGNOSIS — Z95.818 PRESENCE OF WATCHMAN LEFT ATRIAL APPENDAGE CLOSURE DEVICE: ICD-10-CM

## 2025-01-08 DIAGNOSIS — I48.92 ATRIAL FLUTTER, UNSPECIFIED TYPE: Primary | ICD-10-CM

## 2025-01-08 RX ORDER — CLONIDINE HYDROCHLORIDE 0.1 MG/1
0.1 TABLET ORAL AS NEEDED
COMMUNITY

## 2025-01-08 RX ORDER — ASPIRIN 81 MG/1
81 TABLET ORAL DAILY
Qty: 90 TABLET | Refills: 3 | Status: SHIPPED | OUTPATIENT
Start: 2025-01-08

## 2025-01-08 NOTE — PROGRESS NOTES
[facility] Electrophysiology   Reason For Visit:  Atrial Flutter     Subjective        EP Problems:  Atypical atrial flutter  Paroxysmal atrial fibrillation  -2012: Regulo Erwin  Presence of a Watchman device  -11/27/2024: REMY revealing watchman leak; up to 3mm     Cardiology Problems:  1.  Hypertension  2.  CVA     Medical Problems:  1.  Hypothyroidism  2.  Blindness status post optic nerve injury  3.  Falls:    Damaris Nash is a 89 y.o. female with above pertinent PMH who presents for follow-up of atypical atrial flutter.  Patient reports she is doing rehabilitation but is not making a lot of progress.  Recently had bronchitis which is affecting her breathing.  She tells me she still having palpitations.  She complains of some dizziness and lightheadedness.  She feels generalized weakness.      ROS: Pertinent findings as noted above        Pertinent past medical, surgical, family, and social history were reviewed.      Current Outpatient Medications:     acetaminophen (TYLENOL) 325 MG tablet, Take 2 tablets by mouth Daily As Needed for Mild Pain. Indications: Pain, Disp: , Rfl:     ALPRAZolam (XANAX) 0.25 MG tablet, Take 1 tablet by mouth 2 (Two) Times a Day As Needed for Anxiety. Indications: Feeling Anxious, Disp: 14 tablet, Rfl: 0    amiodarone (PACERONE) 200 MG tablet, Take 2 tablets by mouth 2 (Two) Times a Day for 10 days, THEN 1 tablet Daily for 80 days., Disp: 120 tablet, Rfl: 3    Cholecalciferol (VITAMIN D3) 2000 units capsule, Take 1 capsule by mouth 1 (One) Time Per Week. Indications: Osteoporosis, Vitamin D Deficiency, Disp: , Rfl:     cloNIDine (CATAPRES) 0.1 MG tablet, Take 1 tablet by mouth As Needed for High Blood Pressure., Disp: , Rfl:     guaiFENesin (MUCINEX PO), Take  by mouth., Disp: , Rfl:     HYDROcodone-acetaminophen (NORCO) 5-325 MG per tablet, Take 0.5 tablets by mouth Every 12 (Twelve) Hours As Needed for Mild Pain, Severe Pain or Moderate Pain., Disp: , Rfl:     melatonin 3  "MG tablet, Take  by mouth., Disp: , Rfl:     metoprolol succinate XL (TOPROL-XL) 50 MG 24 hr tablet, Take 3 tablets by mouth Every Night. Indications: High Blood Pressure, Disp: 30 tablet, Rfl: 0    polyethylene glycol (MIRALAX) 17 GM/SCOOP powder, Take 17 g by mouth Daily. Indications: Constipation, Disp: , Rfl:     potassium chloride (MICRO-K) 10 MEQ CR capsule, Take 1 capsule by mouth Daily., Disp: 90 capsule, Rfl: 3    aspirin 81 MG EC tablet, Take 1 tablet by mouth Daily., Disp: 90 tablet, Rfl: 3     Objective   Vital Signs:  /80   Pulse 61   Ht 170.2 cm (67\")   BMI 20.36 kg/m²   Estimated body mass index is 20.36 kg/m² as calculated from the following:    Height as of this encounter: 170.2 cm (67\").    Weight as of 11/27/24: 59 kg (130 lb).      Constitutional:       Appearance: Healthy appearance. Not in distress.   Pulmonary:      Effort: Pulmonary effort is normal.      Breath sounds: Normal breath sounds.   Cardiovascular:      PMI at left midclavicular line. Normal rate. Regular rhythm.      Murmurs: There is no murmur.      No gallop.  No click. No rub.   Edema:     Peripheral edema absent.   Abdominal:      General: Bowel sounds are normal.   Musculoskeletal: Normal range of motion.      Cervical back: Normal range of motion and neck supple. Skin:     General: Skin is warm.   Neurological:      Mental Status: Alert and oriented to person, place and time.        Result Review :  The following data was reviewed by: POLY Soria on 01/08/2025:  CMP   CMP          11/29/2024    10:05 11/29/2024    20:20 11/30/2024    03:59 12/2/2024    05:30   CMP   Glucose 120     120   105  85    BUN 34     34   41  39    Creatinine 1.04     1.04   1.17  0.91    EGFR 51.5     51.5   44.7  60.4    Sodium 141     141   139  142    Potassium 3.3     3.3  4.9  4.1  4.3    Chloride 95     95   98  101    Calcium 9.3     9.3   9.4  9.1    Total Protein 6.9    6.5    Albumin 3.8    3.7    Globulin 3.1    2.8  "   Total Bilirubin 1.0    0.5    Alkaline Phosphatase 62    56    AST (SGOT) 19    16    ALT (SGPT) 9    8    Albumin/Globulin Ratio 1.2    1.3    BUN/Creatinine Ratio 32.7     32.7   35.0  42.9    Anion Gap 13.0     13.0   8.0  9.0      CBC   CBC          11/26/2024    01:06 11/27/2024    02:27 12/2/2024    05:30   CBC   WBC 6.51  8.73  6.71    RBC 4.08  4.29  4.19    Hemoglobin 12.1  12.5  12.2    Hematocrit 37.8  39.6  39.3    MCV 92.6  92.3  93.8    MCH 29.7  29.1  29.1    MCHC 32.0  31.6  31.0    RDW 15.1  15.0  14.4    Platelets 245  259  286      Lipid Panel   Lipid Panel          11/26/2024    01:06   Lipid Panel   Total Cholesterol 150    Triglycerides 74    HDL Cholesterol 49    VLDL Cholesterol 14    LDL Cholesterol  87    LDL/HDL Ratio 1.76      BMP   BMP          11/29/2024    10:05 11/29/2024    20:20 11/30/2024    03:59 12/2/2024    05:30   BMP   BUN 34     34   41  39    Creatinine 1.04     1.04   1.17  0.91    Sodium 141     141   139  142    Potassium 3.3     3.3  4.9  4.1  4.3    Chloride 95     95   98  101    CO2 33.0     33.0   33.0  32.0    Calcium 9.3     9.3   9.4  9.1      Data reviewed : Cardiology studies echo      ECG 12 Lead    Date/Time: 1/8/2025 3:10 PM  Performed by: Andrea Duarte APRN    Authorized by: Andrea Duarte APRN  Comparison: compared with previous ECG from 11/28/2024  Comparison to previous ECG: Sinus rhythm has replaced sinus tachycardia  Rhythm: sinus rhythm  Rate: normal  Conduction: 1st degree AV block  QRS axis: left    Clinical impression: abnormal EKG       Results for orders placed during the hospital encounter of 11/25/24    Adult Transesophageal Echo (REMY) W/ Cont if Necessary Per Protocol (Cardiology Department)    Interpretation Summary    A watchman device is present with 3mm residual leak    No evidence of LA thrombus or device related thrombus    Mild valvular disease otherwise         Assessment and Plan   Diagnoses and all orders for this  visit:    1. Atrial flutter, unspecified type (Primary)  2. PAF (paroxysmal atrial fibrillation)  3. Presence of Watchman left atrial appendage closure device  -Appears to be in sinus rhythm on EKG today  - Will stop Eliquis and resume patient's aspirin given that she is 30 days out from [facility] and has presence of a Watchman device  - Continue amiodarone 200 mg daily at this time  - Continue Toprol- mg daily at this time  - Given patient's ongoing complaints will place in 7-day Holter monitor to assess for recurrence of arrhythmia  - Plan to follow-up in 3 months in the electrophysiology office  - Ongoing management of patient's blood pressure per her rehabilitation facility physician           I spent 2 minutes on the separately reported service of EKG interpretation. This time is not included in the time used to support the E/M service also reported today.      Follow Up   No follow-ups on file.  Patient was given instructions and counseling regarding her condition or for health maintenance advice. Please see specific information pulled into the AVS if appropriate.       Part of this note may be an electronic transcription/translation of spoken language to printed text using the Dragon Dictation System.

## 2025-01-27 ENCOUNTER — TELEPHONE (OUTPATIENT)
Dept: CARDIOLOGY | Facility: CLINIC | Age: OVER 89
End: 2025-01-27
Payer: MEDICARE

## 2025-01-27 NOTE — TELEPHONE ENCOUNTER
Maria Guadalupe Roca MD  Reasor, POLY Mclean; [place] Odell, PREMA Berumen,    Please call her and let her know that her Holter monitor did not show any significant arrhythmias aside from sinus bradycardia.  Her heart rates are quite slow at times and we need to decrease her metoprolol.  Please confirm the dose that she is currently taking.  If she is taking 150 mg daily as suggested by her MAR, please advise her to decrease it to 50 mg daily.  She should also monitor her heart rates at home on a daily basis and contact us if it is staying below 40 bpm.    Thanks,  Maria Guadalupe

## 2025-02-17 ENCOUNTER — APPOINTMENT (OUTPATIENT)
Dept: GENERAL RADIOLOGY | Age: 89
End: 2025-02-17
Payer: MEDICARE

## 2025-02-17 ENCOUNTER — APPOINTMENT (OUTPATIENT)
Dept: CT IMAGING | Age: 89
End: 2025-02-17
Payer: MEDICARE

## 2025-02-17 ENCOUNTER — HOSPITAL ENCOUNTER (INPATIENT)
Age: 89
LOS: 5 days | Discharge: HOME HEALTH | End: 2025-02-22
Attending: EMERGENCY MEDICINE | Admitting: INTERNAL MEDICINE
Payer: MEDICARE

## 2025-02-17 DIAGNOSIS — R79.89 ELEVATED TSH: ICD-10-CM

## 2025-02-17 DIAGNOSIS — R79.89 ELEVATED BRAIN NATRIURETIC PEPTIDE (BNP) LEVEL: ICD-10-CM

## 2025-02-17 DIAGNOSIS — G89.4 CHRONIC PAIN SYNDROME: ICD-10-CM

## 2025-02-17 DIAGNOSIS — F41.9 ANXIETY: ICD-10-CM

## 2025-02-17 DIAGNOSIS — R26.2 AMBULATORY DYSFUNCTION: Primary | ICD-10-CM

## 2025-02-17 DIAGNOSIS — Z91.81 HISTORY OF RECENT FALL: ICD-10-CM

## 2025-02-17 DIAGNOSIS — R53.1 GENERAL WEAKNESS: ICD-10-CM

## 2025-02-17 DIAGNOSIS — R53.1 GENERALIZED WEAKNESS: ICD-10-CM

## 2025-02-17 DIAGNOSIS — Z51.5 PALLIATIVE CARE PATIENT: ICD-10-CM

## 2025-02-17 PROBLEM — E03.9 HYPOTHYROIDISM: Status: ACTIVE | Noted: 2025-02-17

## 2025-02-17 LAB
ALBUMIN SERPL-MCNC: 4.2 G/DL (ref 3.5–5.2)
ALP SERPL-CCNC: 60 U/L (ref 35–104)
ALT SERPL-CCNC: 14 U/L (ref 5–33)
ANION GAP SERPL CALCULATED.3IONS-SCNC: 9 MMOL/L (ref 8–16)
AST SERPL-CCNC: 32 U/L (ref 5–32)
BACTERIA #/AREA URNS HPF: NORMAL /HPF
BASOPHILS # BLD: 0 K/UL (ref 0–0.2)
BASOPHILS NFR BLD: 0.7 % (ref 0–1)
BILIRUB SERPL-MCNC: 0.7 MG/DL (ref 0.2–1.2)
BILIRUB UR QL STRIP: NEGATIVE
BNP BLD-MCNC: 2203 PG/ML (ref 0–449)
BUN SERPL-MCNC: 22 MG/DL (ref 8–23)
CALCIUM SERPL-MCNC: 9.2 MG/DL (ref 8.8–10.2)
CHLORIDE SERPL-SCNC: 97 MMOL/L (ref 98–107)
CLARITY UR: CLEAR
CO2 SERPL-SCNC: 31 MMOL/L (ref 22–29)
COLOR UR: YELLOW
CREAT SERPL-MCNC: 0.9 MG/DL (ref 0.5–0.9)
EOSINOPHIL # BLD: 0.1 K/UL (ref 0–0.6)
EOSINOPHIL NFR BLD: 2.2 % (ref 0–5)
ERYTHROCYTE [DISTWIDTH] IN BLOOD BY AUTOMATED COUNT: 15.9 % (ref 11.5–14.5)
GLUCOSE SERPL-MCNC: 96 MG/DL (ref 70–99)
GLUCOSE UR STRIP.AUTO-MCNC: NEGATIVE MG/DL
HCT VFR BLD AUTO: 42 % (ref 37–47)
HGB BLD-MCNC: 13.4 G/DL (ref 12–16)
HGB UR STRIP.AUTO-MCNC: ABNORMAL MG/L
IMM GRANULOCYTES # BLD: 0 K/UL
KETONES UR STRIP.AUTO-MCNC: 15 MG/DL
LEUKOCYTE ESTERASE UR QL STRIP.AUTO: ABNORMAL
LYMPHOCYTES # BLD: 1 K/UL (ref 1.1–4.5)
LYMPHOCYTES NFR BLD: 21.4 % (ref 20–40)
MAGNESIUM SERPL-MCNC: 2.3 MG/DL (ref 1.6–2.4)
MCH RBC QN AUTO: 30 PG (ref 27–31)
MCHC RBC AUTO-ENTMCNC: 31.9 G/DL (ref 33–37)
MCV RBC AUTO: 94.2 FL (ref 81–99)
MONOCYTES # BLD: 0.5 K/UL (ref 0–0.9)
MONOCYTES NFR BLD: 11.7 % (ref 0–10)
NEUTROPHILS # BLD: 2.9 K/UL (ref 1.5–7.5)
NEUTS SEG NFR BLD: 63.6 % (ref 50–65)
NITRITE UR QL STRIP.AUTO: NEGATIVE
PH UR STRIP.AUTO: 6.5 [PH] (ref 5–8)
PLATELET # BLD AUTO: 215 K/UL (ref 130–400)
PMV BLD AUTO: 10.9 FL (ref 9.4–12.3)
POTASSIUM SERPL-SCNC: 4.7 MMOL/L (ref 3.5–5.1)
PROT SERPL-MCNC: 8 G/DL (ref 6.4–8.3)
PROT UR STRIP.AUTO-MCNC: 30 MG/DL
RBC # BLD AUTO: 4.46 M/UL (ref 4.2–5.4)
RBC #/AREA URNS HPF: NORMAL /HPF (ref 0–2)
SODIUM SERPL-SCNC: 137 MMOL/L (ref 136–145)
SP GR UR STRIP.AUTO: 1.02 (ref 1–1.03)
SQUAMOUS #/AREA URNS HPF: NORMAL /HPF
TSH SERPL DL<=0.005 MIU/L-ACNC: 46.68 UIU/ML (ref 0.27–4.2)
UROBILINOGEN UR STRIP.AUTO-MCNC: 1 E.U./DL
WBC # BLD AUTO: 4.5 K/UL (ref 4.8–10.8)
WBC #/AREA URNS HPF: NORMAL /HPF (ref 0–5)

## 2025-02-17 PROCEDURE — 0202U NFCT DS 22 TRGT SARS-COV-2: CPT

## 2025-02-17 PROCEDURE — 6360000002 HC RX W HCPCS: Performed by: EMERGENCY MEDICINE

## 2025-02-17 PROCEDURE — 36415 COLL VENOUS BLD VENIPUNCTURE: CPT

## 2025-02-17 PROCEDURE — 99285 EMERGENCY DEPT VISIT HI MDM: CPT

## 2025-02-17 PROCEDURE — 71045 X-RAY EXAM CHEST 1 VIEW: CPT

## 2025-02-17 PROCEDURE — 85025 COMPLETE CBC W/AUTO DIFF WBC: CPT

## 2025-02-17 PROCEDURE — 80053 COMPREHEN METABOLIC PANEL: CPT

## 2025-02-17 PROCEDURE — 72131 CT LUMBAR SPINE W/O DYE: CPT

## 2025-02-17 PROCEDURE — 84443 ASSAY THYROID STIM HORMONE: CPT

## 2025-02-17 PROCEDURE — 96374 THER/PROPH/DIAG INJ IV PUSH: CPT

## 2025-02-17 PROCEDURE — 81001 URINALYSIS AUTO W/SCOPE: CPT

## 2025-02-17 PROCEDURE — 1200000000 HC SEMI PRIVATE

## 2025-02-17 PROCEDURE — 84439 ASSAY OF FREE THYROXINE: CPT

## 2025-02-17 PROCEDURE — 83735 ASSAY OF MAGNESIUM: CPT

## 2025-02-17 PROCEDURE — 83880 ASSAY OF NATRIURETIC PEPTIDE: CPT

## 2025-02-17 RX ORDER — ENOXAPARIN SODIUM 100 MG/ML
30 INJECTION SUBCUTANEOUS DAILY
Status: DISCONTINUED | OUTPATIENT
Start: 2025-02-17 | End: 2025-02-17

## 2025-02-17 RX ORDER — SODIUM CHLORIDE 9 MG/ML
INJECTION, SOLUTION INTRAVENOUS CONTINUOUS
Status: DISCONTINUED | OUTPATIENT
Start: 2025-02-17 | End: 2025-02-18

## 2025-02-17 RX ORDER — LABETALOL HYDROCHLORIDE 5 MG/ML
10 INJECTION, SOLUTION INTRAVENOUS ONCE
Status: COMPLETED | OUTPATIENT
Start: 2025-02-17 | End: 2025-02-17

## 2025-02-17 RX ORDER — TRIAMCINOLONE ACETONIDE 1 MG/G
0.1 CREAM TOPICAL WEEKLY
COMMUNITY

## 2025-02-17 RX ORDER — SODIUM CHLORIDE 0.9 % (FLUSH) 0.9 %
5-40 SYRINGE (ML) INJECTION PRN
Status: DISCONTINUED | OUTPATIENT
Start: 2025-02-17 | End: 2025-02-22 | Stop reason: HOSPADM

## 2025-02-17 RX ORDER — NICOTINE 14MG/24HR
250 PATCH, TRANSDERMAL 24 HOURS TRANSDERMAL 2 TIMES DAILY
COMMUNITY

## 2025-02-17 RX ORDER — POTASSIUM CHLORIDE 7.45 MG/ML
10 INJECTION INTRAVENOUS PRN
Status: DISCONTINUED | OUTPATIENT
Start: 2025-02-17 | End: 2025-02-22 | Stop reason: HOSPADM

## 2025-02-17 RX ORDER — CLONIDINE HYDROCHLORIDE 0.1 MG/1
0.1 TABLET ORAL DAILY
COMMUNITY

## 2025-02-17 RX ORDER — POLYETHYLENE GLYCOL 3350 17 G/17G
17 POWDER, FOR SOLUTION ORAL DAILY PRN
Status: DISCONTINUED | OUTPATIENT
Start: 2025-02-17 | End: 2025-02-22 | Stop reason: HOSPADM

## 2025-02-17 RX ORDER — SODIUM CHLORIDE 0.9 % (FLUSH) 0.9 %
5-40 SYRINGE (ML) INJECTION EVERY 12 HOURS SCHEDULED
Status: DISCONTINUED | OUTPATIENT
Start: 2025-02-17 | End: 2025-02-18

## 2025-02-17 RX ORDER — METOPROLOL SUCCINATE 50 MG/1
50 TABLET, EXTENDED RELEASE ORAL DAILY
Status: ON HOLD | COMMUNITY
End: 2025-02-21

## 2025-02-17 RX ORDER — ESCITALOPRAM OXALATE 5 MG/1
5 TABLET ORAL DAILY
COMMUNITY

## 2025-02-17 RX ORDER — ONDANSETRON 2 MG/ML
4 INJECTION INTRAMUSCULAR; INTRAVENOUS EVERY 6 HOURS PRN
Status: DISCONTINUED | OUTPATIENT
Start: 2025-02-17 | End: 2025-02-22 | Stop reason: HOSPADM

## 2025-02-17 RX ORDER — POTASSIUM CHLORIDE 1500 MG/1
40 TABLET, EXTENDED RELEASE ORAL PRN
Status: DISCONTINUED | OUTPATIENT
Start: 2025-02-17 | End: 2025-02-22 | Stop reason: HOSPADM

## 2025-02-17 RX ORDER — SENNOSIDES A AND B 8.6 MG/1
2 TABLET, FILM COATED ORAL DAILY
COMMUNITY

## 2025-02-17 RX ORDER — ONDANSETRON 4 MG/1
4 TABLET, FILM COATED ORAL DAILY
COMMUNITY

## 2025-02-17 RX ORDER — MAGNESIUM SULFATE IN WATER 40 MG/ML
2000 INJECTION, SOLUTION INTRAVENOUS PRN
Status: DISCONTINUED | OUTPATIENT
Start: 2025-02-17 | End: 2025-02-22 | Stop reason: HOSPADM

## 2025-02-17 RX ORDER — POLYETHYLENE GLYCOL 3350 17 G/17G
17 POWDER, FOR SOLUTION ORAL DAILY
COMMUNITY

## 2025-02-17 RX ORDER — SODIUM CHLORIDE 9 MG/ML
INJECTION, SOLUTION INTRAVENOUS PRN
Status: DISCONTINUED | OUTPATIENT
Start: 2025-02-17 | End: 2025-02-22 | Stop reason: HOSPADM

## 2025-02-17 RX ORDER — ACETAMINOPHEN 325 MG/1
650 TABLET ORAL EVERY 6 HOURS PRN
COMMUNITY

## 2025-02-17 RX ORDER — ONDANSETRON 4 MG/1
4 TABLET, ORALLY DISINTEGRATING ORAL EVERY 8 HOURS PRN
Status: DISCONTINUED | OUTPATIENT
Start: 2025-02-17 | End: 2025-02-22 | Stop reason: HOSPADM

## 2025-02-17 RX ORDER — AMIODARONE HYDROCHLORIDE 100 MG/1
100 TABLET ORAL DAILY
Status: ON HOLD | COMMUNITY
End: 2025-02-21 | Stop reason: HOSPADM

## 2025-02-17 RX ORDER — POTASSIUM CHLORIDE 750 MG/1
10 CAPSULE, EXTENDED RELEASE ORAL DAILY
Status: ON HOLD | COMMUNITY
End: 2025-02-21 | Stop reason: HOSPADM

## 2025-02-17 RX ORDER — ACETAMINOPHEN 325 MG/1
650 TABLET ORAL EVERY 6 HOURS PRN
Status: DISCONTINUED | OUTPATIENT
Start: 2025-02-17 | End: 2025-02-22 | Stop reason: HOSPADM

## 2025-02-17 RX ADMIN — LABETALOL HYDROCHLORIDE 10 MG: 5 INJECTION, SOLUTION INTRAVENOUS at 19:00

## 2025-02-17 ASSESSMENT — PAIN SCALES - GENERAL
PAINLEVEL_OUTOF10: 7
PAINLEVEL_OUTOF10: 6
PAINLEVEL_OUTOF10: 6

## 2025-02-17 ASSESSMENT — PAIN - FUNCTIONAL ASSESSMENT
PAIN_FUNCTIONAL_ASSESSMENT: NONE - DENIES PAIN
PAIN_FUNCTIONAL_ASSESSMENT: 0-10

## 2025-02-17 ASSESSMENT — PAIN DESCRIPTION - PAIN TYPE
TYPE: ACUTE PAIN
TYPE: ACUTE PAIN

## 2025-02-17 ASSESSMENT — PAIN DESCRIPTION - DESCRIPTORS
DESCRIPTORS: ACHING;SHARP
DESCRIPTORS: ACHING
DESCRIPTORS: ACHING;DISCOMFORT

## 2025-02-17 ASSESSMENT — PAIN DESCRIPTION - ORIENTATION: ORIENTATION: LOWER;MID

## 2025-02-17 ASSESSMENT — PAIN DESCRIPTION - LOCATION
LOCATION: BACK

## 2025-02-17 ASSESSMENT — ENCOUNTER SYMPTOMS
RESPIRATORY NEGATIVE: 1
BACK PAIN: 1
EYES NEGATIVE: 1

## 2025-02-17 ASSESSMENT — PAIN DESCRIPTION - FREQUENCY: FREQUENCY: INTERMITTENT

## 2025-02-17 NOTE — ED PROVIDER NOTES
[facility] EMERGENCY DEPARTMENT  EMERGENCY DEPARTMENT ENCOUNTER      Pt Name: Sakshi Penn  MRN: [id number]  Birthdate 11/25/1935  Date of evaluation: 2/17/2025  Provider: Lokesh Farmer Jr, MD    CHIEF COMPLAINT       Chief Complaint   Patient presents with    Fall     Pt arrived via EMS for reports of a fall 1.5-2 weeks ago and worsening lower back, right hip and right knee pain. Pt was reportedly at [facility] during fall but has since been dc'd and states she has not been seen for the fall.          HISTORY OF PRESENT ILLNESS   (Location/Symptom, Timing/Onset,Context/Setting, Quality, Duration, Modifying Factors, Severity)  Note limiting factors.   Sakshi Penn is a 89 y.o. female who presents to the emergency department for evaluation after she has been unable to walk for the last several days.  Was discharged 3 days ago from a nursing home where she was admitted for acute rehab for the last 2 months.  She had a fall while she was there about a week and a half ago.  Since then has had low back pain that has been worsening.  Family states she seems to be weak all over.  She was at the facility for rehab but often refused rehab or was too weak to perform any significant activity and was ultimately discharged after physical therapy determined she had reached maximum benefit.  Family reports she seems more confused over the last couple days as well and has had urinary tract infections in the past that caused similar issues.  Patient currently living with her son and daughter-in-law and they state they are unable to care for her in her current state.  They did not believe that she was ready to be released from the nursing home either but did not feel they had any other choice at the time as they had already appealed her discharge twice    HPI    NursingNotes were reviewed.    REVIEW OF SYSTEMS    (2-9 systems for level 4, 10 or more for level 5)     Review of Systems   Constitutional:  Positive for fatigue.  (KENALOG) 0.1 % CREAM    Apply 0.01 g topically once a week Apply topically 2 times weekly to the affected areas       ALLERGIES     Epinephrine, Cefdinir, Ceftriaxone, Celecoxib, Ferrous sulfate, Simvastatin, and Sulfa antibiotics    FAMILY HISTORY     History reviewed. No pertinent family history.       SOCIAL HISTORY       Social History     Socioeconomic History    Marital status:      Spouse name: None    Number of children: None    Years of education: None    Highest education level: None   Tobacco Use    Smoking status: Never    Smokeless tobacco: Never   Vaping Use    Vaping status: Never Used   Substance and Sexual Activity    Alcohol use: Never    Drug use: Never     Social Determinants of Health     Transportation Needs: No Transportation Needs (10/25/2023)    Received from [facility]    OASIS : Transportation     Lack of Transportation (Medical): No     Lack of Transportation (Non-Medical): No     Patient Unable or Declines to Respond: No   Social Connections: Feeling Socially Integrated (10/25/2023)    Received from [facility]    OASIS : Social Isolation     Frequency of experiencing loneliness or isolation: Never   Intimate Partner Violence: Not At Risk (11/25/2024)    Received from [facility]    Abuse Screen     Feels Unsafe at Home or Work/School: no     Feels Threatened by Someone: no     Does Anyone Try to Keep You From Having Contact with Others or Doing Things Outside Your Home?: no     Physical Signs of Abuse Present: no   Housing Stability: Not At Risk (11/26/2024)    Received from [facility]    Housing Stability     Current Living Arrangements: home     Potentially Unsafe Housing Conditions: none       SCREENINGS   NIH Stroke Scale  NIH Stroke Scale Assessed: NoGlasgow Coma Scale  Eye Opening: Spontaneous  Best Verbal Response: Oriented  Best Motor Response: Obeys commands  Connie Coma Scale Score: 15

## 2025-02-17 NOTE — ED NOTES
Pt's son and daughter-in-law present at bedside and report EMS was called today bc pt was supposed to have appt with PCP and was unable to get herself out of the chair to stand. Pt has been c/o to family members about lower back pain, right hip, and right knee pain. Bruising noted to lateral right knee area. Pt's family reports pt has declined since being at [facility]. States pt was discharged from NH x2 days ago and has not been able to do much on her own. Family is attempting to get pt's med list sent to them and at this time is only able to report \"heart issues\" and a \"watchman\"

## 2025-02-17 NOTE — ED NOTES
Patient alert and answers simple questions appropriately. Family state that she has been an inpatient at [facility] for rehab purposes. She has recently fallen. Daughter states that at times the patient can get agitated and confused and this is not her normal behavior but this is how she acts when she has a UTI. When the patient was asked does it hurt when you urinate ,she answers \"yes\" sometimes. Daughter states that she has had some urinary frequency lately.The patient resides with her son

## 2025-02-18 PROBLEM — Z51.5 PALLIATIVE CARE PATIENT: Status: ACTIVE | Noted: 2025-02-18

## 2025-02-18 LAB
ANION GAP SERPL CALCULATED.3IONS-SCNC: 8 MMOL/L (ref 8–16)
B PARAP IS1001 DNA NPH QL NAA+NON-PROBE: NOT DETECTED
B PERT.PT PRMT NPH QL NAA+NON-PROBE: NOT DETECTED
BUN SERPL-MCNC: 20 MG/DL (ref 8–23)
C PNEUM DNA NPH QL NAA+NON-PROBE: NOT DETECTED
CALCIUM SERPL-MCNC: 9 MG/DL (ref 8.8–10.2)
CHLORIDE SERPL-SCNC: 98 MMOL/L (ref 98–107)
CO2 SERPL-SCNC: 31 MMOL/L (ref 22–29)
CREAT SERPL-MCNC: 0.9 MG/DL (ref 0.5–0.9)
ERYTHROCYTE [DISTWIDTH] IN BLOOD BY AUTOMATED COUNT: 15.9 % (ref 11.5–14.5)
FLUAV AG NPH QL: NEGATIVE
FLUAV RNA NPH QL NAA+NON-PROBE: NOT DETECTED
FLUBV AG NPH QL: NEGATIVE
FLUBV RNA NPH QL NAA+NON-PROBE: NOT DETECTED
GLUCOSE SERPL-MCNC: 81 MG/DL (ref 70–99)
HADV DNA NPH QL NAA+NON-PROBE: NOT DETECTED
HCOV 229E RNA NPH QL NAA+NON-PROBE: NOT DETECTED
HCOV HKU1 RNA NPH QL NAA+NON-PROBE: NOT DETECTED
HCOV NL63 RNA NPH QL NAA+NON-PROBE: NOT DETECTED
HCOV OC43 RNA NPH QL NAA+NON-PROBE: NOT DETECTED
HCT VFR BLD AUTO: 40 % (ref 37–47)
HGB BLD-MCNC: 12.6 G/DL (ref 12–16)
HMPV RNA NPH QL NAA+NON-PROBE: NOT DETECTED
HPIV1 RNA NPH QL NAA+NON-PROBE: NOT DETECTED
HPIV2 RNA NPH QL NAA+NON-PROBE: NOT DETECTED
HPIV3 RNA NPH QL NAA+NON-PROBE: NOT DETECTED
HPIV4 RNA NPH QL NAA+NON-PROBE: NOT DETECTED
M PNEUMO DNA NPH QL NAA+NON-PROBE: NOT DETECTED
MCH RBC QN AUTO: 29.8 PG (ref 27–31)
MCHC RBC AUTO-ENTMCNC: 31.5 G/DL (ref 33–37)
MCV RBC AUTO: 94.6 FL (ref 81–99)
PLATELET # BLD AUTO: 209 K/UL (ref 130–400)
PMV BLD AUTO: 11.1 FL (ref 9.4–12.3)
POTASSIUM SERPL-SCNC: 3.9 MMOL/L (ref 3.5–5)
RBC # BLD AUTO: 4.23 M/UL (ref 4.2–5.4)
RSV RNA NPH QL NAA+NON-PROBE: NOT DETECTED
RV+EV RNA NPH QL NAA+NON-PROBE: NOT DETECTED
SARS-COV-2 RDRP RESP QL NAA+PROBE: NOT DETECTED
SARS-COV-2 RNA NPH QL NAA+NON-PROBE: NOT DETECTED
SODIUM SERPL-SCNC: 137 MMOL/L (ref 136–145)
T4 FREE SERPL-MCNC: 0.46 NG/DL (ref 0.93–1.7)
T4 FREE SERPL-MCNC: 0.49 NG/DL (ref 0.93–1.7)
WBC # BLD AUTO: 3.6 K/UL (ref 4.8–10.8)

## 2025-02-18 PROCEDURE — 36415 COLL VENOUS BLD VENIPUNCTURE: CPT

## 2025-02-18 PROCEDURE — 1200000000 HC SEMI PRIVATE

## 2025-02-18 PROCEDURE — 6370000000 HC RX 637 (ALT 250 FOR IP): Performed by: NURSE PRACTITIONER

## 2025-02-18 PROCEDURE — 87804 INFLUENZA ASSAY W/OPTIC: CPT

## 2025-02-18 PROCEDURE — 84439 ASSAY OF FREE THYROXINE: CPT

## 2025-02-18 PROCEDURE — 2500000003 HC RX 250 WO HCPCS: Performed by: NURSE PRACTITIONER

## 2025-02-18 PROCEDURE — 97165 OT EVAL LOW COMPLEX 30 MIN: CPT

## 2025-02-18 PROCEDURE — 80048 BASIC METABOLIC PNL TOTAL CA: CPT

## 2025-02-18 PROCEDURE — 87635 SARS-COV-2 COVID-19 AMP PRB: CPT

## 2025-02-18 PROCEDURE — 97535 SELF CARE MNGMENT TRAINING: CPT

## 2025-02-18 PROCEDURE — 97530 THERAPEUTIC ACTIVITIES: CPT

## 2025-02-18 PROCEDURE — 6360000002 HC RX W HCPCS: Performed by: INTERNAL MEDICINE

## 2025-02-18 PROCEDURE — 94760 N-INVAS EAR/PLS OXIMETRY 1: CPT

## 2025-02-18 PROCEDURE — 85027 COMPLETE CBC AUTOMATED: CPT

## 2025-02-18 PROCEDURE — 97110 THERAPEUTIC EXERCISES: CPT

## 2025-02-18 PROCEDURE — 97162 PT EVAL MOD COMPLEX 30 MIN: CPT

## 2025-02-18 PROCEDURE — 6370000000 HC RX 637 (ALT 250 FOR IP): Performed by: HOSPITALIST

## 2025-02-18 RX ORDER — HYDRALAZINE HYDROCHLORIDE 20 MG/ML
5 INJECTION INTRAMUSCULAR; INTRAVENOUS EVERY 6 HOURS PRN
Status: DISCONTINUED | OUTPATIENT
Start: 2025-02-18 | End: 2025-02-19

## 2025-02-18 RX ORDER — SENNOSIDES A AND B 8.6 MG/1
2 TABLET, FILM COATED ORAL DAILY
Status: DISCONTINUED | OUTPATIENT
Start: 2025-02-18 | End: 2025-02-22 | Stop reason: HOSPADM

## 2025-02-18 RX ORDER — ALPRAZOLAM 0.5 MG
0.25 TABLET ORAL 2 TIMES DAILY PRN
Status: DISCONTINUED | OUTPATIENT
Start: 2025-02-18 | End: 2025-02-22 | Stop reason: HOSPADM

## 2025-02-18 RX ORDER — ALPRAZOLAM 0.25 MG
0.25 TABLET ORAL 2 TIMES DAILY PRN
Status: ON HOLD | COMMUNITY
End: 2025-02-21

## 2025-02-18 RX ORDER — LEVOTHYROXINE SODIUM 75 UG/1
150 TABLET ORAL DAILY
Status: DISCONTINUED | OUTPATIENT
Start: 2025-02-18 | End: 2025-02-21

## 2025-02-18 RX ORDER — ESCITALOPRAM OXALATE 5 MG/1
5 TABLET ORAL DAILY
Status: DISCONTINUED | OUTPATIENT
Start: 2025-02-18 | End: 2025-02-22 | Stop reason: HOSPADM

## 2025-02-18 RX ORDER — METOPROLOL SUCCINATE 50 MG/1
50 TABLET, EXTENDED RELEASE ORAL DAILY
Status: DISCONTINUED | OUTPATIENT
Start: 2025-02-18 | End: 2025-02-22 | Stop reason: HOSPADM

## 2025-02-18 RX ORDER — AMIODARONE HYDROCHLORIDE 100 MG/1
100 TABLET ORAL DAILY
Status: DISCONTINUED | OUTPATIENT
Start: 2025-02-18 | End: 2025-02-19

## 2025-02-18 RX ORDER — HYDROCODONE BITARTRATE AND ACETAMINOPHEN 5; 325 MG/1; MG/1
0.5 TABLET ORAL 2 TIMES DAILY PRN
Status: DISCONTINUED | OUTPATIENT
Start: 2025-02-18 | End: 2025-02-22 | Stop reason: HOSPADM

## 2025-02-18 RX ORDER — NIFEDIPINE 30 MG/1
30 TABLET, EXTENDED RELEASE ORAL DAILY
Status: DISCONTINUED | OUTPATIENT
Start: 2025-02-18 | End: 2025-02-19

## 2025-02-18 RX ORDER — ACETAMINOPHEN 160 MG
2000 TABLET,DISINTEGRATING ORAL DAILY
COMMUNITY

## 2025-02-18 RX ORDER — VITAMIN B COMPLEX
2000 TABLET ORAL DAILY
Status: DISCONTINUED | OUTPATIENT
Start: 2025-02-18 | End: 2025-02-22 | Stop reason: HOSPADM

## 2025-02-18 RX ORDER — HYDROCODONE BITARTRATE AND ACETAMINOPHEN 5; 325 MG/1; MG/1
0.5 TABLET ORAL 2 TIMES DAILY PRN
Status: ON HOLD | COMMUNITY
End: 2025-02-21

## 2025-02-18 RX ADMIN — LEVOTHYROXINE SODIUM 150 MCG: 75 TABLET ORAL at 08:54

## 2025-02-18 RX ADMIN — METOPROLOL SUCCINATE 50 MG: 50 TABLET, EXTENDED RELEASE ORAL at 08:54

## 2025-02-18 RX ADMIN — APIXABAN 2.5 MG: 2.5 TABLET, FILM COATED ORAL at 08:54

## 2025-02-18 RX ADMIN — AMIODARONE HYDROCHLORIDE 100 MG: 100 TABLET ORAL at 08:54

## 2025-02-18 RX ADMIN — SENNOSIDES 17.2 MG: 8.6 TABLET, COATED ORAL at 18:10

## 2025-02-18 RX ADMIN — NIFEDIPINE 30 MG: 30 TABLET, EXTENDED RELEASE ORAL at 08:54

## 2025-02-18 RX ADMIN — HYDROCODONE BITARTRATE AND ACETAMINOPHEN 0.5 TABLET: 5; 325 TABLET ORAL at 20:52

## 2025-02-18 RX ADMIN — ESCITALOPRAM 5 MG: 5 TABLET, FILM COATED ORAL at 08:54

## 2025-02-18 RX ADMIN — APIXABAN 2.5 MG: 2.5 TABLET, FILM COATED ORAL at 20:52

## 2025-02-18 RX ADMIN — ACETAMINOPHEN 650 MG: 325 TABLET ORAL at 07:20

## 2025-02-18 RX ADMIN — APIXABAN 2.5 MG: 2.5 TABLET, FILM COATED ORAL at 02:14

## 2025-02-18 RX ADMIN — Medication 2000 UNITS: at 18:10

## 2025-02-18 RX ADMIN — SODIUM CHLORIDE, PRESERVATIVE FREE 10 ML: 5 INJECTION INTRAVENOUS at 08:54

## 2025-02-18 RX ADMIN — HYDRALAZINE HYDROCHLORIDE 5 MG: 20 INJECTION INTRAMUSCULAR; INTRAVENOUS at 05:30

## 2025-02-18 ASSESSMENT — PAIN DESCRIPTION - ORIENTATION: ORIENTATION: RIGHT;LOWER

## 2025-02-18 ASSESSMENT — PAIN SCALES - GENERAL: PAINLEVEL_OUTOF10: 6

## 2025-02-18 ASSESSMENT — LIFESTYLE VARIABLES
HOW OFTEN DO YOU HAVE A DRINK CONTAINING ALCOHOL: NEVER
HOW MANY STANDARD DRINKS CONTAINING ALCOHOL DO YOU HAVE ON A TYPICAL DAY: PATIENT DOES NOT DRINK

## 2025-02-18 ASSESSMENT — PAIN DESCRIPTION - DESCRIPTORS: DESCRIPTORS: ACHING;DISCOMFORT

## 2025-02-18 ASSESSMENT — PAIN DESCRIPTION - LOCATION: LOCATION: HIP;PELVIS

## 2025-02-18 NOTE — ACP (ADVANCE CARE PLANNING)
Advance Care Planning     Palliative Team Advance Care Planning (ACP) Conversation    Date of Conversation: 02/18/25    Individuals present for the conversation: Patient with decision making capacity and Legal healthcare agent named below by phone.     ACP documents on file prior to discussion:  -Power of  for Healthcare    Previously completed document/s not on file:  POA document was completed previously but it is not available for review. This writer requested a copy of the document for patient's chart.     Healthcare Decision Maker:    Primary Decision Maker (Active): ALEK RYAN - Child - [phone]    Primary Decision Maker: ERIKA TOBIAS - Child - [phone]     Conversation Summary: Conversation with son, Alek Ryan, via phone about pt code status. He believed pt would say DNR. This nurse did go back to pt room to address this. Pt was easily awakened by voice. SHe was alert and oriented x 3. Pt states she would not want to be resuscitated. Staed DNR status. Contact to MD, Dr. Moreno, for order to change.     Resuscitation Status:   Code Status: DNR     Documentation Completed:  -No new documents completed.    I spent 20 minutes with the patient and/or surrogate decision maker discussing the patient's wishes and goals.      Lulu Marin RN      Electronically signed by Lulu Marin RN on 2/18/2025 at 2:17 PM

## 2025-02-18 NOTE — ED NOTES
ED TO INPATIENT SBAR HANDOFF    Patient Name: Sakshi Penn   : 1935  89 y.o.   Family/Caregiver Present: No  Code Status Order: Full Code    C-SSRS: Risk of Suicide: No Risk  Sitter No  Restraints:         Situation  Chief Complaint:   Chief Complaint   Patient presents with    Fall     Pt arrived via EMS for reports of a fall 1.5-2 weeks ago and worsening lower back, right hip and right knee pain. Pt was reportedly at [facility] during fall but has since been dc'd and states she has not been seen for the fall.      Patient Diagnosis: Generalized weakness [R53.1]     Brief Description of Patient's Condition:  The patient is a 89 y.o. female who presented to [facility] ED for evaluation of generalized weakness. Pt has history of atrial fibrillation, watchman procedure, stroke, hypothyroidism and hypertension.       Pt lives with her son having experienced worsening generalized weakness associated with urinary frequency and intermittent episode of confusion since discharge from nursing home 5 days ago. She had a fall she tells me at nursing home and has had increased weakness over past several days. She has history of hypothyroidism with synthroid stopped due to level being supratherapeutic as described by family however medication was never restarted.      In ED, tsh, 46.7-pt is not currently altered or edematous, creatinine 0.9, bun 9, glucose 96, wbc 4.5, hgb 13, platelets 215k. Pt is admitted inpatient to hospitalist. Pt has been resting comfortably since I took over care at 10pm.   Mental Status: alert  Arrived from: home    Imaging:   XR CHEST PORTABLE   Final Result   Cardiomegaly with questionable mild pulmonary edema.       Left costophrenic angle opacification due to atelectasis, infiltrates and/or small pleural effusion.               ______________________________________    Electronically signed by: LA ALVES M.D.   Date:     2025   Time:    18:05       CT LUMBAR SPINE WO CONTRAST

## 2025-02-18 NOTE — PROGRESS NOTES
4 Eyes Skin Assessment     NAME:  Sakshi Penn  YOB: 1935  MEDICAL RECORD NUMBER:  [id number]    The patient is being assessed for  Admission    I agree that at least one RN has performed a thorough Head to Toe Skin Assessment on the patient. ALL assessment sites listed below have been assessed.      Areas assessed by both nurses:    Head, Face, Ears, Shoulders, Back, Chest, Arms, Elbows, Hands, Sacrum. Buttock, Coccyx, Ischium, and Legs. Feet and Heels        Does the Patient have a Wound? No noted wound(s)       Eusebio Prevention initiated by RN: Yes  Wound Care Orders initiated by RN: No    Pressure Injury (Stage 3,4, Unstageable, DTI, NWPT, and Complex wounds) if present, place Wound referral order by RN under : No    New Ostomies, if present place, Ostomy referral order under : No     Nurse 1 eSignature: Electronically signed by Jennifer Rajput RN on 2/18/25 at 12:10 PM CST    **SHARE this note so that the co-signing nurse can place an eSignature**    Nurse 2 eSignature: Electronically signed by Ashley Goldman RN on 2/18/25 at 12:33 PM CST

## 2025-02-18 NOTE — PROGRESS NOTES
Progress Note    Date:2025       Room:0409/409-02  Patient Name:Sakshi Penn     YOB: 1935     Age:89 y.o.      Subjective    Subjective: 89 year old lady with history of Atrial fibrillation s/p watchman, HTN, Hypothyroidism, recurrent falls, presented from home with concerns of progressive weakness per family member. Patient was recently discharged from [facility] ([facility]) 5 days prior to this admission. Son stated changes were made to patient medication (specifically HR med) as well as had issues with thyroid levels in the past couple months and has been on and off thyroid meds. Stated while at [facility] had a fall and was told nothing was broken. Had PCP appointment yesterday but was too weak to get into car, hence EMS was called and brought to the hospital. He was concerned that she had a fracture from fall.    On admission, CT L spine was obtained and no evidence of acute fracture. Patient able to answer questions appropriately and denies any further issues at this time. Son is to bring current list of meds to ensure they are currently ordered in the system. Awaiting therapy eval.      Review of Systems: 8 point system reviewed and negative except as stated above    Objective         Vitals Last 24 Hours:  TEMPERATURE:  Temp  Av.5 °F (36.4 °C)  Min: 96.8 °F (36 °C)  Max: 98.1 °F (36.7 °C)  RESPIRATIONS RANGE: Resp  Av.2  Min: 10  Max: 31  PULSE OXIMETRY RANGE: SpO2  Av.3 %  Min: 92 %  Max: 99 %  PULSE RANGE: Pulse  Av.7  Min: 56  Max: 66  BLOOD PRESSURE RANGE: Systolic (24hrs), Av , Min:128 , Max:206   ; Diastolic (24hrs), Av, Min:46, Max:82    I/O (24Hr):  No intake or output data in the 24 hours ending 25 1447      Physical Examination:  General: cachetic, no acute distress lying comfortably in bed.  HEENT: Atraumatic normocephalic, range of motion normal, no JVD, no tracheal deviation noted.  Cardiac: Normal S1-S2 no murmurs   Respiratory: clear To

## 2025-02-18 NOTE — PLAN OF CARE
Problem: Safety - Adult  Goal: Free from fall injury  Outcome: Progressing  Flowsheets (Taken 2/18/2025 0850)  Free From Fall Injury:   Instruct family/caregiver on patient safety   Based on caregiver fall risk screen, instruct family/caregiver to ask for assistance with transferring infant if caregiver noted to have fall risk factors     Problem: Chronic Conditions and Co-morbidities  Goal: Patient's chronic conditions and co-morbidity symptoms are monitored and maintained or improved  Outcome: Progressing  Flowsheets  Taken 2/18/2025 0850 by Jennifer Rajput RN  Care Plan - Patient's Chronic Conditions and Co-Morbidity Symptoms are Monitored and Maintained or Improved:   Monitor and assess patient's chronic conditions and comorbid symptoms for stability, deterioration, or improvement   Collaborate with multidisciplinary team to address chronic and comorbid conditions and prevent exacerbation or deterioration   Update acute care plan with appropriate goals if chronic or comorbid symptoms are exacerbated and prevent overall improvement and discharge  Taken 2/18/2025 0251 by Silvia Queen, RN  Care Plan - Patient's Chronic Conditions and Co-Morbidity Symptoms are Monitored and Maintained or Improved: Monitor and assess patient's chronic conditions and comorbid symptoms for stability, deterioration, or improvement     Problem: Pain  Goal: Verbalizes/displays adequate comfort level or baseline comfort level  Outcome: Progressing  Flowsheets (Taken 2/18/2025 0245 by Silvia Queen, RN)  Verbalizes/displays adequate comfort level or baseline comfort level: Encourage patient to monitor pain and request assistance     Problem: ABCDS Injury Assessment  Goal: Absence of physical injury  Outcome: Progressing  Flowsheets (Taken 2/18/2025 0850)  Absence of Physical Injury: Implement safety measures based on patient assessment

## 2025-02-18 NOTE — PROGRESS NOTES
Physical Therapy  Facility/Department: [facility] ONCOLOGY UNIT  Physical Therapy Initial Assessment    Name: Sakshi Penn  : 1935  MRN: [id number]  Date of Service: 2025    Discharge Recommendations:  Continue to assess pending progress, 24 hour supervision or assist, Patient would benefit from continued therapy after discharge          Patient Diagnosis(es): The primary encounter diagnosis was General weakness. Diagnoses of History of recent fall, Ambulatory dysfunction, Elevated brain natriuretic peptide (BNP) level, and Elevated TSH were also pertinent to this visit.  Past Medical History:  has a past medical history of Atrial fibrillation (HCC), Atrial flutter (HCC), Blindness, Cerebral artery occlusion with cerebral infarction (HCC), Hypertension, Palliative care patient, Presence of Watchman left atrial appendage closure device, Recurrent falls, and Thyroid disease.  Past Surgical History:  has no past surgical history on file.    Assessment  Body Structures, Functions, Activity Limitations Requiring Skilled Therapeutic Intervention: Decreased functional mobility ;Decreased ADL status;Decreased ROM;Decreased safe awareness;Decreased balance;Decreased posture;Decreased endurance;Decreased strength  Assessment: Pt. will benefit from cont. PT to decrease impairments. Pt. a fall risk and should not attempt mobility on her own. Pt needs 24 hr care. She was encouraged to sit up in the recliner. Pt. seated on waffle cushion. Needs v. cues to stand tall and needs to use SW/RW for mobility.  Treatment Diagnosis: impaired gait and mobility  Therapy Prognosis: Good  Decision Making: Medium Complexity  Barriers to Learning: none noted  Requires PT Follow-Up: Yes  Activity Tolerance  Activity Tolerance: Patient limited by fatigue    Plan  Physical Therapy Plan  General Plan: 5-7 times per week  Therapy Duration: 2 Weeks  Current Treatment Recommendations: Strengthening, Balance training, Functional mobility  training, Transfer training, Gait training, Endurance training, Safety education & training, Positioning, Equipment evaluation, education, & procurement, Therapeutic activities, Patient/Caregiver education & training  Safety Devices  Type of Devices: Call light within reach, Heels elevated for pressure relief, Chair alarm in place, Patient at risk for falls, Nurse notified, Left in chair, Gait belt (purewick reconnected to suction, brief donned)    Restrictions  Restrictions/Precautions  Restrictions/Precautions: Fall Risk  Activity Level: Up with Assist  Required Braces or Orthoses?: No     Subjective  General  Chart Reviewed: Yes  Patient assessed for rehabilitation services?: Yes  Response To Previous Treatment: Not applicable  Family/Caregiver Present: Yes (daughter entered room during PT eval)  Referring Practitioner: Piyush Whelan APRN - CNP  Referral Date : 02/17/25  Diagnosis: generalized weakness, recent fall, ambulatory dysfunction, elevated BNP, elevated TSH  Follows Commands: Impaired  Other (Comment): needs repetition of v. cues  General  General Comments: RNJennifer okayed PT.  Subjective  Subjective: Pt. willing to work with therapy. States she is cold.         Social/Functional History  Social/Functional History  Lives With: Son  Type of Home: House  Home Layout: One level  Home Access: Stairs to enter with rails  Entrance Stairs - Number of Steps: 6-7  Bathroom Shower/Tub: Tub/Shower unit, Walk-in shower (sponge baths after d/c from [place])  Bathroom Toilet: Standard  Bathroom Equipment: Shower chair  Bathroom Accessibility: Accessible  Home Equipment: Rollator, Walker - Rolling, Wheelchair - Manual  Receives Help From: Family  Prior Level of Assist for ADLs: Needs assistance  Prior Level of Assist for Homemaking: Needs assistance  Homemaking Responsibilities: No  Prior Level of Assist for Transfers: Independent  Active : No  Patient's  Info: son  Mode of Transportation:

## 2025-02-18 NOTE — H&P
[facility]      Hospitalist - History & Physical      PCP: Jennifer Fuller DO    Date of Admission: 2/17/2025    Date of Service: 2/17/2025    Chief Complaint: Generalized weakness     History Of Present Illness:   The patient is a 89 y.o. female who presented to [facility] ED for evaluation of generalized weakness. Pt has history of atrial fibrillation, watchman procedure, stroke, hypothyroidism and hypertension.     Pt is here with her niece who assist with history. Pt lives with her son having experienced worsening generalized weakness associated with urinary frequency and intermittent episode of confusion since discharge from nursing home 5 days ago. She had a fall she tells me at nursing home and has had increased weakness over past several days. She has history of hypothyroidism with synthroid stopped due to level being supratherapeutic as described by family however medication was never restarted.     In ED, tsh, 46.7-pt is not currently altered or edematous, creatinine 0.9, bun 9, glucose 96, wbc 4.5, hgb 13, platelets 215k. Pt is admitted inpatient to hospitalist.    Past Medical History:        Diagnosis Date    Atrial fibrillation (HCC)     Atrial flutter (HCC)     Blindness     Cerebral artery occlusion with cerebral infarction (HCC)     Hypertension     Presence of Watchman left atrial appendage closure device     Recurrent falls     Thyroid disease        Past Surgical History:    History reviewed. No pertinent surgical history.    Home Medications:  Prior to Admission medications    Medication Sig Start Date End Date Taking? Authorizing Provider   amiodarone (PACERONE) 100 MG tablet Take 1 tablet by mouth daily   Yes Meryl Ruvalcaba MD   potassium chloride (MICRO-K) 10 MEQ extended release capsule Take 1 capsule by mouth daily   Yes Meryl Ruvalcaba MD   polyethylene glycol (MIRALAX) 17 g PACK packet Take 17 g by mouth daily   Yes Meryl Ruvalcaba MD   apixaban (ELIQUIS)  2.5 MG TABS tablet Take 1 tablet by mouth 2 times daily   Yes Meryl Ruvalcaba MD   senna (SENOKOT) 8.6 MG tablet Take 2 tablets by mouth daily At bedtime   Yes Meryl Ruvalcaba MD   Saccharomyces boulardii (PROBIOTIC) 250 MG CAPS Take 250 mg by mouth 2 times daily One tablet twice daily   Yes Meryl Ruvalcaba MD   melatonin 3 MG TABS tablet Take 1 tablet by mouth nightly as needed   Yes Meryl Ruvalcaba MD   cloNIDine (CATAPRES) 0.1 MG tablet Take 1 tablet by mouth daily Take one daily if B/P is greater than 160/90   Yes Meryl Ruvalcaba MD   metoprolol succinate (TOPROL XL) 50 MG extended release tablet Take 1 tablet by mouth daily Take 3 tablets by mouth daily for a total of 150 mg   Yes Meryl Ruvalcaba MD   triamcinolone (KENALOG) 0.1 % cream Apply 0.01 g topically once a week Apply topically 2 times weekly to the affected areas   Yes Meryl Ruvalcaba MD   escitalopram (LEXAPRO) 5 MG tablet Take 1 tablet by mouth daily   Yes Meryl Ruvalcaba MD   acetaminophen (TYLENOL) 325 MG tablet Take 2 tablets by mouth every 6 hours as needed for Pain   Yes Meryl Ruvalcaba MD   ondansetron (ZOFRAN) 4 MG tablet Take 1 tablet by mouth daily Daily for 14 days started on 2/13/25   Yes Meryl Ruvalcaba MD       Allergies:    Epinephrine, Cefdinir, Ceftriaxone, Celecoxib, Ferrous sulfate, Simvastatin, and Sulfa antibiotics    Social History:    The patient currently lives at home  Tobacco:   reports that she has never smoked. She has never used smokeless tobacco.  Alcohol:   reports no history of alcohol use.  Illicit Drugs: none    Family History:  History reviewed. No pertinent family history.    Review of Systems:   Review of Systems   Constitutional:  Positive for fatigue.   Genitourinary:  Positive for frequency.   Neurological:  Positive for weakness.   Psychiatric/Behavioral:  Positive for confusion (intermittently).    All other systems reviewed and are negative.

## 2025-02-18 NOTE — CONSULTS
Palliative Care:  Pt admitted from ED d/t worsening pain in her low back, right hip and knee.  Son states pt had a fall while at  1-2 weeks ago.  Since arriving home activity decreased, sleeping more, and c/o pain.  Pt had a hospital stay x 1 week at  in November for a-flutter. Upon dc she went to SNF for rehab. There for 75 days before returning home to live with her son and dtr in law.  They are concerned d/t pt decreased ambulation and increased c/o pain.            Past Medical History:        Past Medical History:   Diagnosis Date    Atrial fibrillation (HCC)     Atrial flutter (HCC)     Blindness     Cerebral artery occlusion with cerebral infarction (HCC)     Hypertension     Palliative care patient 02/18/2025    Presence of Watchman left atrial appendage closure device     Recurrent falls     Thyroid disease        Advance Directives:    Conversation with son, Alek Penn, via phone about pt code status. He believed pt would say DNR. This nurse did go back to pt room to address this. Pt was easily awakened by voice. SHe was alert and oriented x 3. Pt states she would not want to be resuscitated. Staed DNR status. Contact to MD, Dr. Moreno, for order to change.                Pain/Other Symptoms:   Denies pain currently. Pt does take Lortab 5/325, .05 tab 2 x daily per family. Awaiting med list from [facility] to be faxed by family to 4th floor RN station.            How are symptoms affecting QOL   Increased pain  Decreased mobility, endurance.        Psychological/Spiritual:   Good support reported                    Plan:  DC plan for home with [facility] and [facility] based palliative [facility]).          Patient/family discussion r/t goals:   Family hopeful pt can return to baseline activity after additional therapy at home.   [facility] services per PCP encouragement to family.           Palliative Performance Scale:        50    Palliative Care team will continue to follow and support, with

## 2025-02-18 NOTE — PROGRESS NOTES
Occupational Therapy Initial Assessment  Date: 2025   Patient Name: Sakshi Penn  MRN: [id number]     : 1935    Date of Service: 2025    Discharge Recommendations:  Patient would benefit from continued therapy after discharge       Assessment   Assessment: Evaluation completed and tx initiated.  The patient would benefit from further skilled therapy to upgrade safety and functional independence. The patient requires assist for all mobility and ADL. Discharge home will depend on the level of assist the family is able to provide. Daughter was present during eval. There are 4 steps to enter the home.  Treatment Diagnosis: Generalized weakness, Right hip pain, fall 2 weeks ago  History: A fib, Watchman, stroke  REQUIRES OT FOLLOW-UP: Yes  Activity Tolerance  Activity Tolerance: Patient Tolerated treatment well           Patient Diagnosis(es): The primary encounter diagnosis was General weakness. Diagnoses of History of recent fall, Ambulatory dysfunction, Elevated brain natriuretic peptide (BNP) level, and Elevated TSH were also pertinent to this visit.   has a past medical history of Atrial fibrillation (HCC), Atrial flutter (HCC), Blindness, Cerebral artery occlusion with cerebral infarction (HCC), Hypertension, Palliative care patient, Presence of Watchman left atrial appendage closure device, Recurrent falls, and Thyroid disease.   has no past surgical history on file.    Treatment Diagnosis: Generalized weakness, Right hip pain, fall 2 weeks ago      Restrictions  Restrictions/Precautions  Restrictions/Precautions: Fall Risk  Activity Level: Up with Assist  Required Braces or Orthoses?: No    Subjective   General  Chart Reviewed: Yes  Patient assessed for rehabilitation services?: Yes  Family / Caregiver Present: Yes  Vital Signs  Temp: 98.1 °F (36.7 °C)  Temp Source: Temporal  Pulse: 62  Heart Rate Source: Monitor  Respirations: 18  BP: (!) 128/46  MAP (Calculated): 73  BP Location: Right upper

## 2025-02-18 NOTE — ED NOTES
Attempted to in and out catherize noted no output ,but the moment the catheter was removed the patient was incontinent of a large amount of yellow urine in the adult diaper. At this time repositioned the patient on her left side.Inspected her gluteal region which was noted to be red to her bony prominence region but no open decubitis noted

## 2025-02-18 NOTE — CARE COORDINATION
Case Management Assessment  Initial Evaluation    Date/Time of Evaluation: 2/18/2025 12:08 PM  Assessment Completed by: Brianna Fernandez RN    If patient is discharged prior to next notation, then this note serves as note for discharge by case management.    Patient Name: Sakshi Penn                   YOB: 1935  Diagnosis: General weakness [R53.1]  Generalized weakness [R53.1]  Elevated TSH [R79.89]  Elevated brain natriuretic peptide (BNP) level [R79.89]  Ambulatory dysfunction [R26.2]  History of recent fall [Z91.81]                   Date / Time: 2/17/2025  4:29 PM    Patient Admission Status: Inpatient   Readmission Risk (Low < 19, Mod (19-27), High > 27): Readmission Risk Score: 13.9    Current PCP: Jennifer Fuller, DO  PCP verified by CM? Yes    Chart Reviewed: Yes      History Provided by: Patient, Child/Family  Patient Orientation: Alert and Oriented, Person, Place, Situation    Patient Cognition: Alert    Hospitalization in the last 30 days (Readmission):  No    If yes, Readmission Assessment in  Navigator will be completed.    Advance Directives:      Code Status: Full Code   Patient's Primary Decision Maker is: Legal Next of Kin      Discharge Planning:    Patient lives with: Children Type of Home: House  Primary Care Giver: Family  Patient Support Systems include: Children, Family Members   Current Financial resources: Medicare  Current community resources: None  Current services prior to admission: Durable Medical Equipment            Current DME: Walker, Wheelchair            Type of Home Care services:  Aide Services, OT, PT, Nursing Services, Skilled Therapy    ADLS  Prior functional level: Assistance with the following:, Cooking, Housework, Shopping  Current functional level: Assistance with the following:, Bathing, Dressing, Toileting, Cooking, Housework, Shopping, Mobility    PT AM-PAC:   /24  OT AM-PAC:   /24    Family can provide assistance at DC: Yes  Would you like Case  services.  Palliative Care Eval ordered.    The Plan for Transition of Care is related to the following treatment goals of General weakness [R53.1]  Generalized weakness [R53.1]  Elevated TSH [R79.89]  Elevated brain natriuretic peptide (BNP) level [R79.89]  Ambulatory dysfunction [R26.2]  History of recent fall [Z91.81]    IF APPLICABLE: The Patient and/or patient representative Sakshi and her family were provided with a choice of provider and agrees with the discharge plan. Freedom of choice list with basic dialogue that supports the patient's individualized plan of care/goals and shares the quality data associated with the providers was provided to: Patient   Patient Representative Name:       The Patient and/or Patient Representative Agree with the Discharge Plan? Yes    Brianna Fernandez RN  Case Management Department  Ph: [phone] Fax: [phone]

## 2025-02-18 NOTE — ED NOTES
Awaiting bed placement . Does not want to be repositioned at this time . Daughter attentive at the bedside

## 2025-02-18 NOTE — CARE COORDINATION
Patient has been accepted by [facility].  She still needs a Home Health order.  [facility] by Faustino CASIANO [phone]  F [phone]  Electronically signed by Brianna Fernandez RN on 2/18/2025 at 1:24 PM

## 2025-02-19 PROBLEM — E03.2 HYPOTHYROIDISM DUE TO AMIODARONE: Status: ACTIVE | Noted: 2025-02-19

## 2025-02-19 PROBLEM — E03.9 HYPOTHYROIDISM: Status: RESOLVED | Noted: 2025-02-17 | Resolved: 2025-02-19

## 2025-02-19 PROBLEM — E03.9 SEVERE HYPOTHYROIDISM: Status: ACTIVE | Noted: 2025-02-19

## 2025-02-19 PROBLEM — T46.2X1A HYPOTHYROIDISM DUE TO AMIODARONE: Status: ACTIVE | Noted: 2025-02-19

## 2025-02-19 LAB
BACTERIA URNS QL MICRO: ABNORMAL /HPF
BILIRUB UR QL STRIP: NEGATIVE
CLARITY UR: ABNORMAL
COLOR UR: YELLOW
CRYSTALS URNS MICRO: ABNORMAL /HPF
EPI CELLS #/AREA URNS AUTO: 0 /HPF (ref 0–5)
GLUCOSE UR STRIP.AUTO-MCNC: NEGATIVE MG/DL
HGB UR STRIP.AUTO-MCNC: NEGATIVE MG/L
HYALINE CASTS #/AREA URNS AUTO: 0 /HPF (ref 0–8)
KETONES UR STRIP.AUTO-MCNC: ABNORMAL MG/DL
LEUKOCYTE ESTERASE UR QL STRIP.AUTO: ABNORMAL
NITRITE UR QL STRIP.AUTO: NEGATIVE
PH UR STRIP.AUTO: 8.5 [PH] (ref 5–8)
PROT UR STRIP.AUTO-MCNC: ABNORMAL MG/DL
RBC #/AREA URNS AUTO: 3 /HPF (ref 0–4)
SP GR UR STRIP.AUTO: 1.01 (ref 1–1.03)
T4 FREE SERPL-MCNC: 1.29 NG/DL (ref 0.93–1.7)
UROBILINOGEN UR STRIP.AUTO-MCNC: 1 E.U./DL
WBC #/AREA URNS AUTO: 6 /HPF (ref 0–5)

## 2025-02-19 PROCEDURE — 97110 THERAPEUTIC EXERCISES: CPT

## 2025-02-19 PROCEDURE — 2500000003 HC RX 250 WO HCPCS: Performed by: NURSE PRACTITIONER

## 2025-02-19 PROCEDURE — 36415 COLL VENOUS BLD VENIPUNCTURE: CPT

## 2025-02-19 PROCEDURE — 84439 ASSAY OF FREE THYROXINE: CPT

## 2025-02-19 PROCEDURE — 81001 URINALYSIS AUTO W/SCOPE: CPT

## 2025-02-19 PROCEDURE — 1200000000 HC SEMI PRIVATE

## 2025-02-19 PROCEDURE — 6370000000 HC RX 637 (ALT 250 FOR IP): Performed by: NURSE PRACTITIONER

## 2025-02-19 PROCEDURE — 87086 URINE CULTURE/COLONY COUNT: CPT

## 2025-02-19 PROCEDURE — 6360000002 HC RX W HCPCS: Performed by: STUDENT IN AN ORGANIZED HEALTH CARE EDUCATION/TRAINING PROGRAM

## 2025-02-19 PROCEDURE — 6370000000 HC RX 637 (ALT 250 FOR IP): Performed by: STUDENT IN AN ORGANIZED HEALTH CARE EDUCATION/TRAINING PROGRAM

## 2025-02-19 PROCEDURE — 2580000003 HC RX 258: Performed by: STUDENT IN AN ORGANIZED HEALTH CARE EDUCATION/TRAINING PROGRAM

## 2025-02-19 PROCEDURE — 6370000000 HC RX 637 (ALT 250 FOR IP): Performed by: HOSPITALIST

## 2025-02-19 RX ORDER — DOCUSATE SODIUM 100 MG/1
100 CAPSULE, LIQUID FILLED ORAL DAILY
Status: DISCONTINUED | OUTPATIENT
Start: 2025-02-19 | End: 2025-02-22 | Stop reason: HOSPADM

## 2025-02-19 RX ORDER — HYDRALAZINE HYDROCHLORIDE 20 MG/ML
10 INJECTION INTRAMUSCULAR; INTRAVENOUS EVERY 4 HOURS PRN
Status: DISCONTINUED | OUTPATIENT
Start: 2025-02-19 | End: 2025-02-22 | Stop reason: HOSPADM

## 2025-02-19 RX ORDER — NIFEDIPINE 30 MG/1
60 TABLET, EXTENDED RELEASE ORAL DAILY
Status: DISCONTINUED | OUTPATIENT
Start: 2025-02-19 | End: 2025-02-22 | Stop reason: HOSPADM

## 2025-02-19 RX ADMIN — METOPROLOL SUCCINATE 50 MG: 50 TABLET, EXTENDED RELEASE ORAL at 08:11

## 2025-02-19 RX ADMIN — DOCUSATE SODIUM 100 MG: 100 CAPSULE, LIQUID FILLED ORAL at 12:29

## 2025-02-19 RX ADMIN — SENNOSIDES 17.2 MG: 8.6 TABLET, COATED ORAL at 08:11

## 2025-02-19 RX ADMIN — PIPERACILLIN AND TAZOBACTAM 4500 MG: 4; .5 INJECTION, POWDER, LYOPHILIZED, FOR SOLUTION INTRAVENOUS at 12:32

## 2025-02-19 RX ADMIN — PIPERACILLIN AND TAZOBACTAM 3375 MG: 3; .375 INJECTION, POWDER, LYOPHILIZED, FOR SOLUTION INTRAVENOUS at 17:55

## 2025-02-19 RX ADMIN — APIXABAN 2.5 MG: 2.5 TABLET, FILM COATED ORAL at 20:00

## 2025-02-19 RX ADMIN — ESCITALOPRAM 5 MG: 5 TABLET, FILM COATED ORAL at 08:11

## 2025-02-19 RX ADMIN — LEVOTHYROXINE SODIUM 150 MCG: 75 TABLET ORAL at 05:42

## 2025-02-19 RX ADMIN — NIFEDIPINE 60 MG: 30 TABLET, EXTENDED RELEASE ORAL at 08:11

## 2025-02-19 RX ADMIN — HYDROCODONE BITARTRATE AND ACETAMINOPHEN 0.5 TABLET: 5; 325 TABLET ORAL at 17:50

## 2025-02-19 RX ADMIN — APIXABAN 2.5 MG: 2.5 TABLET, FILM COATED ORAL at 08:11

## 2025-02-19 RX ADMIN — Medication 2000 UNITS: at 08:11

## 2025-02-19 RX ADMIN — SODIUM CHLORIDE, PRESERVATIVE FREE 10 ML: 5 INJECTION INTRAVENOUS at 08:11

## 2025-02-19 ASSESSMENT — PAIN SCALES - GENERAL: PAINLEVEL_OUTOF10: 6

## 2025-02-19 ASSESSMENT — PAIN DESCRIPTION - DESCRIPTORS: DESCRIPTORS: SORE;DISCOMFORT

## 2025-02-19 ASSESSMENT — PAIN DESCRIPTION - LOCATION: LOCATION: BACK;BUTTOCKS

## 2025-02-19 NOTE — PROGRESS NOTES
Physical Therapy    Name: Sakshi Penn  MRN:  [id number]  Date of service:  2/19/2025 02/19/25 1428   Restrictions/Precautions   Restrictions/Precautions Fall Risk   General   Chart Reviewed Yes   Subjective   Subjective I have been up in chair already.  But I guess I will try   Exercises   Straight Leg Raise 1 x 10 aarom   Heelslides 1 x 10 aarom   Hip Abduction 1 x 10 aarom   Knee Short Arc Quad 1 x 10 aarom   Ankle Pumps 1 x 10   Conditions Requiring Skilled Therapeutic Intervention   Assessment Patient agreed to get up then changed her mind.  But she did agree to do some ex in bed. But all ex aarom except for ankle pumps   Safety Devices   Type of Devices Call light within reach         Electronically signed by Shawna Guevara PTA on 2/19/2025 at 2:42 PM

## 2025-02-19 NOTE — PLAN OF CARE
Patient continues to work to meet care plan goals while hospitalized, education provided.    Problem: Safety - Adult  Goal: Free from fall injury  Outcome: Progressing     Problem: Pain  Goal: Verbalizes/displays adequate comfort level or baseline comfort level  Outcome: Progressing     Problem: ABCDS Injury Assessment  Goal: Absence of physical injury  Outcome: Progressing     Problem: Chronic Conditions and Co-morbidities  Goal: Patient's chronic conditions and co-morbidity symptoms are monitored and maintained or improved  Recent Flowsheet Documentation  Taken 2/19/2025 0815 by Jennifer Rajput RN  Care Plan - Patient's Chronic Conditions and Co-Morbidity Symptoms are Monitored and Maintained or Improved:   Monitor and assess patient's chronic conditions and comorbid symptoms for stability, deterioration, or improvement   Collaborate with multidisciplinary team to address chronic and comorbid conditions and prevent exacerbation or deterioration   Update acute care plan with appropriate goals if chronic or comorbid symptoms are exacerbated and prevent overall improvement and discharge     Problem: Skin/Tissue Integrity  Goal: Skin integrity remains intact  Description: 1.  Monitor for areas of redness and/or skin breakdown  2.  Assess vascular access sites hourly  3.  Every 4-6 hours minimum:  Change oxygen saturation probe site  4.  Every 4-6 hours:  If on nasal continuous positive airway pressure, respiratory therapy assess nares and determine need for appliance change or resting period  Recent Flowsheet Documentation  Taken 2/19/2025 0815 by Jennifer Rajput RN  Skin Integrity Remains Intact: Monitor for areas of redness and/or skin breakdown

## 2025-02-19 NOTE — PROGRESS NOTES
Pharmacy Adjustment per [facility] protocol    Sakshi Penn is a 89 y.o. female. Pharmacy has adjusted medications per [facility] protocol.    Recent Labs     02/17/25  1730 02/18/25  0007   BUN 22 20       Recent Labs     02/17/25  1730 02/18/25  0007   CREATININE 0.9 0.9       Estimated Creatinine Clearance: 30 mL/min (based on SCr of 0.9 mg/dL).    Height:   Ht Readings from Last 1 Encounters:   02/17/25 1.6 m (5' 3\")     Weight:  Wt Readings from Last 1 Encounters:   02/17/25 45.4 kg (100 lb)    BMI:  BMI Readings from Last 1 Encounters:   02/17/25 17.71 kg/m²         Plan: Adjust the following medications based on [facility] protocol:           Piperacillin-tazobactam to 4500 mg IV once over 30 minutes followed by 3375 mg IV every 8 hours extended infusion over 240 minutes     Electronically signed by Abdirizak Perdomo RPH on 2/19/2025 at 12:09 PM

## 2025-02-19 NOTE — PLAN OF CARE
Problem: Safety - Adult  Goal: Free from fall injury  2/18/2025 2157 by Hardy Major RN  Outcome: Progressing  2/18/2025 1013 by Jennifer Rajput RN  Outcome: Progressing  Flowsheets (Taken 2/18/2025 0850)  Free From Fall Injury:   Instruct family/caregiver on patient safety   Based on caregiver fall risk screen, instruct family/caregiver to ask for assistance with transferring infant if caregiver noted to have fall risk factors     Problem: Chronic Conditions and Co-morbidities  Goal: Patient's chronic conditions and co-morbidity symptoms are monitored and maintained or improved  2/18/2025 2157 by Hardy Major RN  Outcome: Progressing  Flowsheets (Taken 2/18/2025 2143)  Care Plan - Patient's Chronic Conditions and Co-Morbidity Symptoms are Monitored and Maintained or Improved: Monitor and assess patient's chronic conditions and comorbid symptoms for stability, deterioration, or improvement  2/18/2025 1013 by Jennifer Rajput RN  Outcome: Progressing  Flowsheets  Taken 2/18/2025 0850 by Jennifer Rajput RN  Care Plan - Patient's Chronic Conditions and Co-Morbidity Symptoms are Monitored and Maintained or Improved:   Monitor and assess patient's chronic conditions and comorbid symptoms for stability, deterioration, or improvement   Collaborate with multidisciplinary team to address chronic and comorbid conditions and prevent exacerbation or deterioration   Update acute care plan with appropriate goals if chronic or comorbid symptoms are exacerbated and prevent overall improvement and discharge  Taken 2/18/2025 0251 by Silvia Queen RN  Care Plan - Patient's Chronic Conditions and Co-Morbidity Symptoms are Monitored and Maintained or Improved: Monitor and assess patient's chronic conditions and comorbid symptoms for stability, deterioration, or improvement     Problem: Pain  Goal: Verbalizes/displays adequate comfort level or baseline comfort level  2/18/2025 2157 by Hardy Major

## 2025-02-19 NOTE — PROGRESS NOTES
Progress Note    Date:2025       Room:0409/409-02  Patient Name:Sakshi Penn     YOB: 1935     Age:89 y.o.      Subjective         No acute events noted overnight.  She has not been out of bed.  Remains with fatigue and generalized weakness.  She has also had some recent dysuria with some turbid urine.        Summary: 89 year old lady with history of Atrial fibrillation s/p watchman, HTN, Hypothyroidism, recurrent falls, presented from home with concerns of progressive weakness per family member. Patient was recently discharged from [facility] ([facility]) 5 days prior to this admission. Son stated changes were made to patient medication as well as had issues with thyroid levels in the past couple months and has been on and off thyroid medication.  Stated while at [facility] had a fall. Had PCP appointment day prior to presentation, but was too weak to get into car, hence EMS was called and brought to the hospital. He was concerned that she had a fracture from fall.    On admission, CT L spine was obtained and no evidence of acute fracture.  Further workup was significant for severe hypothyroidism with TSH over 46 and low free T4 of 0.49.  Hypothyroidism likely related to amiodarone.  Amiodarone will be stopped for now and patient initiated on levothyroxine.  She also had some dysuria with evidence of UTI treated with empiric antibiotics.    Objective         Vitals Last 24 Hours:  TEMPERATURE:  Temp  Av.3 °F (36.3 °C)  Min: 96.8 °F (36 °C)  Max: 98.1 °F (36.7 °C)  RESPIRATIONS RANGE: Resp  Avg: 15.4  Min: 12  Max: 18  PULSE OXIMETRY RANGE: SpO2  Av.8 %  Min: 93 %  Max: 96 %  PULSE RANGE: Pulse  Av.3  Min: 58  Max: 67  BLOOD PRESSURE RANGE: Systolic (24hrs), Av , Min:128 , Max:186   ; Diastolic (24hrs), Av, Min:46, Max:89    I/O (24Hr):    Intake/Output Summary (Last 24 hours) at 2025 115  Last data filed at 2025 0888  Gross per 24 hour   Intake 60 ml   Output --  Electronically signed by: LA ALVES M.D. 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  --Reviewed labs including severely elevated TSH and low free T4  -Will stop amiodarone  - Continue levothyroxine at current dose given severity, however may need to decrease dose at discharge or on outpatient basis given patient's weight and advanced age  -- Follow free T4 levels    Urinary tract infection with risk factors for multidrug-resistant organisms, recent stay at skilled nursing facility  Multiple antibiotic allergies/intolerances noted  --Repeat urinalysis obtained/reviewed  - Treat with empiric IV Zosyn, obtain urine culture    Generalized weakness with recurrent Falls, likely exacerbated by severe hypothyroidism  CT Lumbar with no evidence of acute fracture  -PT/OT eval    Atrial fib s/p watchman  -Monitor telemetry  - Stop amiodarone  - Continue metoprolol, ensure adequate rate control with further medication adjustment as needed  --Continue Eliquis 2.5 mg twice daily for now, although given she has had Watchman procedure and history of falls given risk may need to stop Eliquis    HTN: On Nifedipine and metoprolol, monitor with further medical management as warranted    Dispo: Home with  in the next 1 to 2 days    Status, plan of care, disposition discussed with nursing staff, social work, case management    Ellis Jean MD

## 2025-02-20 PROBLEM — E43 SEVERE MALNUTRITION: Chronic | Status: ACTIVE | Noted: 2025-02-20

## 2025-02-20 LAB — T4 FREE SERPL-MCNC: 1.06 NG/DL (ref 0.93–1.7)

## 2025-02-20 PROCEDURE — 6370000000 HC RX 637 (ALT 250 FOR IP): Performed by: HOSPITALIST

## 2025-02-20 PROCEDURE — 6370000000 HC RX 637 (ALT 250 FOR IP): Performed by: NURSE PRACTITIONER

## 2025-02-20 PROCEDURE — 2580000003 HC RX 258: Performed by: STUDENT IN AN ORGANIZED HEALTH CARE EDUCATION/TRAINING PROGRAM

## 2025-02-20 PROCEDURE — 84439 ASSAY OF FREE THYROXINE: CPT

## 2025-02-20 PROCEDURE — 1200000000 HC SEMI PRIVATE

## 2025-02-20 PROCEDURE — 36415 COLL VENOUS BLD VENIPUNCTURE: CPT

## 2025-02-20 PROCEDURE — 2500000003 HC RX 250 WO HCPCS: Performed by: NURSE PRACTITIONER

## 2025-02-20 PROCEDURE — 6370000000 HC RX 637 (ALT 250 FOR IP): Performed by: STUDENT IN AN ORGANIZED HEALTH CARE EDUCATION/TRAINING PROGRAM

## 2025-02-20 PROCEDURE — 6360000002 HC RX W HCPCS: Performed by: STUDENT IN AN ORGANIZED HEALTH CARE EDUCATION/TRAINING PROGRAM

## 2025-02-20 RX ADMIN — DOCUSATE SODIUM 100 MG: 100 CAPSULE, LIQUID FILLED ORAL at 09:38

## 2025-02-20 RX ADMIN — SODIUM CHLORIDE, PRESERVATIVE FREE 10 ML: 5 INJECTION INTRAVENOUS at 09:38

## 2025-02-20 RX ADMIN — HYDROCODONE BITARTRATE AND ACETAMINOPHEN 0.5 TABLET: 5; 325 TABLET ORAL at 20:40

## 2025-02-20 RX ADMIN — Medication 2000 UNITS: at 09:38

## 2025-02-20 RX ADMIN — SENNOSIDES 17.2 MG: 8.6 TABLET, COATED ORAL at 09:38

## 2025-02-20 RX ADMIN — APIXABAN 2.5 MG: 2.5 TABLET, FILM COATED ORAL at 09:38

## 2025-02-20 RX ADMIN — PIPERACILLIN AND TAZOBACTAM 3375 MG: 3; .375 INJECTION, POWDER, LYOPHILIZED, FOR SOLUTION INTRAVENOUS at 09:37

## 2025-02-20 RX ADMIN — PIPERACILLIN AND TAZOBACTAM 3375 MG: 3; .375 INJECTION, POWDER, LYOPHILIZED, FOR SOLUTION INTRAVENOUS at 18:35

## 2025-02-20 RX ADMIN — LEVOTHYROXINE SODIUM 150 MCG: 75 TABLET ORAL at 06:37

## 2025-02-20 RX ADMIN — PIPERACILLIN AND TAZOBACTAM 3375 MG: 3; .375 INJECTION, POWDER, LYOPHILIZED, FOR SOLUTION INTRAVENOUS at 02:10

## 2025-02-20 RX ADMIN — SODIUM CHLORIDE, PRESERVATIVE FREE 10 ML: 5 INJECTION INTRAVENOUS at 20:41

## 2025-02-20 RX ADMIN — APIXABAN 2.5 MG: 2.5 TABLET, FILM COATED ORAL at 20:38

## 2025-02-20 RX ADMIN — NIFEDIPINE 60 MG: 30 TABLET, EXTENDED RELEASE ORAL at 09:38

## 2025-02-20 RX ADMIN — ESCITALOPRAM 5 MG: 5 TABLET, FILM COATED ORAL at 09:38

## 2025-02-20 ASSESSMENT — PAIN DESCRIPTION - DESCRIPTORS: DESCRIPTORS: DISCOMFORT;ACHING

## 2025-02-20 ASSESSMENT — PAIN DESCRIPTION - LOCATION: LOCATION: BACK

## 2025-02-20 ASSESSMENT — PAIN SCALES - GENERAL: PAINLEVEL_OUTOF10: 7

## 2025-02-20 ASSESSMENT — PAIN - FUNCTIONAL ASSESSMENT: PAIN_FUNCTIONAL_ASSESSMENT: PREVENTS OR INTERFERES SOME ACTIVE ACTIVITIES AND ADLS

## 2025-02-20 ASSESSMENT — PAIN DESCRIPTION - ORIENTATION: ORIENTATION: MID;LOWER

## 2025-02-20 NOTE — PLAN OF CARE
Problem: Safety - Adult  Goal: Free from fall injury  Outcome: Progressing     Problem: Chronic Conditions and Co-morbidities  Goal: Patient's chronic conditions and co-morbidity symptoms are monitored and maintained or improved  Outcome: Progressing  Flowsheets (Taken 2/19/2025 1900)  Care Plan - Patient's Chronic Conditions and Co-Morbidity Symptoms are Monitored and Maintained or Improved:   Monitor and assess patient's chronic conditions and comorbid symptoms for stability, deterioration, or improvement   Collaborate with multidisciplinary team to address chronic and comorbid conditions and prevent exacerbation or deterioration   Update acute care plan with appropriate goals if chronic or comorbid symptoms are exacerbated and prevent overall improvement and discharge     Problem: Pain  Goal: Verbalizes/displays adequate comfort level or baseline comfort level  Outcome: Progressing     Problem: ABCDS Injury Assessment  Goal: Absence of physical injury  Outcome: Progressing     Problem: Skin/Tissue Integrity  Goal: Skin integrity remains intact  Description: 1.  Monitor for areas of redness and/or skin breakdown  2.  Assess vascular access sites hourly  3.  Every 4-6 hours minimum:  Change oxygen saturation probe site  4.  Every 4-6 hours:  If on nasal continuous positive airway pressure, respiratory therapy assess nares and determine need for appliance change or resting period  Outcome: Progressing  Flowsheets (Taken 2/19/2025 1900)  Skin Integrity Remains Intact:   Monitor for areas of redness and/or skin breakdown   Assess vascular access sites hourly

## 2025-02-20 NOTE — CARE COORDINATION
Referral sent to the following, waiting on response   [facility]   P   F  [facility]   P  [phone] F  Electronically signed by JOSH Wilson on 2/20/2025 at 11:11 AM

## 2025-02-20 NOTE — PLAN OF CARE
Problem: Safety - Adult  Goal: Free from fall injury  2/20/2025 1542 by Darlene Jarvis RN  Outcome: Progressing  2/20/2025 0654 by Jenny Wong RN  Outcome: Progressing     Problem: Chronic Conditions and Co-morbidities  Goal: Patient's chronic conditions and co-morbidity symptoms are monitored and maintained or improved  2/20/2025 1542 by Darlene Jarvis RN  Outcome: Progressing  Flowsheets (Taken 2/20/2025 0944)  Care Plan - Patient's Chronic Conditions and Co-Morbidity Symptoms are Monitored and Maintained or Improved: Monitor and assess patient's chronic conditions and comorbid symptoms for stability, deterioration, or improvement  2/20/2025 0654 by Jenny Wong RN  Outcome: Progressing  Flowsheets (Taken 2/19/2025 1900)  Care Plan - Patient's Chronic Conditions and Co-Morbidity Symptoms are Monitored and Maintained or Improved:   Monitor and assess patient's chronic conditions and comorbid symptoms for stability, deterioration, or improvement   Collaborate with multidisciplinary team to address chronic and comorbid conditions and prevent exacerbation or deterioration   Update acute care plan with appropriate goals if chronic or comorbid symptoms are exacerbated and prevent overall improvement and discharge     Problem: Pain  Goal: Verbalizes/displays adequate comfort level or baseline comfort level  2/20/2025 1542 by Darlene Jarvis RN  Outcome: Progressing  2/20/2025 0654 by Jenny Wong RN  Outcome: Progressing     Problem: ABCDS Injury Assessment  Goal: Absence of physical injury  2/20/2025 1542 by Darlene Jarvis RN  Outcome: Progressing  2/20/2025 0654 by Jenny Wong RN  Outcome: Progressing     Problem: Skin/Tissue Integrity  Goal: Skin integrity remains intact  Description: 1.  Monitor for areas of redness and/or skin breakdown  2.  Assess vascular access sites hourly  3.  Every 4-6 hours minimum:  Change oxygen saturation probe site  4.  Every 4-6 hours:  If on nasal continuous positive  airway pressure, respiratory therapy assess nares and determine need for appliance change or resting period  2/20/2025 1542 by Darlene Jarvis, RN  Outcome: Progressing  Flowsheets (Taken 2/20/2025 0944)  Skin Integrity Remains Intact: Monitor for areas of redness and/or skin breakdown  2/20/2025 0654 by Jenny Wong, RN  Outcome: Progressing  Flowsheets (Taken 2/19/2025 1900)  Skin Integrity Remains Intact:   Monitor for areas of redness and/or skin breakdown   Assess vascular access sites hourly     Problem: Nutrition Deficit:  Goal: Optimize nutritional status  Outcome: Progressing

## 2025-02-20 NOTE — PROGRESS NOTES
Comprehensive Nutrition Assessment    Type and Reason for Visit:  Initial    Nutrition Recommendations/Plan:   Ensure with meals.     Malnutrition Assessment:  Malnutrition Status:  Severe malnutrition (02/20/25 1034)    Context:  Chronic Illness     Findings of the 6 clinical characteristics of malnutrition:  Energy Intake:  Mild decrease in energy intake  Weight Loss:  No weight loss     Body Fat Loss:  Severe body fat loss Orbital, Triceps   Muscle Mass Loss:  Severe muscle mass loss Clavicles (pectoralis & deltoids), Temples (temporalis), Hand (interosseous)  Fluid Accumulation:  No fluid accumulation     Strength:  Not Performed    Nutrition Assessment:    Pt has severe malnutrition AEB fat and muscle wasting. Pt reports 50# wt loss, but doesn't know when this occurred, apparently has been thin for a number of years. Takes Synthroid for hypothyroidism. Reports she drinks a Boost daily. Will order Ensure and follow.    Nutrition Related Findings:    No lower dentures, reports difficulty chewing Wound Type: None       Current Nutrition Intake & Therapies:    Average Meal Intake: Unable to assess     ADULT DIET; Easy to Chew  ADULT ORAL NUTRITION SUPPLEMENT; Breakfast, AM Snack, Dinner; Standard High Calorie/High Protein Oral Supplement    Anthropometric Measures:  Height: 160 cm (5' 3\")  Ideal Body Weight (IBW): 115 lbs (52 kg)    Admission Body Weight: 45.4 kg (100 lb)  Current Body Weight: 45.4 kg (100 lb),   IBW.    Current BMI (kg/m2): 17.7                             BMI Categories: Underweight (BMI less than 22) age over 65    Estimated Daily Nutrient Needs:  Energy Requirements Based On: Kcal/kg  Weight Used for Energy Requirements: Admission  Energy (kcal/day): 5582-3251  Weight Used for Protein Requirements: Admission  Protein (g/day): 68  Method Used for Fluid Requirements: 1 ml/kcal  Fluid (ml/day): 3410-3551    Nutrition Diagnosis:   Severe malnutrition, in context of chronic, non-illness related

## 2025-02-20 NOTE — PROGRESS NOTES
Progress Note    Date:2025       Room:0409/409-02  Patient Name:Sakshi Penn     YOB: 1935     Age:89 y.o.      Subjective         No acute events noted overnight.  She has not been out of bed today.  Remains very weak and fatigued but without any additional symptoms reported today.      Summary: 89 year old lady with history of Atrial fibrillation s/p watchman, HTN, Hypothyroidism, recurrent falls, presented from home with concerns of progressive weakness per family member. Patient was recently discharged from [facility] ([facility]) 5 days prior to this admission. Son stated changes were made to patient medication as well as had issues with thyroid levels in the past couple months and has been on and off thyroid medication.  Stated while at [facility] had a fall. Had PCP appointment day prior to presentation, but was too weak to get into car, hence EMS was called and brought to the hospital. He was concerned that she had a fracture from fall.    On admission, CT L spine was obtained and no evidence of acute fracture.  Further workup was significant for severe hypothyroidism with TSH over 46 and low free T4 of 0.49.  Hypothyroidism likely related to amiodarone.  Amiodarone will be stopped for now and patient initiated on levothyroxine.  She also had some dysuria with evidence of UTI treated with empiric antibiotics.    Objective         Vitals Last 24 Hours:  TEMPERATURE:  Temp  Av °F (36.7 °C)  Min: 97.7 °F (36.5 °C)  Max: 98.4 °F (36.9 °C)  RESPIRATIONS RANGE: Resp  Av  Min: 14  Max: 18  PULSE OXIMETRY RANGE: SpO2  Av.5 %  Min: 93 %  Max: 99 %  PULSE RANGE: Pulse  Av.3  Min: 55  Max: 66  BLOOD PRESSURE RANGE: Systolic (24hrs), Av , Min:114 , Max:144   ; Diastolic (24hrs), Av, Min:42, Max:58    I/O (24Hr):    Intake/Output Summary (Last 24 hours) at 2025 1330  Last data filed at 2025 1410  Gross per 24 hour   Intake 240 ml   Output --   Net 240 ml  LA ALVES M.D. 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  --D/C amiodarone  - Continue levothyroxine at 150 mcg daily given severity, however will likely reduce levothyroxine dose on discharge given patient's advanced age, weight, risk  -- Follow free T4 levels  -- Free T4 level has trended upwards now into normal range at 1.06 today    Urinary tract infection with risk factors for multidrug-resistant organisms, recent stay at skilled nursing facility  Multiple antibiotic allergies/intolerances noted  -Continue empiric IV Zosyn, follow-up urine culture    Generalized weakness with recurrent Falls, likely exacerbated by severe hypothyroidism  CT Lumbar with no evidence of acute fracture  - Continue PT OT    Atrial fib s/p watchman  -Monitor telemetry  - Stop amiodarone  - Continue metoprolol, ensure adequate rate control with further medication adjustment as needed  --Continue Eliquis 2.5 mg twice daily for now, although given she has had Watchman procedure and history of falls would have low threshold for stopping Eliquis if has any bleeding     HTN: On Nifedipine and metoprolol, monitor with further medical management as warranted    Dispo: Will plan for SNF placement, discussed SNF referrals with social work    Status, plan of care, disposition discussed with nursing staff, social work, case management    Ellis Jean MD

## 2025-02-21 VITALS
WEIGHT: 100.9 LBS | OXYGEN SATURATION: 94 % | SYSTOLIC BLOOD PRESSURE: 104 MMHG | RESPIRATION RATE: 18 BRPM | BODY MASS INDEX: 17.88 KG/M2 | TEMPERATURE: 97.5 F | HEART RATE: 78 BPM | DIASTOLIC BLOOD PRESSURE: 64 MMHG | HEIGHT: 63 IN

## 2025-02-21 LAB — T4 FREE SERPL-MCNC: 1.24 NG/DL (ref 0.93–1.7)

## 2025-02-21 PROCEDURE — 6370000000 HC RX 637 (ALT 250 FOR IP): Performed by: HOSPITALIST

## 2025-02-21 PROCEDURE — 1200000000 HC SEMI PRIVATE

## 2025-02-21 PROCEDURE — 2500000003 HC RX 250 WO HCPCS: Performed by: NURSE PRACTITIONER

## 2025-02-21 PROCEDURE — 6360000002 HC RX W HCPCS: Performed by: STUDENT IN AN ORGANIZED HEALTH CARE EDUCATION/TRAINING PROGRAM

## 2025-02-21 PROCEDURE — 36415 COLL VENOUS BLD VENIPUNCTURE: CPT

## 2025-02-21 PROCEDURE — 84439 ASSAY OF FREE THYROXINE: CPT

## 2025-02-21 PROCEDURE — 6370000000 HC RX 637 (ALT 250 FOR IP): Performed by: NURSE PRACTITIONER

## 2025-02-21 PROCEDURE — 6370000000 HC RX 637 (ALT 250 FOR IP): Performed by: STUDENT IN AN ORGANIZED HEALTH CARE EDUCATION/TRAINING PROGRAM

## 2025-02-21 PROCEDURE — 2580000003 HC RX 258: Performed by: STUDENT IN AN ORGANIZED HEALTH CARE EDUCATION/TRAINING PROGRAM

## 2025-02-21 PROCEDURE — 2500000003 HC RX 250 WO HCPCS: Performed by: STUDENT IN AN ORGANIZED HEALTH CARE EDUCATION/TRAINING PROGRAM

## 2025-02-21 RX ORDER — LEVOTHYROXINE SODIUM 75 UG/1
75 TABLET ORAL DAILY
Qty: 90 TABLET | Refills: 0 | Status: SHIPPED | OUTPATIENT
Start: 2025-02-22

## 2025-02-21 RX ORDER — ALPRAZOLAM 0.25 MG
0.25 TABLET ORAL 2 TIMES DAILY PRN
Qty: 6 TABLET | Refills: 0 | Status: SHIPPED | OUTPATIENT
Start: 2025-02-21 | End: 2025-02-24

## 2025-02-21 RX ORDER — LEVOTHYROXINE SODIUM 75 UG/1
75 TABLET ORAL DAILY
Status: DISCONTINUED | OUTPATIENT
Start: 2025-02-22 | End: 2025-02-22 | Stop reason: HOSPADM

## 2025-02-21 RX ORDER — METOPROLOL SUCCINATE 50 MG/1
50 TABLET, EXTENDED RELEASE ORAL DAILY
Qty: 30 TABLET | Refills: 3 | Status: SHIPPED
Start: 2025-02-21

## 2025-02-21 RX ORDER — NIFEDIPINE 30 MG/1
60 TABLET, EXTENDED RELEASE ORAL DAILY
Qty: 30 TABLET | Refills: 1 | Status: SHIPPED | OUTPATIENT
Start: 2025-02-22

## 2025-02-21 RX ORDER — HYDROCODONE BITARTRATE AND ACETAMINOPHEN 5; 325 MG/1; MG/1
1 TABLET ORAL 2 TIMES DAILY PRN
Qty: 6 TABLET | Refills: 0 | Status: SHIPPED | OUTPATIENT
Start: 2025-02-21 | End: 2025-02-24

## 2025-02-21 RX ADMIN — LEVOTHYROXINE SODIUM 150 MCG: 75 TABLET ORAL at 05:36

## 2025-02-21 RX ADMIN — LACTULOSE: 10 SOLUTION ORAL at 14:52

## 2025-02-21 RX ADMIN — APIXABAN 2.5 MG: 2.5 TABLET, FILM COATED ORAL at 20:16

## 2025-02-21 RX ADMIN — DOCUSATE SODIUM 100 MG: 100 CAPSULE, LIQUID FILLED ORAL at 08:41

## 2025-02-21 RX ADMIN — POLYETHYLENE GLYCOL 3350 17 G: 17 POWDER, FOR SOLUTION ORAL at 11:48

## 2025-02-21 RX ADMIN — NIFEDIPINE 60 MG: 30 TABLET, EXTENDED RELEASE ORAL at 08:42

## 2025-02-21 RX ADMIN — Medication 2000 UNITS: at 08:41

## 2025-02-21 RX ADMIN — APIXABAN 2.5 MG: 2.5 TABLET, FILM COATED ORAL at 08:41

## 2025-02-21 RX ADMIN — METOPROLOL SUCCINATE 50 MG: 50 TABLET, EXTENDED RELEASE ORAL at 08:42

## 2025-02-21 RX ADMIN — ESCITALOPRAM 5 MG: 5 TABLET, FILM COATED ORAL at 08:42

## 2025-02-21 RX ADMIN — SODIUM CHLORIDE, PRESERVATIVE FREE 10 ML: 5 INJECTION INTRAVENOUS at 08:42

## 2025-02-21 RX ADMIN — SENNOSIDES 17.2 MG: 8.6 TABLET, COATED ORAL at 08:42

## 2025-02-21 RX ADMIN — PIPERACILLIN AND TAZOBACTAM 3375 MG: 3; .375 INJECTION, POWDER, LYOPHILIZED, FOR SOLUTION INTRAVENOUS at 02:32

## 2025-02-21 NOTE — DISCHARGE SUMMARY
Discharge Summary    NAME: Sakshi Penn  :  1935  MRN:  [id number]    Admit date:  2025  Discharge date:  25    Advance Directive: DNR    Primary Care Physician:  Jennifer Fuller DO    Discharge Diagnoses:  Principal Problem:    Severe hypothyroidism  Active Problems:    Hypothyroidism due to amiodarone    Generalized weakness    Palliative care patient    Severe malnutrition        Significant Diagnostic Studies:   CT LUMBAR SPINE WO CONTRAST    Result Date: 2025  EXAMINATION: CT LUMBAR SPINE WITHOUT CONTRAST  HISTORY: Trauma due to a fall.  Low back pain.  TECHNIQUE: Computed tomography (CT) of the lumbar spine was performed according to standard protocol without intravenous contrast. Contrast Dose: None. CT Dose Reduction Techniques Performed: Yes.  COMPARISON: None.  FINDINGS: Numbering/Segmentation: Last fully formed disk space is designated L5-S1. Alignment: Normal. Post-Surgical Changes/Hardware: No prior lumbar spine surgery.  Right hip arthroplasty visualized on the  image. Bones: No acute fracture. No chronic compression deformity. Soft Tissues: Normal. Limited Abdomen: Normal visualized portions of the kidneys and adrenals.  Abdominal aorta is not dilated. Calcification in the aorta consistent with atherosclerosis. Small bilateral pleural effusions. Visualized Lower Thoracic Spine: There is no neuroforaminal stenosis. There is no spinal canal stenosis.  Level By Level Degenerative Changes: L1-L2: Normal disc configuration.  No central or foraminal stenosis. L2-L3: Normal disc configuration.  No central or foraminal stenosis. L3-L4: Normal disc height.  Mild degenerative changes of the facet joints.  No central or foraminal stenosis. L4-L5: Disc degeneration with vacuum phenomenon visualized.  Mild hypertrophy of the facet joints.  No central or foraminal stenosis. L5-S1: Disc degeneration with vacuum phenomenon visualized.  Normal disc height.  No central stenosis.  No

## 2025-02-21 NOTE — PLAN OF CARE
Problem: Safety - Adult  Goal: Free from fall injury  Outcome: Adequate for Discharge     Problem: Chronic Conditions and Co-morbidities  Goal: Patient's chronic conditions and co-morbidity symptoms are monitored and maintained or improved  Outcome: Adequate for Discharge     Problem: Pain  Goal: Verbalizes/displays adequate comfort level or baseline comfort level  Outcome: Adequate for Discharge     Problem: ABCDS Injury Assessment  Goal: Absence of physical injury  Outcome: Adequate for Discharge     Problem: Skin/Tissue Integrity  Goal: Skin integrity remains intact  Description: 1.  Monitor for areas of redness and/or skin breakdown  2.  Assess vascular access sites hourly  3.  Every 4-6 hours minimum:  Change oxygen saturation probe site  4.  Every 4-6 hours:  If on nasal continuous positive airway pressure, respiratory therapy assess nares and determine need for appliance change or resting period  Outcome: Adequate for Discharge     Problem: Nutrition Deficit:  Goal: Optimize nutritional status  Outcome: Adequate for Discharge

## 2025-02-21 NOTE — PROGRESS NOTES
02/21/25 1054   Encounter Summary   Encounter Overview/Reason Spiritual/Emotional Needs   Encounter Code  Assessment by  services   Service Provided For Patient   Referral/Consult From Palliative Care   Complexity of Encounter Moderate   Begin Time 0945   End Time  1000   Total Time Calculated 15 min   Spiritual/Emotional needs   Type Spiritual Support   Palliative Care   Type Palliative Care, Follow-up   Assessment/Intervention/Outcome   Assessment Compromised coping;Concerns with suffering   Intervention Active listening;Prayer (assurance of)/[place];Sustaining Presence/Ministry of presence   Outcome Acceptance;Expressed Gratitude   Plan and Referrals   Plan/Referrals Continue to visit, (comment);Continue Support (comment)   Does the patient have a [facility] PCP? Yes    to follow up after discharge? No       This  visited with pt to follow up with palliative care and provide spiritual care. Pt says she attends [facility] and her zabrina is very important to her. This  provided spiritual care with sustaining presence, a listening ear, support, and prayer.   Pt expressed gratitude for spiritual care.       Spiritual Health History and Assessment/Progress Note  [facility]    (P) Spiritual/Emotional Needs,  ,  ,      Name: Sakshi Penn MRN: [id number]    Age: 89 y.o.     Sex: female   Language: English   Zoroastrian: Evangelical   Severe hypothyroidism     Date: 2/21/2025            Total Time Calculated: (P) 15 min              Spiritual Assessment continued in [facility] 4 ONCOLOGY UNIT        Referral/Consult From: (P) Palliative Care   Encounter Overview/Reason: (P) Spiritual/Emotional Needs  Service Provided For: (P) Patient    Zabrina, Belief, Meaning:   Patient identifies as spiritual and is connected with a zabrina tradition or spiritual practice  Family/Friends No family/friends present      Importance and Influence:  Patient has spiritual/personal beliefs that

## 2025-02-21 NOTE — CARE COORDINATION
02/21/25 1126   IMM Letter   IMM Letter given to Patient/Family/Significant other/Guardian/POA/by: jorge carlin sw   IMM Letter date given: 02/21/25   IMM Letter time given: 1105     Second IMM given to patient and explained with patient verbalizing understanding.  All questions and concerns addressed     Signed letter placed in pt soft chart   Patient declined waiting 4 hr period prior to discharge.   Electronically signed by Jorge Carlin on 2/21/2025 at 11:27 AM

## 2025-02-21 NOTE — PLAN OF CARE
Problem: Safety - Adult  Goal: Free from fall injury  2/20/2025 2253 by Renu Fernando RN  Outcome: Progressing  2/20/2025 1542 by Darlene Jarvis RN  Outcome: Progressing     Problem: Chronic Conditions and Co-morbidities  Goal: Patient's chronic conditions and co-morbidity symptoms are monitored and maintained or improved  2/20/2025 2253 by Renu Fernando RN  Outcome: Progressing  2/20/2025 1542 by Darlene Jarvis RN  Outcome: Progressing  Flowsheets (Taken 2/20/2025 0953)  Care Plan - Patient's Chronic Conditions and Co-Morbidity Symptoms are Monitored and Maintained or Improved: Monitor and assess patient's chronic conditions and comorbid symptoms for stability, deterioration, or improvement     Problem: Pain  Goal: Verbalizes/displays adequate comfort level or baseline comfort level  2/20/2025 2253 by Renu Fernando RN  Outcome: Progressing  2/20/2025 1542 by Darlene Jarvis RN  Outcome: Progressing     Problem: Pain  Goal: Verbalizes/displays adequate comfort level or baseline comfort level  2/20/2025 2253 by Renu Fernando RN  Outcome: Progressing  2/20/2025 1542 by Darlene Jarvis RN  Outcome: Progressing     Problem: ABCDS Injury Assessment  Goal: Absence of physical injury  2/20/2025 2253 by Renu Fernando RN  Outcome: Progressing  2/20/2025 1542 by Darlene Jarvis RN  Outcome: Progressing     Problem: Skin/Tissue Integrity  Goal: Skin integrity remains intact  Description: 1.  Monitor for areas of redness and/or skin breakdown  2.  Assess vascular access sites hourly  3.  Every 4-6 hours minimum:  Change oxygen saturation probe site  4.  Every 4-6 hours:  If on nasal continuous positive airway pressure, respiratory therapy assess nares and determine need for appliance change or resting period  2/20/2025 2253 by Renu Fernando RN  Outcome: Progressing  2/20/2025 1542 by Darlene Jarvis RN  Outcome: Progressing  Flowsheets  Taken 2/20/2025 1542  Skin Integrity Remains Intact: Monitor for areas of redness  and/or skin breakdown  Taken 2/20/2025 0944  Skin Integrity Remains Intact: Monitor for areas of redness and/or skin breakdown     Problem: Nutrition Deficit:  Goal: Optimize nutritional status  2/20/2025 2253 by Renu Fernando, RN  Outcome: Progressing  2/20/2025 1542 by Darlene Jarvis, RN  Outcome: Progressing

## 2025-02-22 LAB — BACTERIA UR CULT: NORMAL

## 2025-02-22 NOTE — PLAN OF CARE
Problem: Safety - Adult  Goal: Free from fall injury  2/22/2025 0053 by Renu Fernando RN  Outcome: Completed  2/21/2025 1616 by Priya Goldman RN  Outcome: Adequate for Discharge     Problem: Chronic Conditions and Co-morbidities  Goal: Patient's chronic conditions and co-morbidity symptoms are monitored and maintained or improved  2/22/2025 0053 by Renu Fernando RN  Outcome: Completed  2/21/2025 1616 by Priya Goldman RN  Outcome: Adequate for Discharge     Problem: Pain  Goal: Verbalizes/displays adequate comfort level or baseline comfort level  2/22/2025 0053 by Renu Fernando RN  Outcome: Completed  2/21/2025 1616 by Priya Goldman RN  Outcome: Adequate for Discharge     Problem: ABCDS Injury Assessment  Goal: Absence of physical injury  2/22/2025 0053 by Renu Fernando RN  Outcome: Completed  2/21/2025 1616 by Priya Goldman RN  Outcome: Adequate for Discharge     Problem: Skin/Tissue Integrity  Goal: Skin integrity remains intact  Description: 1.  Monitor for areas of redness and/or skin breakdown  2.  Assess vascular access sites hourly  3.  Every 4-6 hours minimum:  Change oxygen saturation probe site  4.  Every 4-6 hours:  If on nasal continuous positive airway pressure, respiratory therapy assess nares and determine need for appliance change or resting period  2/22/2025 0053 by Renu Fernando RN  Outcome: Completed  2/21/2025 1616 by Priya Goldman RN  Outcome: Adequate for Discharge     Problem: Nutrition Deficit:  Goal: Optimize nutritional status  2/22/2025 0053 by Renu Fernando RN  Outcome: Completed  2/21/2025 1616 by Priya Goldman RN  Outcome: Adequate for Discharge

## (undated) DEVICE — Device: Brand: PROTRACK PIGTAIL WIRE

## (undated) DEVICE — CVR CONN CATH SWIFTLINK ACUSON

## (undated) DEVICE — PINNACLE INTRODUCER SHEATH: Brand: PINNACLE

## (undated) DEVICE — TORFLEX TRANSSEPTAL SHEATH; TRANSSEPTAL DILATOR; J-TIP GUIDEWIRE: Brand: TORFLEX TRANSSEPTAL GUIDING SHEATH

## (undated) DEVICE — Device: Brand: MEDEX

## (undated) DEVICE — Device

## (undated) DEVICE — CATH F6INF PIG 145 110CM 6SH: Brand: INFINITI

## (undated) DEVICE — CANN NASL ETCO2 LO/FLO A/

## (undated) DEVICE — SOL IRR NACL 0.9PCT BT 1000ML

## (undated) DEVICE — ACCESS SHEATH WITH DILATOR: Brand: WATCHMAN FXD CURVE™ ACCESS SYSTEM

## (undated) DEVICE — PK CATH CARD 30 CA/4

## (undated) DEVICE — KT NDL GUIDE STRL 18GA

## (undated) DEVICE — SOLIDIFIER LIQUI LOC PLUS 2000CC

## (undated) DEVICE — PERCLOSE™ PROSTYLE™ SUTURE-MEDIATED CLOSURE AND REPAIR SYSTEM: Brand: PERCLOSE™ PROSTYLE™

## (undated) DEVICE — PAD, DEFIB, ADULT, RADIOTRANS, PHYSIO: Brand: MEDLINE

## (undated) DEVICE — Device: Brand: NRG TRANSSEPTAL NEEDLE

## (undated) DEVICE — INTRO PERFORMER CHECKFLO/LG RAD/BND NO/GW 14F .038IN 30CM